# Patient Record
Sex: MALE | Race: WHITE | Employment: OTHER | URBAN - NONMETROPOLITAN AREA
[De-identification: names, ages, dates, MRNs, and addresses within clinical notes are randomized per-mention and may not be internally consistent; named-entity substitution may affect disease eponyms.]

---

## 2017-10-23 ENCOUNTER — APPOINTMENT (OUTPATIENT)
Dept: GENERAL RADIOLOGY | Age: 57
End: 2017-10-23
Payer: MEDICARE

## 2017-10-23 ENCOUNTER — APPOINTMENT (OUTPATIENT)
Dept: CT IMAGING | Age: 57
End: 2017-10-23
Payer: MEDICARE

## 2017-10-23 ENCOUNTER — HOSPITAL ENCOUNTER (EMERGENCY)
Age: 57
Discharge: HOME | End: 2017-10-23
Attending: FAMILY MEDICINE
Payer: MEDICARE

## 2017-10-23 VITALS
BODY MASS INDEX: 30.44 KG/M2 | RESPIRATION RATE: 18 BRPM | HEART RATE: 72 BPM | DIASTOLIC BLOOD PRESSURE: 102 MMHG | SYSTOLIC BLOOD PRESSURE: 154 MMHG | WEIGHT: 250 LBS | TEMPERATURE: 98.1 F | HEIGHT: 76 IN | OXYGEN SATURATION: 97 %

## 2017-10-23 DIAGNOSIS — R07.9 CHEST PAIN, UNSPECIFIED TYPE: ICD-10-CM

## 2017-10-23 DIAGNOSIS — I10 ESSENTIAL HYPERTENSION: Primary | ICD-10-CM

## 2017-10-23 LAB
ACETAMINOPHEN LEVEL: < 5 UG/ML (ref 0–20)
ALBUMIN SERPL-MCNC: 4.4 G/DL (ref 3.5–5.1)
ALLEN TEST: POSITIVE
ALP BLD-CCNC: 59 U/L (ref 38–126)
ALT SERPL-CCNC: 26 U/L (ref 11–66)
AMMONIA: 28 UMOL/L (ref 11–60)
AMPHETAMINE+METHAMPHETAMINE URINE SCREEN: NEGATIVE
ANION GAP SERPL CALCULATED.3IONS-SCNC: 15 MEQ/L (ref 8–16)
ANISOCYTOSIS: ABNORMAL
AST SERPL-CCNC: 41 U/L (ref 5–40)
BARBITURATE QUANTITATIVE URINE: NEGATIVE
BASE EXCESS (CALCULATED): -1.3 MMOL/L (ref -2.5–2.5)
BASOPHILS # BLD: 2 %
BASOPHILS ABSOLUTE: 0.2 THOU/MM3 (ref 0–0.1)
BENZODIAZEPINE QUANTITATIVE URINE: NEGATIVE
BILIRUB SERPL-MCNC: 0.4 MG/DL (ref 0.3–1.2)
BILIRUBIN DIRECT: < 0.2 MG/DL (ref 0–0.3)
BILIRUBIN URINE: NEGATIVE
BLOOD, URINE: NEGATIVE
BUN BLDV-MCNC: 14 MG/DL (ref 7–22)
CALCIUM SERPL-MCNC: 9 MG/DL (ref 8.5–10.5)
CANNABINOID QUANTITATIVE URINE: NEGATIVE
CHARACTER, URINE: CLEAR
CHLORIDE BLD-SCNC: 97 MEQ/L (ref 98–111)
CO2: 22 MEQ/L (ref 23–33)
COCAINE METABOLITE QUANTITATIVE URINE: NEGATIVE
COLLECTED BY:: ABNORMAL
COLOR: YELLOW
CREAT SERPL-MCNC: 1.4 MG/DL (ref 0.4–1.2)
D-DIMER QUANTITATIVE: < 215 NG/ML FEU (ref 0–500)
DEVICE: ABNORMAL
EOSINOPHIL # BLD: 0.6 %
EOSINOPHILS ABSOLUTE: 0 THOU/MM3 (ref 0–0.4)
ETHYL ALCOHOL, SERUM: < 0.01 %
FLU A ANTIGEN: NEGATIVE
FLU B ANTIGEN: NEGATIVE
GFR SERPL CREATININE-BSD FRML MDRD: 52 ML/MIN/1.73M2
GLUCOSE BLD-MCNC: 93 MG/DL
GLUCOSE BLD-MCNC: 93 MG/DL (ref 70–108)
GLUCOSE BLD-MCNC: 95 MG/DL (ref 70–108)
GLUCOSE URINE: NEGATIVE MG/DL
HCO3: 23 MMOL/L (ref 23–28)
HCT VFR BLD CALC: 37 % (ref 42–52)
HEMOGLOBIN: 12.4 GM/DL (ref 14–18)
KETONES, URINE: NEGATIVE
LEUKOCYTE ESTERASE, URINE: NEGATIVE
LYMPHOCYTES # BLD: 13 %
LYMPHOCYTES ABSOLUTE: 1.1 THOU/MM3 (ref 1–4.8)
MAGNESIUM: 2 MG/DL (ref 1.6–2.4)
MCH RBC QN AUTO: 33 PG (ref 27–31)
MCHC RBC AUTO-ENTMCNC: 33.4 GM/DL (ref 33–37)
MCV RBC AUTO: 98.9 FL (ref 80–94)
MONOCYTES # BLD: 7.7 %
MONOCYTES ABSOLUTE: 0.6 THOU/MM3 (ref 0.4–1.3)
NITRITE, URINE: NEGATIVE
NUCLEATED RED BLOOD CELLS: 0 /100 WBC
O2 SATURATION: 97 %
OPIATES, URINE: NEGATIVE
OSMOLALITY CALCULATION: 268.5 MOSMOL/KG (ref 275–300)
OXYCODONE: NEGATIVE
PCO2: 34 MMHG (ref 35–45)
PDW BLD-RTO: 14.9 % (ref 11.5–14.5)
PH BLOOD GAS: 7.43 (ref 7.35–7.45)
PH UA: 7.5
PHENCYCLIDINE QUANTITATIVE URINE: NEGATIVE
PLATELET # BLD: 135 THOU/MM3 (ref 130–400)
PLATELET ESTIMATE: ADEQUATE
PMV BLD AUTO: 8.2 MCM (ref 7.4–10.4)
PO2: 84 MMHG (ref 71–104)
POIKILOCYTES: SLIGHT
POTASSIUM SERPL-SCNC: 4.5 MEQ/L (ref 3.5–5.2)
PRO-BNP: 132.5 PG/ML (ref 0–900)
PROTEIN UA: NEGATIVE
RBC # BLD: 3.74 MILL/MM3 (ref 4.7–6.1)
RBC # BLD: ABNORMAL 10*6/UL
SALICYLATE, SERUM: 1.6 MG/DL (ref 2–10)
SCAN OF BLOOD SMEAR: NORMAL
SEG NEUTROPHILS: 76.7 %
SEGMENTED NEUTROPHILS ABSOLUTE COUNT: 6.3 THOU/MM3 (ref 1.8–7.7)
SODIUM BLD-SCNC: 134 MEQ/L (ref 135–145)
SOURCE, BLOOD GAS: ABNORMAL
SPECIFIC GRAVITY, URINE: 1.01 (ref 1–1.03)
TOTAL PROTEIN: 7 G/DL (ref 6.1–8)
TROPONIN T: < 0.01 NG/ML
TROPONIN T: < 0.01 NG/ML
TSH SERPL DL<=0.05 MIU/L-ACNC: 1.48 UIU/ML (ref 0.4–4.2)
UROBILINOGEN, URINE: 1 EU/DL
WBC # BLD: 8.2 THOU/MM3 (ref 4.8–10.8)

## 2017-10-23 PROCEDURE — 83735 ASSAY OF MAGNESIUM: CPT

## 2017-10-23 PROCEDURE — 82248 BILIRUBIN DIRECT: CPT

## 2017-10-23 PROCEDURE — 93005 ELECTROCARDIOGRAM TRACING: CPT

## 2017-10-23 PROCEDURE — 96375 TX/PRO/DX INJ NEW DRUG ADDON: CPT

## 2017-10-23 PROCEDURE — 6370000000 HC RX 637 (ALT 250 FOR IP): Performed by: FAMILY MEDICINE

## 2017-10-23 PROCEDURE — 80307 DRUG TEST PRSMV CHEM ANLYZR: CPT

## 2017-10-23 PROCEDURE — 80053 COMPREHEN METABOLIC PANEL: CPT

## 2017-10-23 PROCEDURE — 84443 ASSAY THYROID STIM HORMONE: CPT

## 2017-10-23 PROCEDURE — 6360000002 HC RX W HCPCS: Performed by: FAMILY MEDICINE

## 2017-10-23 PROCEDURE — 36415 COLL VENOUS BLD VENIPUNCTURE: CPT

## 2017-10-23 PROCEDURE — 85379 FIBRIN DEGRADATION QUANT: CPT

## 2017-10-23 PROCEDURE — 6360000004 HC RX CONTRAST MEDICATION: Performed by: FAMILY MEDICINE

## 2017-10-23 PROCEDURE — 82140 ASSAY OF AMMONIA: CPT

## 2017-10-23 PROCEDURE — 83880 ASSAY OF NATRIURETIC PEPTIDE: CPT

## 2017-10-23 PROCEDURE — 82803 BLOOD GASES ANY COMBINATION: CPT

## 2017-10-23 PROCEDURE — 96374 THER/PROPH/DIAG INJ IV PUSH: CPT

## 2017-10-23 PROCEDURE — 96361 HYDRATE IV INFUSION ADD-ON: CPT

## 2017-10-23 PROCEDURE — 99285 EMERGENCY DEPT VISIT HI MDM: CPT

## 2017-10-23 PROCEDURE — 84484 ASSAY OF TROPONIN QUANT: CPT

## 2017-10-23 PROCEDURE — 71275 CT ANGIOGRAPHY CHEST: CPT

## 2017-10-23 PROCEDURE — 81003 URINALYSIS AUTO W/O SCOPE: CPT

## 2017-10-23 PROCEDURE — 36600 WITHDRAWAL OF ARTERIAL BLOOD: CPT

## 2017-10-23 PROCEDURE — 87804 INFLUENZA ASSAY W/OPTIC: CPT

## 2017-10-23 PROCEDURE — 71010 XR CHEST PORTABLE: CPT

## 2017-10-23 PROCEDURE — G0480 DRUG TEST DEF 1-7 CLASSES: HCPCS

## 2017-10-23 PROCEDURE — 82948 REAGENT STRIP/BLOOD GLUCOSE: CPT

## 2017-10-23 PROCEDURE — 2500000003 HC RX 250 WO HCPCS: Performed by: FAMILY MEDICINE

## 2017-10-23 PROCEDURE — 2580000003 HC RX 258: Performed by: FAMILY MEDICINE

## 2017-10-23 PROCEDURE — 85025 COMPLETE CBC W/AUTO DIFF WBC: CPT

## 2017-10-23 RX ORDER — LABETALOL HYDROCHLORIDE 5 MG/ML
10 INJECTION, SOLUTION INTRAVENOUS ONCE
Status: COMPLETED | OUTPATIENT
Start: 2017-10-23 | End: 2017-10-23

## 2017-10-23 RX ORDER — 0.9 % SODIUM CHLORIDE 0.9 %
1000 INTRAVENOUS SOLUTION INTRAVENOUS ONCE
Status: COMPLETED | OUTPATIENT
Start: 2017-10-23 | End: 2017-10-23

## 2017-10-23 RX ORDER — FOLIC ACID 1 MG/1
1 TABLET ORAL ONCE
Status: COMPLETED | OUTPATIENT
Start: 2017-10-23 | End: 2017-10-23

## 2017-10-23 RX ORDER — ATENOLOL 25 MG/1
25 TABLET ORAL ONCE
Status: COMPLETED | OUTPATIENT
Start: 2017-10-23 | End: 2017-10-23

## 2017-10-23 RX ORDER — THIAMINE HYDROCHLORIDE 100 MG/ML
100 INJECTION, SOLUTION INTRAMUSCULAR; INTRAVENOUS ONCE
Status: COMPLETED | OUTPATIENT
Start: 2017-10-23 | End: 2017-10-23

## 2017-10-23 RX ORDER — QUETIAPINE 200 MG/1
400 TABLET, FILM COATED, EXTENDED RELEASE ORAL ONCE
Status: COMPLETED | OUTPATIENT
Start: 2017-10-23 | End: 2017-10-23

## 2017-10-23 RX ADMIN — IOPAMIDOL 80 ML: 755 INJECTION, SOLUTION INTRAVENOUS at 20:03

## 2017-10-23 RX ADMIN — LABETALOL HYDROCHLORIDE 10 MG: 5 INJECTION, SOLUTION INTRAVENOUS at 21:01

## 2017-10-23 RX ADMIN — FOLIC ACID 1 MG: 1 TABLET ORAL at 18:28

## 2017-10-23 RX ADMIN — QUETIAPINE FUMARATE 400 MG: 200 TABLET, EXTENDED RELEASE ORAL at 20:09

## 2017-10-23 RX ADMIN — ATENOLOL 25 MG: 25 TABLET ORAL at 20:09

## 2017-10-23 RX ADMIN — THIAMINE HYDROCHLORIDE 100 MG: 100 INJECTION, SOLUTION INTRAMUSCULAR; INTRAVENOUS at 18:28

## 2017-10-23 RX ADMIN — SODIUM CHLORIDE 1000 ML: 9 INJECTION, SOLUTION INTRAVENOUS at 18:55

## 2017-10-23 ASSESSMENT — PAIN DESCRIPTION - ORIENTATION: ORIENTATION: RIGHT;LEFT

## 2017-10-23 ASSESSMENT — ENCOUNTER SYMPTOMS
CHEST TIGHTNESS: 1
SORE THROAT: 0
BACK PAIN: 0
EYE REDNESS: 0
RHINORRHEA: 0
VOMITING: 0
ABDOMINAL PAIN: 0
SHORTNESS OF BREATH: 1
COUGH: 0
WHEEZING: 0
NAUSEA: 0
DIARRHEA: 0
EYE DISCHARGE: 0

## 2017-10-23 ASSESSMENT — PAIN DESCRIPTION - LOCATION: LOCATION: CHEST

## 2017-10-23 ASSESSMENT — PAIN SCALES - GENERAL: PAINLEVEL_OUTOF10: 6

## 2017-10-23 ASSESSMENT — PAIN DESCRIPTION - PAIN TYPE: TYPE: ACUTE PAIN

## 2017-10-23 ASSESSMENT — PAIN DESCRIPTION - DESCRIPTORS: DESCRIPTORS: TIGHTNESS

## 2017-10-23 NOTE — ED TRIAGE NOTES
Patient brought in by [address] [place] [address] Dr EMS for complaint of chest pain, sweating, shaking, flush, shortness of breath, and rapid heart rate. Patient reports he has a history of a rapid heart rate, denies atrial fib. Currently reports chest tightness, 6/10. Reports he only feels flushed otherwise.

## 2017-10-23 NOTE — ED PROVIDER NOTES
[facility]  eMERGENCY dEPARTMENT eNCOUnter          [address] [facility]       Chief Complaint   Patient presents with    Chest Pain    Shaking    Excessive Sweating    Shortness of Breath       Nurses Notes reviewed and I agree except as noted in the HPI. HISTORY OF PRESENT ILLNESS    Clarice Amos is a 61 y.o. male who presents to the Emergency Department via EMS for the evaluation of chest tightness. The patient states he was driving at 0:44DE today when his face started to get flushed, then he has the shakes, chest tightness, diaphoresis, and shortness of breath. He states he went home and his mom called the ambulance. The patient states he has heart palpitations today, and he has been treated for a a rapid heart beat for two years and he has a monitor. He takes atenolol for that. The patient states this is the first time he had had this before. He sees Dr. Neo Kong in [facility]. He takes Asprin every day. He has hypertension and has bipolar disorder. Patient denies a history of MI, DVT/PE, heart cath, heart stents, or weakened heart. He smokes cigarettes, but doesn't drink or do illicit drugs. Patient reported that his symptoms has much improved. The HPI was provided by the patient. REVIEW OF SYSTEMS     Review of Systems   Constitutional: Positive for diaphoresis. Negative for appetite change, chills, fatigue and fever. HENT: Negative for congestion, ear pain, rhinorrhea and sore throat. Eyes: Negative for discharge, redness and visual disturbance. Respiratory: Positive for chest tightness and shortness of breath. Negative for cough and wheezing. Cardiovascular: Positive for palpitations. Negative for chest pain and leg swelling. Gastrointestinal: Negative for abdominal pain, diarrhea, nausea and vomiting. Genitourinary: Negative for decreased urine volume, difficulty urinating and dysuria.    Musculoskeletal: Negative for arthralgias, back pain, joint swelling and neck pain. Skin: Negative for pallor and rash. Allergic/Immunologic: Negative for environmental allergies. Neurological: Negative for dizziness, syncope, weakness, light-headedness and headaches. Shakes   Hematological: Negative for adenopathy. Psychiatric/Behavioral: Negative for agitation, confusion, dysphoric mood and suicidal ideas. The patient is not nervous/anxious. PAST MEDICAL HISTORY    has a past medical history of Alcohol abuse; Cancer ([facility] [address] Depression; Drug abuse; GERD (gastroesophageal reflux disease); Hyperlipidemia; and Hypertension. SURGICAL HISTORY      has a past surgical history that includes other surgical history (12/28/2012); fracture surgery; and Tonsillectomy. CURRENT MEDICATIONS       Previous Medications    ACETAMINOPHEN 650 MG TABS    Take 650 mg by mouth every 4 hours as needed. AMLODIPINE (NORVASC) 10 MG TABLET    Take 1 tablet by mouth daily. CARBAMAZEPINE (TEGRETOL) 200 MG TABLET    Take 200 mg by mouth 2 times daily. CLONIDINE (CATAPRES) 0.1 MG TABLET    Take 1 tablet by mouth 2 times daily. FLUOXETINE (PROZAC) 40 MG CAPSULE    Take 1 capsule by mouth daily. FOLIC ACID (FOLVITE) 1 MG TABLET    Take 1 tablet by mouth daily. LISINOPRIL-HYDROCHLOROTHIAZIDE (PRINZIDE;ZESTORETIC) 20-12.5 MG PER TABLET    Take 1 tablet by mouth daily. METOPROLOL (LOPRESSOR) 100 MG TABLET    Take 1 tablet by mouth 2 times daily. MULTIPLE VITAMINS-MINERALS (THERAPEUTIC MULTIVITAMIN-MINERALS) TABLET    Take 1 tablet by mouth daily. NICOTINE (NICODERM CQ) 14 MG/24HR    Place 1 patch onto the skin daily. PANTOPRAZOLE (PROTONIX) 40 MG TABLET    Take 1 tablet by mouth 2 times daily (before meals). PRAVASTATIN (PRAVACHOL) 40 MG TABLET    Take 40 mg by mouth nightly. QUETIAPINE (SEROQUEL) 400 MG TABLET    Take 400 mg by mouth 2 times daily. TRAZODONE (DESYREL) 100 MG TABLET    Take 1 tablet by mouth nightly as needed for Sleep.     VITAMIN B-1 100 MG TABLET    Take 1 tablet by mouth daily. ALLERGIES     has No Known Allergies. FAMILY HISTORY     indicated that his mother is alive. He indicated that his father is . family history includes COPD in his father; Heart Disease in his father and mother. SOCIAL HISTORY      reports that he has been smoking Cigarettes. He has been smoking about 0.50 packs per day. He has never used smokeless tobacco. He reports that he drinks alcohol. He reports that he uses drugs, including Cocaine. PHYSICAL EXAM     INITIAL VITALS:  height is 6' 4\" (1.93 m) and weight is 250 lb (113.4 kg). His oral temperature is 98.1 °F (36.7 °C). His blood pressure is 154/102 (abnormal) and his pulse is 72. His respiration is 18 and oxygen saturation is 97%. Physical Exam   Constitutional: He is oriented to person, place, and time. He appears well-developed and well-nourished. HENT:   Head: Normocephalic and atraumatic. Right Ear: External ear normal.   Left Ear: External ear normal.   Wax in both TM's. Eyes: Conjunctivae are normal. Right eye exhibits no discharge. Left eye exhibits no discharge. No scleral icterus. Neck: Normal range of motion. Neck supple. No JVD present. Cardiovascular: Normal rate, regular rhythm and normal heart sounds. Exam reveals no gallop and no friction rub. No murmur heard. Pulmonary/Chest: Effort normal. No stridor. No respiratory distress. Abdominal: Soft. He exhibits no distension. Musculoskeletal: Normal range of motion. He exhibits no edema. Neurological: He is alert and oriented to person, place, and time. He exhibits normal muscle tone. GCS eye subscore is 4. GCS verbal subscore is 5. GCS motor subscore is 6. Skin: Skin is warm and dry. He is not diaphoretic. No erythema. Psychiatric: He has a normal mood and affect. His behavior is normal.   Nursing note and vitals reviewed. DIFFERENTIAL DIAGNOSIS:   ACS, arrhythmia, PE    DIAGNOSTIC RESULTS     EKG:  All EKG's are interpreted by the Emergency Department Physician who either signs or Co-signs this chart in the absence of a cardiologist.  EKG interpreted by Marcie Starr MD:    Vent. Rate: 80 bpm  WI interval: 178 ms  QRS duration: 100 ms  QTc: 426 ms  P-R-T axes: 38, -17, 6  Normal sinus rhythm   Incomplete right BBB  No STEMI    RADIOLOGY: non-plain film images(s) such as CT, Ultrasound and MRI are read by the radiologist.    CTA Chest W WO Contrast   Final Result   1. No CT evidence of pulmonary embolism. 2. No acute intrathoracic findings. Final report electronically signed by Dr. Wilhemenia Mortimer on 10/23/2017 8:22 PM      XR Chest Portable   Final Result   Stable radiographic appearance of the chest. No evidence of an acute process. **This report has been created using voice recognition software. It may contain minor errors which are inherent in voice recognition technology. **      Final report electronically signed by Dr. Isma Gonzales on 10/23/2017 4:53 PM          LABS:   Labs Reviewed   CBC WITH AUTO DIFFERENTIAL - Abnormal; Notable for the following:        Result Value    RBC 3.74 (*)     Hemoglobin 12.4 (*)     Hematocrit 37.0 (*)     MCV 98.9 (*)     MCH 33.0 (*)     RDW 14.9 (*)     Basophils # 0.2 (*)     All other components within normal limits   BASIC METABOLIC PANEL - Abnormal; Notable for the following:     Sodium 134 (*)     Chloride 97 (*)     CO2 22 (*)     CREATININE 1.4 (*)     All other components within normal limits   SALICYLATE LEVEL - Abnormal; Notable for the following:     Salicylate, Serum 1.6 (*)     All other components within normal limits   HEPATIC FUNCTION PANEL - Abnormal; Notable for the following:     AST 41 (*)     All other components within normal limits   GLOMERULAR FILTRATION RATE, ESTIMATED - Abnormal; Notable for the following:     Est, Glom Filt Rate 52 (*)     All other components within normal limits   OSMOLALITY - Abnormal; Notable for the following:     Osmolality Calc 268.5 (*)     All other components within normal limits   BLOOD GAS, ARTERIAL - Abnormal; Notable for the following:     PCO2 34 (*)     All other components within normal limits   POCT GLUCOSE - Normal   RAPID INFLUENZA A/B ANTIGENS   D-DIMER, QUANTITATIVE   TSH WITH REFLEX   MAGNESIUM   URINE RT REFLEX TO CULTURE   URINE DRUG SCREEN   ETHANOL   TROPONIN   BRAIN NATRIURETIC PEPTIDE   ACETAMINOPHEN LEVEL   AMMONIA   SCAN OF BLOOD SMEAR   ANION GAP   TROPONIN   POCT GLUCOSE       EMERGENCY DEPARTMENT COURSE:   Vitals:    Vitals:    10/23/17 1901 10/23/17 2009 10/23/17 2059 10/23/17 2106   BP: (!) 172/108 (!) 171/119 (!) 164/113 (!) 154/102   Pulse: 72 75 72 72   Resp: 12   18   Temp:       TempSrc:       SpO2: 99%  98% 97%   Weight:       Height:           4:06 PM: The patient was seen and evaluated. Seen and evaluated in the emergency chest pain all other Symptoms as mentioned above. Exam is mentioned above. Labwork was done which showed negative troponin by 2, TSH within normal.negative d-dimer, All other lab work as mentioned above. Chest x-ray was ordered which was negative. CT chest was done which was negative for any dissection or pulmonary embolism. Patient's symptoms has resolved during his ER stay. Patient was offered follow-up with cardiology. Patient stated that he has a cardiologist at an outside hospital and has an appointment with him and he preferred to follow up with him. He also has a loop recorder regarding his tachycardia which she follows up with his outside hospital cardiologist.    Return precautions were discussed with the patient and the need to come back immediately to the emergency. Patient verbalized understanding agreed to plan of care, he was discharged home in a stable condition to follow up with his cardiologist and primary care physician. FINAL IMPRESSION      1. Essential hypertension    2.  Chest pain, resolved DISPOSITION/PLAN   Discharge     PATIENT REFERRED TO:  Jaziel Alcala MD  [address]  Ria Mcginnis [address] [place] [address]  [phone]    Schedule an appointment as soon as possible for a visit in 2 days        DISCHARGE MEDICATIONS:  New Prescriptions    No medications on file       (Please note that portions of this note were completed with a voice recognition program.  Efforts were made to edit the dictations but occasionally words are mis-transcribed.)    Scribe:  Josey Messina 10/23/17 4:06 PM Scribing for and in the presence of Cecelia Mar MD.    Signed by: José Miguel Carvajal, 10/23/17 10:07 PM    Provider:  I personally performed the services described in the documentation, reviewed and edited the documentation which was dictated to the scribe in my presence, and it accurately records my words and actions.     Cecelia Mar MD 10/23/17 10:07 PM        Cecelia Mar MD  10/23/17 [id number]

## 2017-10-24 LAB
EKG ATRIAL RATE: 80 BPM
EKG P AXIS: 38 DEGREES
EKG P-R INTERVAL: 178 MS
EKG Q-T INTERVAL: 370 MS
EKG QRS DURATION: 100 MS
EKG QTC CALCULATION (BAZETT): 426 MS
EKG R AXIS: -17 DEGREES
EKG T AXIS: 6 DEGREES
EKG VENTRICULAR RATE: 80 BPM

## 2019-08-13 ENCOUNTER — HOSPITAL ENCOUNTER (EMERGENCY)
Age: 59
Discharge: HOME | End: 2019-08-13
Payer: MEDICARE

## 2019-08-13 ENCOUNTER — HOSPITAL ENCOUNTER (EMERGENCY)
Dept: GENERAL RADIOLOGY | Age: 59
Discharge: HOME | End: 2019-08-13
Payer: MEDICARE

## 2019-08-13 VITALS
DIASTOLIC BLOOD PRESSURE: 94 MMHG | SYSTOLIC BLOOD PRESSURE: 142 MMHG | TEMPERATURE: 97.3 F | RESPIRATION RATE: 18 BRPM | OXYGEN SATURATION: 97 % | HEART RATE: 88 BPM

## 2019-08-13 DIAGNOSIS — M79.18 INTERCOSTAL MUSCLE PAIN: ICD-10-CM

## 2019-08-13 DIAGNOSIS — S20.212A CONTUSION OF RIBS, LEFT, INITIAL ENCOUNTER: Primary | ICD-10-CM

## 2019-08-13 PROCEDURE — 71101 X-RAY EXAM UNILAT RIBS/CHEST: CPT

## 2019-08-13 PROCEDURE — 99213 OFFICE O/P EST LOW 20 MIN: CPT

## 2019-08-13 PROCEDURE — 99203 OFFICE O/P NEW LOW 30 MIN: CPT | Performed by: NURSE PRACTITIONER

## 2019-08-13 RX ORDER — CYCLOBENZAPRINE HCL 10 MG
10 TABLET ORAL 3 TIMES DAILY PRN
Qty: 15 TABLET | Refills: 0 | Status: SHIPPED | OUTPATIENT
Start: 2019-08-13 | End: 2019-08-23

## 2019-08-13 ASSESSMENT — ENCOUNTER SYMPTOMS
BACK PAIN: 1
COUGH: 0
VOMITING: 0
NAUSEA: 0
SHORTNESS OF BREATH: 0

## 2019-08-13 ASSESSMENT — PAIN - FUNCTIONAL ASSESSMENT: PAIN_FUNCTIONAL_ASSESSMENT: PREVENTS OR INTERFERES WITH MANY ACTIVE NOT PASSIVE ACTIVITIES

## 2019-08-13 ASSESSMENT — PAIN DESCRIPTION - PAIN TYPE: TYPE: ACUTE PAIN

## 2019-08-13 ASSESSMENT — PAIN DESCRIPTION - LOCATION: LOCATION: BACK

## 2019-08-13 ASSESSMENT — PAIN DESCRIPTION - FREQUENCY: FREQUENCY: INTERMITTENT

## 2019-08-13 ASSESSMENT — PAIN DESCRIPTION - ORIENTATION: ORIENTATION: UPPER

## 2019-08-13 ASSESSMENT — PAIN DESCRIPTION - PROGRESSION: CLINICAL_PROGRESSION: NOT CHANGED

## 2019-08-13 ASSESSMENT — PAIN DESCRIPTION - ONSET: ONSET: SUDDEN

## 2019-08-13 ASSESSMENT — PAIN SCALES - GENERAL: PAINLEVEL_OUTOF10: 8

## 2019-08-13 ASSESSMENT — PAIN DESCRIPTION - DESCRIPTORS: DESCRIPTORS: SPASM;SHOOTING

## 2019-08-13 NOTE — ED NOTES
No change in patients condition. Discharge instructions and prescriptions discussed with pt. Pt. Verbalized understanding of info given and ambulated to exit in stable condition.       Cindy Andujar RN  08/13/19 [id number]

## 2019-08-13 NOTE — ED NOTES
Pt here with an injury to the upper to mid back. Pt states that last night when heading into bed he tripped causing him to miss judged his bed fell backwards hitting his back on the edge of his nightstand. Pt is slow when moving from sitting to standing position and vice versa. Pt has a reddened faizan across the thoracic portion of back.       Bharat Angela RN  08/13/19 1821

## 2019-08-13 NOTE — ED PROVIDER NOTES
history. Drug: Cocaine. PHYSICAL EXAM     ED TRIAGE VITALS  BP: (!) 142/94, Temp: 97.3 °F (36.3 °C), Pulse: 88, Resp: 18, SpO2: 97 %,Estimated body mass index is 30.43 kg/m² as calculated from the following:    Height as of 10/23/17: 6' 4\" (1.93 m). Weight as of 10/23/17: 250 lb (113.4 kg). ,No LMP for male patient. Physical Exam   Constitutional: He is oriented to person, place, and time. He appears well-developed and well-nourished. He is cooperative. No distress. HENT:   Head: Normocephalic and atraumatic. Pulmonary/Chest: Effort normal. No respiratory distress. He exhibits tenderness (Posterior, left ribs lower thoracic). He exhibits no crepitus and no deformity. Neurological: He is alert and oriented to person, place, and time. Skin: Skin is warm and dry. Psychiatric: He has a normal mood and affect. His speech is normal and behavior is normal.   Nursing note and vitals reviewed. DIAGNOSTIC RESULTS     Labs:No results found for this visit on 08/13/19. IMAGING:    XR RIBS LEFT INCLUDE CHEST (MIN 3 VIEWS)   Final Result   1. Mild cortical irregularity of the left third rib which could be on the basis of a nondisplaced fracture. 2. No acute intrathoracic process. **This report has been created using voice recognition software. It may contain minor errors which are inherent in voice recognition technology. **      Final report electronically signed by Dr Meg Yu on 8/13/2019 10:39 AM            URGENT CARE COURSE:     Vitals:    08/13/19 1020   BP: (!) 142/94   Pulse: 88   Resp: 18   Temp: 97.3 °F (36.3 °C)   TempSrc: Temporal   SpO2: 97%       Medications - No data to display         PROCEDURES:  None    FINAL IMPRESSION      1. Contusion of ribs, left, initial encounter    2. Intercostal muscle pain          DISPOSITION/ PLAN     Patient presents with left posterior rib pain after a fall last night.   X-ray was negative for acute fracture dislocation in the area of

## 2019-08-16 ENCOUNTER — HOSPITAL ENCOUNTER (EMERGENCY)
Age: 59
Discharge: HOME | End: 2019-08-17
Payer: MEDICARE

## 2019-08-16 ENCOUNTER — APPOINTMENT (OUTPATIENT)
Dept: CT IMAGING | Age: 59
End: 2019-08-16
Payer: MEDICARE

## 2019-08-16 VITALS
OXYGEN SATURATION: 98 % | SYSTOLIC BLOOD PRESSURE: 136 MMHG | DIASTOLIC BLOOD PRESSURE: 82 MMHG | HEART RATE: 66 BPM | BODY MASS INDEX: 30.43 KG/M2 | WEIGHT: 250 LBS | RESPIRATION RATE: 16 BRPM | TEMPERATURE: 98.3 F
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
## 2019-08-16 DIAGNOSIS — S01.01XA LACERATION OF SCALP, INITIAL ENCOUNTER: ICD-10-CM

## 2019-08-16 DIAGNOSIS — F10.920 ACUTE ALCOHOLIC INTOXICATION WITHOUT COMPLICATION (HCC): Primary | ICD-10-CM

## 2019-08-16 DIAGNOSIS — S09.90XA INJURY OF HEAD, INITIAL ENCOUNTER: ICD-10-CM

## 2019-08-16 LAB
ACETAMINOPHEN LEVEL: < 5 UG/ML (ref 0–20)
ALBUMIN SERPL-MCNC: 4.3 G/DL (ref 3.5–5.1)
ALP BLD-CCNC: 60 U/L (ref 38–126)
ALT SERPL-CCNC: 21 U/L (ref 11–66)
AMPHETAMINE+METHAMPHETAMINE URINE SCREEN: NEGATIVE
ANION GAP SERPL CALCULATED.3IONS-SCNC: 18 MEQ/L (ref 8–16)
AST SERPL-CCNC: 35 U/L (ref 5–40)
BACTERIA: ABNORMAL /HPF
BARBITURATE QUANTITATIVE URINE: NEGATIVE
BASOPHILS # BLD: 0.8 %
BASOPHILS ABSOLUTE: 0 THOU/MM3 (ref 0–0.1)
BENZODIAZEPINE QUANTITATIVE URINE: NEGATIVE
BILIRUB SERPL-MCNC: 0.4 MG/DL (ref 0.3–1.2)
BILIRUBIN DIRECT: < 0.2 MG/DL (ref 0–0.3)
BILIRUBIN URINE: NEGATIVE
BLOOD, URINE: NEGATIVE
BUN BLDV-MCNC: 12 MG/DL (ref 7–22)
CALCIUM SERPL-MCNC: 8.9 MG/DL (ref 8.5–10.5)
CANNABINOID QUANTITATIVE URINE: NEGATIVE
CASTS 2: ABNORMAL /LPF
CASTS UA: ABNORMAL /LPF
CHARACTER, URINE: ABNORMAL
CHLORIDE BLD-SCNC: 99 MEQ/L (ref 98–111)
CO2: 20 MEQ/L (ref 23–33)
COCAINE METABOLITE QUANTITATIVE URINE: NEGATIVE
COLOR: YELLOW
CREAT SERPL-MCNC: 1.4 MG/DL (ref 0.4–1.2)
CRYSTALS, UA: ABNORMAL
EKG ATRIAL RATE: 76 BPM
EKG P AXIS: 65 DEGREES
EKG P-R INTERVAL: 188 MS
EKG Q-T INTERVAL: 382 MS
EKG QRS DURATION: 100 MS
EKG QTC CALCULATION (BAZETT): 429 MS
EKG R AXIS: -10 DEGREES
EKG T AXIS: 10 DEGREES
EKG VENTRICULAR RATE: 76 BPM
EOSINOPHIL # BLD: 2.7 %
EOSINOPHILS ABSOLUTE: 0.2 THOU/MM3 (ref 0–0.4)
EPITHELIAL CELLS, UA: ABNORMAL /HPF
ERYTHROCYTE [DISTWIDTH] IN BLOOD BY AUTOMATED COUNT: 13 % (ref 11.5–14.5)
ERYTHROCYTE [DISTWIDTH] IN BLOOD BY AUTOMATED COUNT: 48.9 FL (ref 35–45)
ETHYL ALCOHOL, SERUM: 0.08 %
ETHYL ALCOHOL, SERUM: 0.12 %
GFR SERPL CREATININE-BSD FRML MDRD: 52 ML/MIN/1.73M2
GLUCOSE BLD-MCNC: 100 MG/DL (ref 70–108)
GLUCOSE URINE: NEGATIVE MG/DL
HCT VFR BLD CALC: 36.5 % (ref 42–52)
HEMOGLOBIN: 12.1 GM/DL (ref 14–18)
IMMATURE GRANS (ABS): 0.01 THOU/MM3 (ref 0–0.07)
IMMATURE GRANULOCYTES: 0 %
INR BLD: 0.96 (ref 0.85–1.13)
KETONES, URINE: NEGATIVE
LEUKOCYTE ESTERASE, URINE: NEGATIVE
LIPASE: 25.4 U/L (ref 5.6–51.3)
LYMPHOCYTES # BLD: 23.5 %
LYMPHOCYTES ABSOLUTE: 1.4 THOU/MM3 (ref 1–4.8)
MAGNESIUM: 1.9 MG/DL (ref 1.6–2.4)
MCH RBC QN AUTO: 34.1 PG (ref 26–33)
MCHC RBC AUTO-ENTMCNC: 33.2 GM/DL (ref 32.2–35.5)
MCV RBC AUTO: 102.8 FL (ref 80–94)
MISCELLANEOUS 2: ABNORMAL
MONOCYTES # BLD: 7.7 %
MONOCYTES ABSOLUTE: 0.5 THOU/MM3 (ref 0.4–1.3)
NITRITE, URINE: NEGATIVE
NUCLEATED RED BLOOD CELLS: 0 /100 WBC
OPIATES, URINE: NEGATIVE
OSMOLALITY CALCULATION: 273.7 MOSMOL/KG (ref 275–300)
OXYCODONE: NEGATIVE
PH UA: 6.5 (ref 5–9)
PHENCYCLIDINE QUANTITATIVE URINE: NEGATIVE
PLATELET # BLD: 181 THOU/MM3 (ref 130–400)
PMV BLD AUTO: 9.4 FL (ref 9.4–12.4)
POTASSIUM SERPL-SCNC: 3.7 MEQ/L (ref 3.5–5.2)
PRO-BNP: 71 PG/ML (ref 0–900)
PROTEIN UA: NEGATIVE
RBC # BLD: 3.55 MILL/MM3 (ref 4.7–6.1)
RBC URINE: ABNORMAL /HPF
RENAL EPITHELIAL, UA: ABNORMAL
SALICYLATE, SERUM: < 0.3 MG/DL (ref 2–10)
SEG NEUTROPHILS: 65.1 %
SEGMENTED NEUTROPHILS ABSOLUTE COUNT: 3.9 THOU/MM3 (ref 1.8–7.7)
SODIUM BLD-SCNC: 137 MEQ/L (ref 135–145)
SPECIFIC GRAVITY, URINE: 1.01 (ref 1–1.03)
TOTAL PROTEIN: 7.1 G/DL (ref 6.1–8)
TROPONIN T: < 0.01 NG/ML
TSH SERPL DL<=0.05 MIU/L-ACNC: 1.14 UIU/ML (ref 0.4–4.2)
UROBILINOGEN, URINE: 0.2 EU/DL (ref 0–1)
WBC # BLD: 6 THOU/MM3 (ref 4.8–10.8)
WBC UA: ABNORMAL /HPF
YEAST: ABNORMAL

## 2019-08-16 PROCEDURE — 83880 ASSAY OF NATRIURETIC PEPTIDE: CPT

## 2019-08-16 PROCEDURE — 80053 COMPREHEN METABOLIC PANEL: CPT

## 2019-08-16 PROCEDURE — 6360000004 HC RX CONTRAST MEDICATION: Performed by: PHYSICIAN ASSISTANT

## 2019-08-16 PROCEDURE — 85610 PROTHROMBIN TIME: CPT

## 2019-08-16 PROCEDURE — 90471 IMMUNIZATION ADMIN: CPT | Performed by: PHYSICIAN ASSISTANT

## 2019-08-16 PROCEDURE — 6370000000 HC RX 637 (ALT 250 FOR IP): Performed by: PHYSICIAN ASSISTANT

## 2019-08-16 PROCEDURE — 96374 THER/PROPH/DIAG INJ IV PUSH: CPT

## 2019-08-16 PROCEDURE — 71260 CT THORAX DX C+: CPT

## 2019-08-16 PROCEDURE — 82248 BILIRUBIN DIRECT: CPT

## 2019-08-16 PROCEDURE — 6360000002 HC RX W HCPCS: Performed by: PHYSICIAN ASSISTANT

## 2019-08-16 PROCEDURE — G0480 DRUG TEST DEF 1-7 CLASSES: HCPCS

## 2019-08-16 PROCEDURE — 85025 COMPLETE CBC W/AUTO DIFF WBC: CPT

## 2019-08-16 PROCEDURE — 70450 CT HEAD/BRAIN W/O DYE: CPT

## 2019-08-16 PROCEDURE — 74177 CT ABD & PELVIS W/CONTRAST: CPT

## 2019-08-16 PROCEDURE — 2580000003 HC RX 258: Performed by: PHYSICIAN ASSISTANT

## 2019-08-16 PROCEDURE — 83690 ASSAY OF LIPASE: CPT

## 2019-08-16 PROCEDURE — 81001 URINALYSIS AUTO W/SCOPE: CPT

## 2019-08-16 PROCEDURE — 2500000003 HC RX 250 WO HCPCS: Performed by: PHYSICIAN ASSISTANT

## 2019-08-16 PROCEDURE — 76376 3D RENDER W/INTRP POSTPROCES: CPT

## 2019-08-16 PROCEDURE — 12002 RPR S/N/AX/GEN/TRNK2.6-7.5CM: CPT

## 2019-08-16 PROCEDURE — 80305 DRUG TEST PRSMV DIR OPT OBS: CPT

## 2019-08-16 PROCEDURE — 83735 ASSAY OF MAGNESIUM: CPT

## 2019-08-16 PROCEDURE — 93005 ELECTROCARDIOGRAM TRACING: CPT | Performed by: PHYSICIAN ASSISTANT

## 2019-08-16 PROCEDURE — 99285 EMERGENCY DEPT VISIT HI MDM: CPT

## 2019-08-16 PROCEDURE — 93010 ELECTROCARDIOGRAM REPORT: CPT | Performed by: INTERNAL MEDICINE

## 2019-08-16 PROCEDURE — 90715 TDAP VACCINE 7 YRS/> IM: CPT | Performed by: PHYSICIAN ASSISTANT

## 2019-08-16 PROCEDURE — 72125 CT NECK SPINE W/O DYE: CPT

## 2019-08-16 PROCEDURE — 84443 ASSAY THYROID STIM HORMONE: CPT

## 2019-08-16 PROCEDURE — 36415 COLL VENOUS BLD VENIPUNCTURE: CPT

## 2019-08-16 PROCEDURE — 84484 ASSAY OF TROPONIN QUANT: CPT

## 2019-08-16 RX ORDER — MORPHINE SULFATE 2 MG/ML
2 INJECTION, SOLUTION INTRAMUSCULAR; INTRAVENOUS ONCE
Status: COMPLETED | OUTPATIENT
Start: 2019-08-16 | End: 2019-08-16

## 2019-08-16 RX ORDER — 0.9 % SODIUM CHLORIDE 0.9 %
1000 INTRAVENOUS SOLUTION INTRAVENOUS ONCE
Status: COMPLETED | OUTPATIENT
Start: 2019-08-16 | End: 2019-08-16

## 2019-08-16 RX ORDER — LIDOCAINE HYDROCHLORIDE 10 MG/ML
20 INJECTION, SOLUTION INFILTRATION; PERINEURAL ONCE
Status: COMPLETED | OUTPATIENT
Start: 2019-08-16 | End: 2019-08-16

## 2019-08-16 RX ORDER — IBUPROFEN 200 MG
TABLET ORAL ONCE
Status: COMPLETED | OUTPATIENT
Start: 2019-08-16 | End: 2019-08-16

## 2019-08-16 RX ADMIN — TETANUS TOXOID, REDUCED DIPHTHERIA TOXOID AND ACELLULAR PERTUSSIS VACCINE, ADSORBED 0.5 ML: 5; 2.5; 8; 8; 2.5 SUSPENSION INTRAMUSCULAR at 19:35

## 2019-08-16 RX ADMIN — LIDOCAINE HYDROCHLORIDE 20 ML: 10 INJECTION, SOLUTION INFILTRATION; PERINEURAL at 21:33

## 2019-08-16 RX ADMIN — BACITRACIN, NEOMYCIN, POLYMYXIN B 1 G: 400; 3.5; 5 OINTMENT TOPICAL at 21:33

## 2019-08-16 RX ADMIN — IOPAMIDOL 80 ML: 755 INJECTION, SOLUTION INTRAVENOUS at 20:00

## 2019-08-16 RX ADMIN — MORPHINE SULFATE 2 MG: 2 INJECTION, SOLUTION INTRAMUSCULAR; INTRAVENOUS at 19:36

## 2019-08-16 RX ADMIN — SODIUM CHLORIDE 1000 ML: 9 INJECTION, SOLUTION INTRAVENOUS at 19:35

## 2019-08-16 ASSESSMENT — ENCOUNTER SYMPTOMS
EYE REDNESS: 0
COUGH: 0
ABDOMINAL PAIN: 0
NAUSEA: 0
EYE DISCHARGE: 0
COLOR CHANGE: 0
CONSTIPATION: 0
DIARRHEA: 0
BACK PAIN: 0
PHOTOPHOBIA: 0
SHORTNESS OF BREATH: 0
VOMITING: 0

## 2019-08-16 ASSESSMENT — PAIN DESCRIPTION - PAIN TYPE: TYPE: ACUTE PAIN

## 2019-08-16 ASSESSMENT — PAIN DESCRIPTION - LOCATION: LOCATION: HEAD

## 2019-08-16 ASSESSMENT — PAIN SCALES - GENERAL
PAINLEVEL_OUTOF10: 4
PAINLEVEL_OUTOF10: 7

## 2019-08-16 NOTE — ED PROVIDER NOTES
[facility] EMERGENCY DEPT      CHIEF COMPLAINT       Chief Complaint   Patient presents with    Alcohol Intoxication    Fall    Head Injury       Nurses Notes reviewed and I agree except asnoted in the HPI. HISTORY OFPRESENT ILLNESS    Del Gutierrez is a 60 y.o. male who presents to the emergency department for evaluation of alcohol intoxication and a head injury after a fall. The patient states that he was in a [employer] [place] when he fell. The patient states that he does not remember why he was there or how he fell. The patient reportedly hit the back of his head, although he states that he is not sure whether or not he passed out. The patient reports pain at the back of his head as well as bleeding. The patient denies a headache, dizziness, lightheadedness, weakness, numbness, or tingling. The patient reports pain of the left ribs but denies any chest pain or shortness of breath. The patient denies any abdominal pain, nausea, or vomiting. The patient denies any neck or back pain. The patient has a history of alcohol abuse as well as withdrawal.  The patient states that he drank 2 pints of alcohol today. The patient also has a history of hypertension. The patient denies any additional injuries or areas of pain. The patient is wearing a hard c-collar and is on a backboard on upon arrival to this department. There are no additional complaints at this time. REVIEW OF SYSTEMS      Review of Systems   Constitutional: Negative for chills, fatigue and fever. HENT: Negative for tinnitus. Eyes: Negative for photophobia, discharge, redness and visual disturbance. Respiratory: Negative for cough and shortness of breath. Cardiovascular: Negative for chest pain. Gastrointestinal: Negative for abdominal pain, constipation, diarrhea, nausea and vomiting. Denies bowel incontinence   Genitourinary: Negative for difficulty urinating, dysuria, frequency and urgency.         Denies There is intact sensation of soft touch in the extremities. The extremities appear to be neurovascularly intact. Lymphadenopathy:     He has no cervical adenopathy. Neurological: He is alert and oriented to person, place, and time. No cranial nerve deficit. He exhibits normal muscle tone. Coordination normal. GCS eye subscore is 4. GCS verbal subscore is 5. GCS motor subscore is 6. There were no noted cranial nerve or focal neurologic deficits. Cranial nerves II through XII are grossly intact. Skin: Skin is warm and dry. No rash noted. He is not diaphoretic. No erythema. No pallor. Psychiatric: He has a normal mood and affect. His behavior is normal. Thought content normal.   Nursing note and vitals reviewed.                  DIFFERENTIAL DIAGNOSIS:   Includes but not limited to: Alcohol intoxication, contusion, abrasion, laceration, hematoma, TBI, skull fracture, ICH, rib contusion versus fracture    DIAGNOSTIC RESULTS     EKG: All EKG's are interpreted by the Emergency Department Physician who either signs or Co-signsthis chart in the absence of a cardiologist.  EKG Emergency (Final result)    Component (Lab Inquiry)   Collection Time Result Time Ventricular Rate Atrial Rate P-R Interval QRS Duration Q-T Interval   08/16/19 19:42:08 08/16/19 19:42:08 76 76 188 100 382       Collection Time Result Time QTc Calculation (Bazett) P Axis R Axis T Axis   08/16/19 19:42:08 08/16/19 19:42:08 429 65 -10 10         Final result                Narrative:    Normal sinus rhythm  Incomplete right bundle branch block  Nonspecific T wave abnormality  Abnormal ECG  When compared with ECG of 23-OCT-2017 15:41,  No significant change was found  Confirmed by Sangeetha Ray ([id number]) on 8/16/2019 11:15:29 PM                RADIOLOGY: non-plain film images(s) such as CT, Ultrasound and MRI are read by the radiologist.  [place] images are visualized and preliminarily interpreted by the emergency physician unless otherwise stated below. CT Head WO Contrast   Final Result   No acute hemorrhage or midline shift. Moderate sized posterior parietal scalp hematoma with laceration. **This report has been created using voice recognition software. It may contain minor errors which are inherent in voice recognition technology. **      Final report electronically signed by Dr. Vinod Coburn on 8/16/2019 8:21 PM      CT Cervical Spine WO Contrast   Final Result   No acute fracture or subluxation. **This report has been created using voice recognition software. It may contain minor errors which are inherent in voice recognition technology. **      Final report electronically signed by Dr. Vinod Coburn on 8/16/2019 8:24 PM      CT Chest W Contrast   Final Result    No acute intrathoracic findings. **This report has been created using voice recognition software. It may contain minor errors which are inherent in voice recognition technology. **      Final report electronically signed by Dr. Vinod Coburn on 8/16/2019 8:26 PM      CT ABDOMEN PELVIS W IV CONTRAST Additional Contrast? None   Final Result   1. Nonspecific mild perinephric stranding, correlate with urinalysis to exclude changes of nephritis. 2. Chronic degenerative change of the skeleton present. 3. Mild fatty liver changes noted. **This report has been created using voice recognition software. It may contain minor errors which are inherent in voice recognition technology. **      Final report electronically signed by Dr. Karli Norton on 8/16/2019 9:29 PM      CT LUMBAR RECONSTRUCTION WO POST PROCESS   Final Result    IMPRESSION:   No acute fracture or subluxation. **This report has been created using voice recognition software. It may contain minor errors which are inherent in voice recognition technology. **         Final report electronically signed by Dr. Vinod Coburn on 8/16/2019 8:34 PM      CT THORACIC RECONSTRUCTION WO POST PROCESS   Final Result   Mild degenerative changes. Mild disc space narrowing. No acute fracture or subluxation. **This report has been created using voice recognition software. It may contain minor errors which are inherent in voice recognition technology. **      Final report electronically signed by Dr. Genie Garza on 8/16/2019 8:30 PM          LABS:   Labs Reviewed   CBC WITH AUTO DIFFERENTIAL - Abnormal; Notable for the following components:       Result Value    RBC 3.55 (*)     Hemoglobin 12.1 (*)     Hematocrit 36.5 (*)     .8 (*)     MCH 34.1 (*)     RDW-SD 48.9 (*)     All other components within normal limits   BASIC METABOLIC PANEL - Abnormal; Notable for the following components:    CO2 20 (*)     CREATININE 1.4 (*)     All other components within normal limits   SALICYLATE LEVEL - Abnormal; Notable for the following components:    Salicylate, Serum < 0.3 (*)     All other components within normal limits   ANION GAP - Abnormal; Notable for the following components:    Anion Gap 18.0 (*)     All other components within normal limits   GLOMERULAR FILTRATION RATE, ESTIMATED - Abnormal; Notable for the following components:    Est, Glom Filt Rate 52 (*)     All other components within normal limits   OSMOLALITY - Abnormal; Notable for the following components:    Osmolality Calc 273.7 (*)     All other components within normal limits   URINE WITH REFLEXED MICRO - Abnormal; Notable for the following components:    Character, Urine CLOUDY (*)     All other components within normal limits   BRAIN NATRIURETIC PEPTIDE   PROTIME-INR   HEPATIC FUNCTION PANEL   LIPASE   TROPONIN   MAGNESIUM   TSH WITHOUT REFLEX   ACETAMINOPHEN LEVEL   ETHANOL   RAPID DRUG SCREEN, URINE   ETHANOL       EMERGENCY DEPARTMENT COURSE:   Vitals:    Vitals:    08/16/19 2041 08/16/19 2132 08/16/19 2232 08/16/19 2357   BP:  (!) 145/95  136/82   Pulse: 72 73 70 66   Resp:  18 18 16   Temp: pathology. Laboratory work revealed an initial EtOH of 0.12 with a repeat value of 0.08. Within the department, the patient was treated with IV saline and morphine. A Boostrix immunization was given. His lacerations were cleaned, repaired, and dressed as below. I observed the patient's condition to improve during the duration of the stay. The patient was instructed to keep the lacerations clean and dry and agreed to keep them covered and dressed when leaving home. The patient was instructed not to soak the lacerations until the staples are removed, although routine washing of the area is allowed. The patient will return if the staples break or if there are noted fevers, redness, swelling, purulent drainage from the laceration site, or other signs of infection. The patient will either follow up with his PCP or return to this department to have the staples removed in 5 days. The patient vocalized understanding of these instructions. I explained my proposed course of treatment to the patient, who was amenable to my treatment and discharge decisions. The patient was discharged home in stable condition with prescriptions for Keflex and Tylenol, and the patient will return to the ED if the symptoms become more severe in nature or otherwise change. CRITICAL CARE:   None    CONSULTS:  Discussed the case with my attending physician in the Emergency Department, Dr. Marylin Bagley, who agreed with my workup, treatment, and disposition decisions.     PROCEDURES:  Lac Repair  Date/Time: 8/17/2019 1:33 PM  Performed by: Herminia Bagley PA-C  Authorized by: Herminia Bagley PA-C     Consent:     Consent obtained:  Verbal    Consent given by:  Patient    Risks discussed:  Infection, need for additional repair, poor cosmetic result, pain and poor wound healing    Alternatives discussed:  No treatment  Universal protocol:     Procedure explained and questions answered to patient or proxy's satisfaction: yes    Anesthesia (see Location:  Scalp    Scalp location:  R parietal    Length (cm):  4    Depth (mm):  3  Repair type:     Repair type:  Simple  Pre-procedure details:     Preparation:  Patient was prepped and draped in usual sterile fashion and imaging obtained to evaluate for foreign bodies  Exploration:     Hemostasis achieved with:  Direct pressure    Wound exploration: wound explored through full range of motion and entire depth of wound probed and visualized      Wound extent: no areolar tissue violation noted, no fascia violation noted, no foreign bodies/material noted, no muscle damage noted, no nerve damage noted, no tendon damage noted, no underlying fracture noted and no vascular damage noted      Contaminated: no    Treatment:     Area cleansed with:  Hibiclens    Amount of cleaning:  Standard    Irrigation solution:  Sterile saline    Irrigation method:  Syringe    Visualized foreign bodies/material removed: no    Skin repair:     Repair method:  Staples    Number of staples:  11  Approximation:     Approximation:  Close  Post-procedure details:     Dressing:  Antibiotic ointment, non-adherent dressing and tube gauze    Patient tolerance of procedure: Tolerated well, no immediate complications        FINAL IMPRESSION      1. Acute alcoholic intoxication without complication ([address]    2. Laceration of scalp, initial encounter    3. Injury of head, initial encounter    4. Fall from slip, trip, or stumble, initial encounter          DISPOSITION/PLAN     1. Acute alcoholic intoxication without complication ([address]    2. Laceration of scalp, initial encounter    3. Injury of head, initial encounter    4. Fall from slip, trip, or stumble, initial encounter       I have given the patient strict written and verbal instructions about care at home, follow-up, and signs and symptoms of worsening of condition, and the patient did verbalize understanding of these instructions. Patient was discharged in stable condition.  Will return if symptoms change or worsen, or for any sign or symptom deemed emergent by the patient or family members. Follow up as an outpatient or sooner if symptoms warrant.      PATIENT REFERRED TO:  [address]  [address]  [phone]  Call in 5 days  For wound re-check, For staple removal    Alexandr Robins MD  [address] [facility]  [phone]    Call in 5 days  For wound re-check, For staple removal      DISCHARGE MEDICATIONS:  Discharge Medication List as of 8/17/2019 12:14 AM      START taking these medications    Details   cephALEXin (KEFLEX) 500 MG capsule Take 1 capsule by mouth 3 times daily for 10 days, Disp-30 capsule, R-0Print             (Please note that portions of this note werecompleted with a voice recognition program.  Efforts were made to edit the dictations but occasionally words are mis-transcribed.)    QUIANA Nunes PA-C  08/17/19 [id number]

## 2019-08-17 RX ORDER — CEPHALEXIN 500 MG/1
500 CAPSULE ORAL 3 TIMES DAILY
Qty: 30 CAPSULE | Refills: 0 | Status: SHIPPED | OUTPATIENT
Start: 2019-08-17 | End: 2019-08-21 | Stop reason: ALTCHOICE

## 2019-08-17 RX ORDER — ACETAMINOPHEN 500 MG
500 TABLET ORAL EVERY 6 HOURS PRN
Qty: 120 TABLET | Refills: 0 | Status: SHIPPED | OUTPATIENT
Start: 2019-08-17

## 2019-08-17 NOTE — ED NOTES
Pt resting on cot at this time with lights dimmed. Denies other needs.      Nila Hunt RN  08/16/19 3016

## 2019-08-21 ENCOUNTER — HOSPITAL ENCOUNTER (EMERGENCY)
Age: 59
Discharge: HOME | End: 2019-08-21
Payer: MEDICARE

## 2019-08-21 VITALS
HEART RATE: 72 BPM | TEMPERATURE: 96.9 F | DIASTOLIC BLOOD PRESSURE: 88 MMHG | RESPIRATION RATE: 16 BRPM | OXYGEN SATURATION: 99 % | SYSTOLIC BLOOD PRESSURE: 135 MMHG | HEIGHT: 76 IN | WEIGHT: 240 LBS | BODY MASS INDEX: 29.22 KG/M2

## 2019-08-21 DIAGNOSIS — Z48.02 ENCOUNTER FOR STAPLE REMOVAL: Primary | ICD-10-CM

## 2019-08-21 PROCEDURE — 2709999900 HC NON-CHARGEABLE SUPPLY

## 2019-08-21 PROCEDURE — 99213 OFFICE O/P EST LOW 20 MIN: CPT | Performed by: NURSE PRACTITIONER

## 2019-08-21 PROCEDURE — 99211 OFF/OP EST MAY X REQ PHY/QHP: CPT

## 2019-08-21 ASSESSMENT — PAIN SCALES - GENERAL: PAINLEVEL_OUTOF10: 3

## 2019-08-21 ASSESSMENT — ENCOUNTER SYMPTOMS
VOMITING: 0
SHORTNESS OF BREATH: 0
NAUSEA: 0

## 2019-08-21 ASSESSMENT — PAIN DESCRIPTION - PAIN TYPE: TYPE: ACUTE PAIN

## 2019-08-21 ASSESSMENT — PAIN DESCRIPTION - ORIENTATION: ORIENTATION: LEFT

## 2020-01-09 ENCOUNTER — HOSPITAL ENCOUNTER (EMERGENCY)
Age: 60
Discharge: HOME | End: 2020-01-09
Payer: MEDICARE

## 2020-01-09 VITALS
TEMPERATURE: 97.4 F | HEIGHT: 76 IN | HEART RATE: 70 BPM | BODY MASS INDEX: 28.98 KG/M2 | WEIGHT: 238 LBS | RESPIRATION RATE: 16 BRPM | SYSTOLIC BLOOD PRESSURE: 134 MMHG | DIASTOLIC BLOOD PRESSURE: 84 MMHG | OXYGEN SATURATION: 97 %

## 2020-01-09 PROCEDURE — 99212 OFFICE O/P EST SF 10 MIN: CPT

## 2020-01-09 PROCEDURE — 12001 RPR S/N/AX/GEN/TRNK 2.5CM/<: CPT

## 2020-01-09 PROCEDURE — 99213 OFFICE O/P EST LOW 20 MIN: CPT | Performed by: NURSE PRACTITIONER

## 2020-01-09 PROCEDURE — 12002 RPR S/N/AX/GEN/TRNK2.6-7.5CM: CPT | Performed by: NURSE PRACTITIONER

## 2020-01-09 RX ORDER — ACETAMINOPHEN 650 MG
TABLET, EXTENDED RELEASE ORAL PRN
Status: DISCONTINUED | OUTPATIENT
Start: 2020-01-09 | End: 2020-01-09 | Stop reason: HOSPADM

## 2020-01-09 RX ORDER — CEPHALEXIN 500 MG/1
500 CAPSULE ORAL 4 TIMES DAILY
Qty: 40 CAPSULE | Refills: 0 | Status: SHIPPED | OUTPATIENT
Start: 2020-01-09 | End: 2020-01-19

## 2020-01-09 RX ORDER — LIDOCAINE HYDROCHLORIDE 10 MG/ML
5 INJECTION, SOLUTION INFILTRATION; PERINEURAL ONCE
Status: DISCONTINUED | OUTPATIENT
Start: 2020-01-09 | End: 2020-01-09 | Stop reason: HOSPADM

## 2020-01-09 ASSESSMENT — PAIN SCALES - GENERAL: PAINLEVEL_OUTOF10: 3

## 2020-01-09 ASSESSMENT — PAIN DESCRIPTION - PROGRESSION: CLINICAL_PROGRESSION: NOT CHANGED

## 2020-01-09 ASSESSMENT — PAIN DESCRIPTION - ORIENTATION: ORIENTATION: RIGHT

## 2020-01-09 ASSESSMENT — PAIN DESCRIPTION - LOCATION: LOCATION: HAND

## 2020-01-09 ASSESSMENT — PAIN DESCRIPTION - FREQUENCY: FREQUENCY: CONTINUOUS

## 2020-01-09 ASSESSMENT — ENCOUNTER SYMPTOMS: ROS SKIN COMMENTS: RIGHT HAND

## 2020-01-09 ASSESSMENT — PAIN - FUNCTIONAL ASSESSMENT: PAIN_FUNCTIONAL_ASSESSMENT: ACTIVITIES ARE NOT PREVENTED

## 2020-01-09 ASSESSMENT — PAIN DESCRIPTION - DESCRIPTORS: DESCRIPTORS: ACHING

## 2020-01-09 ASSESSMENT — PAIN DESCRIPTION - PAIN TYPE: TYPE: ACUTE PAIN

## 2020-01-09 ASSESSMENT — PAIN DESCRIPTION - ONSET: ONSET: SUDDEN

## 2020-01-09 NOTE — ED NOTES
Right hand wrapped with 2\" ace over gauze dressing. Pt given discharge instructions and verbalizes understanding.       Mau Navarro RN  01/09/20 1002

## 2020-01-09 NOTE — ED PROVIDER NOTES
is warm and dry. Capillary Refill: Capillary refill takes less than 2 seconds. Findings: Laceration present. Comments: Right hand   Neurological:      Mental Status: He is alert and oriented to person, place, and time. Sensory: No sensory deficit. Psychiatric:         Mood and Affect: Mood normal.         Behavior: Behavior normal. Behavior is cooperative. DIAGNOSTIC RESULTS     Labs:No results found for this visit on 01/09/20. IMAGING:    No orders to display         EKG:      URGENT CARE COURSE:     Vitals:    01/09/20 0856   BP: 134/84   Pulse: 70   Resp: 16   Temp: 97.4 °F (36.3 °C)   TempSrc: Temporal   SpO2: 97%   Weight: 238 lb (108 kg)   Height: 6' 4\" (1.93 m)       Medications - No data to display         PROCEDURES:  Lac Repair  Date/Time: 1/9/2020 9:30 AM  Performed by: DEBRA Sherman CNP  Authorized by: DEBRA Sherman CNP     Consent:     Consent obtained:  Verbal and written    Consent given by:  Patient    Risks discussed:  Infection, need for additional repair, tendon damage, vascular damage and nerve damage  Anesthesia (see MAR for exact dosages):      Anesthesia method:  Local infiltration    Local anesthetic:  Lidocaine 1% w/o epi (5 ml's)  Laceration details:     Location:  Hand    Hand location:  R palm    Length (cm):  3    Depth (mm):  3  Repair type:     Repair type:  Simple  Pre-procedure details:     Preparation:  Patient was prepped and draped in usual sterile fashion  Exploration:     Wound extent: no areolar tissue violation noted, no fascia violation noted, no foreign bodies/material noted, no muscle damage noted, no nerve damage noted, no underlying fracture noted and no vascular damage noted      Contaminated: no    Treatment:     Area cleansed with:  Betadine    Amount of cleaning:  Standard    Visualized foreign bodies/material removed: no    Skin repair:     Repair method:  Sutures    Suture size:  4-0    Suture material: Nylon    Suture technique:  Simple interrupted    Number of sutures:  6  Approximation:     Approximation:  Close  Post-procedure details:     Dressing:  Adhesive bandage    Patient tolerance of procedure: Tolerated well, no immediate complications                6 sutures patient had full flexion-extension of all digits after procedure area washed and dry sterile dressing applied      FINAL IMPRESSION      1.  Laceration of right hand without foreign body, initial encounter          DISPOSITION/ PLAN     Monitor for redness, drainage, pain   Keep clean and dry  Sutures out in 10-12 days  Take antibiotic as directed  Follow up with your PCP or return for any concerns   or go to the Emergency Department      PATIENT REFERRED TO:  Jai Turner MD  [address] / [place] [address]:  Discharge Medication List as of 1/9/2020  9:58 AM      START taking these medications    Details   cephALEXin (KEFLEX) 500 MG capsule Take 1 capsule by mouth 4 times daily for 10 days, Disp-40 capsule, R-0Print             Discharge Medication List as of 1/9/2020  9:58 AM      STOP taking these medications       pantoprazole (PROTONIX) 40 MG tablet Comments:   Reason for Stopping:               Discharge Medication List as of 1/9/2020  9:58 AM          DEBRA Jones CNP    (Please note that portions of this note were completed with a voice recognition program. Efforts were made to edit the dictations but occasionally words are mis-transcribed.)           DEBRA Jones CNP  01/09/20 1010

## 2020-08-24 ENCOUNTER — HOSPITAL ENCOUNTER (INPATIENT)
Age: 60
LOS: 3 days | Discharge: NURSING, REHAB OR LONG-TERM CARE | DRG: 552 | End: 2020-08-27
Attending: SURGERY | Admitting: SURGERY
Payer: MEDICARE

## 2020-08-24 ENCOUNTER — APPOINTMENT (OUTPATIENT)
Dept: GENERAL RADIOLOGY | Age: 60
DRG: 552 | End: 2020-08-24
Payer: MEDICARE

## 2020-08-24 ENCOUNTER — APPOINTMENT (OUTPATIENT)
Dept: CT IMAGING | Age: 60
DRG: 552 | End: 2020-08-24
Payer: MEDICARE

## 2020-08-24 PROBLEM — S22.009A CLOSED FRACTURE OF THORACIC VERTEBRAL BODY (HCC): Status: ACTIVE | Noted: 2020-08-24

## 2020-08-24 LAB
ALBUMIN SERPL-MCNC: 3.8 G/DL (ref 3.5–5.1)
ALP BLD-CCNC: 116 U/L (ref 38–126)
ALT SERPL-CCNC: 7 U/L (ref 11–66)
AMPHETAMINE+METHAMPHETAMINE URINE SCREEN: NEGATIVE
ANION GAP SERPL CALCULATED.3IONS-SCNC: 12 MEQ/L (ref 8–16)
APTT: 27.2 SECONDS (ref 22–38)
AST SERPL-CCNC: 14 U/L (ref 5–40)
BARBITURATE QUANTITATIVE URINE: NEGATIVE
BASOPHILS # BLD: 0.3 %
BASOPHILS ABSOLUTE: 0 THOU/MM3 (ref 0–0.1)
BENZODIAZEPINE QUANTITATIVE URINE: NEGATIVE
BILIRUB SERPL-MCNC: 0.4 MG/DL (ref 0.3–1.2)
BILIRUBIN URINE: NEGATIVE
BLOOD, URINE: NEGATIVE
BUN BLDV-MCNC: 12 MG/DL (ref 7–22)
CALCIUM SERPL-MCNC: 8.8 MG/DL (ref 8.5–10.5)
CANNABINOID QUANTITATIVE URINE: NEGATIVE
CHARACTER, URINE: CLEAR
CHLORIDE BLD-SCNC: 98 MEQ/L (ref 98–111)
CO2: 22 MEQ/L (ref 23–33)
COCAINE METABOLITE QUANTITATIVE URINE: NEGATIVE
COLOR: YELLOW
CREAT SERPL-MCNC: 1.3 MG/DL (ref 0.4–1.2)
EKG ATRIAL RATE: 76 BPM
EKG P AXIS: 70 DEGREES
EKG P-R INTERVAL: 186 MS
EKG Q-T INTERVAL: 394 MS
EKG QRS DURATION: 100 MS
EKG QTC CALCULATION (BAZETT): 443 MS
EKG R AXIS: -17 DEGREES
EKG T AXIS: 0 DEGREES
EKG VENTRICULAR RATE: 76 BPM
EOSINOPHIL # BLD: 0.7 %
EOSINOPHILS ABSOLUTE: 0 THOU/MM3 (ref 0–0.4)
ERYTHROCYTE [DISTWIDTH] IN BLOOD BY AUTOMATED COUNT: 13.4 % (ref 11.5–14.5)
ERYTHROCYTE [DISTWIDTH] IN BLOOD BY AUTOMATED COUNT: 52.9 FL (ref 35–45)
ETHYL ALCOHOL, SERUM: 0.08 %
GFR SERPL CREATININE-BSD FRML MDRD: 56 ML/MIN/1.73M2
GLUCOSE BLD-MCNC: 86 MG/DL (ref 70–108)
GLUCOSE URINE: NEGATIVE MG/DL
HCT VFR BLD CALC: 30.5 % (ref 42–52)
HEMOGLOBIN: 10 GM/DL (ref 14–18)
IMMATURE GRANS (ABS): 0.02 THOU/MM3 (ref 0–0.07)
IMMATURE GRANULOCYTES: 0.3 %
INR BLD: 1.04 (ref 0.85–1.13)
KETONES, URINE: NEGATIVE
LEUKOCYTE ESTERASE, URINE: NEGATIVE
LIPASE: 12.2 U/L (ref 5.6–51.3)
LYMPHOCYTES # BLD: 24.4 %
LYMPHOCYTES ABSOLUTE: 1.4 THOU/MM3 (ref 1–4.8)
MAGNESIUM: 1.5 MG/DL (ref 1.6–2.4)
MCH RBC QN AUTO: 35.1 PG (ref 26–33)
MCHC RBC AUTO-ENTMCNC: 32.8 GM/DL (ref 32.2–35.5)
MCV RBC AUTO: 107 FL (ref 80–94)
MONOCYTES # BLD: 7.3 %
MONOCYTES ABSOLUTE: 0.4 THOU/MM3 (ref 0.4–1.3)
NITRITE, URINE: NEGATIVE
NUCLEATED RED BLOOD CELLS: 0 /100 WBC
OPIATES, URINE: NEGATIVE
OSMOLALITY CALCULATION: 263.6 MOSMOL/KG (ref 275–300)
OXYCODONE: NEGATIVE
PH UA: 7.5 (ref 5–9)
PHENCYCLIDINE QUANTITATIVE URINE: NEGATIVE
PLATELET # BLD: 224 THOU/MM3 (ref 130–400)
PMV BLD AUTO: 9.4 FL (ref 9.4–12.4)
POTASSIUM SERPL-SCNC: 3.8 MEQ/L (ref 3.5–5.2)
PROTEIN UA: NEGATIVE
RBC # BLD: 2.85 MILL/MM3 (ref 4.7–6.1)
SEG NEUTROPHILS: 67 %
SEGMENTED NEUTROPHILS ABSOLUTE COUNT: 4 THOU/MM3 (ref 1.8–7.7)
SODIUM BLD-SCNC: 132 MEQ/L (ref 135–145)
SPECIFIC GRAVITY, URINE: 1.01 (ref 1–1.03)
TOTAL PROTEIN: 6 G/DL (ref 6.1–8)
TROPONIN T: < 0.01 NG/ML
UROBILINOGEN, URINE: 1 EU/DL (ref 0–1)
WBC # BLD: 5.9 THOU/MM3 (ref 4.8–10.8)

## 2020-08-24 PROCEDURE — 85610 PROTHROMBIN TIME: CPT

## 2020-08-24 PROCEDURE — 72125 CT NECK SPINE W/O DYE: CPT

## 2020-08-24 PROCEDURE — 72128 CT CHEST SPINE W/O DYE: CPT

## 2020-08-24 PROCEDURE — 93010 ELECTROCARDIOGRAM REPORT: CPT | Performed by: NUCLEAR MEDICINE

## 2020-08-24 PROCEDURE — 6820000001 HC L2 TRAUMA SURGERY EVALUATION: Performed by: SURGERY

## 2020-08-24 PROCEDURE — 83690 ASSAY OF LIPASE: CPT

## 2020-08-24 PROCEDURE — 81003 URINALYSIS AUTO W/O SCOPE: CPT

## 2020-08-24 PROCEDURE — 99285 EMERGENCY DEPT VISIT HI MDM: CPT

## 2020-08-24 PROCEDURE — G0480 DRUG TEST DEF 1-7 CLASSES: HCPCS

## 2020-08-24 PROCEDURE — 84484 ASSAY OF TROPONIN QUANT: CPT

## 2020-08-24 PROCEDURE — 36415 COLL VENOUS BLD VENIPUNCTURE: CPT

## 2020-08-24 PROCEDURE — 93005 ELECTROCARDIOGRAM TRACING: CPT | Performed by: NURSE PRACTITIONER

## 2020-08-24 PROCEDURE — 83735 ASSAY OF MAGNESIUM: CPT

## 2020-08-24 PROCEDURE — 70450 CT HEAD/BRAIN W/O DYE: CPT

## 2020-08-24 PROCEDURE — 6370000000 HC RX 637 (ALT 250 FOR IP): Performed by: PHYSICIAN ASSISTANT

## 2020-08-24 PROCEDURE — 85730 THROMBOPLASTIN TIME PARTIAL: CPT

## 2020-08-24 PROCEDURE — 80053 COMPREHEN METABOLIC PANEL: CPT

## 2020-08-24 PROCEDURE — 71045 X-RAY EXAM CHEST 1 VIEW: CPT

## 2020-08-24 PROCEDURE — 1200000000 HC SEMI PRIVATE

## 2020-08-24 PROCEDURE — 80307 DRUG TEST PRSMV CHEM ANLYZR: CPT

## 2020-08-24 PROCEDURE — 72131 CT LUMBAR SPINE W/O DYE: CPT

## 2020-08-24 PROCEDURE — APPSS60 APP SPLIT SHARED TIME 46-60 MINUTES: Performed by: PHYSICIAN ASSISTANT

## 2020-08-24 PROCEDURE — 99222 1ST HOSP IP/OBS MODERATE 55: CPT | Performed by: SURGERY

## 2020-08-24 PROCEDURE — 6370000000 HC RX 637 (ALT 250 FOR IP): Performed by: NURSE PRACTITIONER

## 2020-08-24 PROCEDURE — 85025 COMPLETE CBC W/AUTO DIFF WBC: CPT

## 2020-08-24 RX ORDER — FLUOXETINE HYDROCHLORIDE 20 MG/1
60 CAPSULE ORAL DAILY
Status: DISCONTINUED | OUTPATIENT
Start: 2020-08-25 | End: 2020-08-27 | Stop reason: HOSPADM

## 2020-08-24 RX ORDER — THIAMINE MONONITRATE (VIT B1) 100 MG
100 TABLET ORAL DAILY
Status: DISCONTINUED | OUTPATIENT
Start: 2020-08-24 | End: 2020-08-27 | Stop reason: HOSPADM

## 2020-08-24 RX ORDER — LORAZEPAM 1 MG/1
4 TABLET ORAL
Status: DISCONTINUED | OUTPATIENT
Start: 2020-08-24 | End: 2020-08-27 | Stop reason: HOSPADM

## 2020-08-24 RX ORDER — LORAZEPAM 1 MG/1
3 TABLET ORAL
Status: DISCONTINUED | OUTPATIENT
Start: 2020-08-24 | End: 2020-08-27 | Stop reason: HOSPADM

## 2020-08-24 RX ORDER — CARBAMAZEPINE 200 MG/1
200 TABLET ORAL 2 TIMES DAILY
Status: DISCONTINUED | OUTPATIENT
Start: 2020-08-24 | End: 2020-08-27 | Stop reason: HOSPADM

## 2020-08-24 RX ORDER — AMLODIPINE BESYLATE 10 MG/1
10 TABLET ORAL DAILY
Status: DISCONTINUED | OUTPATIENT
Start: 2020-08-24 | End: 2020-08-27 | Stop reason: HOSPADM

## 2020-08-24 RX ORDER — LORAZEPAM 1 MG/1
1 TABLET ORAL
Status: DISCONTINUED | OUTPATIENT
Start: 2020-08-24 | End: 2020-08-27 | Stop reason: HOSPADM

## 2020-08-24 RX ORDER — LORAZEPAM 2 MG/ML
1 INJECTION INTRAMUSCULAR
Status: DISCONTINUED | OUTPATIENT
Start: 2020-08-24 | End: 2020-08-27 | Stop reason: HOSPADM

## 2020-08-24 RX ORDER — FOLIC ACID 1 MG/1
1 TABLET ORAL DAILY
Status: DISCONTINUED | OUTPATIENT
Start: 2020-08-24 | End: 2020-08-27 | Stop reason: HOSPADM

## 2020-08-24 RX ORDER — QUETIAPINE FUMARATE 400 MG/1
400 TABLET, FILM COATED ORAL 2 TIMES DAILY
Status: DISCONTINUED | OUTPATIENT
Start: 2020-08-24 | End: 2020-08-27 | Stop reason: HOSPADM

## 2020-08-24 RX ORDER — ACETAMINOPHEN 325 MG/1
650 TABLET ORAL ONCE
Status: COMPLETED | OUTPATIENT
Start: 2020-08-24 | End: 2020-08-24

## 2020-08-24 RX ORDER — LORAZEPAM 2 MG/ML
3 INJECTION INTRAMUSCULAR
Status: DISCONTINUED | OUTPATIENT
Start: 2020-08-24 | End: 2020-08-27 | Stop reason: HOSPADM

## 2020-08-24 RX ORDER — LORAZEPAM 1 MG/1
2 TABLET ORAL
Status: DISCONTINUED | OUTPATIENT
Start: 2020-08-24 | End: 2020-08-27 | Stop reason: HOSPADM

## 2020-08-24 RX ORDER — TRAZODONE HYDROCHLORIDE 100 MG/1
100 TABLET ORAL NIGHTLY PRN
Status: DISCONTINUED | OUTPATIENT
Start: 2020-08-24 | End: 2020-08-27 | Stop reason: HOSPADM

## 2020-08-24 RX ORDER — TAMSULOSIN HYDROCHLORIDE 0.4 MG/1
0.4 CAPSULE ORAL 2 TIMES DAILY
COMMUNITY

## 2020-08-24 RX ORDER — LORAZEPAM 2 MG/ML
2 INJECTION INTRAMUSCULAR
Status: DISCONTINUED | OUTPATIENT
Start: 2020-08-24 | End: 2020-08-27 | Stop reason: HOSPADM

## 2020-08-24 RX ORDER — FENOFIBRATE 54 MG/1
54 TABLET ORAL NIGHTLY
COMMUNITY

## 2020-08-24 RX ORDER — LORAZEPAM 2 MG/ML
4 INJECTION INTRAMUSCULAR
Status: DISCONTINUED | OUTPATIENT
Start: 2020-08-24 | End: 2020-08-27 | Stop reason: HOSPADM

## 2020-08-24 RX ADMIN — AMLODIPINE BESYLATE 10 MG: 10 TABLET ORAL at 22:43

## 2020-08-24 RX ADMIN — Medication 100 MG: at 22:10

## 2020-08-24 RX ADMIN — QUETIAPINE FUMARATE 400 MG: 400 TABLET ORAL at 22:10

## 2020-08-24 RX ADMIN — FOLIC ACID 1 MG: 1 TABLET ORAL at 22:09

## 2020-08-24 RX ADMIN — CARBAMAZEPINE 200 MG: 200 TABLET ORAL at 22:10

## 2020-08-24 RX ADMIN — TRAZODONE HYDROCHLORIDE 100 MG: 100 TABLET ORAL at 22:10

## 2020-08-24 RX ADMIN — ACETAMINOPHEN 650 MG: 325 TABLET ORAL at 18:56

## 2020-08-24 ASSESSMENT — PAIN DESCRIPTION - DESCRIPTORS
DESCRIPTORS: ACHING
DESCRIPTORS: ACHING

## 2020-08-24 ASSESSMENT — PAIN SCALES - GENERAL
PAINLEVEL_OUTOF10: 8
PAINLEVEL_OUTOF10: 6

## 2020-08-24 ASSESSMENT — PAIN DESCRIPTION - ORIENTATION
ORIENTATION: UPPER;LOWER
ORIENTATION: UPPER;LOWER

## 2020-08-24 ASSESSMENT — PAIN DESCRIPTION - PAIN TYPE
TYPE: ACUTE PAIN
TYPE: ACUTE PAIN

## 2020-08-24 ASSESSMENT — PAIN DESCRIPTION - LOCATION
LOCATION: NECK;BACK
LOCATION: NECK;BACK

## 2020-08-24 NOTE — ED NOTES
Bed: 020A  Expected date:   Expected time:   Means of arrival: [facility]  Comments:     Loyd Nugent RN  08/24/20 7164

## 2020-08-24 NOTE — ED NOTES
Large hematoma noted to right side of forehead. Pt remains alert and oriented. Respirations unlabored.      Danika Park RN  08/24/20 8415

## 2020-08-24 NOTE — ED NOTES
Presents to ER with complaints of neck and back pain after a fall. Pt states he has been falling frequently at home. States he started using a walker to get around, but still fell out in the garage today with the walker. Pt states he used to do drugs, but has been drug free for 8 years. States he still is a daily drinker. Pt states he drank a pint of vodka today and feels that is what is causing him to fall. Upon EMS arrival pt in C Collar and backboard is in place. Pt states he is unaware if he lost consciousness.      Sandoval Guy RN  08/24/20 2516

## 2020-08-24 NOTE — ED NOTES
ED to inpatient nurses report    Chief Complaint   Patient presents with    Fall      Present to ED from home  LOC: alert and orientated to name, place, date  Vital signs   Vitals:    08/24/20 1729 08/24/20 1756 08/24/20 1858   BP: (!) 106/94 127/88 (!) 143/98   Pulse: 78 75 76   Resp: 15 16 15   Temp: 98.3 °F (36.8 °C)     TempSrc: Oral     SpO2: 98% 99% 98%   Weight: 235 lb (106.6 kg)     Height: 6' 4\" (1.93 m)        Oxygen Baseline room air    Current needs required none Bipap/Cpap No  LDAs:   Peripheral IV 08/24/20 Right Forearm (Active)   Site Assessment Clean;Dry; Intact 08/24/20 1754   Line Status Normal saline locked 08/24/20 1754   Dressing Status Clean;Dry; Intact 08/24/20 1754     Mobility: Bedrest  Pending ED orders: none  Present condition: stable, Lower spine fracture.     Electronically signed by Cely Cuba RN on 8/24/2020 at 7:17 PM     Cely Cuba RN  08/24/20 0332

## 2020-08-25 ENCOUNTER — APPOINTMENT (OUTPATIENT)
Dept: MRI IMAGING | Age: 60
DRG: 552 | End: 2020-08-25
Payer: MEDICARE

## 2020-08-25 LAB
ALBUMIN SERPL-MCNC: 3.6 G/DL (ref 3.5–5.1)
ALP BLD-CCNC: 117 U/L (ref 38–126)
ALT SERPL-CCNC: 7 U/L (ref 11–66)
ANION GAP SERPL CALCULATED.3IONS-SCNC: 8 MEQ/L (ref 8–16)
AST SERPL-CCNC: 13 U/L (ref 5–40)
BASOPHILS # BLD: 0.7 %
BASOPHILS ABSOLUTE: 0 THOU/MM3 (ref 0–0.1)
BILIRUB SERPL-MCNC: 0.4 MG/DL (ref 0.3–1.2)
BUN BLDV-MCNC: 12 MG/DL (ref 7–22)
CALCIUM SERPL-MCNC: 8.4 MG/DL (ref 8.5–10.5)
CHLORIDE BLD-SCNC: 102 MEQ/L (ref 98–111)
CO2: 24 MEQ/L (ref 23–33)
CREAT SERPL-MCNC: 1.3 MG/DL (ref 0.4–1.2)
EOSINOPHIL # BLD: 1.1 %
EOSINOPHILS ABSOLUTE: 0.1 THOU/MM3 (ref 0–0.4)
ERYTHROCYTE [DISTWIDTH] IN BLOOD BY AUTOMATED COUNT: 13.6 % (ref 11.5–14.5)
ERYTHROCYTE [DISTWIDTH] IN BLOOD BY AUTOMATED COUNT: 54.1 FL (ref 35–45)
FOLATE: 8.9 NG/ML (ref 4.8–24.2)
GFR SERPL CREATININE-BSD FRML MDRD: 56 ML/MIN/1.73M2
GLUCOSE BLD-MCNC: 89 MG/DL (ref 70–108)
HCT VFR BLD CALC: 29.7 % (ref 42–52)
HEMOGLOBIN: 10 GM/DL (ref 14–18)
IMMATURE GRANS (ABS): 0.01 THOU/MM3 (ref 0–0.07)
IMMATURE GRANULOCYTES: 0.2 %
LYMPHOCYTES # BLD: 44.5 %
LYMPHOCYTES ABSOLUTE: 2 THOU/MM3 (ref 1–4.8)
MAGNESIUM: 1.5 MG/DL (ref 1.6–2.4)
MCH RBC QN AUTO: 36.5 PG (ref 26–33)
MCHC RBC AUTO-ENTMCNC: 33.7 GM/DL (ref 32.2–35.5)
MCV RBC AUTO: 108.4 FL (ref 80–94)
MONOCYTES # BLD: 7.2 %
MONOCYTES ABSOLUTE: 0.3 THOU/MM3 (ref 0.4–1.3)
NUCLEATED RED BLOOD CELLS: 0 /100 WBC
PLATELET # BLD: 174 THOU/MM3 (ref 130–400)
PMV BLD AUTO: 9.4 FL (ref 9.4–12.4)
POTASSIUM REFLEX MAGNESIUM: 4.2 MEQ/L (ref 3.5–5.2)
RBC # BLD: 2.74 MILL/MM3 (ref 4.7–6.1)
SEG NEUTROPHILS: 46.3 %
SEGMENTED NEUTROPHILS ABSOLUTE COUNT: 2.1 THOU/MM3 (ref 1.8–7.7)
SODIUM BLD-SCNC: 134 MEQ/L (ref 135–145)
TOTAL PROTEIN: 5.7 G/DL (ref 6.1–8)
VITAMIN B-12: < 150 PG/ML (ref 211–911)
WBC # BLD: 4.6 THOU/MM3 (ref 4.8–10.8)

## 2020-08-25 PROCEDURE — 6360000002 HC RX W HCPCS: Performed by: NURSE PRACTITIONER

## 2020-08-25 PROCEDURE — 99232 SBSQ HOSP IP/OBS MODERATE 35: CPT | Performed by: INTERNAL MEDICINE

## 2020-08-25 PROCEDURE — 72148 MRI LUMBAR SPINE W/O DYE: CPT

## 2020-08-25 PROCEDURE — 6370000000 HC RX 637 (ALT 250 FOR IP): Performed by: PHYSICIAN ASSISTANT

## 2020-08-25 PROCEDURE — 6360000002 HC RX W HCPCS: Performed by: PHYSICIAN ASSISTANT

## 2020-08-25 PROCEDURE — 92523 SPEECH SOUND LANG COMPREHEN: CPT

## 2020-08-25 PROCEDURE — APPSS60 APP SPLIT SHARED TIME 46-60 MINUTES: Performed by: PHYSICIAN ASSISTANT

## 2020-08-25 PROCEDURE — 2500000003 HC RX 250 WO HCPCS: Performed by: PHYSICIAN ASSISTANT

## 2020-08-25 PROCEDURE — 72141 MRI NECK SPINE W/O DYE: CPT

## 2020-08-25 PROCEDURE — 94760 N-INVAS EAR/PLS OXIMETRY 1: CPT

## 2020-08-25 PROCEDURE — 99222 1ST HOSP IP/OBS MODERATE 55: CPT | Performed by: NURSE PRACTITIONER

## 2020-08-25 PROCEDURE — 85025 COMPLETE CBC W/AUTO DIFF WBC: CPT

## 2020-08-25 PROCEDURE — 99232 SBSQ HOSP IP/OBS MODERATE 35: CPT | Performed by: SURGERY

## 2020-08-25 PROCEDURE — L0464 TLSO 4MOD SACRO-SCAP PRE: HCPCS

## 2020-08-25 PROCEDURE — 72146 MRI CHEST SPINE W/O DYE: CPT

## 2020-08-25 PROCEDURE — 2580000003 HC RX 258: Performed by: PHYSICIAN ASSISTANT

## 2020-08-25 PROCEDURE — 83735 ASSAY OF MAGNESIUM: CPT

## 2020-08-25 PROCEDURE — 82746 ASSAY OF FOLIC ACID SERUM: CPT

## 2020-08-25 PROCEDURE — 97116 GAIT TRAINING THERAPY: CPT

## 2020-08-25 PROCEDURE — 82607 VITAMIN B-12: CPT

## 2020-08-25 PROCEDURE — 97163 PT EVAL HIGH COMPLEX 45 MIN: CPT

## 2020-08-25 PROCEDURE — 1200000000 HC SEMI PRIVATE

## 2020-08-25 PROCEDURE — 80053 COMPREHEN METABOLIC PANEL: CPT

## 2020-08-25 PROCEDURE — 36415 COLL VENOUS BLD VENIPUNCTURE: CPT

## 2020-08-25 PROCEDURE — 6370000000 HC RX 637 (ALT 250 FOR IP): Performed by: NURSE PRACTITIONER

## 2020-08-25 RX ORDER — MAGNESIUM SULFATE IN WATER 40 MG/ML
2 INJECTION, SOLUTION INTRAVENOUS PRN
Status: DISCONTINUED | OUTPATIENT
Start: 2020-08-25 | End: 2020-08-27 | Stop reason: HOSPADM

## 2020-08-25 RX ORDER — ONDANSETRON 2 MG/ML
4 INJECTION INTRAMUSCULAR; INTRAVENOUS EVERY 6 HOURS PRN
Status: DISCONTINUED | OUTPATIENT
Start: 2020-08-25 | End: 2020-08-27 | Stop reason: HOSPADM

## 2020-08-25 RX ORDER — POLYETHYLENE GLYCOL 3350 17 G/17G
17 POWDER, FOR SOLUTION ORAL DAILY PRN
Status: DISCONTINUED | OUTPATIENT
Start: 2020-08-25 | End: 2020-08-27 | Stop reason: HOSPADM

## 2020-08-25 RX ORDER — HALOPERIDOL 5 MG/ML
5 INJECTION INTRAMUSCULAR
Status: COMPLETED | OUTPATIENT
Start: 2020-08-25 | End: 2020-08-25

## 2020-08-25 RX ORDER — CYANOCOBALAMIN 1000 UG/ML
1000 INJECTION INTRAMUSCULAR; SUBCUTANEOUS ONCE
Status: COMPLETED | OUTPATIENT
Start: 2020-08-25 | End: 2020-08-25

## 2020-08-25 RX ORDER — SODIUM CHLORIDE 0.9 % (FLUSH) 0.9 %
10 SYRINGE (ML) INJECTION EVERY 12 HOURS SCHEDULED
Status: DISCONTINUED | OUTPATIENT
Start: 2020-08-25 | End: 2020-08-27 | Stop reason: HOSPADM

## 2020-08-25 RX ORDER — LORAZEPAM 2 MG/ML
1 INJECTION INTRAMUSCULAR ONCE
Status: COMPLETED | OUTPATIENT
Start: 2020-08-25 | End: 2020-08-25

## 2020-08-25 RX ORDER — MORPHINE SULFATE 2 MG/ML
2 INJECTION, SOLUTION INTRAMUSCULAR; INTRAVENOUS
Status: DISCONTINUED | OUTPATIENT
Start: 2020-08-25 | End: 2020-08-27 | Stop reason: HOSPADM

## 2020-08-25 RX ORDER — ACETAMINOPHEN 325 MG/1
650 TABLET ORAL EVERY 4 HOURS PRN
Status: DISCONTINUED | OUTPATIENT
Start: 2020-08-25 | End: 2020-08-27 | Stop reason: HOSPADM

## 2020-08-25 RX ORDER — SENNA PLUS 8.6 MG/1
1 TABLET ORAL NIGHTLY
Status: DISCONTINUED | OUTPATIENT
Start: 2020-08-25 | End: 2020-08-27 | Stop reason: HOSPADM

## 2020-08-25 RX ORDER — SODIUM CHLORIDE 9 MG/ML
INJECTION, SOLUTION INTRAVENOUS CONTINUOUS
Status: DISCONTINUED | OUTPATIENT
Start: 2020-08-25 | End: 2020-08-26

## 2020-08-25 RX ORDER — PROMETHAZINE HYDROCHLORIDE 25 MG/1
12.5 TABLET ORAL EVERY 6 HOURS PRN
Status: DISCONTINUED | OUTPATIENT
Start: 2020-08-25 | End: 2020-08-27 | Stop reason: HOSPADM

## 2020-08-25 RX ORDER — MORPHINE SULFATE 4 MG/ML
4 INJECTION, SOLUTION INTRAMUSCULAR; INTRAVENOUS
Status: DISCONTINUED | OUTPATIENT
Start: 2020-08-25 | End: 2020-08-27 | Stop reason: HOSPADM

## 2020-08-25 RX ORDER — MULTIVITAMIN WITH IRON
1 TABLET ORAL DAILY
Status: DISCONTINUED | OUTPATIENT
Start: 2020-08-25 | End: 2020-08-27 | Stop reason: HOSPADM

## 2020-08-25 RX ORDER — SODIUM CHLORIDE 0.9 % (FLUSH) 0.9 %
10 SYRINGE (ML) INJECTION PRN
Status: DISCONTINUED | OUTPATIENT
Start: 2020-08-25 | End: 2020-08-27 | Stop reason: HOSPADM

## 2020-08-25 RX ORDER — DOCUSATE SODIUM 100 MG/1
100 CAPSULE, LIQUID FILLED ORAL DAILY
Status: DISCONTINUED | OUTPATIENT
Start: 2020-08-25 | End: 2020-08-27 | Stop reason: HOSPADM

## 2020-08-25 RX ADMIN — Medication 100 MG: at 09:31

## 2020-08-25 RX ADMIN — FAMOTIDINE 20 MG: 10 INJECTION INTRAVENOUS at 19:15

## 2020-08-25 RX ADMIN — FOLIC ACID 1 MG: 1 TABLET ORAL at 09:31

## 2020-08-25 RX ADMIN — LORAZEPAM 1 MG: 2 INJECTION INTRAMUSCULAR; INTRAVENOUS at 17:19

## 2020-08-25 RX ADMIN — SODIUM CHLORIDE: 9 INJECTION, SOLUTION INTRAVENOUS at 02:35

## 2020-08-25 RX ADMIN — HALOPERIDOL LACTATE 5 MG: 5 INJECTION, SOLUTION INTRAMUSCULAR at 18:59

## 2020-08-25 RX ADMIN — MAGNESIUM SULFATE HEPTAHYDRATE 2 G: 40 INJECTION, SOLUTION INTRAVENOUS at 14:13

## 2020-08-25 RX ADMIN — FAMOTIDINE 20 MG: 10 INJECTION INTRAVENOUS at 09:29

## 2020-08-25 RX ADMIN — TRAZODONE HYDROCHLORIDE 100 MG: 100 TABLET ORAL at 19:13

## 2020-08-25 RX ADMIN — MORPHINE SULFATE 4 MG: 4 INJECTION, SOLUTION INTRAMUSCULAR; INTRAVENOUS at 09:29

## 2020-08-25 RX ADMIN — MORPHINE SULFATE 2 MG: 2 INJECTION, SOLUTION INTRAMUSCULAR; INTRAVENOUS at 19:10

## 2020-08-25 RX ADMIN — THERA TABS 1 TABLET: TAB at 09:31

## 2020-08-25 RX ADMIN — AMLODIPINE BESYLATE 10 MG: 10 TABLET ORAL at 09:30

## 2020-08-25 RX ADMIN — CARBAMAZEPINE 200 MG: 200 TABLET ORAL at 19:01

## 2020-08-25 RX ADMIN — FAMOTIDINE 20 MG: 10 INJECTION INTRAVENOUS at 02:35

## 2020-08-25 RX ADMIN — DOCUSATE SODIUM 100 MG: 100 CAPSULE, LIQUID FILLED ORAL at 14:13

## 2020-08-25 RX ADMIN — CYANOCOBALAMIN 1000 MCG: 1000 INJECTION, SOLUTION INTRAMUSCULAR at 14:01

## 2020-08-25 RX ADMIN — SODIUM CHLORIDE: 9 INJECTION, SOLUTION INTRAVENOUS at 22:36

## 2020-08-25 RX ADMIN — FLUOXETINE HYDROCHLORIDE 60 MG: 20 CAPSULE ORAL at 09:30

## 2020-08-25 RX ADMIN — CARBAMAZEPINE 200 MG: 200 TABLET ORAL at 09:31

## 2020-08-25 RX ADMIN — QUETIAPINE FUMARATE 400 MG: 400 TABLET ORAL at 09:30

## 2020-08-25 RX ADMIN — QUETIAPINE FUMARATE 400 MG: 400 TABLET ORAL at 19:01

## 2020-08-25 RX ADMIN — MORPHINE SULFATE 2 MG: 2 INJECTION, SOLUTION INTRAMUSCULAR; INTRAVENOUS at 14:13

## 2020-08-25 RX ADMIN — ACETAMINOPHEN 650 MG: 325 TABLET ORAL at 05:57

## 2020-08-25 SDOH — HEALTH STABILITY: MENTAL HEALTH: HOW OFTEN DO YOU HAVE A DRINK CONTAINING ALCOHOL?: 4 OR MORE TIMES A WEEK

## 2020-08-25 ASSESSMENT — PAIN SCALES - GENERAL
PAINLEVEL_OUTOF10: 0
PAINLEVEL_OUTOF10: 6
PAINLEVEL_OUTOF10: 7
PAINLEVEL_OUTOF10: 6
PAINLEVEL_OUTOF10: 6
PAINLEVEL_OUTOF10: 7
PAINLEVEL_OUTOF10: 6
PAINLEVEL_OUTOF10: 6

## 2020-08-25 ASSESSMENT — PAIN DESCRIPTION - DESCRIPTORS
DESCRIPTORS: ACHING
DESCRIPTORS: TENDER

## 2020-08-25 ASSESSMENT — PAIN DESCRIPTION - PAIN TYPE
TYPE: ACUTE PAIN
TYPE: ACUTE PAIN

## 2020-08-25 ASSESSMENT — PAIN DESCRIPTION - PROGRESSION: CLINICAL_PROGRESSION: GRADUALLY WORSENING

## 2020-08-25 ASSESSMENT — PAIN DESCRIPTION - FREQUENCY: FREQUENCY: CONTINUOUS

## 2020-08-25 ASSESSMENT — PAIN DESCRIPTION - LOCATION
LOCATION: NECK;BACK
LOCATION: NECK

## 2020-08-25 ASSESSMENT — PAIN DESCRIPTION - ORIENTATION: ORIENTATION: UPPER;LOWER

## 2020-08-25 ASSESSMENT — PAIN DESCRIPTION - ONSET: ONSET: ON-GOING

## 2020-08-25 NOTE — PROGRESS NOTES
Comprehensive Nutrition Assessment    Type and Reason for Visit:  Initial, Positive Nutrition Screen(Weight Loss/Decreased Appetite/Intake)    Nutrition Recommendations/Plan:   *Started Ensure Enlive TID. *Continue Multivitamin, Folic Acid and Thiamine daily d/t hx of ETOH abuse. *Continue current diet. Nutrition Assessment: Pt. nutritionally compromised AEB pt report of consuming only one meals daily over the past few years which is his baseline intake and pt reports unplanned weight loss of 10 lbs over the past year; however no weight history per EMR to confirm reported weight loss. At risk for further nutrition compromise r/t admit d/t fall w/closed fx of vertebral body, underlying medical condition (hx of ETOH abuse, drug abuse GERD, HTN and HLD) and need for nutrition support. Nutrition recommendations/interventions as per above. Malnutrition Assessment:  Malnutrition Status: At risk for malnutrition (Comment)    Context:  Chronic Illness     Findings of the 6 clinical characteristics of malnutrition:  Energy Intake:  7 - 75% or less estimated energy requirements for 1 month or longer  Weight Loss:  Unable to assess(no weight hx per EMR)     Body Fat Loss:  No significant body fat loss     Muscle Mass Loss:  No significant muscle mass loss    Fluid Accumulation:  No significant fluid accumulation Extremities   Strength:  Not Performed    Estimated Daily Nutrient Needs:  Energy (kcal):  5383-9115 kcal/day (20-25); Weight Used for Energy Requirements:  (111 kg on 8/25)     Protein (g):  119-138 g/day (1.3-1.5 g/kg);  Weight Used for Protein Requirements:  (IBW 91.8 kg)        Nutrition Related Findings:  admit d/t fall with closed fracture of thoracic verterbra; pt NPO at present; pt reports consuming only one meals daily around supper time over the past few years and pt reports pt reports unplanned weight loss of 10 lbs over the past year; hx ETOH abuse; pt denies N/V or difficulty chewing/swallowing food; no BM yet; Labs: Mg 1.5, Hg 10, Na 134. Rx includes: Colace, Folic acid, Multivitamin, Thiamine, Senna      Wounds:  Multiple(left and right knee scab; left head abrasion/traumatic)       Current Nutrition Therapies:    DIET GENERAL; Anthropometric Measures:  · Height: 6' 4\" (193 cm)  · Current Body Weight: 245 lb 8 oz (111.4 kg)(8/25; no edema noted)   · Admission Body Weight: 245 lb 8 oz (111.4 kg)(8/25; no edema noted)    · Usual Body Weight: 245 lb (111.1 kg)(per pt report. No actual weights per EMR to assess weight trends)     · Ideal Body Weight: 202 lbs  · BMI: 29.9  · BMI Categories: Overweight (BMI 25.0-29. 9)       Nutrition Diagnosis:   · Inadequate oral intake related to inadequate protein-energy intake as evidenced by poor intake prior to admission    Nutrition Interventions:   Food and/or Nutrient Delivery:  Continue Current Diet, Start Oral Nutrition Supplement, Vitamin Supplement  Nutrition Education/Counseling:  Education initiated   Coordination of Nutrition Care:  Continued Inpatient Monitoring    Goals:  Pt will consume 75% or more of meals during LOS       Nutrition Monitoring and Evaluation:   Food/Nutrient Intake Outcomes:  Food and Nutrient Intake, Supplement Intake, Vitamin/Mineral Intake  Physical Signs/Symptoms Outcomes:  Weight, Skin, Nutrition Focused Physical Findings, Biochemical Data     Discharge Planning:     Too soon to determine     Electronically signed by Shannan Ramos RD, LD on 8/25/20 at 1:42 PM EDT    Contact: [phone]

## 2020-08-25 NOTE — PROGRESS NOTES
[address] 59  OCCUPATIONAL THERAPY MISSED TREATMENT NOTE  [facility] 5K  [id number]/[id number]      Date: 2020  Patient Name: Brayan Gonzales        CSN: [id number]   : 1960  (60 y.o.)  Gender: male                REASON FOR MISSED TREATMENT: Pt with MRI pending, bedrest orders, and awaiting TLSO, see .

## 2020-08-25 NOTE — PLAN OF CARE
Pt seen after midnight. Labs reviewed. Cyanocobalamin ordered for Vit B 12 deficiency. CC:  Falls    HPI: \"The patient is a 60 y.o. male whom I have been requested to see by Dr. Josee Cheema for evaluation of falls; patient has a past medical history of chronic alcohol abuse, hypertension, hyperlipidemia and GERD; reported as patient has been having increasing falls over the last couple months; he fell 2 days ago and then again yesterday that brought him into the emergency department; he did tell me that he drank a pint of vodka yesterday afternoon and then sustained a fall; he tells me that he normally does not drink a pint of vodka a day; he states he falls after standing up; I have ordered orthostatic vital signs; today he relates to hitting his head and complains of neck pain as well; he states \"I blacked out in the garage\"; he currently denies any lightheadedness or dizziness, cough, chest pain, shortness of breath, nausea; he currently has a c-collar on; he is fully alert and oriented; moves all of his extremities; in the emergency department he was found to have a T12 vertebral body fracture and orthopedics has already been consulted; he also sustained a scalp hematoma; he was found to be hyponatremic at 132, creatinine mildly elevated at 1.3, and his magnesium was found to be low at 1.5; his troponin level was negative; urine toxicology screen was normal, serum ethyl alcohol level 0.08; he does have a macrocytic anemia which goes along with his alcohol abuse. \"    Electronically signed by Lorena Fajardo PA-C on 8/25/2020 at 7:50 AM

## 2020-08-25 NOTE — CONSULTS
[address] and Trauma SBIRT attempted, pt declined, state he receives daily outpatient AOD/MH services at [facility] in [facility].

## 2020-08-25 NOTE — PLAN OF CARE
Problem: Falls - Risk of:  Goal: Will remain free from falls  Description: Will remain free from falls  Outcome: Ongoing  Note: No falls this shift. Bed alarm, nonskid socks, and call light utilized. At risk due to generalized weakness and strict bedrest.        Problem: Pain:  Goal: Pain level will decrease  Description: Pain level will decrease  Outcome: Ongoing  Note: Pt states pain is 6/10. Pt pain goal 3/10. Rest and reposition provided to decrease pain. Will continue ongoing assessment of pain. Problem: Skin Integrity:  Goal: Will show no infection signs and symptoms  Description: Will show no infection signs and symptoms  Outcome: Ongoing     Problem: Skin Integrity:  Goal: Absence of new skin breakdown  Description: Absence of new skin breakdown  Outcome: Ongoing  Note: No new skin breakdown. Pt turned and repositioned. Will continue on going skin assessment. Problem: Discharge Planning:  Goal: Discharged to appropriate level of care  Description: Discharged to appropriate level of care  Outcome: Ongoing  Note: Discharge plans to home. Discussed with pt. Care plan reviewed with patient. Patient verbalize understanding of the plan of care and contribute to goal setting.

## 2020-08-25 NOTE — CONSULTS
bladder  incontinence. He currently denies any headache, dizziness, or  lightheadedness. ASSESSMENT:  T12 vertebral body fracture. PLAN:  We will place the patient in a TLSO brace for immobilization of  this fracture. He will most likely require this for the next two to  three months and we will require that he wears this when he is up and  walking. He may have this off when laying in bed. Otherwise, we are  obviously working him up further with MRI scans of the cervical,  thoracic, and lumbar spine. Within the cervical spine, there is  evidence of spinal canal stenosis based on the CT scan and we will  further evaluate this with an MRI scan to determine if there is a  component of myelopathic change affecting his balance in addition to his  alcohol abuse. We will also get thoracic and lumbar spine MRIs to  further evaluate the T12 vertebral body fracture and ligamentous  instability at this level as well. DRUG ALLERGIES:  No known drug allergies. CURRENT HOME MEDICATIONS:  Includes fenofibrate, tamsulosin, buspirone,  atorvastatin, atenolol, Tylenol, vitamin B, clonidine, multiple vitamin,  and Zestoretic. PAST MEDICAL HISTORY:  Includes hypertension, hyperlipidemia,  gastroesophageal reflux disease, drug abuse, depression, skin cancer,  and alcohol abuse. PAST SURGICAL HISTORY:  Includes previous ORIF left tibia,  tonsillectomy. SOCIAL HISTORY:  He is a current everyday smoker with a 40 pack-year  history. He does drink alcohol on a day-to-day basis. He does also  report a previous history of substance abuse including cocaine. REVIEW OF SYSTEMS:  Negative unless otherwise stated in the HPI. PHYSICAL EXAMINATION:  VITAL SIGNS:  Most recent vital signs show temperature 97.7, pulse 73,  respirations 16, blood pressure 120/80, and O2 at 98%. GENERAL:  The patient is pleasant, cooperative, awake, alert and  oriented x3. He is lying in his hospital bed in no acute distress.   BACK:

## 2020-08-25 NOTE — PLAN OF CARE
Problem: Nutrition  Goal: Optimal nutrition therapy  Outcome: Ongoing   Nutrition Problem #1: Inadequate oral intake  Intervention: Food and/or Nutrient Delivery: Continue Current Diet, Start Oral Nutrition Supplement, Vitamin Supplement  Nutritional Goals: Pt will consume 75% or more of meals during LOS

## 2020-08-25 NOTE — ED NOTES
Patient transported to [id number]  by cart in stable condition. Patient monitored on cardiac telemetry. IV  line is patent.    Pt with mask in place       Brodie Wells EMT-P  08/24/20 2100

## 2020-08-25 NOTE — PROGRESS NOTES
[address] [address]  INPATIENT PHYSICAL THERAPY  EVALUATION  [facility] ONC MED 5K - 5K-15/015-A    Time In: 4265  Time Out: 1444  Timed Code Treatment Minutes: 8 Minutes  Minutes: 19          Date: 2020  Patient Name: Meliton Oliveira,  Gender:  male        MRN: [id number]  : 1960  (60 y.o.)      Referring Practitioner: FLORIN Hernandez  Diagnosis: closed fracture of thoracic vertebral body  Additional Pertinent Hx: admit with above diagnosis, T12 compression fracture, chronic alcohol abuse, drug abuse, recurrent falls, neck pain, scalp hematoma     Restrictions/Precautions:  Restrictions/Precautions: General Precautions, Fall Risk  Required Braces or Orthoses  Spinal: Thoracic Lumbar Sacral Orthotics  Position Activity Restriction  Spinal Precautions: No Bending, No Lifting, No Twisting  Other position/activity restrictions: TLSO brace ordered for T12 fracture. Will need to wear this when up. May have it off when in bed.  pt impulsive    Subjective:  Chart Reviewed: Yes  Patient assessed for rehabilitation services?: Yes  Subjective: pleasant, impulsive, safety concerns, pt wanting to go to bathroom but difficulty getting words out to request at times    General:            Hearing: Within functional limits         Pain: pain all over that is chronic but inc per pt-not rated    Social/Functional History:    Lives With: (with mother)  Type of Home: House  Home Equipment: Rolling walker      Ambulation Assistance: Independent  Transfer Assistance: Independent          Additional Comments: pt impulsive and not wanting to answer PLOF questions,  no family present to answer questions    OBJECTIVE:  Range of Motion:  Bilateral Lower Extremity: WFL    Strength:  Bilateral Lower Extremity: WFL, generalized weakness    Balance:  Static Sitting Balance:  Supervision  Dynamic Sitting Balance: Stand By Assistance, Escobar to toribio and doff TLSO  Static Standing Balance: Contact Guard Assistance, with RW, fwd flexed posture  Dynamic Standing Balance: Minimal Assistance, to CGA to urinate at toilet-pt urinated on floor-RN aware, washed hands at sink, impulsive and safety concerns    Bed Mobility:  Rolling to Left: Supervision   Rolling to Right: Supervision   Supine to Sit: Stand By Assistance  Sit to Supine: Stand By Assistance   Scooting: Stand By Assistance  Pt impulsive cues for log roll technique for safety with back precautions    Transfers:  Sit to Stand: Contact Guard Assistance  Stand to Sit:Contact [employer] Assistance    Ambulation:  Contact Guard Assistance  Distance: 12'x2  Surface: Level Tile  Device:Rolling Walker  Gait Deviations: Forward Flexed Posture, Slow Dedra, Moderate Path Deviations and Unsteady Gait, cues for safe use of walker and for direction, pt impulsive      Functional Outcome Measures: Completed  AM-PAC Inpatient Mobility Raw Score : 18  AM-PAC Inpatient T-Scale Score : 43.63    ASSESSMENT:  Activity Tolerance:  Patient tolerance of  treatment: good. Treatment Initiated: Treatment and education initiated within context of evaluation. Evaluation time included review of current medical information, gathering information related to past medical, social and functional history, completion of standardized testing, formal and informal observation of tasks, assessment of data and development of plan of care and goals. Treatment time included skilled education and facilitation of tasks to increase safety and independence with functional mobility for improved independence and quality of life. Assessment:   Body structures, Functions, Activity limitations: Decreased functional mobility , Decreased safe awareness, Decreased balance, Decreased cognition, Decreased endurance, Decreased strength  Assessment: pt impulsive and dec safety awareness, dec balance, dec strength, TLSO to be worn when OOB, back precautions, chronic pain all over per pt but is inc-pt unable to rate, inc assist for safe mobility recommend cont PT to inc pt functional mobility  Prognosis: Good    REQUIRES PT FOLLOW UP: Yes    Discharge Recommendations:  Discharge Recommendations: Continue to assess pending progress, 24 hour supervision or assist, Patient would benefit from continued therapy after discharge(unsure pt's cognitive baseline, pt impulsive and safety concerns, recommend 24hr A/S at discharge)    Patient Education:  PT Education: Goals, Precautions, PT Role, Plan of Care, Functional Mobility Training    Equipment Recommendations: Other: cont to assess needs, used RW at [place] pt has RW    Plan:  Times per week: 5-6X T  Times per day: Daily  Specific instructions for Next Treatment: therex and mobility with TLSO    Goals:  Patient goals : return home  Short term goals  Time Frame for Short term goals: by discharge  Short term goal 1: bed mobility with S to get in/out of bed, use log roll technique  Short term goal 2: transfer with S to get in/out of chairs  Short term goal 3: amb 75'x1 with RW and S to walk safely in room  Long term goals  Time Frame for Long term goals : no LTGs set secondary to short ELOS    Following session, patient left in safe position with all fall risk precautions in place.

## 2020-08-25 NOTE — CARE COORDINATION
8/25/20, 11:31 AM EDT  DISCHARGE PLANNING EVALUATION:    Pansy Baumgarten       Admitted from: ER, patient presented after falling at home. 8/24/2020/ [place] day: 1   Location: [facility]15/015-A Reason for admit: Closed fracture of thoracic vertebral body ([facility] [address] [S22.009A] Status: Inpt. Admit order signed?: yes  PMH:  has a past medical history of Alcohol abuse, Cancer ([facility] [address] Depression, Drug abuse ([facility] [address] GERD (gastroesophageal reflux disease), Hyperlipidemia, and Hypertension. Procedure: N/A  Pertinent abnormal Imaging:CT of head: Large frontal scalp hematoma on the left. No acute intracranial findings. CT of thoracic spine: T12 vertebral body fracture. Fracture involves the majority of the vertebral body. If neurologic changes are suspected consider thoracic MRI for further evaluation. CT of cervical and lumbar spine-both without acute findings. MRI of cervical spine: Multilevel degenerative changes of the cervical spine with multiple areas of mild spinal canal stenosis and up to severe neuroforaminal stenosis. Please refer to the body of the report for segmental analysis. Medications:  Scheduled Meds:   sodium chloride flush  10 mL Intravenous 2 times per day    famotidine (PEPCID) injection  20 mg Intravenous BID    multivitamin  1 tablet Oral Daily    cyanocobalamin  1,000 mcg Intramuscular Once    magnesium replacement protocol   Other RX Placeholder    docusate sodium  100 mg Oral Daily    senna  1 tablet Oral Nightly    thiamine  100 mg Oral Daily    amLODIPine  10 mg Oral Daily    FLUoxetine  60 mg Oral Daily    QUEtiapine  400 mg Oral BID    folic acid  1 mg Oral Daily    carBAMazepine  200 mg Oral BID     Continuous Infusions:   sodium chloride 75 mL/hr at 08/25/20 0235      Pertinent Info/Orders/Treatment Plan: ETOH 0.08.  Ortho consult, MRI of thoracic and lumbar spine, TLSO brace, PT/OT/ST, B12 injection, IV fluids, Magnesium replacement protocol, prn Tylenol, Dulcolax, Morphine, Glycolax, Phenergan, Zofran, and Desyrel, CIWA scale with Ativan, daily  Weights, SCD's, orthostatic vitals, bedrest, fall and seizure precautions. Diet: Diet NPO Effective Now   Smoking status:  reports that he has been smoking cigarettes. He has a 40.00 pack-year smoking history. He has never used smokeless tobacco.   PCP: DEBRA Ro CNP  Readmission 30 days or less: No  Readmission Risk Score: 19%    Discharge Planning Evaluation  Current Residence:  Private Residence  Living Arrangements:  Alone   Support Systems:  Parent, Children  Current Services PTA:     Potential Assistance Needed:  N/A  Potential Assistance Purchasing Medications:  No  Does patient want to participate in local refill/ meds to beds program?  No  Type of Home Care Services:  None  Patient expects to be discharged to:  home  Expected Discharge date:  08/31/20  Follow Up Appointment: Best Day/ Time: Monday PM    Patient Goals/Plan/Treatment Preferences: Met with Jaelyn Spencer. He is from home residing with his mother. Miltondana Joahnna states he has a daughter that resides in [facility]. She will transport him home at discharge. Jaelyn Spencer verifies his insurance and PCP. He goes to [facility] therapy daily. He states they also fill his medication box. Lindaakiradana Spencer states he is independent with his ADL's and still able to drive. Moshe plans to return home at discharge. He denies a need for DME and declines HH. The TLSO brace has been delivered. Monitoring for therapy evaluations. Transportation/Food Security/Housekeeping Addressed:  No issues identified.     Evaluation: yes

## 2020-08-25 NOTE — PROGRESS NOTES
(1.93 m)   Wt 245 lb 8 oz (111.4 kg)   SpO2 98%   BMI 29.88 kg/m²   GENERAL: Awake, alert and oriented, in no acute distress, lying in hospital bed. NEURO: Alert and oriented, converses appropriately, follows commands, strength equal and strong bilaterally. No signs of focal neurological deficits. LUNGS: clear to auscultation bilaterally- no wheezes, rales or rhonchi, normal air movement, no respiratory distress  HEART: normal rate, regular rhythm, normal S1 and S2, no murmurs, no gallops and intact distal pulses  ABDOMEN: soft, non-tender, non-distended, normal bowel sounds, no masses or organomegaly  WOUNDS: Scalp abrasions healing well, no significant drainage, no significant erythema  EXTREMITY: no cyanosis and no clubbing. PMS intact in all 4 extremities. Strength equal and strong bilaterally. LABS  CBC :   Recent Labs     08/24/20 1745 08/25/20 0347   WBC 5.9 4.6*   HGB 10.0* 10.0*   HCT 30.5* 29.7*   .0* 108.4*    174     BMP:   Recent Labs     08/24/20 1745 08/25/20 0347   * 134*   K 3.8 4.2   CL 98 102   CO2 22* 24   BUN 12 12   CREATININE 1.3* 1.3*     COAGS:   Recent Labs     08/24/20 1745 08/25/20 0347   APTT 27.2  --    PROT 6.0* 5.7*   INR 1.04  --      Pancreas/HFP:    Recent Labs     08/24/20 1745   LIPASE 12.2     Recent Labs     08/24/20 1745 08/25/20 0347   AST 14 13   ALT 7* 7*   BILITOT 0.4 0.4   ALKPHOS 116 117     RADIOLOGY  Narrative    PROCEDURE: MRI LUMBAR SPINE WO CONTRAST         CLINICAL INFORMATION: T12 burst fx.  Problems with balance recent multiple falls.         COMPARISON: CT lumbar spine dated 8/24/2020 and 8/16/2019.         TECHNIQUE: Sagittal and axial T1 and T2-weighted images were obtained through the lumbar spine.         FINDINGS:    Redemonstration of compression deformity of the T12 vertebral body with approximately 60% central height loss and multiple linear areas of low T1 and hyperintense T2 signal corresponding to fracture lucencies on CT. There is diffuse edema throughout the    vertebral body with minimal retropulsion of the posterior aspect of the T12 vertebral bodies spinal canal without significant spinal canal stenosis. There is small amount of edema extending into the pedicles. There is otherwise anatomic alignment. There    is mild thickening of the anterior longitudinal ligament at T11-12 and T12-L1. The posterior longitudinal ligament appears intact. No ligamentum flavum injury or interspinous ligamentous injury is identified. No other focal areas of abnormal signal are    identified within the lumbar vertebral column. The conus terminates in a normal fashion at the L1-2 level. The cauda equina is normal in appearance without nerve root thickening or clumping identified. Paraspinal soft tissues are unremarkable. At T12-L1 there is no significant spinal canal or neuroforaminal stenosis. At L1-2 there is a minimal disc bulge without significant spinal canal or neuroforaminal stenosis. At L2-3 there is no significant spinal canal or neuroforaminal stenosis. At L3-4 there is a shallow disc bulge which mildly indents thecal sac and contributes to moderate spinal canal stenosis in association with mild facet hypertrophy and ligamentum flavum thickening superimposed on congenital spinal canal narrowing. There    is mild to moderate bilateral neural foraminal stenosis. At L4-5 there is a minimal disc bulge without significant spinal canal stenosis. There is mild bilateral neural foraminal stenosis in association with mild ligamentum flavum thickening. At L5-S1 there is a shallow disc bulge without significant spinal canal stenosis and moderate to severe bilateral neural foraminal stenosis in association with facet hypertrophy and ligamentum flavum thickening.              Impression         1. Redemonstration of T12 burst injury with mild anterior longitudinal ligament injury without evidence of instability.     2.  Redemonstration of acute burst injury of the T12 vertebral body without evidence of instability. There is no significant spinal canal or neuroforaminal stenosis throughout the thoracic spine.                        **This report has been created using voice recognition software. It may contain minor errors which are inherent in voice recognition technology. **         Final report electronically signed by Dr. Polina España MD on 8/25/2020 11:37 AM      Narrative    PROCEDURE: MRI CERVICAL SPINE WO CONTRAST         CLINICAL INFORMATION: balance changes, falls, cervical pain . Balance problems with multiple recent falls.         COMPARISON: CT cervical spine dated 8/24/2020.         TECHNIQUE: Sagittal T1, T2 and STIR sequences were obtained through the cervical spine. Axial fast and echo and gradient echo T2-weighted images were obtained.         FINDINGS:    There is minimal, grade 1, retrolisthesis of C6 relative to C7 on the basis of degenerative change, stable compared to prior CT. There is otherwise anatomic vertebral body height and alignment. There is a focal area of hyperintense T1 and T2 signal in    the C5 vertebral body is evidence for a hemangioma. There is mild hyperintense T1 and T2 signal at the opposing endplates of H3-1 through C6-7 with anterior osteophytes as evidence for Modic 2 degenerative change. Marrow signal is otherwise within normal     limits. The craniocervical junction appears intact. The cervical spinal cord is normal in caliber without focal area of abnormal T2 signal identified. Paraspinal soft tissues are unremarkable. At C2-3 there is no significant spinal canal stenosis. There is moderate bilateral neural foraminal stenosis in association with uncovertebral joint degenerative change and facet hypertrophy.     At C3-4 there is a shallow disc bulge which causes mild spinal canal stenosis, moderate to severe right and severe left neural foraminal stenosis in association with uncovertebral joint degenerative change and facet hypertrophy. At C4-5 there is a shallow disc bulge which causes mild spinal canal stenosis and moderate to severe left and severe right neuroforaminal stenosis in association with uncovertebral joint degenerative change and facet hypertrophy. At C5-6 there is a shallow disc bulge with rim osteophytes which causes mild spinal canal stenosis, moderate right and severe left neuroforaminal stenosis in association with uncovertebral joint degenerative change. At C6-7 there is partial uncovering the disc with superimposed shallow disc bulge which contributes to mild spinal canal stenosis and moderate right and mild left neural foraminal stenosis in association with uncovertebral joint degenerative change. At C7-T1 there is no significant spinal canal or neuroforaminal stenosis.           Impression     Multilevel degenerative changes of the cervical spine with multiple areas of mild spinal canal stenosis and up to severe neuroforaminal stenosis. Please refer to the body of the report for segmental analysis.                     **This report has been created using voice recognition software. It may contain minor errors which are inherent in voice recognition technology. **         Final report electronically signed by Dr. Justice Mckeon MD on 8/25/2020 11:25 AM             Electronically signed by Cora Fields PA-C on 8/25/2020 at 8:34 AM    Patient seen and examined independently by me earlier this AM. Above discussed and I agree with FLORIN Sousa. See my additional comments below for updated orders and plan. Labs, cultures, and radiographs where available were reviewed. I discussed patient concerns with the patient's nurse and instructions were given. Please see our orders for the updated patient care plan. -Bedrest until final recommendations given by orthopedic service. MRI today. No acute injury from cervical spine.   Okay to remove hard collar when okay with spine team.  Pain control. Advancing diet when okay with orthopedic service. At this time nonsurgical intervention. Back brace. A.m. labs. Appreciate hospitalist assistance. Therapy as able when restrictions given by orthopedic team.  Clinically, no acute neurovascular changes early this morning on exam.  SCDs for DVT prophylaxis.     Electronically signed by Alejandro Gutiérrez MD on 8/25/20 at 2:50 PM EDT

## 2020-08-25 NOTE — PROGRESS NOTES
Pt admitted to  5K15 by cart/stretcher from ED. IV into the forearm right, condition INT, no rednessate. IV site free of s/s of infection or infiltration. Vital signs obtained. Assessment complete. Oriented to room. All questions answered with no further questions at this time. Two nurse skin assessment performed by Zhanna Hinojosa and Kevin Chris RN. Oriented to room. Policies and procedures for 5 explained. A Fall prevention and safety brochure discussed with patient. Bed alarm on. Call light in reach.   Electronically signed by Ady Carpenter RN on 8/24/2020 at 10:47 PM

## 2020-08-25 NOTE — H&P
Trauma H&P     Patient:  Hiwot Lyon date: 8/24/2020   YOB: 1960 Date of Evaluation: 8/24/2020  MRN: [id number]  Acct: [de-identified]    Injury Date:8/22/2020  Injury time:Unknown  PCP: DEBRA Marrero CNP   Referring physician: Pallavi Jefferson CNP    Time of Trauma Surgeon Notification:  1909 August 24, 2020  Time of YONAS Arrival:1930  Time of Trauma Surgeon Arrival: 2027 August 24, 2020    Assessment:    Chronic alcohol abuse  Recurrent falls  T12 vertebral body fracture   Neck pain  Scalp hematoma  Plan:    Patient admitted under Trauma Services     Chronic alcohol abuse   - ETOH 0.08 on admission   - consult to addiction services   - CIWA protocal    Recurrent falls   - Consult to internal medicine for workup   - Restarted home meds.  Nursing staff to recheck home meds    T12 vertebral body fracture    - Consult to orthopedic spine   - Bed rest   - ortho to see in AM, considering MRI   - Pain control   - Neurochecks    Neck pain   - Consult to orthopedic spine   - Bed rest   - ortho to see in AM, considering MRI   - Pain control   - Neurochecks   - [place] collar to be placed    Scalp hematoma   - Ice to area   - Pain control    Consults: Internal medicine, orthopedic spine    Pain Management   Morphine    Prophylaxis: SCD's, Incentive Spirometry, Colace, Pepcid, Zofran    NPO now    IVF Management  Regular Neurovascular Checks  Repeat Labs Tomorrow AM  PT/OT/SLP Eval and Treat  Strict bedrest until evaluated by orthopedic spine    Planned Discharge to       Activation: []Level I (Trauma Alert) []Level II (Injury Call) [x]Level III (Trauma Consult) [] Downgraded (Time: )   Mode of Arrival: EMS transportation  Referring Facility:   Loss of Consciousness []No [x]Yes[]Unknown  Duration(min)  Mechanism of Injury:  []Motor Vehicle crash   []Single Vehicle [] []Passenger []Scene Fatality []Front Seat  []Restrained   []Air Bag Deployed   []Ejected []Rollover []Pedestrian []Trapped   Type of vehicle:   Protective Devices:   []Motorcycle  Wearing Helmet []Yes []No  []Bicycle  Wearing Helmet []Yes []No  [x]Fall   Distance - standing   []Assault    Abuse Reported []Yes []No  []Gunshot  []Stabbing  []Work Related  []Burn: []Flame []Scald []Electrical []Chemical []Contact []Inhalation []House Fire  []Other:   Patient Active Problem List   Diagnosis    Closed fracture of lower end of left tibia    HTN (hypertension)    Syncope    Alcohol intoxication in active alcoholic ([facility]    Chronic alcohol abuse    STEPHANY (acute kidney injury) ([facility]    Metabolic acidosis    Benign essential hypertension    Tobacco abuse    Syncope and collapse    Alcohol withdrawal ([facility]    Closed fracture of thoracic vertebral body ([facility]     Subjective   Chief Complaint: Back pain    History of Present Illness:    Mr. Emmanuel Siddiqi is a 61 Y/O male presenting at [facility] by trauma consult, brought by EMS following a fall today  with LOC; PMH includes drug and alcohol abuse, cancer, HTN, HLD, and GERD. Per patient report he has been falling with increasing frequency over past 2 months, believes a fall while exiting a parked car two days ago is what hurt his back, but he also fell today. Patient states he typically does not remember falls, just finds himself on the ground. Daughter present in exam room states, falls often occur after standing up, he will begin to shake and then fall to floor, appear to lose consciousness briefly. Patient reports history of alcohol abuse, drank a pint of liquor today between 2415-3790, states this is not unusual for him. He complains of moderate mid back and neck pain. Daughter states patient is behaving at baseline. Patient denies chest pain, shortness of breath, cough, headache, dizziness, lightheadedness, numbness, paraesthesias, weakness, abdominal pain, nausea, vomiting. Admit to trauma services. Further workup by orthopedic spine and internal medicine consults.  Care in coordination with trauma surgeon Dr. Macey Willson. Review of Systems:   Review of Systems  All systems were reviewed and negative other than HPI  Patient has no known allergies.   Past Surgical History:   Procedure Laterality Date    FRACTURE SURGERY      OTHER SURGICAL HISTORY  12/28/2012    ORIF LEFT TIBIA    TONSILLECTOMY       Past Medical History:   Diagnosis Date    Alcohol abuse     Cancer ([facility]     skin    Depression     Drug abuse ([facility]     2008    GERD (gastroesophageal reflux disease)     Hyperlipidemia     Hypertension      Past Surgical History:   Procedure Laterality Date    FRACTURE SURGERY      OTHER SURGICAL HISTORY  12/28/2012    ORIF LEFT TIBIA    TONSILLECTOMY       Social History     Socioeconomic History    Marital status:      Spouse name: None    Number of children: None    Years of education: None    Highest education level: None   Occupational History    None   Social Needs    Financial resource strain: None    Food insecurity     Worry: None     Inability: None    Transportation needs     Medical: None     Non-medical: None   Tobacco Use    Smoking status: Current Every Day Smoker     Packs/day: 1.00     Years: 40.00     Pack years: 40.00     Types: Cigarettes    Smokeless tobacco: Never Used   Substance and Sexual Activity    Alcohol use: Not Currently    Drug use: Not Currently     Types: Cocaine     Comment: \"clean since 2008\"    Sexual activity: None   Lifestyle    Physical activity     Days per week: None     Minutes per session: None    Stress: None   Relationships    Social connections     Talks on phone: None     Gets together: None     Attends Baptism service: None     Active member of club or organization: None     Attends meetings of clubs or organizations: None     Relationship status: None    Intimate partner violence     Fear of current or ex partner: None     Emotionally abused: None     Physically abused: None     Forced sexual activity: None   Other Topics Concern    None   Social History Narrative    None     Family History   Problem Relation Age of Onset    Heart Disease Mother     COPD Father     Heart Disease Father        Home medications:    Previous Medications    ACETAMINOPHEN (APAP EXTRA STRENGTH) 500 MG TABLET    Take 1 tablet by mouth every 6 hours as needed for Pain    AMLODIPINE (NORVASC) 10 MG TABLET    Take 1 tablet by mouth daily. ATENOLOL PO    Take 100 mg by mouth     ATORVASTATIN CALCIUM PO    Take 80 mg by mouth     BUSPIRONE HCL (BUSPAR PO)    Take by mouth daily    CARBAMAZEPINE (TEGRETOL) 200 MG TABLET    Take 200 mg by mouth 2 times daily. FENOFIBRATE (TRICOR) 54 MG TABLET    Take 54 mg by mouth daily [facility] pharmacy: Please dispense generic fenofibrate unless prescriber denote    FLUOXETINE (PROZAC) 40 MG CAPSULE    Take 1 capsule by mouth daily. FOLIC ACID (FOLVITE) 1 MG TABLET    Take 1 tablet by mouth daily. MULTIPLE VITAMINS-MINERALS (THERAPEUTIC MULTIVITAMIN-MINERALS) TABLET    Take 1 tablet by mouth daily. QUETIAPINE (SEROQUEL) 400 MG TABLET    Take 400 mg by mouth 2 times daily. TAMSULOSIN (FLOMAX) 0.4 MG CAPSULE    Take 0.4 mg by mouth daily    TRAZODONE (DESYREL) 100 MG TABLET    Take 1 tablet by mouth nightly as needed for Sleep. VITAMIN B-1 100 MG TABLET    Take 1 tablet by mouth daily.        Hospital medications:  Scheduled Meds:  Continuous Infusions:  PRN Meds:  Objective   ED TRIAGE VITALS  BP: (!) 148/97, Temp: 98.3 °F (36.8 °C), Pulse: 74, Resp: 16, SpO2: 97 %  [place] Coma Scale  Eye Opening: Spontaneous  Best Verbal Response: Oriented  Best Motor Response: Obeys commands  [place] Coma Scale Score: 15  Results for orders placed or performed during the hospital encounter of 08/24/20   CBC Auto Differential   Result Value Ref Range    WBC 5.9 4.8 - 10.8 thou/mm3    RBC 2.85 (L) 4.70 - 6.10 mill/mm3    Hemoglobin 10.0 (L) 14.0 - 18.0 gm/dl    Hematocrit 30.5 (L) 42.0 - 100 ms    Q-T Interval 394 ms    QTc Calculation (Bazett) 443 ms    P Axis 70 degrees    R Axis -17 degrees    T Axis 0 degrees       Physical Exam:  Patient Vitals for the past 24 hrs:   BP Temp Temp src Pulse Resp SpO2 Height Weight   08/24/20 1938 (!) 148/97 -- -- 74 16 97 % -- --   08/24/20 1858 (!) 143/98 -- -- 76 15 98 % -- --   08/24/20 1756 127/88 -- -- 75 16 99 % -- --   08/24/20 1729 (!) 106/94 98.3 °F (36.8 °C) Oral 78 15 98 % 6' 4\" (1.93 m) 235 lb (106.6 kg)     Primary Assessment:  Airway: Patent, trachea midline  Breathing: Breath sounds present and equal bilaterally, spontaneous, and unlabored  Circulation: Hemodynamically stable, 2+ peripheral pulses. Disability: ALBERT x 4, following commands. GCS =15    Secondary Assessment:  General: Alert, NAD. Head: Large 8x8x4 cm hematoma over left anterior parietal scalp with associated abrasion, mid face stable, Tympanic membranes intact, Nares patent bilaterally, no epistaxis. Mouth clear of foreign bodies, no lacerations or abrasions. Eyes: PERRLA, EOMI, Nontraumatic  Neurologic: A & O x3. Following commands. CN 2-12 intact  Neck: Immobilized in cervical collar, trachea midline. C-spine not evaluated secondary to collar in place  Back:TL spines tender in midline over inferior thoracic region, without step-offs, crepitus or deformity. No abrasions, contusions, or ecchymosis noted. Lungs: Clear to auscultation bilaterally. Chest Wall: Chest rise symmetrical.  Chest wall without tenderness to palpation. No crepitus, deformities, lacerations, or abrasions. Heart: RRR. Normal S1/S2. No obvious M/G/R. Abdomen:  Soft, NTTP. No guarding. Non-peritoneal.  Pelvis:  NTTP, stable to compression. Extremities: No gross deformities. PMS intact. Radial /DP/PT pulses 2+ bilaterally. Skin: Skin warm and dry. Normal for ethnicity. Radiology:     XR CHEST 1 VIEW   Final Result      No acute findings.             **This report has been created using voice recognition software. It may contain minor errors which are inherent in voice recognition technology. **      Final report electronically signed by Dr. Catherine Baker on 8/24/2020 6:44 PM      CT HEAD WO CONTRAST   Final Result   Large frontal scalp hematoma on the left. No acute intracranial findings. **This report has been created using voice recognition software. It may contain minor errors which are inherent in voice recognition technology. **      Final report electronically signed by Dr. Catherine Baker on 8/24/2020 6:39 PM      CT CERVICAL SPINE WO CONTRAST   Final Result   No acute fracture or subluxation. **This report has been created using voice recognition software. It may contain minor errors which are inherent in voice recognition technology. **      Final report electronically signed by Dr. Catherine Baker on 8/24/2020 6:47 PM      CT THORACIC SPINE WO CONTRAST   Final Result    IMPRESSION:   T12 vertebral body fracture. Fracture involves the majority of the vertebral body. If neurologic changes are suspected consider thoracic MRI for further evaluation. **This report has been created using voice recognition software. It may contain minor errors which are inherent in voice recognition technology. **      Final report electronically signed by Dr. Catherine Baker on 8/24/2020 6:53 PM      CT LUMBAR SPINE WO CONTRAST   Final Result      No acute fracture of the lumbar spine. T12 vertebral body fracture as reported on thoracic spine CT. **This report has been created using voice recognition software. It may contain minor errors which are inherent in voice recognition technology. **      Final report electronically signed by Dr. Catherine Baker on 8/24/2020 6:56 PM        Fast Exam: No - workup performed by ED providers. Electronically signed by FLORIN Hernandez on 8/24/2020 at 8:19 PM    Patient seen and examined independently by me on 8/24/20.  Above discussed and I agree with Alejandra Durbin PA. See my additional comments below for updated orders and plan. Labs, cultures, and radiographs where available were reviewed. I discussed patient concerns with the patient's nurse and instructions were given. Please see our orders for the updated patient care plan. -Admit for pain control. Bed rest.  Orthopedic service to evaluate. Neurovascular checks. Aspen collar temporarily secondary to pain. Cervical spine CT okay. Possible MRI in a.m. pending spine team evaluation. A.m. labs. SCDs for DVT prophylaxis. Alcohol withdrawal monitoring. Addiction services consultation. N.p.o. until evaluated by spine team.  Therapy when appropriate.     Electronically signed by Jina Peña MD on 8/25/20 at 5:23 AM EDT

## 2020-08-25 NOTE — PROGRESS NOTES
[facility]  SPEECH THERAPY  STRZ ONC MED 5K  Speech - Language - Cognitive Evaluation    SLP Individual Minutes  Time In: 3918  Time Out: 9169  Minutes: 20  Timed Code Treatment Minutes: 0 Minutes       Date: 2020  Patient Name: Hollie Troy      CSN: [id number]   : 1960  (60 y.o.)  Gender: male   Referring Physician: FLORIN Woo  Diagnosis: Closed fracture of thoracic vertebral body   Secondary Diagnosis: Cognitive deficits   Precautions: Fall risk  History of Present Illness/Injury: Pt admitted with above dx. Per H&P, pt \"presenting at [facility] by trauma consult, brought by EMS following a fall today  with LOC; PMH includes drug and alcohol abuse, cancer, HTN, HLD, and GERD. Per patient report he has been falling with increasing frequency over past 2 months, believes a fall while exiting a parked car two days ago is what hurt his back, but he also fell today. Patient states he typically does not remember falls, just finds himself on the ground. Daughter present in exam room states, falls often occur after standing up, he will begin to shake and then fall to floor, appear to lose consciousness briefly. Patient reports history of alcohol abuse, drank a pint of liquor today between 7085-6804, states this is not unusual for him. He complains of moderate mid back and neck pain. Daughter states patient is behaving at baseline. Patient denies chest pain, shortness of breath, cough, headache, dizziness, lightheadedness, numbness, paraesthesias, weakness, abdominal pain, nausea, vomiting. Admit to trauma services. Further workup by orthopedic spine and internal medicine consults. \" ST consulted to assess cognitive functioning and determine goals/POC given concerns for recurrent falls.      Past Medical History:   Diagnosis Date    Alcohol abuse     Cancer ([address]     skin    Depression     Drug abuse ([facility]         GERD (gastroesophageal reflux disease)     Hyperlipidemia  Hypertension        Pain: No pain reported. Subjective: DASH Nunez with approval to complete cognitive evaluation. Pt resting in bed upon ST arrival. Alert and pleasant throughout, mildly tangential with highly decreased attention skills. SOCIAL HISTORY:   Living Arrangements: Home with mother  Work History: On disability  Education Level: 12th grade  Driving Status: Active   Finance Management: Independent  Medication Management: Independent  ADL's: Independent. Hobbies: Browsing the Internet   Vision Status: Impaired; wears glasses daily (not present for evaluation)  Hearing: WFL     Type of Home: House  Home Equipment: Rolling walker    SPEECH / VOICE:  Speech and Voice appear to be grossly intact for basic and complex daily communication    LANGUAGE:  Receptive:  Receptive language skills appear to be grossly intact for basic and complex daily communication. Expressive:  Expressive language skills appear to be grossly intact for basic and complex daily communication. COGNITION:  [place] Cognitive Assessment [facility]) version 7.3 completed. Pt scored 15/25 (score adjusted given visual impairments; glasses not present at time of evaluation). Normal score is greater than or equal to 26/30. Inclusion of +1 point given highest level of education achieved less than/equal to 12th grade or GED with limited-0 post-secondary schooling. Orientation: 4/6  Immediate Recall: 2/5  Short-Term Recall: 0/5  Divergent Namin in 1 minute (normal is 11 or greater)  Problem Solvin/3; Impaired  Reasonin/3; Impaired  Sequencin/4 ADL verbal sequencing;  Impaired  Thought Organization: Impaired  Insight: Impaired  Attention: 6/11 sustained attention  Math Computation: 2/3 serial math configuration    SWALLOWING:  Current Diet: NPO (awaiting ortho)  Not Tested     RECOMMENDATIONS/ASSESSMENT:  DIAGNOSTIC IMPRESSIONS:  Pt presents with a moderate cognitive impairment characterized by score of 15/25 on will complete sustained, selective, and divided attention tasks with no more than 3 errors within a given task in order to improve participation in treatment and permit eventual return to driving. Goal 3: Pt will complete functional reasoning and thought organizational tasks with 70% accuracy given mod cues to improve overall executive functioning skills. Goal 4: Pt will complete functional problem solving and sequencing tasks with 70% accuracy given mod cues to improve ability to make successful return to IADL's (medication management, finances, etc.). LONG TERM GOALS:  No LTG's established given short ELOS.        Kathy Solano M.S. [facility] [id number]

## 2020-08-25 NOTE — CONSULTS
[facility]      Patient:  Brayan Gonzales  YOB: 1960    MRN: [id number]     Acct: [de-identified]    Primary Care Physician: DEBRA Arizmendi CNP    Reason for consult: falls    Assessment/Plan:  1. Recurrent Falls--check orthostatic vital signs; I have to question if this is affected by alcohol as he told me he drank a pint of alcohol before he fell however his alcohol level is only at 0.08  2. Hypomagnesia--replace per protocol  3. Hyponatremia--monitor, gentle IV fluids  4. Essential hypertension, uncontrolled--patient takes Norvasc at home, monitor  5. Chronic Alcohol Abuse--ETOH level at 0.08; monitor for any signs of withdrawal; on CIWA with Ativan  6. Chronic macrocytic, hyperchromic anemia--we will check vitamin B12 and folate levels      HISTORY OF PRESENT ILLNESS:   History obtained from chart review and the patient.   The patient is a 60 y.o. male whom I have been requested to see by Dr. Black Garvey for evaluation of falls; patient has a past medical history of chronic alcohol abuse, hypertension, hyperlipidemia and GERD; reported as patient has been having increasing falls over the last couple months; he fell 2 days ago and then again yesterday that brought him into the emergency department; he did tell me that he drank a pint of vodka yesterday afternoon and then sustained a fall; he tells me that he normally does not drink a pint of vodka a day; he states he falls after standing up; I have ordered orthostatic vital signs; today he relates to hitting his head and complains of neck pain as well; he states \"I blacked out in the garage\"; he currently denies any lightheadedness or dizziness, cough, chest pain, shortness of breath, nausea; he currently has a c-collar on; he is fully alert and oriented; moves all of his extremities; in the emergency department he was found to have a T12 vertebral body fracture and orthopedics has already been consulted; he also sustained a scalp hematoma; he was found to be hyponatremic at 132, creatinine mildly elevated at 1.3, and his magnesium was found to be low at 1.5; his troponin level was negative; urine toxicology screen was normal, serum ethyl alcohol level 0.08; he does have a macrocytic anemia which goes along with his alcohol abuse. Past Medical History:        Diagnosis Date    Alcohol abuse     Cancer ([facility] [address]     skin    Depression     Drug abuse ([facility]     2008    GERD (gastroesophageal reflux disease)     Hyperlipidemia     Hypertension        Past Surgical History:        Procedure Laterality Date    FRACTURE SURGERY      OTHER SURGICAL HISTORY  12/28/2012    ORIF LEFT TIBIA    TONSILLECTOMY         Medications: Scheduled Meds:   sodium chloride flush  10 mL Intravenous 2 times per day    famotidine (PEPCID) injection  20 mg Intravenous BID    thiamine  100 mg Oral Daily    amLODIPine  10 mg Oral Daily    FLUoxetine  60 mg Oral Daily    QUEtiapine  400 mg Oral BID    folic acid  1 mg Oral Daily    carBAMazepine  200 mg Oral BID     Continuous Infusions:   sodium chloride 75 mL/hr at 08/25/20 0235     PRN Meds:.sodium chloride flush, acetaminophen, polyethylene glycol, promethazine **OR** ondansetron, morphine **OR** morphine, LORazepam **OR** LORazepam **OR** LORazepam **OR** LORazepam **OR** LORazepam **OR** LORazepam **OR** LORazepam **OR** LORazepam, traZODone    Allergies:  Patient has no known allergies. Social History:    reports that he has been smoking cigarettes. He has a 40.00 pack-year smoking history. He has never used smokeless tobacco. He reports previous alcohol use. He reports previous drug use. Drug: Cocaine.       Family History:       Problem Relation Age of Onset    Heart Disease Mother     COPD Father     Heart Disease Father          Review of Systems:  Constitutional: ROS: negative  Head: no headache, no head injury, no migraine   Eyes ROS: denies blurred/double vision  Ears ROS: no hearing difficulty, no tinnitus  Mouth and Throat ROS: no ulceration, dysphagia, dental caries  Psychological ROS: no depression, no anxiety, no panic attacks, denies suicide/homicide ideation  Endocrine ROS: denies polyuria, polydypsia, no heat or cold intolerance  Respiratory ROS: no cough, shortness of breath, or wheezing  Cardiovascular ROS: no chest pain or dyspnea on exertion  Gastrointestinal ROS: no abdominal pain, change in bowel habits, or black or bloody stools  Genito-Urinary ROS: denies dysuria, frequency, urgency; denies hematuria  Musculoskeletal ROS: positive for -pain in his head and neck  Neurological ROS: (+) syncope, no seizures, no numbness or tingling of hands, no numbness or tingling of feet, no paresis  Dermatology: no skin rash, no eczema  Endocrine: no polyuria, polydypsia, no heat/cold intolerance  Hematology: denies bruising easily, denies bleeding problems, denies clotting disorders            Physical Exam:    Vitals:  Patient Vitals for the past 24 hrs:   BP Temp Temp src Pulse Resp SpO2 Height Weight   08/25/20 0045 -- -- -- -- -- -- -- 245 lb 8 oz (111.4 kg)   08/25/20 0000 120/79 97.9 °F (36.6 °C) Oral 72 16 97 % -- --   08/24/20 2236 (!) 168/100 -- -- 70 -- -- -- --   08/24/20 2206 (!) 181/104 -- -- 68 -- -- -- --   08/24/20 2130 (!) 169/107 97.3 °F (36.3 °C) Oral 68 16 -- -- --   08/24/20 1938 (!) 148/97 -- -- 74 16 97 % -- --   08/24/20 1858 (!) 143/98 -- -- 76 15 98 % -- --   08/24/20 1756 127/88 -- -- 75 16 99 % -- --   08/24/20 1729 (!) 106/94 98.3 °F (36.8 °C) Oral 78 15 98 % 6' 4\" (1.93 m) 235 lb (106.6 kg)     Weight: Weight: 245 lb 8 oz (111.4 kg)     24 hour intake/output:    Intake/Output Summary (Last 24 hours) at 8/25/2020 0252  Last data filed at 8/24/2020 2236  Gross per 24 hour   Intake --   Output 200 ml   Net -200 ml       General appearance - alert, well appearing, and in no distress, oriented to person, place, and time and normal appearing weight; appears stated age  HEENT-atraumatic, normocephalic; PERRL; conjunctiva pink; sclera anicteric; oral mucosa pink and moist; trachea midline; neck supple; no carotid bruit auscultated; no JVD  Respiratory - clear to auscultation, no wheezes, rales or rhonchi, symmetric air entry  Cardiovascular - normal rate and regular rhythm  Gastrointestinal - soft, nontender, nondistended, active bowel sounds x 4  Obese: No  Neurological - alert and oriented to person, place, time; cranial nerves 2-12 grossly intact; speech is clear   Musculoskeletal -c-collar is in place, movement of extremities x 4 intact; no lower extremity edema; pedal and posterior tibialis pulses 2+  Integumentary - pink, warm, dry      Review of Labs and Diagnostic Testing:    CBC:   Recent Labs     08/24/20  1745   WBC 5.9   HGB 10.0*        BMP:    Recent Labs     08/24/20  1745   *   K 3.8   CL 98   CO2 22*   BUN 12   CREATININE 1.3*   GLUCOSE 86     Calcium:  Recent Labs     08/24/20  1745   CALCIUM 8.8     Ionized Calcium:No results for input(s): IONCA in the last 72 hours. Magnesium:  Recent Labs     08/24/20  1745   MG 1.5*       INR:   Recent Labs     08/24/20  1745   INR 1.04     Hepatic:   Recent Labs     08/24/20  1745   ALKPHOS 116   ALT 7*   AST 14   PROT 6.0*   BILITOT 0.4   LABALBU 3.8     Troponin:   Recent Labs     08/24/20  1745   TROPONINT < 0.010     Ct Head Wo Contrast    Result Date: 8/24/2020  PROCEDURE: CT HEAD WO CONTRAST CLINICAL INFORMATION: fall. COMPARISON: 8/16/2019 TECHNIQUE: Noncontrast 5 mm axial images were obtained through the brain. All CT scans at this facility use dose modulation, iterative reconstruction, and/or weight-based dosing when appropriate to reduce radiation dose to as low as reasonably achievable. FINDINGS: Generalized volume loss and small vessel ischemic changes. There is no acute hemorrhage or midline shift. Prominent ventricles in keeping with central atrophy.  Paranasal sinuses are clear. Mastoid air cells are patent. Skull: Unremarkable. Soft tissues: Large frontal scalp hematoma on the left. Large frontal scalp hematoma on the left. No acute intracranial findings. **This report has been created using voice recognition software. It may contain minor errors which are inherent in voice recognition technology. ** Final report electronically signed by Dr. Pavel Arriaga on 8/24/2020 6:39 PM    Ct Cervical Spine Wo Contrast    Result Date: 8/24/2020  PROCEDURE: CT CERVICAL SPINE WO CONTRAST CLINICAL INFORMATION: fall. COMPARISON: August 16, 2019 TECHNIQUE: 3 mm noncontrast axial images were obtained through the cervical spine with sagittal and coronal reconstructions. All CT scans at this facility use dose modulation, iterative reconstruction, and/or weight-based dosing when appropriate to reduce radiation dose to as low as reasonably achievable. FINDINGS: Mastoid air cells are clear where visualized. Facet hypertrophy. Vascular calcifications. Multilevel disc space narrowing and anterior osteophytes. No severe central canal stenosis. Prevertebral soft tissues are unremarkable. No acute fracture or subluxation. **This report has been created using voice recognition software. It may contain minor errors which are inherent in voice recognition technology. ** Final report electronically signed by Dr. Pavel Arriaga on 8/24/2020 6:47 PM    Ct Thoracic Spine Wo Contrast    Result Date: 8/24/2020  PROCEDURE: CT THORACIC SPINE WO CONTRAST CLINICAL INFORMATION: fall. COMPARISON: August 16, 2019 TECHNIQUE: 3 mm axial noncontrast CT images were obtained to the thoracic spine. Sagittal and coronal reconstructions were obtained. All CT scans at this facility use dose modulation, iterative reconstruction, and/or weight-based dosing when appropriate to reduce radiation dose to as low as reasonably achievable. FINDINGS: Normal alignment of the thoracic spine. Facet hypertrophy.  Acute fracture greater than 50% T12 vertebral body. Mild narrowing of the central canal related to small bony fragment extending posteriorly from the posterior margin of the vertebral body. . If there is neurologic change thoracic MRI can be performed as clinically warranted. . There is mild paraspinal edema/infiltration. Facet joints are normally aligned. IMPRESSION: T12 vertebral body fracture. Fracture involves the majority of the vertebral body. If neurologic changes are suspected consider thoracic MRI for further evaluation. **This report has been created using voice recognition software. It may contain minor errors which are inherent in voice recognition technology. ** Final report electronically signed by Dr. Bijan Albert on 8/24/2020 6:53 PM    Ct Lumbar Spine Wo Contrast    Result Date: 8/24/2020  PROCEDURE: CT LUMBAR SPINE WO CONTRAST CLINICAL INFORMATION: fall, fall. COMPARISON: August 16, 2019 TECHNIQUE: 3 mm noncontrast axial images were obtained of the lumbar spine. Sagittal and coronal reconstructions were obtained. Angled images were reconstructed through the L3-4, L4-5 and L5-S1 disc levels. All CT scans at this facility use dose modulation, iterative reconstruction, and/or weight-based dosing when appropriate to reduce radiation dose to as low as reasonably achievable. FINDINGS: Normal alignment. No acute fracture lumbar spine. Multilevel disc space narrowing. . No severe central canal stenosis. Facet joints are normally aligned. SI joints are symmetric. Old fracture of the left posterior rib with callus formation. No acute fracture of the lumbar spine. T12 vertebral body fracture as reported on thoracic spine CT. **This report has been created using voice recognition software. It may contain minor errors which are inherent in voice recognition technology. ** Final report electronically signed by Dr. Bijan Albert on 8/24/2020 6:56 PM    Xr Chest 1 View    Result Date: 8/24/2020  PROCEDURE: XR CHEST 1 VIEW CLINICAL INFORMATION:

## 2020-08-25 NOTE — FLOWSHEET NOTE
08/25/20 1757   Provider Notification   Reason for Communication Review case   Provider Name [facility]   Provider Notification [facility] Clinician (CNS, NP, CNM, CRNA, PA)   Method of Communication Secure Message   Response See orders   Notification Time [address] [place], [place] messaged to inform of patient behaviors pulled IV out, wanting to leave, confused and getting combative with staff with security called and daughter updated.  Bowen Schilling returned call and new order for PRN haldol 5 mg IM x 1 dose

## 2020-08-25 NOTE — FLOWSHEET NOTE
Tere Hansen 59  PHYSICAL THERAPY MISSED TREATMENT NOTE  [facility] ONC MED 5K    Date: 2020  Patient Name: Santi Atkins        MRN: [id number]   : 1960  (60 y.o.)  Gender: male                REASON FOR MISSED TREATMENT:  Missed Treat. Pt with MRI pending, bedrest orders, and awaiting TLSO, see .

## 2020-08-25 NOTE — CARE COORDINATION
DISCHARGE/PLANNING EVALUATION  8/25/20, 3:44 PM EDT    Reason for Referral: Discharge needs  Mental Status: Patient is confused  Decision Making: Makes own decisions  Family/Social/Home Environment: Patient stated that he resides at home with his mother. He stated that his home is one floor with 2 steps inside. He stated that he has a tub/shower combo with a shower seat. Patient stated that he drives. Current Services including food security, transportation and housekeeping:  Patient stated that he drives. He stated that is mother completes household tasks. Current Equipment: [address]: [employer]  Concerns or Barriers to Discharge: NA  Post acute provider list with quality measures, geographic area and applicable managed care information provided. Questions regarding selection process answered: Declined    Teach Back Method used with Shahidabest Smalls regarding care plan and options for discharge. Patient  verbalize understanding of the plan of care and contribute to goal setting. Patient goals, treatment preferences and discharge plan: Patient stated that his daughter will be coming to visit and that they will discuss. Patient unsure of his plan but thinks that he wants to return home.      Electronically signed by KIRSTIE Recinos on 8/25/2020 at 3:44 PM

## 2020-08-26 LAB
ANION GAP SERPL CALCULATED.3IONS-SCNC: 9 MEQ/L (ref 8–16)
BUN BLDV-MCNC: 13 MG/DL (ref 7–22)
CALCIUM SERPL-MCNC: 8.7 MG/DL (ref 8.5–10.5)
CHLORIDE BLD-SCNC: 105 MEQ/L (ref 98–111)
CO2: 24 MEQ/L (ref 23–33)
CREAT SERPL-MCNC: 1.1 MG/DL (ref 0.4–1.2)
ERYTHROCYTE [DISTWIDTH] IN BLOOD BY AUTOMATED COUNT: 13.6 % (ref 11.5–14.5)
ERYTHROCYTE [DISTWIDTH] IN BLOOD BY AUTOMATED COUNT: 55.8 FL (ref 35–45)
GFR SERPL CREATININE-BSD FRML MDRD: 68 ML/MIN/1.73M2
GLUCOSE BLD-MCNC: 99 MG/DL (ref 70–108)
HCT VFR BLD CALC: 31.4 % (ref 42–52)
HEMOGLOBIN: 10.1 GM/DL (ref 14–18)
MAGNESIUM: 2.2 MG/DL (ref 1.6–2.4)
MCH RBC QN AUTO: 35.8 PG (ref 26–33)
MCHC RBC AUTO-ENTMCNC: 32.2 GM/DL (ref 32.2–35.5)
MCV RBC AUTO: 111.3 FL (ref 80–94)
PLATELET # BLD: 181 THOU/MM3 (ref 130–400)
PMV BLD AUTO: 9.3 FL (ref 9.4–12.4)
POTASSIUM SERPL-SCNC: 4.5 MEQ/L (ref 3.5–5.2)
RBC # BLD: 2.82 MILL/MM3 (ref 4.7–6.1)
SODIUM BLD-SCNC: 138 MEQ/L (ref 135–145)
WBC # BLD: 4.1 THOU/MM3 (ref 4.8–10.8)

## 2020-08-26 PROCEDURE — 6370000000 HC RX 637 (ALT 250 FOR IP): Performed by: PHYSICIAN ASSISTANT

## 2020-08-26 PROCEDURE — 97166 OT EVAL MOD COMPLEX 45 MIN: CPT

## 2020-08-26 PROCEDURE — 97530 THERAPEUTIC ACTIVITIES: CPT

## 2020-08-26 PROCEDURE — 97116 GAIT TRAINING THERAPY: CPT

## 2020-08-26 PROCEDURE — 80048 BASIC METABOLIC PNL TOTAL CA: CPT

## 2020-08-26 PROCEDURE — 2500000003 HC RX 250 WO HCPCS: Performed by: PHYSICIAN ASSISTANT

## 2020-08-26 PROCEDURE — 97535 SELF CARE MNGMENT TRAINING: CPT

## 2020-08-26 PROCEDURE — 6370000000 HC RX 637 (ALT 250 FOR IP): Performed by: NURSE PRACTITIONER

## 2020-08-26 PROCEDURE — 99233 SBSQ HOSP IP/OBS HIGH 50: CPT | Performed by: NURSE PRACTITIONER

## 2020-08-26 PROCEDURE — 1200000000 HC SEMI PRIVATE

## 2020-08-26 PROCEDURE — 97130 THER IVNTJ EA ADDL 15 MIN: CPT

## 2020-08-26 PROCEDURE — 83735 ASSAY OF MAGNESIUM: CPT

## 2020-08-26 PROCEDURE — 85027 COMPLETE CBC AUTOMATED: CPT

## 2020-08-26 PROCEDURE — 36415 COLL VENOUS BLD VENIPUNCTURE: CPT

## 2020-08-26 PROCEDURE — APPSS60 APP SPLIT SHARED TIME 46-60 MINUTES: Performed by: PHYSICIAN ASSISTANT

## 2020-08-26 PROCEDURE — 97110 THERAPEUTIC EXERCISES: CPT

## 2020-08-26 PROCEDURE — 99232 SBSQ HOSP IP/OBS MODERATE 35: CPT | Performed by: SURGERY

## 2020-08-26 PROCEDURE — 97129 THER IVNTJ 1ST 15 MIN: CPT

## 2020-08-26 PROCEDURE — 2580000003 HC RX 258: Performed by: PHYSICIAN ASSISTANT

## 2020-08-26 PROCEDURE — 6360000002 HC RX W HCPCS: Performed by: PHYSICIAN ASSISTANT

## 2020-08-26 RX ORDER — LANOLIN ALCOHOL/MO/W.PET/CERES
1000 CREAM (GRAM) TOPICAL DAILY
Status: DISCONTINUED | OUTPATIENT
Start: 2020-08-26 | End: 2020-08-27 | Stop reason: HOSPADM

## 2020-08-26 RX ORDER — ASPIRIN 325 MG
81 TABLET ORAL DAILY
COMMUNITY
End: 2022-04-26 | Stop reason: DRUGHIGH

## 2020-08-26 RX ADMIN — TRAZODONE HYDROCHLORIDE 100 MG: 100 TABLET ORAL at 20:11

## 2020-08-26 RX ADMIN — ACETAMINOPHEN 650 MG: 325 TABLET ORAL at 14:59

## 2020-08-26 RX ADMIN — MORPHINE SULFATE 2 MG: 2 INJECTION, SOLUTION INTRAMUSCULAR; INTRAVENOUS at 20:09

## 2020-08-26 RX ADMIN — Medication 100 MG: at 08:17

## 2020-08-26 RX ADMIN — FOLIC ACID 1 MG: 1 TABLET ORAL at 08:17

## 2020-08-26 RX ADMIN — QUETIAPINE FUMARATE 400 MG: 400 TABLET ORAL at 08:17

## 2020-08-26 RX ADMIN — DOCUSATE SODIUM 100 MG: 100 CAPSULE, LIQUID FILLED ORAL at 08:17

## 2020-08-26 RX ADMIN — Medication 1000 MCG: at 14:51

## 2020-08-26 RX ADMIN — Medication 10 ML: at 20:09

## 2020-08-26 RX ADMIN — QUETIAPINE FUMARATE 400 MG: 400 TABLET ORAL at 20:08

## 2020-08-26 RX ADMIN — CARBAMAZEPINE 200 MG: 200 TABLET ORAL at 08:18

## 2020-08-26 RX ADMIN — AMLODIPINE BESYLATE 10 MG: 10 TABLET ORAL at 08:17

## 2020-08-26 RX ADMIN — FAMOTIDINE 20 MG: 10 INJECTION INTRAVENOUS at 08:17

## 2020-08-26 RX ADMIN — FAMOTIDINE 20 MG: 10 INJECTION INTRAVENOUS at 20:08

## 2020-08-26 RX ADMIN — FLUOXETINE HYDROCHLORIDE 60 MG: 20 CAPSULE ORAL at 20:08

## 2020-08-26 RX ADMIN — THERA TABS 1 TABLET: TAB at 08:17

## 2020-08-26 RX ADMIN — SENNOSIDES 8.6 MG: 8.6 TABLET, FILM COATED ORAL at 20:08

## 2020-08-26 RX ADMIN — Medication 10 ML: at 08:19

## 2020-08-26 RX ADMIN — CARBAMAZEPINE 200 MG: 200 TABLET ORAL at 20:08

## 2020-08-26 ASSESSMENT — PAIN DESCRIPTION - PAIN TYPE
TYPE: ACUTE PAIN

## 2020-08-26 ASSESSMENT — PAIN DESCRIPTION - FREQUENCY
FREQUENCY: CONTINUOUS

## 2020-08-26 ASSESSMENT — PAIN SCALES - GENERAL
PAINLEVEL_OUTOF10: 0
PAINLEVEL_OUTOF10: 5
PAINLEVEL_OUTOF10: 6
PAINLEVEL_OUTOF10: 6
PAINLEVEL_OUTOF10: 5

## 2020-08-26 ASSESSMENT — PAIN DESCRIPTION - PROGRESSION
CLINICAL_PROGRESSION: NOT CHANGED

## 2020-08-26 ASSESSMENT — PAIN DESCRIPTION - LOCATION
LOCATION: HEAD;BACK
LOCATION: NECK;BACK
LOCATION: BACK

## 2020-08-26 ASSESSMENT — PAIN DESCRIPTION - ORIENTATION
ORIENTATION: LOWER;UPPER
ORIENTATION: LOWER

## 2020-08-26 ASSESSMENT — PAIN DESCRIPTION - ONSET
ONSET: ON-GOING
ONSET: ON-GOING

## 2020-08-26 ASSESSMENT — PAIN DESCRIPTION - DESCRIPTORS
DESCRIPTORS: ACHING
DESCRIPTORS: ACHING;TENDER
DESCRIPTORS: ACHING

## 2020-08-26 ASSESSMENT — PAIN - FUNCTIONAL ASSESSMENT
PAIN_FUNCTIONAL_ASSESSMENT: ACTIVITIES ARE NOT PREVENTED
PAIN_FUNCTIONAL_ASSESSMENT: ACTIVITIES ARE NOT PREVENTED

## 2020-08-26 NOTE — PROGRESS NOTES
Tere Hansen 59  INPATIENT OCCUPATIONAL THERAPY  [facility] ONC MED 5K  EVALUATION    Time:    Time In: 6303  Time Out: 1205  Timed Code Treatment Minutes: 10 Minutes  Minutes: 23          Date: 2020  Patient Name: Samina Billy,   Gender: male      MRN: [id number]  : 1960  (60 y.o.)  Referring Practitioner: ULI CATHERINE  Diagnosis: closed fracture of thoracic vertebral  body  Additional Pertinent Hx: Per ED notes: Mr. Emmanuel Siddiqi is a 59 Y/O male presenting at [facility] by trauma consult, brought by EMS following a fall today  with LOC; PMH includes drug and alcohol abuse, cancer, HTN, HLD, and GERD. Per patient report he has been falling with increasing frequency over past 2 months, believes a fall while exiting a parked car two days ago is what hurt his back, but he also fell today. Patient states he typically does not remember falls, just finds himself on the ground. Daughter present in exam room states, falls often occur after standing up, he will begin to shake and then fall to floor, appear to lose consciousness briefly. Patient reports history of alcohol abuse, drank a pint of liquor today between [id number], states this is not unusual for him. Restrictions/Precautions:  Restrictions/Precautions: General Precautions, Fall Risk  Required Braces or Orthoses  Spinal: Thoracic Lumbar Sacral Orthotics  Position Activity Restriction  Spinal Precautions: No Bending, No Lifting, No Twisting  Other position/activity restrictions: TLSO brace ordered for T12 fracture. Will need to wear this when up. May have it off when in bed. pt impulsive    Subjective  Chart Reviewed: Yes, Orders, Progress Notes, History and Physical  Patient assessed for rehabilitation services?: Yes    Subjective: Pt sitting up in bedside chair with increased confusion notedad sitter present. Pt stating he stayed in a motel last night requiring reorientation to place.     Pain:  Pain Assessment  Patient Currently in Pain: Distance: To and from bathroom and into hallway  Pt unsteady on feet with small LOB equiring ocaisonal assist to correct. Pt requiring education on walker safety throughotu as well d/t attempting to  walker and putting it to fare in front of him during          Activity Tolerance:  Patient tolerance of  treatment: fair. Decreased safety awareness throughout       Assessment:  Assessment: Pt demo increased confusion this date with decreased safety awareness during ADL tasks. Pt erquiring wearing of TLSO at htis time d/t T12 burst fracture. Pt with decreased balance increasing his fall risk druing ADLs. Pt would benefit from skilled oTS Ervices to educate on safety awareness, improve his balance for decreased fall risk and imrpove his ability to be more indep with ADL tasks while maintaining back precautions. Performance deficits / Impairments: Decreased functional mobility , Decreased endurance, Decreased ADL status, Decreased balance, Decreased safe awareness, Decreased cognition  Prognosis: Good  REQUIRES OT FOLLOW UP: Yes  Decision Making: Medium Complexity    Treatment Initiated: Treatment and education initiated within context of evaluation. Evaluation time included review of current medical information, gathering information related to past medical, social and functional history, completion of standardized testing, formal and informal observation of tasks, assessment of data and development of plan of care and goals. Treatment time included skilled education and facilitation of tasks to increase safety and independence with ADL's for improved functional independence and quality of life.     Discharge Recommendations:  IP Rehab, Patient would benefit from continued therapy after discharge(Pt is not safe to return home with his mother at this time d/t decreased safety awareness, decreased balance increasing his fall risk and need for assitance during ADL tasks)    Patient Education:  OT Education: Plan of Care, OT Role  Patient Education: waering of TLSO, safety with transfers and mobility    Equipment Recommendations: Other: continue to assess- will need a RW    Plan:  Times per week: 6x  Current Treatment Recommendations: Patient/Caregiver Education & Training, Balance Training, Functional Mobility Training, Endurance Training, Safety Education & Training, Self-Care / ADL. See long-term goal time frame for expected duration of plan of care. If no long-term goals established, a short length of stay is anticipated. Goals:  Patient goals : go home  Short term goals  Time Frame for Short term goals: by discharge  Short term goal 1: Pt to complete LB ADLs with min A while using LHAE and requiring min cues for maintaining back precautions throughout  Short term goal 2: Pt to demo toileting tasks and transfers with CGA and no cues for safety  Short term goal 3: Pt to demo good dyamamic standing balance > 3 min with no UE support while reaching outside base of support with CGA in prepf for ADL tasks  Short term goal 4: Pt to [facility] distances using AD with CGA, no LOB or cues for safety to increase his abiliyt to complete his ADL tasks in home upon return         Following session, patient left in safe position with all fall risk precautions in place.

## 2020-08-26 NOTE — PROGRESS NOTES
[address] [address]  INPATIENT PHYSICAL THERAPY  DAILY NOTE  [facility] 5K - 5K-15/015-A      Time In: 7702  Time Out: 0806  Timed Code Treatment Minutes: 43 Minutes  Minutes: 42          Date: 2020  Patient Name: Linh Hawk,  Gender:  male        MRN: [id number]  : 1960  (60 y.o.)     Referring Practitioner: FLORIN Hernandez  Diagnosis: closed fracture of thoracic vertebral body  Additional Pertinent Hx: admit with above diagnosis, T12 compression fracture, chronic alcohol abuse, drug abuse, recurrent falls, neck pain, scalp hematoma     Prior Level of Function:  Lives With: (with mother)  Type of Home: House  Home Equipment: Rolling walker        Ambulation Assistance: Independent  Transfer Assistance: Independent  Additional Comments: pt impulsive and not wanting to answer PLOF questions,  no family present to answer questions    Restrictions/Precautions:  Restrictions/Precautions: General Precautions, Fall Risk  Required Braces or Orthoses  Spinal: Thoracic Lumbar Sacral Orthotics  Position Activity Restriction  Spinal Precautions: No Bending, No Lifting, No Twisting  Other position/activity restrictions: TLSO brace ordered for T12 fracture. Will need to wear this when up. May have it off when in bed.  pt impulsive    SUBJECTIVE: pt in bed on arrival he was cooperative and agreeable for therapy, he demonstrated decreased safety awareness however he was recalling some of what we discussed, pt reported that he feels his vision is off he c/o of blurriness but denied any double vision he stated that his current glasses don't seem to help him right now, will call [facility] to adjust fitting of his TLSO      PAIN: no number given he c/o of back soreness     OBJECTIVE:  Bed Mobility:  Rolling to Right: Minimal Assistance, he was guarded and did use rail    Supine to Sit: Minimal Assistance, at trunk with HOB flat and no use of rail   Scooting: Contact Guard Assistance  ** pt required total assist to toribio his TLSO and shoes while sitting edge of bed   Transfers:  Sit to Stand: Minimal Assistance, from bed he needed cues each time for hand placement he was unsteady wtih intial standing and one time he reported that he was having tremors   Stand to Sit:Minimal Assistance, cues to reach back and to control descend noted fair carryover when he sat in the chair     Ambulation:  Minimal Assistance of one and SBA of another for IV pole  Distance: 40x1, 10x1  Surface: Level Tile  Device:Rolling Walker  Gait Deviations:  Pt needing assist at walker to keep it closer to him, noted uneven step length and usually a rather large step to where he isn't even able to step to with his opp LE, noted fair heel contact at junior LEs he needed max cues for walker safety marin when turning around and backing up to chair     Balance:  Pt stood w/ no UE at support with CGA and min trunk sway, he completed reaching task and needed min assist when he had both UEs off the walker and trunk sway he did c/o of being dizzy when looking up, and he remembered his back precautions and didn't twist     Exercise:  Patient was guided in 1 set(s) 10 reps of exercise to both lower extremities. Ankle pumps, Glut sets, Quad sets, Heelslides, Short arc quads, Hip abduction/adduction and discussed completing ex with abdominal bracing. Exercises were completed for increased independence with functional mobility. Functional Outcome Measures: Completed  Balance Score: 2  Gait Score: 6  Tinetti Total Score: 8  Risk Indicators:  Less than/equal to 18 = high risk  19-23 Moderate risk  Greater than/equal to 24 = low risk    AM-PAC Inpatient Mobility Raw Score : 16  AM-PAC Inpatient T-Scale Score : 40.78    ASSESSMENT:  Assessment: Patient progressing toward established goals.  and pt cont to demonstrate decreased safety awareness however he did recall and carryover some of what we discussed today in therapy, he was unsteady with standing balance and gait he scored 8/28 on his tinetti balance test putting him at a high fall risk, pt would greatly benefit from cont skilled therapy to work on strength, balance, endurance and increased independence with functional mobility prior to return home with family   Activity Tolerance:  Patient tolerance of  treatment: fair. Equipment Recommendations: Other: will cont to assess- pt inconsistant with answers if he has a walker at home anticipate he will need to use a rolling walker at discharge   Discharge Recommendations:    IP Rehab    Plan: Times per week: 5-6X T  Times per day: Daily  Specific instructions for Next Treatment: therex and mobility with TLSO    Patient Education  Patient Education: Plan of Care, back precautions, hand placement with transfers, gait and discharge recommendations     Goals:  Patient goals : return home  Short term goals  Time Frame for Short term goals: by discharge  Short term goal 1: bed mobility with S to get in/out of bed, use log roll technique  Short term goal 2: transfer with S to get in/out of chairs  Short term goal 3: amb 75'x1 with RW and S to walk safely in room  Long term goals  Time Frame for Long term goals : no LTGs set secondary to short ELOS    Following session, patient left in safe position with all fall risk precautions in place.

## 2020-08-26 NOTE — CARE COORDINATION
8/26/20, 2:40 PM EDT    DISCHARGE ON GOING [address] day: 2  Location: [facility]15/015-A Reason for admit: Closed fracture of thoracic vertebral body ([facility] [address] [S22.009A]   Procedure: N/A  Treatment Plan of Care: PT/OT/ST, Ortho consult complete, patient to follow-up at discharge, PM & Rconsult, Acute Rehab consult, SS, Dietician, TLSO brace on when out of bed, IV Pepcid, Magnesium replacement protocol, prn Tylenol, Morphine, Glycolax, Phenergan,  Zofran, and Desyrel, general diet, activity as tolerated, fall and seizure precautions, daily weights. Barriers to Discharge: Medical stability, placement. PCP: DEBRA Rob CNP  Readmission Risk Score: 15%  Patient Goals/Plan/Treatment Preferences: Tisha Noonan is from home residing with his mother. Acute rehab consulted for placement.

## 2020-08-26 NOTE — PROGRESS NOTES
[facility]  Daily Progress Note    Pt Name: Santi Atkins  Medical Record Number: [id number]  Date of Birth 1960   Today's Date: 8/26/2020    HD: # 2    CC: \"I'm doing alright\"    ASSESSMENT  1. Active Hospital Problems    Diagnosis Date Noted    Fall [W19. XXXA]     Closed head injury [S09.90XA]     Closed fracture of thoracic vertebral body ([address] [S22.009A] 08/24/2020         PLAN  Patient admitted under Trauma Services      Chronic alcohol abuse              - ETOH 0.08 on admission              - consult to addiction services              - CIWA protocal   - Thiamine, folic acid, multivitamin     Recurrent falls              - Consult to internal medicine for workup              - Restarted home meds.  Nursing staff to recheck home meds     T12 vertebral body fracture               - Consult to orthopedic spine              - Bed rest              - TLSO brace ordered   - MRI cervical, thoracic, lumbar spine obtained resulted noted below    -Acute burst injury of the T12 vertebral body without evidence of instability   - OOB with therapy, no lifting over 20 lb per ortho   - Ortho office to schedule 2-3 week f/u, patient will need serial XR              - Pain control              - Neurochecks   - PT/OT      Neck pain              - Consult to orthopedic spine              - Bed rest              - MRI cervical, thoracic, lumbar spine obtained resulted noted below    -Multilevel degenerative changes of the cervical spine              - Pain control              - Neurochecks              - [place] collar removed by orthospine   - PT/OT when cleared by orthospine     Scalp hematoma              - Ice to area              - Pain control   - SLP cog eval for close head injury     Consults: Internal medicine, orthopedic spine     Pain Management              Morphine     Prophylaxis: SCD's, Incentive Spirometry, Colace, Pepcid, Zofran     NPO now until orthospine clears     IVF Management  Regular Neurovascular Checks  Repeat Labs Tomorrow AM  PT/OT/SLP Eval and Treat  Strict bedrest until evaluated by orthopedic spine     Planned Discharge pending clinical course    - Consult to rehab/TCU and PM&R for evaluation/placement     SUBJECTIVE  Mr. Nicola Vazquez is a 59 Y/O male continuing to present at [address] [place] [address] on 5K following multiple falls with a past medical history of ETOH abuse, HTN, HLD< CA, depression, GERD, and drug abuse. Patient complains of mild mid back pain, some nausea when taking pepcid, and head pain from abrasions/hematoma. Patient denies chest pain, shortness of breath, cough,  dizziness, lightheadedness, numbness, paraesthesias, weakness,  abdominal pain, vomiting. Workup for falls by hospitalist team, possible IPR at discharge. Hemoglobin stable at 10.1, WBC downtrending at 4.1, no electrolyte abnormality, MRI images reviewed, vital signs stable on exam.         Wt Readings from Last 3 Encounters:   08/25/20 245 lb 8 oz (111.4 kg)   01/09/20 238 lb (108 kg)   08/21/19 240 lb (108.9 kg)     Temp Readings from Last 3 Encounters:   08/26/20 98 °F (36.7 °C) (Oral)   01/09/20 97.4 °F (36.3 °C) (Temporal)   08/21/19 96.9 °F (36.1 °C)     BP Readings from Last 3 Encounters:   08/26/20 130/80   01/09/20 134/84   08/21/19 135/88     Pulse Readings from Last 3 Encounters:   08/26/20 72   01/09/20 70   08/21/19 72       24 HR INTAKE/OUTPUT :     Intake/Output Summary (Last 24 hours) at 8/26/2020 1054  Last data filed at 8/26/2020 1025  Gross per 24 hour   Intake 1577.32 ml   Output 1920 ml   Net -342.68 ml     DIET GENERAL;    OBJECTIVE  CURRENT VITALS /80   Pulse 72   Temp 98 °F (36.7 °C) (Oral)   Resp 16   Ht 6' 4\" (1.93 m)   Wt 245 lb 8 oz (111.4 kg)   SpO2 100%   BMI 29.88 kg/m²   GENERAL: Presents sitting upright in chairunassisted, Awake and alert x 3 person, place, and time; In no acute distress and well nourished.   SKIN: Appropriate for ethnicity, warm and dry. EYES: No apparent trauma, discharge, or hematoma bilaterally. PERRL at 3mm  CARDIO: Unable to assess completely due to TLSO brace. 2+ radial and dorsalis pedal pulses. Capillary refill <2 sec. No extremity edema noted. PULMONARY:  Trachea midline, no audible wheezing, increased respiratory effort, accessory muscle use, or asymmetrical chest wall movement. Lung sounds are clear to ascultation . No wheezing, crackles, or rhonchi. NEURO: Follows commands. PMS intact, moves limbs freely. MSK: Extremities intact and present. No new bruising, swelling, deformity, discoloration or bleeding. No new tenderness to muscular or bony structure palpation.  strength 5/5 and equal bilaterally. LABS  CBC :   Recent Labs     08/24/20 1745 08/25/20 0347 08/26/20  0400   WBC 5.9 4.6* 4.1*   HGB 10.0* 10.0* 10.1*   HCT 30.5* 29.7* 31.4*   .0* 108.4* 111.3*    174 181     BMP:   Recent Labs     08/24/20 1745 08/25/20 0347 08/26/20  0400   * 134* 138   K 3.8 4.2 4.5   CL 98 102 105   CO2 22* 24 24   BUN 12 12 13   CREATININE 1.3* 1.3* 1.1     COAGS:   Recent Labs     08/24/20 1745 08/25/20 0347   APTT 27.2  --    PROT 6.0* 5.7*   INR 1.04  --      Pancreas/HFP:    Recent Labs     08/24/20 1745   LIPASE 12.2     Recent Labs     08/24/20 1745 08/25/20  0347   AST 14 13   ALT 7* 7*   BILITOT 0.4 0.4   ALKPHOS 116 117     RADIOLOGY  Narrative    PROCEDURE: MRI LUMBAR SPINE WO CONTRAST         CLINICAL INFORMATION: T12 burst fx. Problems with balance recent multiple falls.         COMPARISON: CT lumbar spine dated 8/24/2020 and 8/16/2019.         TECHNIQUE: Sagittal and axial T1 and T2-weighted images were obtained through the lumbar spine.         FINDINGS:    Redemonstration of compression deformity of the T12 vertebral body with approximately 60% central height loss and multiple linear areas of low T1 and hyperintense T2 signal corresponding to fracture lucencies on CT. There is diffuse edema throughout the    vertebral body with minimal retropulsion of the posterior aspect of the T12 vertebral bodies spinal canal without significant spinal canal stenosis. There is small amount of edema extending into the pedicles. There is otherwise anatomic alignment. There    is mild thickening of the anterior longitudinal ligament at T11-12 and T12-L1. The posterior longitudinal ligament appears intact. No ligamentum flavum injury or interspinous ligamentous injury is identified. No other focal areas of abnormal signal are    identified within the lumbar vertebral column. The conus terminates in a normal fashion at the L1-2 level. The cauda equina is normal in appearance without nerve root thickening or clumping identified. Paraspinal soft tissues are unremarkable. At T12-L1 there is no significant spinal canal or neuroforaminal stenosis. At L1-2 there is a minimal disc bulge without significant spinal canal or neuroforaminal stenosis. At L2-3 there is no significant spinal canal or neuroforaminal stenosis. At L3-4 there is a shallow disc bulge which mildly indents thecal sac and contributes to moderate spinal canal stenosis in association with mild facet hypertrophy and ligamentum flavum thickening superimposed on congenital spinal canal narrowing. There    is mild to moderate bilateral neural foraminal stenosis. At L4-5 there is a minimal disc bulge without significant spinal canal stenosis. There is mild bilateral neural foraminal stenosis in association with mild ligamentum flavum thickening. At L5-S1 there is a shallow disc bulge without significant spinal canal stenosis and moderate to severe bilateral neural foraminal stenosis in association with facet hypertrophy and ligamentum flavum thickening.              Impression         1. Redemonstration of T12 burst injury with mild anterior longitudinal ligament injury without evidence of instability.     2. Congenital spinal canal narrowing with superimposed mild degenerative changes most pronounced at L3-4 where there is moderate spinal canal stenosis and mild to moderate bilateral neural foraminal stenosis.                     **This report has been created using voice recognition software. It may contain minor errors which are inherent in voice recognition technology. **         Final report electronically signed by Dr. Giovanni Bell MD on 8/25/2020 11:54 AM      Narrative    PROCEDURE: MRI THORACIC SPINE WO CONTRAST         CLINICAL INFORMATION: T12 burst fx. Balance problems with recent falls.         COMPARISON: CT thoracic spine dated 8/24/2020 and 8/16/2019.         TECHNIQUE: Sagittal T1, T2 and STIR sequences were obtained through the thoracic spine. Axial T2-weighted images were obtained through the discs.         FINDINGS:     Redemonstration of compression deformity of the T12 vertebral body with approximately 60% central height loss and minimal retropulsion of the posterior aspect of the vertebral body of the spinal canal, stable compared to most recent CT and new compared    to 8/16/2019. There is no significant spinal canal stenosis at this level. There are linear hypointense signal areas in the vertebral column consistent with fracture lucencies. There is diffuse edema throughout the T12 vertebral body with mild extension    into the pedicles. There is otherwise anatomic vertebral body height and alignment. There is a prominent focal area of hyperintense T1 and T2 signal in the T10 vertebral body as evidence for a hemangioma. Marrow signal is otherwise within normal limits. The thoracic spinal cord is normal in caliber without focal area of abnormal T2 signal identified. Paraspinal soft tissues are unremarkable. The intervertebral discs are relatively preserved.  There is no significant spinal canal or neuroforaminal    stenosis at any thoracic level.                   Impression     Redemonstration of acute burst injury of the T12 vertebral body without evidence of instability. There is no significant spinal canal or neuroforaminal stenosis throughout the thoracic spine.                        **This report has been created using voice recognition software. It may contain minor errors which are inherent in voice recognition technology. **         Final report electronically signed by Dr. Reese Ronquillo MD on 8/25/2020 11:37 AM      Narrative    PROCEDURE: MRI CERVICAL SPINE WO CONTRAST         CLINICAL INFORMATION: balance changes, falls, cervical pain . Balance problems with multiple recent falls.         COMPARISON: CT cervical spine dated 8/24/2020.         TECHNIQUE: Sagittal T1, T2 and STIR sequences were obtained through the cervical spine. Axial fast and echo and gradient echo T2-weighted images were obtained.         FINDINGS:    There is minimal, grade 1, retrolisthesis of C6 relative to C7 on the basis of degenerative change, stable compared to prior CT. There is otherwise anatomic vertebral body height and alignment. There is a focal area of hyperintense T1 and T2 signal in    the C5 vertebral body is evidence for a hemangioma. There is mild hyperintense T1 and T2 signal at the opposing endplates of D2-7 through C6-7 with anterior osteophytes as evidence for Modic 2 degenerative change. Marrow signal is otherwise within normal     limits. The craniocervical junction appears intact. The cervical spinal cord is normal in caliber without focal area of abnormal T2 signal identified. Paraspinal soft tissues are unremarkable. At C2-3 there is no significant spinal canal stenosis. There is moderate bilateral neural foraminal stenosis in association with uncovertebral joint degenerative change and facet hypertrophy.     At C3-4 there is a shallow disc bulge which causes mild spinal canal stenosis, moderate to severe right and severe left neural foraminal stenosis in association with uncovertebral joint degenerative change and facet hypertrophy. At C4-5 there is a shallow disc bulge which causes mild spinal canal stenosis and moderate to severe left and severe right neuroforaminal stenosis in association with uncovertebral joint degenerative change and facet hypertrophy. At C5-6 there is a shallow disc bulge with rim osteophytes which causes mild spinal canal stenosis, moderate right and severe left neuroforaminal stenosis in association with uncovertebral joint degenerative change. At C6-7 there is partial uncovering the disc with superimposed shallow disc bulge which contributes to mild spinal canal stenosis and moderate right and mild left neural foraminal stenosis in association with uncovertebral joint degenerative change. At C7-T1 there is no significant spinal canal or neuroforaminal stenosis.           Impression     Multilevel degenerative changes of the cervical spine with multiple areas of mild spinal canal stenosis and up to severe neuroforaminal stenosis. Please refer to the body of the report for segmental analysis.                     **This report has been created using voice recognition software. It may contain minor errors which are inherent in voice recognition technology. **         Final report electronically signed by Dr. Errol Bravo MD on 8/25/2020 11:25 AM             Electronically signed by FLORIN Rico on 8/26/2020 at 10:54 AM    Patient seen and examined independently by me. Above discussed and I agree with FLORIN Rico. See my additional comments below for updated orders and plan. Labs, cultures, and radiographs where available were reviewed. I discussed patient concerns with the patient's nurse and instructions were given. Please see our orders for the updated patient care plan. -Pain control. TLSO brace. Nonsurgical management per orthopedic spine service. PT/OT. Neurovascular checks. Cervical collar removed.   Following up with orthopedic service. Discharge planning in process.     Electronically signed by Pérez Blackwood MD on 8/26/20 at 3:57 PM EDT

## 2020-08-26 NOTE — PROGRESS NOTES
Department of Orthopedic Surgery  Spine Service  Progress Note        Subjective:   8/26/20  Williams Varma is resting in bed on 5k. Our team was consulted for T12 vetebral body fracture. MRI of CS,TS, and LS completed yesterday and reviewed by both Dr. Esdras Walker and myself. No surgical intervention at this time. There is cervical spinal stenosis but we are not convinced that his stenosis is a cause of his falls and believe that his falls are due to the patients alcohol abuse. He denies myelopathy or radicular symptoms. Main concern is LBP. Patient acknowledges his alcohol abuse and states he plans on being discharged to his mothers house at discharge. MRI TS/LS demonstrates a T12 burst fx without instability. Discussed with patient he is to wear his TLSO brace when OOB and no lifting over 10 lbs. Vitals  VITALS:  /70   Pulse 66   Temp 97.6 °F (36.4 °C)   Resp 16   Ht 6' 4\" (1.93 m)   Wt 245 lb 8 oz (111.4 kg)   SpO2 96%   BMI 29.88 kg/m²   24HR INTAKE/OUTPUT:    Intake/Output Summary (Last 24 hours) at 8/26/2020 0701  Last data filed at 8/26/2020 0640  Gross per 24 hour   Intake 1457.32 ml   Output 1620 ml   Net -162.68 ml     URINARY CATHETER OUTPUT (Hernandez):     DRAIN/TUBE OUTPUT:     VENT SETTINGS:  Vent Information  SpO2: 96 %  Additional Respiratory  Assessments  Pulse: 66  Resp: 16  SpO2: 96 %        PHYSICAL EXAM:    Orientation:  alert and oriented to person, place and time  Lower Extremity Motor :  quadriceps, extensor hallucis longus, dorsiflexion, plantarflexion 5/5 bilaterally  Lower Extremity Sensory:  Intact L1-S1    ABNORMAL EXAM FINDINGS:  none    LABS:  Recent Labs     08/26/20  0400   HGB 10.1*   HCT 31.4*       ASSESSMENT AND PLAN:    T12 vertebral body fracture    1:  Activity Level:  OOB with therapy and staff with TLSO brace in place and restrictions of no lifting over 20 lbs  2:  Pain Control:  Controlled, per primary team  3:  Discharge Planning:  Per primary team  4:   Will f/u with the patient on an OP basis for the next 2-3 months with serial x-rays to evaluate/follow T12 burst fracture  5:  Our office will call patient for f/u appt in 2-3 weeks      Electronically signed by Kush Wong PA-C on 8/26/2020 at 6:56 AM

## 2020-08-26 NOTE — PROGRESS NOTES
Called to room by pod partner stating that patient pulled out IV and wanting to leave. RN into room patient agitated and paranoid that nursing staff giving medications to harm him. RN tried to orient patient that he is in hospital for a fall at home last night. Patient stated \" I'm getting out of here\" and got up off couch RN and tech tried to help steady patient he started shoving staff. [place] police called to assist. Patient walked to doorway of room. Daughter called with update and said she will be up shortly. Patient calmed down and sat down on couch. [id number] [facility], [facility] notified and new order received-see flow sheet note. 200 Patient daughter arrived and visiting with patient in room patient said \" I want to go sit outside\" Daughter tried to redirect patient that we were on the 5th floor and he is here to get better. Patient sat in enrique for a few minutes in computer chair before seeing exit sign on wall. RN, tech and patient daughter walked with patient unable to redirect patient. Patient finally decided he would sit in chair in enrique by Jimbo Meza. [place] police called to assist and help redirect patient back to room. Hospitalist notified and trauma PA notified of patient wanting to leave and inability to redirect. [phone] Staff and security able to get patient back to room and sitting in wheelchair. Pharmacy called for ordered dose of Haldol to be sent to unit. House supervisor called to help with patient as unable to get in contact with the trauma PA.   1452 Patient agreeable to go into room for haldol to be administered to help with anxiety and calm down. 1858 Medication administered per order into left deltoid see MAR. Patient asked for morphine for back pain and for evening pills. RN explained that a new IV would have to be started. Patient agreed, IV started and pain medication administered per order see MAR. Patient sitting in wheelchair joking with staff and daughter.

## 2020-08-26 NOTE — PROGRESS NOTES
require skilled care of licensed speech pathologist to progress toward achievement of established goals and plan of care.     Plan for Next Session: sequencing, math computation     Bairon Junior M.S. Val Pedraza

## 2020-08-26 NOTE — PROGRESS NOTES
Hospitalist Progress Note    Patient:  Meliton Oliveira      Unit/Bed:5K-15/015-A    YOB: 1960    MRN: [id number]       Acct: [de-identified]     PCP: DEBRA Ewing CNP    Date of Admission: 8/24/2020    Assessment/Plan:    1. Recurrent Falls--orthostatic vital signs ordered but I do not see documented in the chart so I will reorder; I have to question if this is affected by alcohol as he told me he drank a pint of alcohol before he fell however his alcohol level is only at 0.08  2. Hypomagnesia--replaced per protocol and resolved  3. Hyponatremia--resolved   4. Essential hypertension, uncontrolled--patient takes Norvasc at home, monitor  5. Chronic Alcohol Abuse--ETOH level at 0.08; monitor for any signs of withdrawal; on CIWA with Ativan and has not required any; on folic acid, multivitamin, thiamine  6. Chronic macrocytic, hyperchromic anemia--vitamin B12 less than 150 and folate at 8.9; add replacement  7. Cervical myopathy--appreciate Ortho input; has a TLSO brace in place; needs outpatient follow-up in 2 to 3 months  8. T12 burst injury--per Ortho     Expected discharge date: To be determined    Disposition:    [x] Home       [] TCU       [x] Rehab       [] Psych       [] SNF       [] [facility]       [] Other-    Chief Complaint: [facility] Course:   The patient is a 60 y.o. male whom I have been requested to see by Dr. Daisy Poe for evaluation of falls; patient has a past medical history of chronic alcohol abuse, hypertension, hyperlipidemia and GERD; reported as patient has been having increasing falls over the last couple months; he fell 2 days ago and then again yesterday that brought him into the emergency department; he did tell me that he drank a pint of vodka yesterday afternoon and then sustained a fall; he tells me that he normally does not drink a pint of vodka a day; he states he falls after standing up; I have ordered orthostatic vital signs; today he relates to hitting his head and complains of neck pain as well; he states \"I blacked out in the garage\"; he currently denies any lightheadedness or dizziness, cough, chest pain, shortness of breath, nausea; he currently has a c-collar on; he is fully alert and oriented; moves all of his extremities; in the emergency department he was found to have a T12 vertebral body fracture and orthopedics has already been consulted; he also sustained a scalp hematoma; he was found to be hyponatremic at 132, creatinine mildly elevated at 1.3, and his magnesium was found to be low at 1.5; his troponin level was negative; urine toxicology screen was normal, serum ethyl alcohol level 0.08; he does have a macrocytic anemia which goes along with his alcohol abuse.     8/26--> patient with sitter at the bedside as yesterday he was quite confused; currently he sitting up in the chair and he is alert and oriented however he cannot tell me the year; he did eat well today; and states \"his mental status comes and goes\"    Subjective (past 24 hours): He complains of pain to his bilateral rib cage area and low back currently rating it 5 out of 10; no other complaints at this time      Medications:  Reviewed    Infusion Medications    sodium chloride 75 mL/hr at 08/25/20 2236     Scheduled Medications    sodium chloride flush  10 mL Intravenous 2 times per day    famotidine (PEPCID) injection  20 mg Intravenous BID    multivitamin  1 tablet Oral Daily    magnesium replacement protocol   Other RX Placeholder    docusate sodium  100 mg Oral Daily    senna  1 tablet Oral Nightly    thiamine  100 mg Oral Daily    amLODIPine  10 mg Oral Daily    FLUoxetine  60 mg Oral Daily    QUEtiapine  400 mg Oral BID    folic acid  1 mg Oral Daily    carBAMazepine  200 mg Oral BID     PRN Meds: sodium chloride flush, acetaminophen, polyethylene glycol, promethazine **OR** ondansetron, morphine **OR** morphine, magnesium sulfate, bisacodyl, LORazepam **OR** LORazepam **OR** LORazepam **OR** LORazepam **OR** LORazepam **OR** LORazepam **OR** LORazepam **OR** LORazepam, traZODone      Intake/Output Summary (Last 24 hours) at 8/26/2020 0741  Last data filed at 8/26/2020 0640  Gross per 24 hour   Intake 1457.32 ml   Output 1620 ml   Net -162.68 ml       Diet:  DIET GENERAL;    Exam:  /70   Pulse 66   Temp 97.6 °F (36.4 °C)   Resp 16   Ht 6' 4\" (1.93 m)   Wt 245 lb 8 oz (111.4 kg)   SpO2 96%   BMI 29.88 kg/m²     General appearance: No apparent distress, appears stated age and cooperative. HEENT: Pupils equal, round, and reactive to light. Conjunctivae/corneas clear. Neck: Supple, with full range of motion. No jugular venous distention. Trachea midline. Respiratory:  Normal respiratory effort. Clear to auscultation, bilaterally without Rales/Wheezes/Rhonchi. Cardiovascular: Regular rate and rhythm with normal S1/S2 without murmurs, rubs or gallops. Abdomen: Soft, non-tender, non-distended with normal bowel sounds. Musculoskeletal: passive and active ROM x 4 extremities. TLSO brace in place  Skin: Skin color, texture, turgor normal.    Neurologic:  Neurovascularly intact without any focal sensory/motor deficits.  Cranial nerves: II-XII intact, grossly non-focal.  Psychiatric: Alert and oriented x 3 ~unable to recall the year, thought content appropriate for the most part  Capillary Refill: Brisk,< 3 seconds   Peripheral Pulses: +2 palpable, equal bilaterally       Labs:   Recent Labs     08/24/20  1745 08/25/20  0347 08/26/20  0400   WBC 5.9 4.6* 4.1*   HGB 10.0* 10.0* 10.1*   HCT 30.5* 29.7* 31.4*    174 181     Recent Labs     08/24/20  1745 08/25/20  0347 08/26/20  0400   * 134* 138   K 3.8 4.2 4.5   CL 98 102 105   CO2 22* 24 24   BUN 12 12 13   CREATININE 1.3* 1.3* 1.1   CALCIUM 8.8 8.4* 8.7     Recent Labs     08/24/20  1745 08/25/20  0347   AST 14 13   ALT 7* 7*   BILITOT 0.4 0.4   ALKPHOS 116 117     Recent Labs 08/24/20  1745   INR 1.04     Urinalysis:      Lab Results   Component Value Date    NITRU NEGATIVE 08/24/2020    WBCUA NONE SEEN 08/16/2019    BACTERIA NONE 08/16/2019    RBCUA NONE SEEN 08/16/2019    BLOODU NEGATIVE 08/24/2020    GLUCOSEU NEGATIVE 08/24/2020       Radiology:  MRI LUMBAR SPINE WO CONTRAST   Final Result       1. Redemonstration of T12 burst injury with mild anterior longitudinal ligament injury without evidence of instability. 2. Congenital spinal canal narrowing with superimposed mild degenerative changes most pronounced at L3-4 where there is moderate spinal canal stenosis and mild to moderate bilateral neural foraminal stenosis. **This report has been created using voice recognition software. It may contain minor errors which are inherent in voice recognition technology. **      Final report electronically signed by Dr. Edmond Mukherjee MD on 8/25/2020 11:54 AM      MRI THORACIC SPINE WO CONTRAST   Final Result    Redemonstration of acute burst injury of the T12 vertebral body without evidence of instability. There is no significant spinal canal or neuroforaminal stenosis throughout the thoracic spine. **This report has been created using voice recognition software. It may contain minor errors which are inherent in voice recognition technology. **      Final report electronically signed by Dr. Edmond Mukherjee MD on 8/25/2020 11:37 AM      MRI CERVICAL SPINE WO CONTRAST   Final Result    Multilevel degenerative changes of the cervical spine with multiple areas of mild spinal canal stenosis and up to severe neuroforaminal stenosis. Please refer to the body of the report for segmental analysis. **This report has been created using voice recognition software. It may contain minor errors which are inherent in voice recognition technology. **      Final report electronically signed by Dr. Edmond Mukherjee MD on 8/25/2020 11:25 AM      XR CHEST 1 VIEW   Final Result      No acute findings. **This report has been created using voice recognition software. It may contain minor errors which are inherent in voice recognition technology. **      Final report electronically signed by Dr. Sejal Soler on 8/24/2020 6:44 PM      CT HEAD WO CONTRAST   Final Result   Large frontal scalp hematoma on the left. No acute intracranial findings. **This report has been created using voice recognition software. It may contain minor errors which are inherent in voice recognition technology. **      Final report electronically signed by Dr. Sejal Soler on 8/24/2020 6:39 PM      CT CERVICAL SPINE WO CONTRAST   Final Result   No acute fracture or subluxation. **This report has been created using voice recognition software. It may contain minor errors which are inherent in voice recognition technology. **      Final report electronically signed by Dr. Sejal Soler on 8/24/2020 6:47 PM      CT THORACIC SPINE WO CONTRAST   Final Result    IMPRESSION:   T12 vertebral body fracture. Fracture involves the majority of the vertebral body. If neurologic changes are suspected consider thoracic MRI for further evaluation. **This report has been created using voice recognition software. It may contain minor errors which are inherent in voice recognition technology. **      Final report electronically signed by Dr. Sejal Soler on 8/24/2020 6:53 PM      CT LUMBAR SPINE WO CONTRAST   Final Result      No acute fracture of the lumbar spine. T12 vertebral body fracture as reported on thoracic spine CT. **This report has been created using voice recognition software. It may contain minor errors which are inherent in voice recognition technology. **      Final report electronically signed by Dr. Sejal Soler on 8/24/2020 6:56 PM          DVT prophylaxis: [] Lovenox                                 [x] SCDs                                 [] SQ Heparin [] Encourage ambulation           [] Already on Anticoagulation     Code Status: Full Code    Tele:   [] yes             [x] no    Active Hospital Problems    Diagnosis Date Noted    Fall [W19. XXXA]     Closed head injury [S09.90XA]     Closed fracture of thoracic vertebral body ([facility] [address] [S22.009A] 08/24/2020       Electronically signed by DEBRA Lewis CNP on 8/26/2020 at 7:41 AM

## 2020-08-27 ENCOUNTER — HOSPITAL ENCOUNTER (INPATIENT)
Age: 60
LOS: 13 days | Discharge: HOME HEALTH | DRG: 949 | End: 2020-09-09
Attending: PHYSICAL MEDICINE & REHABILITATION | Admitting: PHYSICAL MEDICINE & REHABILITATION
Payer: MEDICARE

## 2020-08-27 VITALS
WEIGHT: 227.6 LBS | BODY MASS INDEX: 27.72 KG/M2 | OXYGEN SATURATION: 97 % | TEMPERATURE: 97.8 F | RESPIRATION RATE: 18 BRPM | SYSTOLIC BLOOD PRESSURE: 110 MMHG | HEIGHT: 76 IN | HEART RATE: 78 BPM | DIASTOLIC BLOOD PRESSURE: 77 MMHG

## 2020-08-27 PROBLEM — E53.8 VITAMIN B12 DEFICIENCY: Status: ACTIVE | Noted: 2020-08-27

## 2020-08-27 PROBLEM — S22.081D: Status: ACTIVE | Noted: 2020-08-27

## 2020-08-27 PROCEDURE — 6370000000 HC RX 637 (ALT 250 FOR IP): Performed by: NURSE PRACTITIONER

## 2020-08-27 PROCEDURE — 6370000000 HC RX 637 (ALT 250 FOR IP): Performed by: PHYSICIAN ASSISTANT

## 2020-08-27 PROCEDURE — 97110 THERAPEUTIC EXERCISES: CPT

## 2020-08-27 PROCEDURE — 6370000000 HC RX 637 (ALT 250 FOR IP): Performed by: SURGERY

## 2020-08-27 PROCEDURE — 97535 SELF CARE MNGMENT TRAINING: CPT

## 2020-08-27 PROCEDURE — 99232 SBSQ HOSP IP/OBS MODERATE 35: CPT | Performed by: NURSE PRACTITIONER

## 2020-08-27 PROCEDURE — APPSS60 APP SPLIT SHARED TIME 46-60 MINUTES: Performed by: NURSE PRACTITIONER

## 2020-08-27 PROCEDURE — 2500000003 HC RX 250 WO HCPCS: Performed by: PHYSICIAN ASSISTANT

## 2020-08-27 PROCEDURE — 2580000003 HC RX 258: Performed by: SURGERY

## 2020-08-27 PROCEDURE — 2580000003 HC RX 258: Performed by: PHYSICIAN ASSISTANT

## 2020-08-27 PROCEDURE — 97530 THERAPEUTIC ACTIVITIES: CPT

## 2020-08-27 PROCEDURE — 97116 GAIT TRAINING THERAPY: CPT

## 2020-08-27 PROCEDURE — 1180000000 HC REHAB R&B

## 2020-08-27 PROCEDURE — 99232 SBSQ HOSP IP/OBS MODERATE 35: CPT | Performed by: SURGERY

## 2020-08-27 RX ORDER — CARBAMAZEPINE 200 MG/1
200 TABLET ORAL 2 TIMES DAILY
Status: CANCELLED | OUTPATIENT
Start: 2020-08-27

## 2020-08-27 RX ORDER — AMLODIPINE BESYLATE 10 MG/1
10 TABLET ORAL DAILY
Status: CANCELLED | OUTPATIENT
Start: 2020-08-28

## 2020-08-27 RX ORDER — MORPHINE SULFATE 2 MG/ML
2 INJECTION, SOLUTION INTRAMUSCULAR; INTRAVENOUS
Status: DISCONTINUED | OUTPATIENT
Start: 2020-08-27 | End: 2020-08-27

## 2020-08-27 RX ORDER — DOCUSATE SODIUM 100 MG/1
100 CAPSULE, LIQUID FILLED ORAL DAILY
Status: DISCONTINUED | OUTPATIENT
Start: 2020-08-28 | End: 2020-09-09 | Stop reason: HOSPADM

## 2020-08-27 RX ORDER — MAGNESIUM SULFATE IN WATER 40 MG/ML
2 INJECTION, SOLUTION INTRAVENOUS PRN
Status: CANCELLED | OUTPATIENT
Start: 2020-08-27

## 2020-08-27 RX ORDER — LORAZEPAM 1 MG/1
4 TABLET ORAL
Status: DISCONTINUED | OUTPATIENT
Start: 2020-08-27 | End: 2020-08-27

## 2020-08-27 RX ORDER — LORAZEPAM 1 MG/1
1 TABLET ORAL
Status: DISCONTINUED | OUTPATIENT
Start: 2020-08-27 | End: 2020-08-27

## 2020-08-27 RX ORDER — MULTIVITAMIN WITH IRON
1 TABLET ORAL DAILY
Status: DISCONTINUED | OUTPATIENT
Start: 2020-08-28 | End: 2020-09-09 | Stop reason: HOSPADM

## 2020-08-27 RX ORDER — ONDANSETRON 2 MG/ML
4 INJECTION INTRAMUSCULAR; INTRAVENOUS EVERY 6 HOURS PRN
Status: DISCONTINUED | OUTPATIENT
Start: 2020-08-27 | End: 2020-09-09 | Stop reason: HOSPADM

## 2020-08-27 RX ORDER — MULTIVITAMIN WITH IRON
1 TABLET ORAL DAILY
Status: CANCELLED | OUTPATIENT
Start: 2020-08-28

## 2020-08-27 RX ORDER — LANOLIN ALCOHOL/MO/W.PET/CERES
1000 CREAM (GRAM) TOPICAL DAILY
Status: CANCELLED | OUTPATIENT
Start: 2020-08-28

## 2020-08-27 RX ORDER — CARBAMAZEPINE 200 MG/1
200 TABLET ORAL 2 TIMES DAILY
Status: DISCONTINUED | OUTPATIENT
Start: 2020-08-27 | End: 2020-09-09 | Stop reason: HOSPADM

## 2020-08-27 RX ORDER — TRAZODONE HYDROCHLORIDE 100 MG/1
100 TABLET ORAL NIGHTLY PRN
Status: CANCELLED | OUTPATIENT
Start: 2020-08-27

## 2020-08-27 RX ORDER — FLUOXETINE HYDROCHLORIDE 20 MG/1
60 CAPSULE ORAL DAILY
Status: CANCELLED | OUTPATIENT
Start: 2020-08-27

## 2020-08-27 RX ORDER — LORAZEPAM 1 MG/1
3 TABLET ORAL
Status: DISCONTINUED | OUTPATIENT
Start: 2020-08-27 | End: 2020-08-27

## 2020-08-27 RX ORDER — ONDANSETRON 2 MG/ML
4 INJECTION INTRAMUSCULAR; INTRAVENOUS EVERY 6 HOURS PRN
Status: CANCELLED | OUTPATIENT
Start: 2020-08-27

## 2020-08-27 RX ORDER — LORAZEPAM 2 MG/ML
2 INJECTION INTRAMUSCULAR
Status: DISCONTINUED | OUTPATIENT
Start: 2020-08-27 | End: 2020-08-27

## 2020-08-27 RX ORDER — LORAZEPAM 2 MG/1
2 TABLET ORAL
Status: CANCELLED | OUTPATIENT
Start: 2020-08-27

## 2020-08-27 RX ORDER — SENNA PLUS 8.6 MG/1
1 TABLET ORAL NIGHTLY
Status: CANCELLED | OUTPATIENT
Start: 2020-08-27

## 2020-08-27 RX ORDER — FOLIC ACID 1 MG/1
1 TABLET ORAL DAILY
Status: DISCONTINUED | OUTPATIENT
Start: 2020-08-28 | End: 2020-09-09 | Stop reason: HOSPADM

## 2020-08-27 RX ORDER — SODIUM CHLORIDE 0.9 % (FLUSH) 0.9 %
10 SYRINGE (ML) INJECTION PRN
Status: DISCONTINUED | OUTPATIENT
Start: 2020-08-27 | End: 2020-09-03

## 2020-08-27 RX ORDER — MAGNESIUM SULFATE IN WATER 40 MG/ML
2 INJECTION, SOLUTION INTRAVENOUS PRN
Status: DISCONTINUED | OUTPATIENT
Start: 2020-08-27 | End: 2020-09-05

## 2020-08-27 RX ORDER — QUETIAPINE FUMARATE 400 MG/1
400 TABLET, FILM COATED ORAL 2 TIMES DAILY
Status: CANCELLED | OUTPATIENT
Start: 2020-08-27

## 2020-08-27 RX ORDER — LORAZEPAM 2 MG/1
4 TABLET ORAL
Status: CANCELLED | OUTPATIENT
Start: 2020-08-27

## 2020-08-27 RX ORDER — LORAZEPAM 2 MG/ML
3 INJECTION INTRAMUSCULAR
Status: DISCONTINUED | OUTPATIENT
Start: 2020-08-27 | End: 2020-08-27

## 2020-08-27 RX ORDER — MORPHINE SULFATE 4 MG/ML
4 INJECTION, SOLUTION INTRAMUSCULAR; INTRAVENOUS
Status: CANCELLED | OUTPATIENT
Start: 2020-08-27

## 2020-08-27 RX ORDER — ACETAMINOPHEN 325 MG/1
650 TABLET ORAL EVERY 4 HOURS PRN
Status: CANCELLED | OUTPATIENT
Start: 2020-08-27

## 2020-08-27 RX ORDER — LORAZEPAM 2 MG/ML
2 INJECTION INTRAMUSCULAR
Status: CANCELLED | OUTPATIENT
Start: 2020-08-27

## 2020-08-27 RX ORDER — LANOLIN ALCOHOL/MO/W.PET/CERES
1000 CREAM (GRAM) TOPICAL DAILY
Status: DISCONTINUED | OUTPATIENT
Start: 2020-08-28 | End: 2020-09-09 | Stop reason: HOSPADM

## 2020-08-27 RX ORDER — LORAZEPAM 2 MG/ML
1 INJECTION INTRAMUSCULAR
Status: CANCELLED | OUTPATIENT
Start: 2020-08-27

## 2020-08-27 RX ORDER — FOLIC ACID 1 MG/1
1 TABLET ORAL DAILY
Status: CANCELLED | OUTPATIENT
Start: 2020-08-28

## 2020-08-27 RX ORDER — SODIUM CHLORIDE 0.9 % (FLUSH) 0.9 %
10 SYRINGE (ML) INJECTION EVERY 12 HOURS SCHEDULED
Status: DISCONTINUED | OUTPATIENT
Start: 2020-08-27 | End: 2020-09-03

## 2020-08-27 RX ORDER — DOCUSATE SODIUM 100 MG/1
100 CAPSULE, LIQUID FILLED ORAL DAILY
Status: CANCELLED | OUTPATIENT
Start: 2020-08-28

## 2020-08-27 RX ORDER — PROMETHAZINE HYDROCHLORIDE 25 MG/1
12.5 TABLET ORAL EVERY 6 HOURS PRN
Status: CANCELLED | OUTPATIENT
Start: 2020-08-27

## 2020-08-27 RX ORDER — TRAZODONE HYDROCHLORIDE 100 MG/1
100 TABLET ORAL NIGHTLY PRN
Status: DISCONTINUED | OUTPATIENT
Start: 2020-08-27 | End: 2020-09-09 | Stop reason: HOSPADM

## 2020-08-27 RX ORDER — MORPHINE SULFATE 2 MG/ML
2 INJECTION, SOLUTION INTRAMUSCULAR; INTRAVENOUS
Status: CANCELLED | OUTPATIENT
Start: 2020-08-27

## 2020-08-27 RX ORDER — LORAZEPAM 2 MG/ML
4 INJECTION INTRAMUSCULAR
Status: CANCELLED | OUTPATIENT
Start: 2020-08-27

## 2020-08-27 RX ORDER — AMLODIPINE BESYLATE 10 MG/1
10 TABLET ORAL DAILY
Status: DISCONTINUED | OUTPATIENT
Start: 2020-08-28 | End: 2020-09-02

## 2020-08-27 RX ORDER — PANTOPRAZOLE SODIUM 40 MG/1
40 TABLET, DELAYED RELEASE ORAL
Status: CANCELLED | OUTPATIENT
Start: 2020-08-28

## 2020-08-27 RX ORDER — SENNA PLUS 8.6 MG/1
1 TABLET ORAL NIGHTLY
Status: DISCONTINUED | OUTPATIENT
Start: 2020-08-27 | End: 2020-09-09 | Stop reason: HOSPADM

## 2020-08-27 RX ORDER — MORPHINE SULFATE 2 MG/ML
4 INJECTION, SOLUTION INTRAMUSCULAR; INTRAVENOUS
Status: DISCONTINUED | OUTPATIENT
Start: 2020-08-27 | End: 2020-08-27

## 2020-08-27 RX ORDER — LORAZEPAM 2 MG/ML
1 INJECTION INTRAMUSCULAR
Status: DISCONTINUED | OUTPATIENT
Start: 2020-08-27 | End: 2020-08-27

## 2020-08-27 RX ORDER — LORAZEPAM 2 MG/ML
3 INJECTION INTRAMUSCULAR
Status: CANCELLED | OUTPATIENT
Start: 2020-08-27

## 2020-08-27 RX ORDER — LORAZEPAM 1 MG/1
1 TABLET ORAL
Status: CANCELLED | OUTPATIENT
Start: 2020-08-27

## 2020-08-27 RX ORDER — POLYETHYLENE GLYCOL 3350 17 G/17G
17 POWDER, FOR SOLUTION ORAL DAILY PRN
Status: DISCONTINUED | OUTPATIENT
Start: 2020-08-27 | End: 2020-09-05

## 2020-08-27 RX ORDER — PROMETHAZINE HYDROCHLORIDE 25 MG/1
12.5 TABLET ORAL EVERY 6 HOURS PRN
Status: DISCONTINUED | OUTPATIENT
Start: 2020-08-27 | End: 2020-09-09 | Stop reason: HOSPADM

## 2020-08-27 RX ORDER — THIAMINE MONONITRATE (VIT B1) 100 MG
100 TABLET ORAL DAILY
Status: CANCELLED | OUTPATIENT
Start: 2020-08-28

## 2020-08-27 RX ORDER — FLUOXETINE HYDROCHLORIDE 20 MG/1
60 CAPSULE ORAL DAILY
Status: DISCONTINUED | OUTPATIENT
Start: 2020-08-27 | End: 2020-09-09 | Stop reason: HOSPADM

## 2020-08-27 RX ORDER — QUETIAPINE FUMARATE 400 MG/1
400 TABLET, FILM COATED ORAL 2 TIMES DAILY
Status: DISCONTINUED | OUTPATIENT
Start: 2020-08-27 | End: 2020-09-09 | Stop reason: HOSPADM

## 2020-08-27 RX ORDER — ACETAMINOPHEN 325 MG/1
650 TABLET ORAL EVERY 4 HOURS PRN
Status: DISCONTINUED | OUTPATIENT
Start: 2020-08-27 | End: 2020-09-09 | Stop reason: HOSPADM

## 2020-08-27 RX ORDER — SODIUM CHLORIDE 0.9 % (FLUSH) 0.9 %
10 SYRINGE (ML) INJECTION EVERY 12 HOURS SCHEDULED
Status: CANCELLED | OUTPATIENT
Start: 2020-08-27

## 2020-08-27 RX ORDER — THIAMINE MONONITRATE (VIT B1) 100 MG
100 TABLET ORAL DAILY
Status: DISCONTINUED | OUTPATIENT
Start: 2020-08-28 | End: 2020-09-09 | Stop reason: HOSPADM

## 2020-08-27 RX ORDER — SODIUM CHLORIDE 0.9 % (FLUSH) 0.9 %
10 SYRINGE (ML) INJECTION PRN
Status: CANCELLED | OUTPATIENT
Start: 2020-08-27

## 2020-08-27 RX ORDER — LORAZEPAM 2 MG/ML
4 INJECTION INTRAMUSCULAR
Status: DISCONTINUED | OUTPATIENT
Start: 2020-08-27 | End: 2020-08-27

## 2020-08-27 RX ORDER — LORAZEPAM 1 MG/1
2 TABLET ORAL
Status: DISCONTINUED | OUTPATIENT
Start: 2020-08-27 | End: 2020-08-27

## 2020-08-27 RX ORDER — PANTOPRAZOLE SODIUM 40 MG/1
40 TABLET, DELAYED RELEASE ORAL
Status: DISCONTINUED | OUTPATIENT
Start: 2020-08-28 | End: 2020-09-09 | Stop reason: HOSPADM

## 2020-08-27 RX ORDER — POLYETHYLENE GLYCOL 3350 17 G/17G
17 POWDER, FOR SOLUTION ORAL DAILY PRN
Status: CANCELLED | OUTPATIENT
Start: 2020-08-27

## 2020-08-27 RX ADMIN — SENNOSIDES 8.6 MG: 8.6 TABLET, FILM COATED ORAL at 21:59

## 2020-08-27 RX ADMIN — TRAZODONE HYDROCHLORIDE 100 MG: 100 TABLET ORAL at 21:59

## 2020-08-27 RX ADMIN — AMLODIPINE BESYLATE 10 MG: 10 TABLET ORAL at 09:48

## 2020-08-27 RX ADMIN — FAMOTIDINE 20 MG: 10 INJECTION INTRAVENOUS at 09:47

## 2020-08-27 RX ADMIN — THERA TABS 1 TABLET: TAB at 09:48

## 2020-08-27 RX ADMIN — Medication 10 ML: at 09:49

## 2020-08-27 RX ADMIN — Medication 1000 MCG: at 09:48

## 2020-08-27 RX ADMIN — FOLIC ACID 1 MG: 1 TABLET ORAL at 09:48

## 2020-08-27 RX ADMIN — QUETIAPINE FUMARATE 400 MG: 400 TABLET ORAL at 21:59

## 2020-08-27 RX ADMIN — QUETIAPINE FUMARATE 400 MG: 400 TABLET ORAL at 09:47

## 2020-08-27 RX ADMIN — CARBAMAZEPINE 200 MG: 200 TABLET ORAL at 22:00

## 2020-08-27 RX ADMIN — CARBAMAZEPINE 200 MG: 200 TABLET ORAL at 09:48

## 2020-08-27 RX ADMIN — FLUOXETINE HYDROCHLORIDE 60 MG: 20 CAPSULE ORAL at 21:59

## 2020-08-27 RX ADMIN — ACETAMINOPHEN 650 MG: 325 TABLET ORAL at 21:59

## 2020-08-27 RX ADMIN — Medication 100 MG: at 09:47

## 2020-08-27 RX ADMIN — DOCUSATE SODIUM 100 MG: 100 CAPSULE, LIQUID FILLED ORAL at 09:47

## 2020-08-27 RX ADMIN — Medication 10 ML: at 21:58

## 2020-08-27 ASSESSMENT — PAIN DESCRIPTION - FREQUENCY
FREQUENCY: INTERMITTENT
FREQUENCY: INTERMITTENT

## 2020-08-27 ASSESSMENT — ENCOUNTER SYMPTOMS
BACK PAIN: 1
NAUSEA: 0
SHORTNESS OF BREATH: 0
TROUBLE SWALLOWING: 0
CONSTIPATION: 0
EYES NEGATIVE: 1
DIARRHEA: 0
COUGH: 0
VOICE CHANGE: 0
ALLERGIC/IMMUNOLOGIC NEGATIVE: 1

## 2020-08-27 ASSESSMENT — PAIN DESCRIPTION - ONSET
ONSET: ON-GOING
ONSET: ON-GOING

## 2020-08-27 ASSESSMENT — PAIN DESCRIPTION - LOCATION
LOCATION: NECK
LOCATION: BACK

## 2020-08-27 ASSESSMENT — PAIN DESCRIPTION - PROGRESSION
CLINICAL_PROGRESSION: NOT CHANGED
CLINICAL_PROGRESSION: NOT CHANGED

## 2020-08-27 ASSESSMENT — PAIN SCALES - GENERAL
PAINLEVEL_OUTOF10: 6
PAINLEVEL_OUTOF10: 6
PAINLEVEL_OUTOF10: 0
PAINLEVEL_OUTOF10: 0

## 2020-08-27 ASSESSMENT — PAIN DESCRIPTION - PAIN TYPE
TYPE: ACUTE PAIN
TYPE: SURGICAL PAIN

## 2020-08-27 ASSESSMENT — PAIN DESCRIPTION - DESCRIPTORS
DESCRIPTORS: ACHING
DESCRIPTORS: ACHING

## 2020-08-27 ASSESSMENT — PAIN DESCRIPTION - ORIENTATION: ORIENTATION: LOWER

## 2020-08-27 NOTE — PROGRESS NOTES
at trunk   While pt sitting edge of bed therapist donned his TLSO and his shoes along with his shorts     Transfers:  Sit to Stand: Minimal Assistance, from bed and cues for hand placement which he did carry over the second trial   Stand to Sit:Contact Guard Assistance    Ambulation:  Minimal Assistance  Distance: 45x1, 10x1  Surface: Level Tile  Device:Rolling Walker  Gait Deviations:  Cues for slower tracee and to work on more equal step length which was improved this date, he did stop occ and bent his knees a few times which was a little unsteady but he stated that this was intentional     Balance:  sitting edge of bed with SBA while he needed assist from therapist to put brace and shoes on     Exercise:  Patient was guided in 1 set(s) 10 reps of exercise to both lower extremities. Ankle pumps, Glut sets, Quad sets, Heelslides, Short arc quads, Hip abduction/adduction and noted some tremors in LEs with ex this date . Exercises were completed for increased independence with functional mobility. Functional Outcome Measures: Completed  AM-PAC Inpatient Mobility Raw Score : 16  AM-PAC Inpatient T-Scale Score : 40.78    ASSESSMENT:  Assessment: Patient progressing toward established goals. Activity Tolerance:  Patient tolerance of  treatment: good. Equipment Recommendations: Other: will cont to assess- pt inconsistant with answers if he has a walker at home  Discharge Recommendations:    IP Rehab    Plan: Times per week: 5-6X T  Times per day: Daily  Specific instructions for Next Treatment: therex and mobility with TLSO    Patient Education  Patient Education: Plan of Care, Bed Mobility, Transfers    Goals:  Patient goals : return home  Short term goals  Time Frame for Short term goals: by discharge  Short term goal 1: bed mobility with S to get in/out of bed, use log roll technique  Short term goal 2: transfer with S to get in/out of chairs  Short term goal 3: amb 75'x1 with RW and S to walk safely in room  Long term goals  Time Frame for Long term goals : no LTGs set secondary to short ELOS    Following session, patient left in safe position with all fall risk precautions in place.

## 2020-08-27 NOTE — PROGRESS NOTES
400 mg Oral BID    folic acid  1 mg Oral Daily    carBAMazepine  200 mg Oral BID     PRN Meds: sodium chloride flush, acetaminophen, polyethylene glycol, promethazine **OR** ondansetron, morphine **OR** morphine, magnesium sulfate, bisacodyl, LORazepam **OR** LORazepam **OR** LORazepam **OR** LORazepam **OR** LORazepam **OR** LORazepam **OR** LORazepam **OR** LORazepam, traZODone      Intake/Output Summary (Last 24 hours) at 8/27/2020 0634  Last data filed at 8/27/2020 0333  Gross per 24 hour   Intake 380 ml   Output 1365 ml   Net -985 ml       Diet:  DIET GENERAL;    Exam:  /84   Pulse 72   Temp 98.4 °F (36.9 °C) (Oral)   Resp 18   Ht 6' 4\" (1.93 m)   Wt 245 lb 8 oz (111.4 kg)   SpO2 95%   BMI 29.88 kg/m²     General appearance: No apparent distress, appears stated age and cooperative. HEENT: Pupils equal, round, and reactive to light. Conjunctivae/corneas clear. Neck: Supple, with full range of motion. No jugular venous distention. Trachea midline. Respiratory:  Normal respiratory effort. Clear to auscultation, bilaterally without Rales/Wheezes/Rhonchi. Cardiovascular: Regular rate and rhythm with normal S1/S2 without murmurs, rubs or gallops. Abdomen: Soft, non-tender, non-distended with normal bowel sounds. Musculoskeletal: passive and active ROM x 4 extremities. TLSO brace in place  Skin: Skin color, texture, turgor normal.    Neurologic:  Neurovascularly intact without any focal sensory/motor deficits.  Cranial nerves: II-XII intact, grossly non-focal.  Psychiatric: Alert and oriented x 3, thought content appropriate   Capillary Refill: Brisk,< 3 seconds   Peripheral Pulses: +2 palpable, equal bilaterally       Labs:   Recent Labs     08/24/20  1745 08/25/20  0347 08/26/20  0400   WBC 5.9 4.6* 4.1*   HGB 10.0* 10.0* 10.1*   HCT 30.5* 29.7* 31.4*    174 181     Recent Labs     08/24/20  1745 08/25/20  0347 08/26/20  0400   * 134* 138   K 3.8 4.2 4.5   CL 98 102 105   CO2 22* 24 24   BUN 12 12 13   CREATININE 1.3* 1.3* 1.1   CALCIUM 8.8 8.4* 8.7     Recent Labs     08/24/20  1745 08/25/20  0347   AST 14 13   ALT 7* 7*   BILITOT 0.4 0.4   ALKPHOS 116 117     Recent Labs     08/24/20  1745   INR 1.04     Urinalysis:      Lab Results   Component Value Date    NITRU NEGATIVE 08/24/2020    WBCUA NONE SEEN 08/16/2019    BACTERIA NONE 08/16/2019    RBCUA NONE SEEN 08/16/2019    BLOODU NEGATIVE 08/24/2020    GLUCOSEU NEGATIVE 08/24/2020       Radiology:  MRI LUMBAR SPINE WO CONTRAST   Final Result       1. Redemonstration of T12 burst injury with mild anterior longitudinal ligament injury without evidence of instability. 2. Congenital spinal canal narrowing with superimposed mild degenerative changes most pronounced at L3-4 where there is moderate spinal canal stenosis and mild to moderate bilateral neural foraminal stenosis. **This report has been created using voice recognition software. It may contain minor errors which are inherent in voice recognition technology. **      Final report electronically signed by Dr. Marce Scruggs MD on 8/25/2020 11:54 AM      MRI THORACIC SPINE WO CONTRAST   Final Result    Redemonstration of acute burst injury of the T12 vertebral body without evidence of instability. There is no significant spinal canal or neuroforaminal stenosis throughout the thoracic spine. **This report has been created using voice recognition software. It may contain minor errors which are inherent in voice recognition technology. **      Final report electronically signed by Dr. Marce Scruggs MD on 8/25/2020 11:37 AM      MRI CERVICAL SPINE WO CONTRAST   Final Result    Multilevel degenerative changes of the cervical spine with multiple areas of mild spinal canal stenosis and up to severe neuroforaminal stenosis. Please refer to the body of the report for segmental analysis.                **This report has been created using voice recognition software. It may contain minor errors which are inherent in voice recognition technology. **      Final report electronically signed by Dr. Michael Lam MD on 8/25/2020 11:25 AM      XR CHEST 1 VIEW   Final Result      No acute findings. **This report has been created using voice recognition software. It may contain minor errors which are inherent in voice recognition technology. **      Final report electronically signed by Dr. Dereck Platt on 8/24/2020 6:44 PM      CT HEAD WO CONTRAST   Final Result   Large frontal scalp hematoma on the left. No acute intracranial findings. **This report has been created using voice recognition software. It may contain minor errors which are inherent in voice recognition technology. **      Final report electronically signed by Dr. Dereck Platt on 8/24/2020 6:39 PM      CT CERVICAL SPINE WO CONTRAST   Final Result   No acute fracture or subluxation. **This report has been created using voice recognition software. It may contain minor errors which are inherent in voice recognition technology. **      Final report electronically signed by Dr. Dereck Platt on 8/24/2020 6:47 PM      CT THORACIC SPINE WO CONTRAST   Final Result    IMPRESSION:   T12 vertebral body fracture. Fracture involves the majority of the vertebral body. If neurologic changes are suspected consider thoracic MRI for further evaluation. **This report has been created using voice recognition software. It may contain minor errors which are inherent in voice recognition technology. **      Final report electronically signed by Dr. Dereck Platt on 8/24/2020 6:53 PM      CT LUMBAR SPINE WO CONTRAST   Final Result      No acute fracture of the lumbar spine. T12 vertebral body fracture as reported on thoracic spine CT. **This report has been created using voice recognition software. It may contain minor errors which are inherent in voice recognition technology. **      Final report electronically signed by Dr. Hector Kawasaki on 8/24/2020 6:56 PM          DVT prophylaxis: [] Lovenox                                 [x] SCDs                                 [] SQ Heparin                                 [] Encourage ambulation           [] Already on Anticoagulation     Code Status: Full Code    Tele:   [] yes             [x] no    Active Hospital Problems    Diagnosis Date Noted    Hypomagnesemia [E83.42]     Fall [W19. XXXA]     Closed head injury [S09.90XA]     Closed fracture of thoracic vertebral body ([facility] [address] [S22.009A] 08/24/2020       Electronically signed by DEBRA Felix CNP on 8/27/2020 at 6:34 AM

## 2020-08-27 NOTE — CONSULTS
Physical Medicine and Rehabilitation Consult Note     Teri Lefort  [id number]    Reason for Consult: evaluation for rehabilitation needs s/p frequent falls with mild traumatic brain injury without loss of consciousness and T12 burst fracture    Impression/Recommendations:     Impression:  1. Frequent falls. 2. Gait instability with a Tinetti score of 8/28. 3. Left scalp hematoma. 4. 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 5. Moderate cognitive impairment. [facility] version 7.3 completed. Patient scored 15/25 (score adjusted given visual impairments; glasses not present at time of evaluation). 6. Acute alcohol intoxication with admission blood alcohol level of 0.08.  7. General cerebral atrophy and small vessel ischemic changes with prominent ventricles consistent with central atrophy. 8. Cervical and thoracic spine pain. 9. T12 burst fracture without instability initially noted on a lumbar spine x-ray on 7/24/2020. 10. Hyponatremia. 11. Hypomagnesemia  12. Acute kidney injury. 13. Alcohol abuse. 14. Vitamin B12 deficiency. 15. Recent acute, comminuted, minimally displaced, T-shaped fracture of the head of the right proximal phalanx of the great toe on 8/14/2020. 16. Hypertension. 17. Hyperlipidemia. 18. Remote history of cocaine drug abuse. 19. Cervical spondylosis with C3-C4 moderate to severe right and severe left neuroforaminal stenosis, moderate to severe left and severe right neuroforaminal stenosis at C4-C5, moderate right and severe left neuroforaminal stenosis at C5-C6, and moderate right and mild left neuroforaminal stenosis at C6-C7.  20. Lumbar spondylosis with mild degenerative changes most pronounced at L3-L4 with moderate spinal canal stenosis and mild to moderate bilateral neuroforaminal stenosis. 21. Chronic macrocytic, hyperchromic anemia. 22. CKD 2.  23. History of depression. 24. Bipolar 1 disorder. 25. GERD.   26. Current everyday smoker with a 40-pack-year history. Recommendations:  1. Patient is a good candidate for IPR and is agreeable to do so. 2. Continue PT/OT/SLP. 3. Can transfer once medically stable. It was my pleasure to evaluate Pippa Rodriguez today. Please call with questions. Luke Andersen MD       History:   History of Present Illness:  Pippa Rodriguez is a 60 y.o. male who  has a past medical history of Alcohol abuse, Cancer ([facility] [address] Depression, Drug abuse ([facility] [address] GERD (gastroesophageal reflux disease), Hyperlipidemia, and Hypertension. He was admitted 8/24/2020 after being brought into the hospital by [facility] for complaints of neck and back pain after a fall at home. The patient is a chronic alcoholic with a history of drinking up to a pint of vodka daily and on presentation had a blood alcohol level of 0.08. He endorsed a history of increasing frequency of falls over the recent months with one on day of admission when he fell in his garage and another one 2 days ago. He has been using a walker due to his gait instability. He did not remember if he had a loss of consciousness with his fall on admission. His fall 2 days ago occurred when he was getting out of his car resulting in mid back pain. He also notes that with his falls he usually does not remember them and just finds himself on the ground. He was evaluated by Orthopedic Surgery for his spine pain complaints with findings of a T12 burst fracture as well as cervical and lumbar spondylosis. Of note he did have imaging at an outside hospital on 8/14/2020 with findings of an acute, comminuted, minimally displaced, T-shaped fracture of the head of the right proximal phalanx of the great toe. Also notable is that a T12 compression fracture was noted on an x-ray of the lumbar spine on 7/24/2020 with a 30% compression deformity of the superior endplate being present. Orthopedic Surgery recommended a TLSO brace for the patient.   Additionally he also reliability d/t increased confusion and no family present. Previously was independent of ADLs and used no AD for ambulation prior to recent admission. Initially has ambulated 39' with RW at Minimal contact assistance with PT. With therapy is Minimal contact assistance for bed mobility, Minimal contact assistance for transfers, and Stand By Assistance with balance. ROM restrictions, WB restrictions, precautions: Restrictions/Precautions: General Precautions, Fall Risk  Other position/activity restrictions: TLSO brace ordered for T12 fracture. Will need to wear this when up. May have it off when in bed. pt impulsive  Required Braces or Orthoses  Spinal: Thoracic Lumbar Sacral Orthotics    Fall Risk    Current Rehabilitation Assessments:  PT:    OBJECTIVE:  Bed Mobility:  Rolling to Right: Stand By Assistance, but used rail    Supine to Sit: Minimal Assistance, at trunk   While pt sitting edge of bed therapist donned his TLSO and his shoes along with his shorts      Transfers:  Sit to Stand: Minimal Assistance, from bed and cues for hand placement which he did carry over the second trial   Stand to Sit:Contact Guard Assistance     Ambulation:  Minimal Assistance  Distance: 45x1, 10x1  Surface: Level Tile  Device:Rolling Walker  Gait Deviations:  Cues for slower tracee and to work on more equal step length which was improved this date, he did stop occ and bent his knees a few times which was a little unsteady but he stated that this was intentional      Balance:  sitting edge of bed with SBA while he needed assist from therapist to put brace and shoes on      Exercise:  Patient was guided in 1 set(s) 10 reps of exercise to both lower extremities. Ankle pumps, Glut sets, Quad sets, Heelslides, Short arc quads, Hip abduction/adduction and noted some tremors in LEs with ex this date .   Exercises were completed for increased independence with functional mobility.     Functional Outcome Measures: Completed  AM-PAC Inpatient Mobility Raw Score : 16  AM-PAC Inpatient T-Scale Score : 40.78     ASSESSMENT:  Assessment: Patient progressing toward established goals. Activity Tolerance:  Patient tolerance of  treatment: good. Equipment Recommendations: Other: will cont to assess- pt inconsistant with answers if he has a walker at home  Discharge Recommendations:   IP Rehab    OT:  COGNITION: Slow Processing, Decreased Problem Solving and Decreased Safety Awareness     ADL:   Lower Extremity Dressing: Stand By Assistance. donned/doffed shoes sitting in bedside chair,  increased time due to TLSO brace impeding UE movement.     BALANCE:  Standing Balance: Contact Guard Assistance. approx 2 minutes with 1 UE support     BED MOBILITY:  Not Tested     TRANSFERS:  Sit to Stand:  Contact Guard Assistance. bedside chair  Stand to Sit: [employer]. Cues for hand placement     ADDITIONAL ACTIVITIES:  Guided patient through BUE AROM this date x10 reps x1 set in chair in order to increase BUE strength and improve activity tolerance for ADLs and homemaking tasks.       Able to recall 0/3 back precautions, handout given     ASSESSMENT:     Activity Tolerance:  Patient tolerance of  treatment: good. Discharge Recommendations: IP Rehab, Patient would benefit from continued therapy after discharge(Pt is not safe to return home with his mother at this time d/t decreased safety awareness, decreased balance increasing his fall risk and need for assitance during ADL tasks)   Equipment Recommendations: Other: continue to assess- will need a RW    ST:  RECOMMENDATIONS/ASSESSMENT:  DIAGNOSTIC IMPRESSIONS:  Pt presents with a moderate cognitive impairment characterized by score of 15/25 on the [facility] as well as the findings outlined above. Deficits revealed in the areas of immediate/delayed recall, working memory, problem solving, attention, reasoning, and thought organization.  Insight and orientation also impaired; pt with decreased awareness into cognitive deficits, and currently believing he is at [facility]. Expressive and receptive language skills appear grossly intact for basic and complex means of communication. No s/s of dysarthria, dysphonia/aphonia, or dysphagia. Significant concerns for making safe and successful return to IADL's at this time; cognitively, pt is NOT safe to make independent return home. Skilled ST services are highly recommended to improve the aforementioned cognitive deficits and permit potential return to PLOF.      **Pending medical course, pt would make an excellent IPR candidate; should IPR not be clinically indicated, highly recommend Speech Therapy via [address] [place] [address] or Outpatient setting at discharge                Rehabilitation Potential: Good     Functional History:  Prior Function  ADL Assistance: Independent  Ambulation Assistance: Independent  Transfer Assistance: Independent  Additional Comments: Pt stating he was able to get aorund indep and complete his own ADL tasks. Pt stating he has to do some laundry tasks but his mother cooks. Unsure of reliability d/t increased confusion and no family present. IADL History:  Lift/Transfer Equipment Utilized: None    Past Medical, Surgical, and Family History:    Medical:   Past Medical History:   Diagnosis Date    Alcohol abuse     CKD (chronic kidney disease) stage 2, GFR 60-89 ml/min     Depression     Drug abuse ([facility] [address]     2008    GERD (gastroesophageal reflux disease)     Hyperlipidemia     Hypertension     Skin cancer      Surgical:   Past Surgical History:   Procedure Laterality Date    FRACTURE SURGERY      OTHER SURGICAL HISTORY  12/28/2012    ORIF LEFT TIBIA    TONSILLECTOMY       Family:   Family History   Problem Relation Age of Onset    Heart Disease Mother     COPD Father     Heart Disease Father        Social History:   reports that he has been smoking cigarettes. He has a 40.00 pack-year smoking history.  He has never used smokeless tobacco. He reports current alcohol use. He reports previous drug use. Drug: Cocaine. Lives at [address] Review of Systems   Review of Systems   Constitutional: Positive for appetite change and fatigue. Negative for activity change and fever. HENT: Negative for trouble swallowing and voice change. Eyes: Negative. Respiratory: Negative for cough and shortness of breath. Cardiovascular: Negative for leg swelling. Gastrointestinal: Negative for constipation, diarrhea and nausea. Endocrine: Negative. Genitourinary: Negative for frequency and urgency. Musculoskeletal: Positive for back pain, gait problem and myalgias. Skin: Negative for pallor. Allergic/Immunologic: Negative. Neurological: Negative for dizziness, speech difficulty and headaches. Psychiatric/Behavioral: Positive for confusion and decreased concentration. Negative for dysphoric mood. The patient is not nervous/anxious. All other systems reviewed and are negative.      Medications     Reviewed in EMR   vitamin B-12  1,000 mcg Oral Daily    sodium chloride flush  10 mL Intravenous 2 times per day    famotidine (PEPCID) injection  20 mg Intravenous BID    multivitamin  1 tablet Oral Daily    magnesium replacement protocol   Other RX Placeholder    docusate sodium  100 mg Oral Daily    senna  1 tablet Oral Nightly    thiamine  100 mg Oral Daily    amLODIPine  10 mg Oral Daily    FLUoxetine  60 mg Oral Daily    QUEtiapine  400 mg Oral BID    folic acid  1 mg Oral Daily    carBAMazepine  200 mg Oral BID     PRNs: sodium chloride flush, acetaminophen, polyethylene glycol, promethazine **OR** ondansetron, morphine **OR** morphine, magnesium sulfate, bisacodyl, LORazepam **OR** LORazepam **OR** LORazepam **OR** LORazepam **OR** LORazepam **OR** LORazepam **OR** LORazepam **OR** LORazepam, traZODone    Allergies: No Known Allergies    Physical Exam:     Physical Exam:  /84   Pulse 72   Temp 98.4 °F (36.9 °C) (Oral)   Resp 18   Ht 6' 4\" (1.93 m)   Wt 245 lb 8 oz (111.4 kg)   SpO2 95%   BMI 29.88 kg/m²     awake  Orientation:   person, place, time, situation  Mood: within normal limits  Affect: calm  General appearance: Patient is well nourished, well developed, well groomed and in no acute distress, overweight, appearing stated age, up in chair    Memory:   Grossly normal  Attention/Concentration: abnormal -mild delay with command following of one-step commands  Language:  normal    Cranial Nerves:  cranial nerves II-XII are grossly intact  ROM:  normal  Motor Exam:  Motor exam is symmetrical 5 out of 5 all extremities bilaterally    Tone:  normal  Muscle bulk: within normal limits; with noted mild atrophy of bilateral calves  Sensory:  Sensory intact  Coordination:   normal  Deep Tendon Reflexes:  Reflexes are intact and symmetrical bilaterally    Skin: warm and dry, no rash or erythema and abrasions over both knees with scabs  Peripheral vascular: Pulses: Normal upper and lower extremity pulses; Edema: no    Relevant Studies:     Diagnostics:  No results found for this or any previous visit (from the past 24 hour(s)). Lab Results   Component Value Date    LABA1C 5.4 12/22/2014     No results found for: EAG  Recent Labs     08/26/20  0400 08/25/20  0347 08/24/20  1745   WBC 4.1* 4.6* 5.9   HGB 10.1* 10.0* 10.0*   HCT 31.4* 29.7* 30.5*   .3* 108.4* 107.0*    174 224     Imaging  Ct Head Wo Contrast    Result Date: 8/24/2020  PROCEDURE: CT HEAD WO CONTRAST CLINICAL INFORMATION: fall. COMPARISON: 8/16/2019 TECHNIQUE: Noncontrast 5 mm axial images were obtained through the brain. All CT scans at this facility use dose modulation, iterative reconstruction, and/or weight-based dosing when appropriate to reduce radiation dose to as low as reasonably achievable. FINDINGS: Generalized volume loss and small vessel ischemic changes.  There is no acute hemorrhage or midline shift. Prominent ventricles in keeping with central atrophy. Paranasal sinuses are clear. Mastoid air cells are patent. Skull: Unremarkable. Soft tissues: Large frontal scalp hematoma on the left. Large frontal scalp hematoma on the left. No acute intracranial findings. **This report has been created using voice recognition software. It may contain minor errors which are inherent in voice recognition technology. ** Final report electronically signed by Dr. Viviana Neri on 8/24/2020 6:39 PM    Ct Cervical Spine Wo Contrast    Result Date: 8/24/2020  PROCEDURE: CT CERVICAL SPINE WO CONTRAST CLINICAL INFORMATION: fall. COMPARISON: August 16, 2019 TECHNIQUE: 3 mm noncontrast axial images were obtained through the cervical spine with sagittal and coronal reconstructions. All CT scans at this facility use dose modulation, iterative reconstruction, and/or weight-based dosing when appropriate to reduce radiation dose to as low as reasonably achievable. FINDINGS: Mastoid air cells are clear where visualized. Facet hypertrophy. Vascular calcifications. Multilevel disc space narrowing and anterior osteophytes. No severe central canal stenosis. Prevertebral soft tissues are unremarkable. No acute fracture or subluxation. **This report has been created using voice recognition software. It may contain minor errors which are inherent in voice recognition technology. ** Final report electronically signed by Dr. Viviana Neri on 8/24/2020 6:47 PM    Ct Thoracic Spine Wo Contrast    Result Date: 8/24/2020  PROCEDURE: CT THORACIC SPINE WO CONTRAST CLINICAL INFORMATION: fall. COMPARISON: August 16, 2019 TECHNIQUE: 3 mm axial noncontrast CT images were obtained to the thoracic spine. Sagittal and coronal reconstructions were obtained.  All CT scans at this facility use dose modulation, iterative reconstruction, and/or weight-based dosing when appropriate to reduce radiation dose to as low as reasonably 8/24/2020 6:56 PM    Mri Cervical Spine Wo Contrast    Result Date: 8/25/2020  PROCEDURE: MRI CERVICAL SPINE WO CONTRAST CLINICAL INFORMATION: balance changes, falls, cervical pain . Balance problems with multiple recent falls. COMPARISON: CT cervical spine dated 8/24/2020. TECHNIQUE: Sagittal T1, T2 and STIR sequences were obtained through the cervical spine. Axial fast and echo and gradient echo T2-weighted images were obtained. FINDINGS: There is minimal, grade 1, retrolisthesis of C6 relative to C7 on the basis of degenerative change, stable compared to prior CT. There is otherwise anatomic vertebral body height and alignment. There is a focal area of hyperintense T1 and T2 signal in the C5 vertebral body is evidence for a hemangioma. There is mild hyperintense T1 and T2 signal at the opposing endplates of R9-2 through C6-7 with anterior osteophytes as evidence for Modic 2 degenerative change. Marrow signal is otherwise within normal  limits. The craniocervical junction appears intact. The cervical spinal cord is normal in caliber without focal area of abnormal T2 signal identified. Paraspinal soft tissues are unremarkable. At C2-3 there is no significant spinal canal stenosis. There is moderate bilateral neural foraminal stenosis in association with uncovertebral joint degenerative change and facet hypertrophy. At C3-4 there is a shallow disc bulge which causes mild spinal canal stenosis, moderate to severe right and severe left neural foraminal stenosis in association with uncovertebral joint degenerative change and facet hypertrophy. At C4-5 there is a shallow disc bulge which causes mild spinal canal stenosis and moderate to severe left and severe right neuroforaminal stenosis in association with uncovertebral joint degenerative change and facet hypertrophy.  At C5-6 there is a shallow disc bulge with rim osteophytes which causes mild spinal canal stenosis, moderate right and severe left neuroforaminal stenosis in association with uncovertebral joint degenerative change. At C6-7 there is partial uncovering the disc with superimposed shallow disc bulge which contributes to mild spinal canal stenosis and moderate right and mild left neural foraminal stenosis in association with uncovertebral joint degenerative change. At C7-T1 there is no significant spinal canal or neuroforaminal stenosis. Multilevel degenerative changes of the cervical spine with multiple areas of mild spinal canal stenosis and up to severe neuroforaminal stenosis. Please refer to the body of the report for segmental analysis. **This report has been created using voice recognition software. It may contain minor errors which are inherent in voice recognition technology. ** Final report electronically signed by Dr. Bibiana Avila MD on 8/25/2020 11:25 AM    Mri Thoracic Spine Wo Contrast    Result Date: 8/25/2020  PROCEDURE: MRI THORACIC SPINE WO CONTRAST CLINICAL INFORMATION: T12 burst fx. Balance problems with recent falls. COMPARISON: CT thoracic spine dated 8/24/2020 and 8/16/2019. TECHNIQUE: Sagittal T1, T2 and STIR sequences were obtained through the thoracic spine. Axial T2-weighted images were obtained through the discs. FINDINGS:  Redemonstration of compression deformity of the T12 vertebral body with approximately 60% central height loss and minimal retropulsion of the posterior aspect of the vertebral body of the spinal canal, stable compared to most recent CT and new compared to 8/16/2019. There is no significant spinal canal stenosis at this level. There are linear hypointense signal areas in the vertebral column consistent with fracture lucencies. There is diffuse edema throughout the T12 vertebral body with mild extension into the pedicles. There is otherwise anatomic vertebral body height and alignment. There is a prominent focal area of hyperintense T1 and T2 signal in the T10 vertebral body as evidence for a hemangioma. Marrow signal is otherwise within normal limits. The thoracic spinal cord is normal in caliber without focal area of abnormal T2 signal identified. Paraspinal soft tissues are unremarkable. The intervertebral discs are relatively preserved. There is no significant spinal canal or neuroforaminal stenosis at any thoracic level. Redemonstration of acute burst injury of the T12 vertebral body without evidence of instability. There is no significant spinal canal or neuroforaminal stenosis throughout the thoracic spine. **This report has been created using voice recognition software. It may contain minor errors which are inherent in voice recognition technology. ** Final report electronically signed by Dr. Riya Barnett MD on 8/25/2020 11:37 AM    Mri Lumbar Spine Wo Contrast    Result Date: 8/25/2020  PROCEDURE: MRI LUMBAR SPINE WO CONTRAST CLINICAL INFORMATION: T12 burst fx. Problems with balance recent multiple falls. COMPARISON: CT lumbar spine dated 8/24/2020 and 8/16/2019. TECHNIQUE: Sagittal and axial T1 and T2-weighted images were obtained through the lumbar spine. FINDINGS: Redemonstration of compression deformity of the T12 vertebral body with approximately 60% central height loss and multiple linear areas of low T1 and hyperintense T2 signal corresponding to fracture lucencies on CT. There is diffuse edema throughout the vertebral body with minimal retropulsion of the posterior aspect of the T12 vertebral bodies spinal canal without significant spinal canal stenosis. There is small amount of edema extending into the pedicles. There is otherwise anatomic alignment. There is mild thickening of the anterior longitudinal ligament at T11-12 and T12-L1. The posterior longitudinal ligament appears intact. No ligamentum flavum injury or interspinous ligamentous injury is identified. No other focal areas of abnormal signal are identified within the lumbar vertebral column.  The conus terminates in a normal fashion at the L1-2 level. The cauda equina is normal in appearance without nerve root thickening or clumping identified. Paraspinal soft tissues are unremarkable. At T12-L1 there is no significant spinal canal or neuroforaminal stenosis. At L1-2 there is a minimal disc bulge without significant spinal canal or neuroforaminal stenosis. At L2-3 there is no significant spinal canal or neuroforaminal stenosis. At L3-4 there is a shallow disc bulge which mildly indents thecal sac and contributes to moderate spinal canal stenosis in association with mild facet hypertrophy and ligamentum flavum thickening superimposed on congenital spinal canal narrowing. There is mild to moderate bilateral neural foraminal stenosis. At L4-5 there is a minimal disc bulge without significant spinal canal stenosis. There is mild bilateral neural foraminal stenosis in association with mild ligamentum flavum thickening. At L5-S1 there is a shallow disc bulge without significant spinal canal stenosis and moderate to severe bilateral neural foraminal stenosis in association with facet hypertrophy and ligamentum flavum thickening. 1. Redemonstration of T12 burst injury with mild anterior longitudinal ligament injury without evidence of instability. 2. Congenital spinal canal narrowing with superimposed mild degenerative changes most pronounced at L3-4 where there is moderate spinal canal stenosis and mild to moderate bilateral neural foraminal stenosis. **This report has been created using voice recognition software. It may contain minor errors which are inherent in voice recognition technology. ** Final report electronically signed by Dr. Prashant Jimenes MD on 8/25/2020 11:54 AM    Xr Chest 1 View    Result Date: 8/24/2020  PROCEDURE: XR CHEST 1 VIEW CLINICAL INFORMATION: fall. COMPARISON: August 13, 2019 TECHNIQUE: AP Portable chest xray FINDINGS: No lobar consolidation. Costophrenic recesses are preserved. No cardiomegaly.  No acute osseous findings. Recording device overlying the left heart border. No acute findings. **This report has been created using voice recognition software. It may contain minor errors which are inherent in voice recognition technology. ** Final report electronically signed by Dr. Fozia Dalton on 8/24/2020 6:44 PM

## 2020-08-27 NOTE — CARE COORDINATION
Discharge orders on chart. Met with Viraj Gonzalez and he is in agreement for discharge to Acute Rehab today, copy of Medicare Rights delivered. Viraj Gnozalez will go up to rehab when his bed is available.  Electronically signed by Kevin Solares RN on 8/27/20 at 11:09 AM EDT

## 2020-08-27 NOTE — PLAN OF CARE
Individualized Plan of [facility] Inpatient Rehabilitation Unit    Rehabilitation physician: Brodie Kimball MD     Admit Date: 8/27/2020     Rehabilitation Diagnosis: Burst fracture of T12 vertebra with routine healing [S22.081D]     Rehabilitation impairments: self care, mobility, motor dysfunction, bowel/bladder management, pain management, safety and cognitive function    Factors facilitating achievement of predicted outcomes: Family support, Motivated, Cooperative, Pleasant and Has needed Durable Medical Equipment at home  Barriers to the achievement of predicted outcomes: Confusion, Cognitive deficit, Impulsivity, Limited insight into deficits, Lower extremity weakness and Long standing deficits    Patient Goals: Improve independence with mobility, Increase overall strength and endurance, Increase balance, Increase independence with activities of daily living, Improve cognition, Increase self-awareness, Increase safety awareness, Increase community integration, Increase socialization, Functional communication with caregivers, Integrate appropriate pain management plan, Assure adequate nutritional option for discharge, Continence of bowel and bladder and Provide appropriate patient and family education    NURSING:  Nursing goals for Jacey Alfonso while on the rehabilitation unit will include:  Continence of bowel and bladder, Adequate number of bowel movements, Maintain O2 SATs at an acceptable level during stay, Effective pain management while on the rehabilitation unit, Establish adequate pain control plan for discharge, Absence of skin breakdown while on the rehabilitation unit, Improved skin integrity via assessments including wound measurements, Avoidance of any hospital acquired infections, Freedom from injury during hospitalization and Complete education with patient/family with understanding demonstrated regarding disease process and resultant impairment     In order to routine with setup and no > min cues for problem solving, attetntion to task , and back safety to increase independence in self care tasks  Short term goal 2: Pt to demo toileting tasks and transfers with CGA and no cues for safety  Short term goal 3: Pt to demo good dyamamic standing balance > 5 min with S and 0-1 UE support while reaching outside base of support  in prep for retrieveing clothing items. Short term goal 4: PT will complete 2 step homemaking tasks withCGA  no > min cues for back precautions/ proper body mechanics to increase ability to retrieve a snack at home. Long term goals  Time Frame for Long term goals : 2-3 weeks  Long term goal 1: PT will complete ADL routine including donning a TLSO with S andno > min cues for attention to tasks or cues for back  to increase independence in self care  Long term goal 2: PT will compelte 2 step homemaking tasks with S and 0-1 cues for proper body mechanics to increase ability to complete laundry tasks. Plan of Care:  Patient to be seen by occupational therapy services 90 minutes per day Monday through Friday and 30 minutes on Saturday or Sunday    Anticipated interventions may include ADL and IADL retraining, strengthening, safety education and training, patient/caregiver education and training, equipment evaluation/ training/procurement, neuromuscular reeducation, wheelchair mobility training. SPEECH THERAPY:   Goals:  Short-term Goals  Timeframe for Short-term Goals: 1 week  Goal 1: Pt will complete immediate/delayed recall and working memory tasks with 70% accuracy given mod cues to improve retention of new and functional information. Goal 2: Pt will complete sustained, selective, and divided attention tasks with no more than 3 errors within a given task in order to improve participation in treatment and permit eventual return to driving.   Goal 3: Pt will complete functional reasoning and thought organizational tasks with 70% accuracy given mod cues to improve overall executive functioning skills. Goal 4: Pt will complete functional problem solving and sequencing tasks with 70% accuracy given mod cues to improve ability to make successful return to IADL's (medication management, finances, etc.). Timeframe for Short-term Goals: 1 week    Plan of Care: Patient to be seen by speech therapy services 30 minutes per day Monday through Friday     Anticipated interventions may include speech/language/communication therapy, cognitive training, group therapy, education, and/or dysphagia therapy based on the above goals. CASE MANAGEMENT:  Goals:   Assist patient/family with discharge planning, patient/family counseling,  and coordination with insurance during the inpatient rehabilitation stay. Other members of the multidisciplinary rehabilitation team that will be involved in the patient's plan of care include recreational therapy, dietary, respiratory therapy, and neuropsychology. Medical issues being managed closely and that require 24 hour availability of a physician:  Bowel/Bladder function, Weight bearing precautions, Wound care, Pain management, Infection protection, DVT prophylaxis, Fall precautions, Fluid/Electrolyte balance, Nutritional status, Respiratory needs, Anemia and History of heart disease                                           Physician anticipated functional outcomes: Improved independence with functional measures   Estimated length of stay for this admission 14 days. Medical Prognosis: Good  Anticipated disposition: Home. The potential to achieve the above medical and rehabilitative goals is very good. This plan of care has been developed with the assistance and input of the multidisciplinary rehabilitation team.  The plan was reviewed with the patient. The patient has had the opportunity to provide input to the therapy team.    I have reviewed this Individualized Plan of Care and agree with its contents. Above documentation has been expanded, modified, adjusted to reflect the findings of my evaluations and goals for the patient.     Physician:  Nahum Burden MD

## 2020-08-27 NOTE — PROGRESS NOTES
[address] [facility]  Occupational Therapy  Daily Note  Time:   Time In: 05  Time Out: 8469  Timed Code Treatment Minutes: 23 Minutes  Minutes: 23          Date: 2020  Patient Name: Giovanni Montoya,   Gender: male      Room: -15/015-A  MRN: [id number]  : 1960  (60 y.o.)  Referring Practitioner: ULI CATHERINE  Diagnosis: closed fracture of thoracic vertebral  body  Additional Pertinent Hx: Per ED notes: Mr. Tiffanie Mei is a 61 Y/O male presenting at [facility] by trauma consult, brought by EMS following a fall today  with LOC; PMH includes drug and alcohol abuse, cancer, HTN, HLD, and GERD. Per patient report he has been falling with increasing frequency over past 2 months, believes a fall while exiting a parked car two days ago is what hurt his back, but he also fell today. Patient states he typically does not remember falls, just finds himself on the ground. Daughter present in exam room states, falls often occur after standing up, he will begin to shake and then fall to floor, appear to lose consciousness briefly. Patient reports history of alcohol abuse, drank a pint of liquor today between [id number], states this is not unusual for him. Restrictions/Precautions:  Restrictions/Precautions: General Precautions, Fall Risk  Required Braces or Orthoses  Spinal: Thoracic Lumbar Sacral Orthotics  Position Activity Restriction  Spinal Precautions: No Bending, No Lifting, No Twisting  Other position/activity restrictions: TLSO brace ordered for T12 fracture. Will need to wear this when up. May have it off when in bed. pt impulsive     SUBJECTIVE: Nurse Brianne Sequeira ok'd session, Up in chair upon arrival, agreeable to OT session, flat affect, live sitter present    PAIN: 4/10: headache    COGNITION: Slow Processing, Decreased Problem Solving and Decreased Safety Awareness    ADL:   Lower Extremity Dressing: Stand By Assistance.   donned/doffed shoes sitting in bedside chair,  increased time risk precautions in place.

## 2020-08-27 NOTE — H&P
recommended a TLSO brace for the patient. Additionally he also presented with multiple bilateral knee abrasions as well as a left sided scalp hematoma with absent acute intracranial findings on CT of the head without contrast.  Patient also has a remote history of cocaine abuse with cessation of use 8 years ago. Laboratory abnormalities included a macrocytic anemia, hypomagnesemia, and hyponatremia consistent with chronic alcohol abuse. Speech therapy did find moderate cognitive deficits with administration of the MOCA of 15/25 with 5 points adjusted for his visual impairments. On initial consultation exam the patient is resting comfortably in hospital recliner and his daughter, who is a nurse manager at the [employer] is also present. The patient is pleasant with mild confusion noted; but no agitation. There is a tele-sitter in the room at this time. He is able to endorse his most recent medical history consistent with chart review. His current level of pain is stated to be 5/10 from his known T12 burst fracture. There is also a left-sided scalp hematoma with no complaints of headaches at this time. Strength is grossly 5/5 without any fine motor control deficits of the hands any noted ataxia at this time. It was discussed with the patient and daughter that he would be a good candidate to come to the acute inpatient rehabilitation unit; but that it would be his choice under his Medicare benefits whether he wanted to stay at the hospital for his acute inpatient rehabilitation stay or go to [facility] where his daughter works. The patient notes that his medical care here was good and he would like to stay here; his daughter states that she is okay with this decision. In the interim the patient states that his first day of therapies is going well.   He does state he has had some difficulty sleeping and does use Tylenol PM at home and would like for this to be added to his orders. Previously was independent of ADLs and used no AD for ambulation prior to recent admission. Initially has ambulated 79' with RW at Minimal contact assistance with PT. With therapy is Supervision for bed mobility, Minimal contact assistance for transfers, and contact-guard assistance with balance. ROM restrictions, WB restrictions, precautions: Restrictions/Precautions: General Precautions, Fall Risk  Other position/activity restrictions: TLSO brace ordered for T12 fracture. Will need to wear this when up. May have it off when in bed. Required Braces or Orthoses  Spinal: Thoracic Lumbar Sacral Orthotics(May don in sitting)    Fall Risk    Current Rehabilitation Assessments:     OBJECTIVE:  Range of Motion:  Right Lower Extremity: WFL  Left Lower Extremity: [facility]     Strength:  Right Lower Extremity: WFL at knee/ankle. Hip MMT deferred due to back injury  Left Lower Extremity:  WFL at knee/ankle. Hip MMT deferred due to back injury    Bed Mobility:  Rolling to Left: Supervision   Rolling to Right: Supervision   Supine to Sit: Supervision  Sit to Supine: Supervision      Transfers:  Sit to Stand: Contact Guard Assistance with RW support. Without RW, min assist to gain upright balance. Stand to Sit:Minimal Assistance with cues to fully align RW and for hand placement. Stand Pivot:Minimal Assistance to [address] with RW. Min trunk unsteadiness.  Min c/o lightheadedness     Ambulation:  Minimal Assistance  Distance: 20 ft, 70 ft  Surface: Level Tile  Device:Rolling Walker  Gait Deviations:  Slow Dedra, Decreased Step Length Bilaterally, Decreased Gait Speed and Unsteady Gait     Functional Outcome Measures: Completed  Tinetti:  Balance Score: 9  Gait Score: 5  Tinetti Total Score: 14  Risk Indicators:  Less than/equal to 18 = high risk  19-23 Moderate risk  Greater than/equal to 24 = low risk     Timed up and Go Test (TUG)  63 seconds   Normative Reference Values  60-69 years:  8.1 seconds  70-79 years: 9.2 seconds  80-99 years: 11.3 seconds     < 10 seconds is normal, a score of > 14 seconds indicates high fall risk     EXERCISES:  The following exercises were completed to improve functional mobility: standing with RW support, heel and toe raises x10 reps with CGA to min assist for balance. Demo for technique.       ASSESSMENT:  Activity Tolerance:  Patient tolerance of  treatment: good.        Treatment Initiated: Treatment and education initiated within context of evaluation. Evaluation time included review of current medical information, gathering information related to past medical, social and functional history, completion of standardized testing, formal and informal observation of tasks, assessment of data and development of plan of care and goals. Treatment time included skilled education and facilitation of tasks to increase safety and independence with functional mobility for improved independence and quality of life.     Assessment: Body structures, Functions, Activity limitations: Decreased functional mobility , Decreased safe awareness, Decreased balance, Decreased cognition, Decreased endurance, Decreased strength  Assessment: Pt demonstrates a decrease in baseline by way of bed mobility, transfers, gait and steps combined with decreased safety awareness. He is showing a decrease in balance noted by the 63 sec TUG and 14/28 Tinetti, decrease in endurance and functional strength. He will benefit from skilled PT to advance in these areas for improved functional mobility and safety.   Prognosis: Good    Discharge Recommendations: Continue to assess pending progress, Patient would benefit from continued therapy after discharge     Occupational Therapy:  COGNITION: Slow Processing, Decreased Recall, Decreased Insight, Impaired Memory, Decreased Problem Solving, Decreased Safety Awareness, Impaired Attention, Decreased Arousal and Difficulty Following Commands     RANGE OF MOTION:  Bilateral Upper Extremity:  WFL     STRENGTH:  Bilateral Upper Extremity:  Not Tested     ADL:     EATING:Supervision or touching assistance. ,  CARE Score: 4. ORAL HYGIENE:Supervision or touching assistance. [place] CARE Score: 4. TOILETING HYGIENE:Partial/moderate assistance. [place] CARE Score: 3. SHOWERING/BATHING:Partial/moderate assistance. assist for B feet and bottom. sponge bath completed. [place] Aldana CARE Score: 3.     UPPER BODY DRESSING:Partial/moderate assistance. mod A for TLSO , seated. CARE Score: 3. LOWER BODY DRESSING:Partial/moderate assistance. without AE. assist for shorts over feet. Sin Aldana CARE Score: 3. FOOTWEAR:Substantial/maximal assistance   . CARE Score: 2. TOILET TRANSFER: Partial/moderate assistance  min A sit to stand from a STS with grab bar on the right. CARE Score: 3        BALANCE:  Sitting Balance:  Supervision. Standing Balance: Contact Guard Assistance.       BED MOBILITY:  Not Tested     TRANSFERS:  Sit to Stand:  Contact Guard Assistance. mod cues for safe hand placemetn. Stand to Sit: Contact Guard Assistance.       FUNCTIONAL MOBILITY:  Assistive Device: Rolling Walker  Assist Level:  Contact Guard Assistance. Distance: To and from bathroom and on the unit towards the therapy apt. PT instructed on safe tech for standing for laundry tasks. PT able to tolerate standign x 3 min with CGA to close SBA for setting up washing machine for placement of clothing. Exercise:  none     Activity Tolerance:  Patient tolerance of  treatment: good.         Assessment:  Assessment: Pt demo increased confusion this date with decreased safety awareness during ADL tasks. Pt is requiring wearing of TLSO at is time d/t T12 burst fracture. He currently requires mod A for LE dressing and bathing, CGA For safe transfers and CGA For toileing tasks due to unsteadiness and fair safety awareness.  He requires up to mod A for problem solving, initiation and safe tech for completing a spong e memory, problem solving, reasoning, thought organization, executive functioning and sustained/selective attention. Patient with limited insight into cognitive impairments and functional implications related to return to his home environment. Expressive and receptive language skills are relatively intact. No dysarthria, dysphonia, or dysphagia noted. Patient will likely continue to require support/assistance for management of fiances and medications upon discharge, with additional recommendations for refraining from driving. Skilled ST services are highly recommended to improve the aforementioned cognitive deficits and permit potential return to [facility]. Rehabilitation Potential: good    Past Medical History:      Diagnosis Date    Alcohol abuse     CKD (chronic kidney disease) stage 2, GFR 60-89 ml/min     Current every day smoker     Depression     Drug abuse ([facility] [address]     2008    GERD (gastroesophageal reflux disease)     Hyperlipidemia     Hypertension     Skin cancer      Primary care provider: DEBRA Martinez CNP     Past Surgical History:      Procedure Laterality Date    TIBIA FRACTURE SURGERY Left 12/28/2012    ORIF LEFT TIBIA    TONSILLECTOMY       Allergies:    Patient has no known allergies.     Current Medications:    Current Facility-Administered Medications: acetaminophen (TYLENOL) tablet 500 mg, 500 mg, Oral, Nightly **AND** diphenhydrAMINE (BENADRYL) tablet 25 mg, 25 mg, Oral, Nightly  acetaminophen (TYLENOL) tablet 650 mg, 650 mg, Oral, Q4H PRN  polyethylene glycol (GLYCOLAX) packet 17 g, 17 g, Oral, Daily PRN  promethazine (PHENERGAN) tablet 12.5 mg, 12.5 mg, Oral, Q6H PRN **OR** ondansetron (ZOFRAN) injection 4 mg, 4 mg, Intravenous, Q6H PRN  sodium chloride flush 0.9 % injection 10 mL, 10 mL, Intravenous, 2 times per day  sodium chloride flush 0.9 % injection 10 mL, 10 mL, Intravenous, PRN  vitamin B-1 (THIAMINE) tablet 100 mg, 100 mg, Oral, Daily  amLODIPine (NORVASC) tablet 10 mg, 10 mg, Oral, Daily  bisacodyl (DULCOLAX) EC tablet 5 mg, 5 mg, Oral, Daily PRN  carBAMazepine (TEGRETOL) tablet 200 mg, 200 mg, Oral, BID  docusate sodium (COLACE) capsule 100 mg, 100 mg, Oral, Daily  FLUoxetine (PROZAC) capsule 60 mg, 60 mg, Oral, Daily  folic acid (FOLVITE) tablet 1 mg, 1 mg, Oral, Daily  [START ON 8/25/2021] magnesium replacement protocol, , Other, RX Placeholder  magnesium sulfate 2 g in 50 mL IVPB premix, 2 g, Intravenous, PRN  multivitamin 1 tablet, 1 tablet, Oral, Daily  pantoprazole (PROTONIX) tablet 40 mg, 40 mg, Oral, QAM AC  QUEtiapine (SEROQUEL) tablet 400 mg, 400 mg, Oral, BID  senna (SENOKOT) tablet 8.6 mg, 1 tablet, Oral, Nightly  traZODone (DESYREL) tablet 100 mg, 100 mg, Oral, Nightly PRN  vitamin B-12 (CYANOCOBALAMIN) tablet 1,000 mcg, 1,000 mcg, Oral, Daily  magnesium hydroxide (MILK OF MAGNESIA) 400 MG/5ML suspension 30 mL, 30 mL, Oral, Daily PRN     Social History:   reports that he has been smoking cigarettes. He has a 40.00 pack-year smoking history. He has never used smokeless tobacco. He reports current alcohol use. He reports previous drug use. Drug: Cocaine. Lives at [address] [place] [address]. Social/Functional History  Lives With: Other (comment)(Mother)  Type of Home: House  Home Layout: One level  Home Access: Stairs to enter without rails  Entrance Stairs - Number of Steps: 1  Bathroom Shower/Tub: Tub/Shower unit, Shower chair with back  Bathroom Toilet: Standard  Bathroom Equipment: Grab bars in shower(not in great shape per pt. plastic grab bars.)  Home Equipment: Standard walker  ADL Assistance: Independent  Ambulation Assistance: Independent  Transfer Assistance: Independent  Active : Yes  Occupation: On disability  Type of occupation: x 11 years  Additional Comments: Pt stated he was \"experimenting\" with using the walker prior to admission.  Pt reports mother does most of the inside chores, Pt is responsible for outside chores and yardwork. Functional History:  Prior Function  ADL Assistance: Independent  Ambulation Assistance: Independent  Transfer Assistance: Independent  Additional Comments: Pt stated he was \"experimenting\" with using the walker prior to admission. Pt reports mother does most of the inside chores, Pt is responsible for outside chores and yardwork. IADL History:  Lift/Transfer Equipment Utilized: None    Family History:       Problem Relation Age of Onset    Heart Disease Mother     COPD Father     Heart Disease Father      Review of Systems:  Review of Systems   Constitutional: Positive for activity change and fatigue. Negative for appetite change and fever. HENT: Negative for trouble swallowing and voice change. Eyes: Negative. Respiratory: Negative for cough and shortness of breath. Cardiovascular: Negative for leg swelling. Gastrointestinal: Negative for constipation, diarrhea and nausea. Endocrine: Negative. Genitourinary: Negative for frequency and urgency. Musculoskeletal: Positive for gait problem. Negative for back pain. Skin: Negative for pallor. Allergic/Immunologic: Negative. Neurological: Positive for weakness. Negative for dizziness, speech difficulty, light-headedness and numbness. Hematological: Negative. Psychiatric/Behavioral: Positive for confusion and decreased concentration. Negative for dysphoric mood. The patient is not nervous/anxious. All other systems reviewed and are negative.      Physical Exam:  /83   Pulse 83   Temp 98.1 °F (36.7 °C) (Oral)   Resp 16   Ht 6' 4\" (1.93 m)   Wt 226 lb 12.8 oz (102.9 kg)   SpO2 97%   BMI 27.61 kg/m²     awake  Orientation:   person, place, time, situation  Mood: within normal limits  Affect: calm  General appearance: Patient is well nourished, well developed, well groomed and in no acute distress, overweight, appearing stated age, up in hallway with RW    Memory:   Grossly normal  Attention/Concentration: abnormal -mild delay with command following of one-step commands  Language:  normal    Cranial Nerves:  cranial nerves II-XII are grossly intact  ROM:  normal  Motor Exam:  Motor exam is symmetrical 5 out of 5 all extremities bilaterally    Tone:  normal  Muscle bulk: within normal limits; with noted mild atrophy of bilateral calves  Sensory:  Sensory intact  Coordination:   normal  Deep Tendon Reflexes:  Reflexes are intact and symmetrical bilaterally    Skin: warm and dry, no rash or erythema and abrasions over both knees with scabs and hematoma with abrasion over left parietal scalp  Peripheral vascular: Pulses: Normal upper and lower extremity pulses; Edema: no    Diagnostics:  No results found for this or any previous visit (from the past 24 hour(s)). Lab Results   Component Value Date    LABA1C 5.4 12/22/2014     No results found for: EAG  Recent Labs     08/28/20  0536 08/26/20  0400 08/25/20  0347   WBC 6.0 4.1* 4.6*   HGB 9.8* 10.1* 10.0*   HCT 30.9* 31.4* 29.7*   .0* 111.3* 108.4*    181 174     Impression:  1. Frequent falls. 2. Gait instability with a Tinetti score of 8/28. 3. Left scalp hematoma. 4. 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 5. Moderate cognitive impairment. (MOCA) version 8.2 completed. Patient scored 14/30. *Previous score on the [address] [place] [address] was 15/25. 6. Acute alcohol intoxication with admission blood alcohol level of 0.08.  7. General cerebral atrophy and small vessel ischemic changes with prominent ventricles consistent with central atrophy. 8. Cervical and thoracic spine pain. 9. T12 burst fracture without instability initially noted on a lumbar spine x-ray on 7/24/2020. 10. Hyponatremia. 11. Hypomagnesemia  12. Acute kidney injury. 13. Alcohol abuse. 14. Vitamin B12 deficiency with chronic macrocytic anemia. .  15. Recent acute, comminuted, minimally displaced, T-shaped fracture of the head of the right proximal phalanx of the great toe on 8/14/2020. 16. Hypertension. 17. Hyperlipidemia. 18. Remote history of cocaine drug abuse. 19. Cervical spondylosis with C3-C4 moderate to severe right and severe left neuroforaminal stenosis, moderate to severe left and severe right neuroforaminal stenosis at C4-C5, moderate right and severe left neuroforaminal stenosis at C5-C6, and moderate right and mild left neuroforaminal stenosis at C6-C7.  20. Lumbar spondylosis with mild degenerative changes most pronounced at L3-L4 with moderate spinal canal stenosis and mild to moderate bilateral neuroforaminal stenosis. 21. Chronic macrocytic, hyperchromic anemia. 22. CKD 2.  23. History of depression. 24. Bipolar 1 disorder. 25. GERD. 26. Current everyday smoker with a 40-pack-year history. Plan:   The patient demonstrates good potential to participate in an inpatient rehabilitation program involving at least 3 hours per day, 5 days per week of intensive rehabilitation. Rehabilitation services will include PT, OT and SLP/RT in order to improve functional status prior to discharge. Family education and training will be completed. Equipment evaluations and recommendations will be completed as appropriate. Medical management: Per primary team and Dr. Dell Plunkett. Consultants: Trauma Surgery, Critical Care, Orthopedic Surgery, Family Medicine, Physical Medicine    Narcotic usage:  N/A  Last BM:  Stool Amount: Large (08/29/20 1402)    Acute/Rehabilitation Problems:  1. Frequent falls/Gait instability with a Tinetti score of 8/28 . 1. PT/OT. 2. Left scalp hematoma. 3. 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 1. TBI protocol. 2. Tele-sitter. 4. Moderate cognitive impairment. (MOCA) version 8.2 completed. Patient scored 14/30. *Previous score on the [address] [place] [address] was 15/25. 1. SLP.   5. Acute alcohol intoxication with admission blood alcohol level of 0.08.  6. General cerebral atrophy and small vessel ischemic changes with prominent neuroforaminal stenosis at C5-C6, and moderate right and mild left neuroforaminal stenosis at C6-C7.  5. Lumbar spondylosis with mild degenerative changes most pronounced at L3-L4 with moderate spinal canal stenosis and mild to moderate bilateral neuroforaminal stenosis. 6. Chronic macrocytic, hyperchromic anemia. 7. CKD 2.  8. History of depression/Bipolar 1 disorder. 1. Prozac. 2. Seroquel. 3. Tegretol. 4. Has BuSpar on home medication list.  5. Trazodone for sleep. 9. GERD. 1. Protonix. 10. Current everyday smoker with a 40-pack-year history. 1. NicoDerm patch if patient is agreeable. Labs reviewed on:  1. 8/28. Infectious Disease:  1. N/A    The main medical problem(s) and comorbidities being actively managed by the physicians and requiring 24 hour rehabilitation nursing care during this stay include traumatic brain injury management, electrolyte management, acute kidney injury management, smoking cessation, anemia management, and weightbearing maintenance. The domains of functional impairment present in this patient which will require an intensive and interdisciplinary rehabilitation environment include self care, mobility, motor dysfunction, bowel/bladder management, pain management, safety and cognitive function. Estimated length of stay for this admission 14 days. Anticipated disposition: Assisted living. The potential to achieve that is good. The post admission physician evaluation (OTILIA) is consistent with the pre-admission assessment. See above findings to reflect the elements required in the OTILIA. Patient's admitting condition is consistent with the findings of the preadmission assessment by the rehabilitation admissions coordinator. Greater than 70% of 15 minutes was spent formulating a rehabilitation unit admission plan, reviewing labs, reviewing imaging, and answering any of the patient's questions.     Shashi Goddard MD

## 2020-08-27 NOTE — PROGRESS NOTES
Admitted to the Inpatient Rehabilitation Unit via wheelchair. Patient was then oriented to room and unit. Education provided on the rehabilitation routine: three hours of therapy five days per week and dining room for lunch. Explained patients right to have family, representative or physician notified of their admission. Patient has N/A for physician to be notified. Patient has N/A for family/representative to be notified. Admitting medication orders compared with acute stay medications; home medication list reviewed with patient/family. Medication issues identified No  Medication issue: NA  If yes, physician notified Dr Jack Bedoya. Bladder and Bowel Function Assessment:  1. Prior history of bladder problems: no problems with bladder  2. Number of pads used per day: 0  3. Frequency of night time voiding: twice  4. Fluid intake volume and pattern: adequate  5. Last BM: 08/25/20  6. Prior history of bowel problems: No      Incontinence      Frequent diarrhea      Constipation      Hemorrhoids      Diverticulitis      Bowel Surgery     Two nurse skin assessment performed by Mary Lou Tatum LPN  and Prieto Cervantes LPN . Care plan was created with patient's input and goals were agreed upon. Admission folder provided with education regarding patients diagnoses, fall prevention, skin care, and M in the box. \"Data Collection Information Summary for Patients in Inpatient Rehabilitation Facilities\" and \"Privacy Act Statement - Health Care Records\" provided. Please refer to the admission navigator for further information.

## 2020-08-27 NOTE — PROGRESS NOTES
[facility] [address] Armando Calderon  Daily Progress Note    Pt Name: Ross Everett  Medical Record Number: [id number]  Date of Birth 1960   Today's Date: 8/27/2020    HD: # 3    CC: No C/O    ASSESSMENT  1. Active Hospital Problems    Diagnosis Date Noted    Hypomagnesemia [E83.42]     Fall [W19. XXXA]     Closed head injury [S09.90XA]     Closed fracture of thoracic vertebral body ([address] [S22.009A] 08/24/2020         PLAN  Patient admitted under Trauma Services      Chronic alcohol abuse              - ETOH 0.08 on admission              - Addiction services has seen              - SIMON flor   - Thiamine, folic acid, multivitamin     Recurrent falls              - Hospitalist assisting with management              - Restarted home meds.  Nursing staff to recheck home meds     T12 vertebral body fracture               - Orthopedic spine managing non operatively               - TLSO brace when OOB   - MRI cervical, thoracic, lumbar spine notes acute burst injury of the T12 vertebral body without evidence of instability   - OOB with therapy, no lifting over 20 lb per ortho   - Ortho office to schedule 2-3 week f/u, patient will need serial XR              - Pain control              - Neurochecks   - PT/OT      Neck pain              - Orthopedic spine managing non operatively               - MRI cervical, thoracic, lumbar spine notes multilevel degenerative changes of the cervical spine              - Pain control              - Neurochecks              - [place] collar removed by orthospine   - PT/OT to continue     Scalp hematoma              - Ice to area              - Pain control    Closed head injury   - SLP indicates patient is not safe to return home independently   - Limited stimulation brain injury guidelines        Pain Management              Morphine     Prophylaxis: SCD's, Incentive Spirometry, Colace, Pepcid, Zofran     Diet as tolerated     Stop IVF Management  Regular Neurovascular Checks  PT/OT/SLP continue to treat  Up with assistance and TLSO brace     Planned Discharge pending clinical course    - PM&R consulted, patient is a good candidate for IPR, plan to discharge today      Arturo Childress continues on 5K. He continues to have neck and lower back pain. Patient denies chest pain, shortness of breath, cough,  dizziness, lightheadedness, numbness, paraesthesias, weakness,  abdominal pain, vomiting. He is tolerating a diet without any nausea or vomiting. He is passing flatus but has not had a bowel movement. Plan for IPR today. Wt Readings from Last 3 Encounters:   08/25/20 245 lb 8 oz (111.4 kg)   01/09/20 238 lb (108 kg)   08/21/19 240 lb (108.9 kg)     Temp Readings from Last 3 Encounters:   08/27/20 98.4 °F (36.9 °C) (Oral)   01/09/20 97.4 °F (36.3 °C) (Temporal)   08/21/19 96.9 °F (36.1 °C)     BP Readings from Last 3 Encounters:   08/27/20 123/84   01/09/20 134/84   08/21/19 135/88     Pulse Readings from Last 3 Encounters:   08/27/20 72   01/09/20 70   08/21/19 72       24 HR INTAKE/OUTPUT :     Intake/Output Summary (Last 24 hours) at 8/27/2020 0805  Last data filed at 8/27/2020 0333  Gross per 24 hour   Intake 260 ml   Output 745 ml   Net -485 ml     DIET GENERAL;    OBJECTIVE  CURRENT VITALS /84   Pulse 72   Temp 98.4 °F (36.9 °C) (Oral)   Resp 18   Ht 6' 4\" (1.93 m)   Wt 245 lb 8 oz (111.4 kg)   SpO2 95%   BMI 29.88 kg/m²      GENERAL: Awake, alert and oriented, in no acute distress, lying in hospital bed. NEURO: Alert and oriented, converses appropriately, follows commands, strength equal and strong bilaterally. No signs of focal neurological deficits.   LUNGS: clear to auscultation bilaterally- no wheezes, rales or rhonchi, normal air movement, no respiratory distress  HEART: normal rate, regular rhythm, normal S1 and S2, no murmurs, no gallops and intact distal pulses  ABDOMEN: soft, non-tender, non-distended, normal bowel sounds, no masses or organomegaly  WOUNDS: Scalp abrasions healing well, no significant drainage, no significant erythema  EXTREMITY: no cyanosis and no clubbing. PMS intact in all 4 extremities. Strength equal and strong bilaterally. LABS  CBC :   Recent Labs     08/24/20 1745 08/25/20 0347 08/26/20  0400   WBC 5.9 4.6* 4.1*   HGB 10.0* 10.0* 10.1*   HCT 30.5* 29.7* 31.4*   .0* 108.4* 111.3*    174 181     BMP:   Recent Labs     08/24/20 1745 08/25/20 0347 08/26/20  0400   * 134* 138   K 3.8 4.2 4.5   CL 98 102 105   CO2 22* 24 24   BUN 12 12 13   CREATININE 1.3* 1.3* 1.1     COAGS:   Recent Labs     08/24/20 1745 08/25/20 0347   APTT 27.2  --    PROT 6.0* 5.7*   INR 1.04  --      Pancreas/HFP:    Recent Labs     08/24/20 1745   LIPASE 12.2     Recent Labs     08/24/20 1745 08/25/20  0347   AST 14 13   ALT 7* 7*   BILITOT 0.4 0.4   ALKPHOS 116 117     RADIOLOGY:  No new results      Electronically signed by DEBRA Naqvi CNP on 8/27/2020 at 8:05 AM    Patient seen and examined independently by me early this AM. Above discussed and I agree with Kumar Pressley CNP. See my additional comments below for updated orders and plan. Labs, cultures, and radiographs where available were reviewed. I discussed patient concerns with the patient's nurse and instructions were given. Please see our orders for the updated patient care plan. -Stable from a trauma standpoint. Pain control. Therapy. Nonoperative management by orthopedic service. TLSO brace. Planning inpatient acute rehab. Most likely transfer today.     Electronically signed by Moraima Guillen MD on 8/27/20 at 12:37 PM EDT

## 2020-08-27 NOTE — DISCHARGE SUMMARY
Discharge Summary     Patient Identification:  Teri Lefort  : 1960  MRN: [id number]   Account: [de-identified]     Admit date: 2020  Discharge date: 20   Attending provider: Javier Cline MD        Primary care provider: DEBRA Buchanan CNP     Discharge Diagnoses: Active Problems:    Closed fracture of thoracic vertebral body ([address]    Fall    Closed head injury    Hypomagnesemia  Resolved Problems:    * No resolved hospital problems. *       Hospital Course:   Teri Lefort is a 60 y.o. male admitted to [address] [place] [address] on 2020 for treatment of injuries sustained in a fall. He has a history of chronic alcohol abuse and falls frequently, mostly not remembering how he fell, would just wake up on the ground. He believed he fell while exiting a parked car two days prior to arrival and that  is what hurt his back, but he also fell the day he presented to the ER. Nonetheless, he was diagnosed with a T12 burst fracture and Orthopedic Spine specialists were consulted. An MRI was obtained noting that the burst fracture was stable. A TLSO brace was placed on Ronel Rico when he was out of bed and he was advised to not lift anything over 10 pounds. No surgical intervention was necessary. PT, OT and Speech therapy were consulted and determinted that Ronel Rico was not safe to return home at the time of discharge and recommended that he go to [facility] for continued therapy. Once insurance approved the transfer, he was discharged and re-admitted to [facility] in stable condition.                Discharge Medications:   Win Almendarez   Home Medication Instructions RZY:[id number]    Printed on:20 1103   Medication Information                      acetaminophen (APAP EXTRA STRENGTH) 500 MG tablet  Take 1 tablet by mouth every 6 hours as needed for Pain             aspirin 325 MG tablet  Take 325 mg by mouth daily             ATENOLOL PO  Take 100 mg by mouth HEAD WO CONTRAST CLINICAL INFORMATION: fall. COMPARISON: 8/16/2019 TECHNIQUE: Noncontrast 5 mm axial images were obtained through the brain. All CT scans at this facility use dose modulation, iterative reconstruction, and/or weight-based dosing when appropriate to reduce radiation dose to as low as reasonably achievable. FINDINGS: Generalized volume loss and small vessel ischemic changes. There is no acute hemorrhage or midline shift. Prominent ventricles in keeping with central atrophy. Paranasal sinuses are clear. Mastoid air cells are patent. Skull: Unremarkable. Soft tissues: Large frontal scalp hematoma on the left. Large frontal scalp hematoma on the left. No acute intracranial findings. **This report has been created using voice recognition software. It may contain minor errors which are inherent in voice recognition technology. ** Final report electronically signed by Dr. Edgard Gardner on 8/24/2020 6:39 PM    Ct Cervical Spine Wo Contrast    Result Date: 8/24/2020  PROCEDURE: CT CERVICAL SPINE WO CONTRAST CLINICAL INFORMATION: fall. COMPARISON: August 16, 2019 TECHNIQUE: 3 mm noncontrast axial images were obtained through the cervical spine with sagittal and coronal reconstructions. All CT scans at this facility use dose modulation, iterative reconstruction, and/or weight-based dosing when appropriate to reduce radiation dose to as low as reasonably achievable. FINDINGS: Mastoid air cells are clear where visualized. Facet hypertrophy. Vascular calcifications. Multilevel disc space narrowing and anterior osteophytes. No severe central canal stenosis. Prevertebral soft tissues are unremarkable. No acute fracture or subluxation. **This report has been created using voice recognition software. It may contain minor errors which are inherent in voice recognition technology. ** Final report electronically signed by Dr. Edgard Gardner on 8/24/2020 6:47 PM    Ct Thoracic Spine Wo Contrast    Result Date: 8/24/2020  PROCEDURE: CT THORACIC SPINE WO CONTRAST CLINICAL INFORMATION: fall. COMPARISON: August 16, 2019 TECHNIQUE: 3 mm axial noncontrast CT images were obtained to the thoracic spine. Sagittal and coronal reconstructions were obtained. All CT scans at this facility use dose modulation, iterative reconstruction, and/or weight-based dosing when appropriate to reduce radiation dose to as low as reasonably achievable. FINDINGS: Normal alignment of the thoracic spine. Facet hypertrophy. Acute fracture greater than 50% T12 vertebral body. Mild narrowing of the central canal related to small bony fragment extending posteriorly from the posterior margin of the vertebral body. . If there is neurologic change thoracic MRI can be performed as clinically warranted. . There is mild paraspinal edema/infiltration. Facet joints are normally aligned. IMPRESSION: T12 vertebral body fracture. Fracture involves the majority of the vertebral body. If neurologic changes are suspected consider thoracic MRI for further evaluation. **This report has been created using voice recognition software. It may contain minor errors which are inherent in voice recognition technology. ** Final report electronically signed by Dr. Dominique Cuellar on 8/24/2020 6:53 PM    Ct Lumbar Spine Wo Contrast    Result Date: 8/24/2020  PROCEDURE: CT LUMBAR SPINE WO CONTRAST CLINICAL INFORMATION: fall, fall. COMPARISON: August 16, 2019 TECHNIQUE: 3 mm noncontrast axial images were obtained of the lumbar spine. Sagittal and coronal reconstructions were obtained. Angled images were reconstructed through the L3-4, L4-5 and L5-S1 disc levels. All CT scans at this facility use dose modulation, iterative reconstruction, and/or weight-based dosing when appropriate to reduce radiation dose to as low as reasonably achievable. FINDINGS: Normal alignment. No acute fracture lumbar spine. Multilevel disc space narrowing. . No severe central canal stenosis.  Facet joints are normally aligned. SI joints are symmetric. Old fracture of the left posterior rib with callus formation. No acute fracture of the lumbar spine. T12 vertebral body fracture as reported on thoracic spine CT. **This report has been created using voice recognition software. It may contain minor errors which are inherent in voice recognition technology. ** Final report electronically signed by Dr. Dereck Platt on 8/24/2020 6:56 PM    Mri Cervical Spine Wo Contrast    Result Date: 8/25/2020  PROCEDURE: MRI CERVICAL SPINE WO CONTRAST CLINICAL INFORMATION: balance changes, falls, cervical pain . Balance problems with multiple recent falls. COMPARISON: CT cervical spine dated 8/24/2020. TECHNIQUE: Sagittal T1, T2 and STIR sequences were obtained through the cervical spine. Axial fast and echo and gradient echo T2-weighted images were obtained. FINDINGS: There is minimal, grade 1, retrolisthesis of C6 relative to C7 on the basis of degenerative change, stable compared to prior CT. There is otherwise anatomic vertebral body height and alignment. There is a focal area of hyperintense T1 and T2 signal in the C5 vertebral body is evidence for a hemangioma. There is mild hyperintense T1 and T2 signal at the opposing endplates of A1-1 through C6-7 with anterior osteophytes as evidence for Modic 2 degenerative change. Marrow signal is otherwise within normal  limits. The craniocervical junction appears intact. The cervical spinal cord is normal in caliber without focal area of abnormal T2 signal identified. Paraspinal soft tissues are unremarkable. At C2-3 there is no significant spinal canal stenosis. There is moderate bilateral neural foraminal stenosis in association with uncovertebral joint degenerative change and facet hypertrophy.  At C3-4 there is a shallow disc bulge which causes mild spinal canal stenosis, moderate to severe right and severe left neural foraminal stenosis in association with uncovertebral joint degenerative change and facet hypertrophy. At C4-5 there is a shallow disc bulge which causes mild spinal canal stenosis and moderate to severe left and severe right neuroforaminal stenosis in association with uncovertebral joint degenerative change and facet hypertrophy. At C5-6 there is a shallow disc bulge with rim osteophytes which causes mild spinal canal stenosis, moderate right and severe left neuroforaminal stenosis in association with uncovertebral joint degenerative change. At C6-7 there is partial uncovering the disc with superimposed shallow disc bulge which contributes to mild spinal canal stenosis and moderate right and mild left neural foraminal stenosis in association with uncovertebral joint degenerative change. At C7-T1 there is no significant spinal canal or neuroforaminal stenosis. Multilevel degenerative changes of the cervical spine with multiple areas of mild spinal canal stenosis and up to severe neuroforaminal stenosis. Please refer to the body of the report for segmental analysis. **This report has been created using voice recognition software. It may contain minor errors which are inherent in voice recognition technology. ** Final report electronically signed by Dr. Donna Huerta MD on 8/25/2020 11:25 AM    Mri Thoracic Spine Wo Contrast    Result Date: 8/25/2020  PROCEDURE: MRI THORACIC SPINE WO CONTRAST CLINICAL INFORMATION: T12 burst fx. Balance problems with recent falls. COMPARISON: CT thoracic spine dated 8/24/2020 and 8/16/2019. TECHNIQUE: Sagittal T1, T2 and STIR sequences were obtained through the thoracic spine. Axial T2-weighted images were obtained through the discs. FINDINGS:  Redemonstration of compression deformity of the T12 vertebral body with approximately 60% central height loss and minimal retropulsion of the posterior aspect of the vertebral body of the spinal canal, stable compared to most recent CT and new compared to 8/16/2019.  There is no significant spinal canal stenosis at this level. There are linear hypointense signal areas in the vertebral column consistent with fracture lucencies. There is diffuse edema throughout the T12 vertebral body with mild extension into the pedicles. There is otherwise anatomic vertebral body height and alignment. There is a prominent focal area of hyperintense T1 and T2 signal in the T10 vertebral body as evidence for a hemangioma. Marrow signal is otherwise within normal limits. The thoracic spinal cord is normal in caliber without focal area of abnormal T2 signal identified. Paraspinal soft tissues are unremarkable. The intervertebral discs are relatively preserved. There is no significant spinal canal or neuroforaminal stenosis at any thoracic level. Redemonstration of acute burst injury of the T12 vertebral body without evidence of instability. There is no significant spinal canal or neuroforaminal stenosis throughout the thoracic spine. **This report has been created using voice recognition software. It may contain minor errors which are inherent in voice recognition technology. ** Final report electronically signed by Dr. Debbie Moon MD on 8/25/2020 11:37 AM    Mri Lumbar Spine Wo Contrast    Result Date: 8/25/2020  PROCEDURE: MRI LUMBAR SPINE WO CONTRAST CLINICAL INFORMATION: T12 burst fx. Problems with balance recent multiple falls. COMPARISON: CT lumbar spine dated 8/24/2020 and 8/16/2019. TECHNIQUE: Sagittal and axial T1 and T2-weighted images were obtained through the lumbar spine. FINDINGS: Redemonstration of compression deformity of the T12 vertebral body with approximately 60% central height loss and multiple linear areas of low T1 and hyperintense T2 signal corresponding to fracture lucencies on CT. There is diffuse edema throughout the vertebral body with minimal retropulsion of the posterior aspect of the T12 vertebral bodies spinal canal without significant spinal canal stenosis.  There is small amount of edema extending into the pedicles. There is otherwise anatomic alignment. There is mild thickening of the anterior longitudinal ligament at T11-12 and T12-L1. The posterior longitudinal ligament appears intact. No ligamentum flavum injury or interspinous ligamentous injury is identified. No other focal areas of abnormal signal are identified within the lumbar vertebral column. The conus terminates in a normal fashion at the L1-2 level. The cauda equina is normal in appearance without nerve root thickening or clumping identified. Paraspinal soft tissues are unremarkable. At T12-L1 there is no significant spinal canal or neuroforaminal stenosis. At L1-2 there is a minimal disc bulge without significant spinal canal or neuroforaminal stenosis. At L2-3 there is no significant spinal canal or neuroforaminal stenosis. At L3-4 there is a shallow disc bulge which mildly indents thecal sac and contributes to moderate spinal canal stenosis in association with mild facet hypertrophy and ligamentum flavum thickening superimposed on congenital spinal canal narrowing. There is mild to moderate bilateral neural foraminal stenosis. At L4-5 there is a minimal disc bulge without significant spinal canal stenosis. There is mild bilateral neural foraminal stenosis in association with mild ligamentum flavum thickening. At L5-S1 there is a shallow disc bulge without significant spinal canal stenosis and moderate to severe bilateral neural foraminal stenosis in association with facet hypertrophy and ligamentum flavum thickening. 1. Redemonstration of T12 burst injury with mild anterior longitudinal ligament injury without evidence of instability. 2. Congenital spinal canal narrowing with superimposed mild degenerative changes most pronounced at L3-4 where there is moderate spinal canal stenosis and mild to moderate bilateral neural foraminal stenosis. **This report has been created using voice recognition software.  It may contain minor errors which are inherent in voice recognition technology. ** Final report electronically signed by Dr. Thang Rao MD on 8/25/2020 11:54 AM    Xr Chest 1 View    Result Date: 8/24/2020  PROCEDURE: XR CHEST 1 VIEW CLINICAL INFORMATION: fall. COMPARISON: August 13, 2019 TECHNIQUE: AP Portable chest xray FINDINGS: No lobar consolidation. Costophrenic recesses are preserved. No cardiomegaly. No acute osseous findings. Recording device overlying the left heart border. No acute findings. **This report has been created using voice recognition software. It may contain minor errors which are inherent in voice recognition technology. ** Final report electronically signed by Dr. Pavel Arriaga on 8/24/2020 6:44 PM      Labs:   Recent Results (from the past 72 hour(s))   CBC Auto Differential    Collection Time: 08/24/20  5:45 PM   Result Value Ref Range    WBC 5.9 4.8 - 10.8 thou/mm3    RBC 2.85 (L) 4.70 - 6.10 mill/mm3    Hemoglobin 10.0 (L) 14.0 - 18.0 gm/dl    Hematocrit 30.5 (L) 42.0 - 52.0 %    .0 (H) 80.0 - 94.0 fL    MCH 35.1 (H) 26.0 - 33.0 pg    MCHC 32.8 32.2 - 35.5 gm/dl    RDW-CV 13.4 11.5 - 14.5 %    RDW-SD 52.9 (H) 35.0 - 45.0 fL    Platelets 539 196 - 644 thou/mm3    MPV 9.4 9.4 - 12.4 fL    Seg Neutrophils 67.0 %    Lymphocytes 24.4 %    Monocytes 7.3 %    Eosinophils 0.7 %    Basophils 0.3 %    Immature Granulocytes 0.3 %    Segs Absolute 4.0 1.8 - 7.7 thou/mm3    Lymphocytes Absolute 1.4 1.0 - 4.8 thou/mm3    Monocytes Absolute 0.4 0.4 - 1.3 thou/mm3    Eosinophils Absolute 0.0 0.0 - 0.4 thou/mm3    Basophils Absolute 0.0 0.0 - 0.1 thou/mm3    Immature Grans (Abs) 0.02 0.00 - 0.07 thou/mm3    nRBC 0 /100 wbc   Comprehensive Metabolic Panel    Collection Time: 08/24/20  5:45 PM   Result Value Ref Range    Glucose 86 70 - 108 mg/dL    CREATININE 1.3 (H) 0.4 - 1.2 mg/dL    BUN 12 7 - 22 mg/dL    Sodium 132 (L) 135 - 145 meq/L    Potassium 3.8 3.5 - 5.2 meq/L    Chloride 98 98 - 111 meq/L    CO2 22 (L) 23 - 33 meq/L    Calcium 8.8 8.5 - 10.5 mg/dL    AST 14 5 - 40 U/L    Alkaline Phosphatase 116 38 - 126 U/L    Total Protein 6.0 (L) 6.1 - 8.0 g/dL    Alb 3.8 3.5 - 5.1 g/dL    Total Bilirubin 0.4 0.3 - 1.2 mg/dL    ALT 7 (L) 11 - 66 U/L   Troponin    Collection Time: 08/24/20  5:45 PM   Result Value Ref Range    Troponin T < 0.010 ng/ml   Lipase    Collection Time: 08/24/20  5:45 PM   Result Value Ref Range    Lipase 12.2 5.6 - 51.3 U/L   Magnesium    Collection Time: 08/24/20  5:45 PM   Result Value Ref Range    Magnesium 1.5 (L) 1.6 - 2.4 mg/dL   Protime-INR    Collection Time: 08/24/20  5:45 PM   Result Value Ref Range    INR 1.04 0.85 - 1.13   APTT    Collection Time: 08/24/20  5:45 PM   Result Value Ref Range    aPTT 27.2 22.0 - 38.0 seconds   Ethanol    Collection Time: 08/24/20  5:45 PM   Result Value Ref Range    ETHYL ALCOHOL, SERUM 0.08 0.00 %   Anion Gap    Collection Time: 08/24/20  5:45 PM   Result Value Ref Range    Anion Gap 12.0 8.0 - 16.0 meq/L   Osmolality    Collection Time: 08/24/20  5:45 PM   Result Value Ref Range    Osmolality Calc 263.6 (L) 275.0 - 300 mOsmol/kg   Glomerular Filtration Rate, Estimated    Collection Time: 08/24/20  5:45 PM   Result Value Ref Range    Est, Glom Filt Rate 56 (A) ml/min/1.73m2   EKG 12 Lead    Collection Time: 08/24/20  5:45 PM   Result Value Ref Range    Ventricular Rate 76 BPM    Atrial Rate 76 BPM    P-R Interval 186 ms    QRS Duration 100 ms    Q-T Interval 394 ms    QTc Calculation (Bazett) 443 ms    P Axis 70 degrees    R Axis -17 degrees    T Axis 0 degrees   Urinalysis Reflex to Culture    Collection Time: 08/24/20 10:20 PM    Specimen: Urine, clean catch   Result Value Ref Range    Glucose, Ur NEGATIVE NEGATIVE mg/dl    Bilirubin Urine NEGATIVE NEGATIVE    Ketones, Urine NEGATIVE NEGATIVE    Specific Gravity, Urine 1.015 1.002 - 1.03    Blood, Urine NEGATIVE NEGATIVE    pH, UA 7.5 5.0 - 9.0    Protein, UA NEGATIVE NEGATIVE Urobilinogen, Urine 1.0 0.0 - 1.0 eu/dl    Nitrite, Urine NEGATIVE NEGATIVE    Leukocyte Esterase, Urine NEGATIVE NEGATIVE    Color, UA YELLOW STRAW-YELL    Character, Urine CLEAR CLEAR-SL C   Urine Drug Screen    Collection Time: 08/24/20 10:20 PM   Result Value Ref Range    AMPHETAMINE+METHAMPHETAMINE URINE SCREEN Negative NEGATIVE    Barbiturate Quant, Ur Negative NEGATIVE    Benzodiazepine Quant, Ur Negative NEGATIVE    Cannabinoid Quant, Ur Negative NEGATIVE    Cocaine Metab Quant, Ur Negative NEGATIVE    Opiates, Urine Negative NEGATIVE    Oxycodone Negative NEGATIVE    PCP Quant, Ur Negative NEGATIVE   Comprehensive Metabolic Panel w/ Reflex to MG    Collection Time: 08/25/20  3:47 AM   Result Value Ref Range    Glucose 89 70 - 108 mg/dL    CREATININE 1.3 (H) 0.4 - 1.2 mg/dL    BUN 12 7 - 22 mg/dL    Sodium 134 (L) 135 - 145 meq/L    Potassium reflex Magnesium 4.2 3.5 - 5.2 meq/L    Chloride 102 98 - 111 meq/L    CO2 24 23 - 33 meq/L    Calcium 8.4 (L) 8.5 - 10.5 mg/dL    AST 13 5 - 40 U/L    Alkaline Phosphatase 117 38 - 126 U/L    Total Protein 5.7 (L) 6.1 - 8.0 g/dL    Alb 3.6 3.5 - 5.1 g/dL    Total Bilirubin 0.4 0.3 - 1.2 mg/dL    ALT 7 (L) 11 - 66 U/L   CBC auto differential    Collection Time: 08/25/20  3:47 AM   Result Value Ref Range    WBC 4.6 (L) 4.8 - 10.8 thou/mm3    RBC 2.74 (L) 4.70 - 6.10 mill/mm3    Hemoglobin 10.0 (L) 14.0 - 18.0 gm/dl    Hematocrit 29.7 (L) 42.0 - 52.0 %    .4 (H) 80.0 - 94.0 fL    MCH 36.5 (H) 26.0 - 33.0 pg    MCHC 33.7 32.2 - 35.5 gm/dl    RDW-CV 13.6 11.5 - 14.5 %    RDW-SD 54.1 (H) 35.0 - 45.0 fL    Platelets 414 706 - 993 thou/mm3    MPV 9.4 9.4 - 12.4 fL    Seg Neutrophils 46.3 %    Lymphocytes 44.5 %    Monocytes 7.2 %    Eosinophils 1.1 %    Basophils 0.7 %    Immature Granulocytes 0.2 %    Segs Absolute 2.1 1.8 - 7.7 thou/mm3    Lymphocytes Absolute 2.0 1.0 - 4.8 thou/mm3    Monocytes Absolute 0.3 (L) 0.4 - 1.3 thou/mm3    Eosinophils Absolute 0.1 0.0 - 0.4 thou/mm3    Basophils Absolute 0.0 0.0 - 0.1 thou/mm3    Immature Grans (Abs) 0.01 0.00 - 0.07 thou/mm3    nRBC 0 /100 wbc   Vitamin B12 & folate    Collection Time: 08/25/20  3:47 AM   Result Value Ref Range    Vitamin B-12 < 150 (L) 211 - 911 pg/mL    Folate 8.9 4.8 - 24.2 ng/mL   Anion Gap    Collection Time: 08/25/20  3:47 AM   Result Value Ref Range    Anion Gap 8.0 8.0 - 16.0 meq/L   Glomerular Filtration Rate, Estimated    Collection Time: 08/25/20  3:47 AM   Result Value Ref Range    Est, Glom Filt Rate 56 (A) ml/min/1.73m2   Magnesium    Collection Time: 08/25/20  8:13 AM   Result Value Ref Range    Magnesium 1.5 (L) 1.6 - 2.4 mg/dL   CBC    Collection Time: 08/26/20  4:00 AM   Result Value Ref Range    WBC 4.1 (L) 4.8 - 10.8 thou/mm3    RBC 2.82 (L) 4.70 - 6.10 mill/mm3    Hemoglobin 10.1 (L) 14.0 - 18.0 gm/dl    Hematocrit 31.4 (L) 42.0 - 52.0 %    .3 (H) 80.0 - 94.0 fL    MCH 35.8 (H) 26.0 - 33.0 pg    MCHC 32.2 32.2 - 35.5 gm/dl    RDW-CV 13.6 11.5 - 14.5 %    RDW-SD 55.8 (H) 35.0 - 45.0 fL    Platelets 450 153 - 395 thou/mm3    MPV 9.3 (L) 9.4 - 12.4 fL   Basic Metabolic Panel    Collection Time: 08/26/20  4:00 AM   Result Value Ref Range    Sodium 138 135 - 145 meq/L    Potassium 4.5 3.5 - 5.2 meq/L    Chloride 105 98 - 111 meq/L    CO2 24 23 - 33 meq/L    Glucose 99 70 - 108 mg/dL    BUN 13 7 - 22 mg/dL    CREATININE 1.1 0.4 - 1.2 mg/dL    Calcium 8.7 8.5 - 10.5 mg/dL   Magnesium    Collection Time: 08/26/20  4:00 AM   Result Value Ref Range    Magnesium 2.2 1.6 - 2.4 mg/dL   Anion Gap    Collection Time: 08/26/20  4:00 AM   Result Value Ref Range    Anion Gap 9.0 8.0 - 16.0 meq/L   Glomerular Filtration Rate, Estimated    Collection Time: 08/26/20  4:00 AM   Result Value Ref Range    Est, Glom Filt Rate 68 (A) ml/min/1.73m2       Discharge condition: stable  Disposition: Discharge/Readmit  Time spent on discharge: >35 minutes    Electronically signed by DEBRA Crawford CNP on 8/27/2020 at 11:01 AM

## 2020-08-27 NOTE — DISCHARGE SUMMARY
Physical Medicine & Rehabilitation  Discharge Summary     Patient Identification:  Samina Billy  : 1960  Admit date: 2020  Discharge date:  2020   Attending provider: Ansley Wallis MD    Primary care provider: DEBRA Marroquin CNP     Discharge Diagnoses:   Primary impairment requiring rehabilitation: 2.22 Traumatic, Closed brain injury     Etiologic Diagnosis that led to the condition: Mild traumatic brain injury without loss of consciousness. Comorbid conditions affecting rehabilitation:  1. Frequent falls. 2. Gait instability with a Tinetti score of 8/28. 3. Left scalp hematoma. 4. 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 5. Moderate cognitive impairment. 6. Acute alcohol intoxication with admission blood alcohol level of 0.08.  7. General cerebral atrophy and small vessel ischemic changes with prominent ventricles consistent with central atrophy. 8. Cervical and thoracic spine pain. 9. T12 burst fracture without instability initially noted on a lumbar spine x-ray on 2020. 10. Hyponatremia. 11. Hypomagnesemia  12. *Acute kidney injury. 13. Alcohol abuse. 14. Vitamin B12 deficiency with chronic macrocytic anemia. .  15. Recent acute, comminuted, minimally displaced, T-shaped fracture of the head of the right proximal phalanx of the great toe on 2020. 16. Hypertension. 17. Hyperlipidemia. 18. Remote history of cocaine drug abuse. 19. Cervical spondylosis with C3-C4 moderate to severe right and severe left neuroforaminal stenosis, moderate to severe left and severe right neuroforaminal stenosis at C4-C5, moderate right and severe left neuroforaminal stenosis at C5-C6, and moderate right and mild left neuroforaminal stenosis at C6-C7.  20. Lumbar spondylosis with mild degenerative changes most pronounced at L3-L4 with moderate spinal canal stenosis and mild to moderate bilateral neuroforaminal stenosis.   21. Chronic macrocytic, hyperchromic anemia. 22. CKD 2.  23. History of depression. 24. Bipolar 1 disorder. 25. GERD. 26. Current everyday smoker with a 40-pack-year history. 27. Orthostatic hypotension. 28. Right medial hip adductor pain. 29. Chronic neuropathy of both feet. Discharge Functional Status:    Physical therapy:  PAIN: None indicated     OBJECTIVE:  Bed Mobility:  Rolling to Right: Modified Independent   Supine to Sit: Modified Independent  Sit to Supine: Modified Independent      Transfers:  Sit to Stand: Modified Independent  Stand to Sit:Modified Independent     Ambulation:  Modified Independent  Distance: 150' x 2, 20' x 1, 40' x 2  Surface: Level Tile  Device:Rolling Walker, No AD for one 40' bout  Gait Deviations:  Decreased Gait Speed. Pt. Easily distracted. Pt. With mild posterior lean with ambulation at beginning of session     Stairs:  Stairs:  6\" steps. X 4 using Bilateral Handrails and Stand By Assistance, with increased time for completion. Pt. Unsteady during descent displaying increased trunk sway with posterior lean at times.      Balance:  Dynamic Sitting Balance: Modified Independent  Dynamic Standing Balance: Modified Independent  While completing clothing management. Pt. Following back precautions well.      Exercise:  None     Functional Outcome Measures: Completed  Balance Score: 13  Gait Score: 11  Tinetti Total Score: 24     Risk Indicators:  Less than/equal to 18 = high risk  19-23 Moderate risk  Greater than/equal to 24 = low risk        Timed Up and Go: 20 seconds  Normative Reference Values  60-69 years:  8.1 seconds  70-79 years: 9.2 seconds  80-99 years: 11.3 seconds     < 10 seconds is normal, a score of > 14 seconds indicates high fall risk    ASSESSMENT:  Assessment: Patient progressing toward established goals. Activity Tolerance:  Patient tolerance of  treatment: good.       Equipment Recommendations:Equipment Needed: (RW ordered from [facility])  Discharge Recommendations:  Continue to assess pending progress, Home with Home health PT     Plan: Pt. To be discharged home with mother and home health PT.     Patient Education  Patient Education: Plan of Care, Precautions/Restrictions, Reviewed Prior Education, Health Promotion and Wellness Education, Home Safety Education     Goals:  Patient goals : get my dizziness better  Short term goals  Time Frame for Short term goals: 1 wk  Short term goal 1: bed mobility with Mod I, to get in/out of bed, use log roll technique  GOAL MET  Short term goal 2: transfer with SBA to get in/out of chairs  GOAL MET, SEE LTG  Short term goal 3: amb >= 50''x1 with RW and SBA to progress to home and community mobility MET, see LTG  Short term goal 4: Pt to ascend/descend 6\" step with RW, CGA, for home entry. MET, see LTG  Short term goal 5: Pt to score >= 20/28 on Tinetti; perform TUG <= 25 sec, indicating improved balance. GOAL MET, SEE LTG  Long term goals  Time Frame for Long term goals : 3 wks  Long term goal 1: transfer with Mod I, to get in/out of chairs  GOAL MET  Long term goal 2: amb >= 150''x1 with RW and Mod I, for home and community mobility  GOAL MET  Long term goal 3: Pt to ascend/descend 6\" step with RW, Mod I, for home entry. GOAL MET  Long term goal 4: Pt to score >= 24/28 on Tinetti; perform TUG ,<= 20 sec, indicating improved balance. GOAL MET  Long term goal 5: Pt to ascend/descend 4+ steps junior rails, Mod I, for community mobility  GOAL NOT MET  Mobility:  , PT Equipment Recommendations  Equipment Needed: (RW ordered from [facility]), Assessment: Pt demonstrates a decrease in baseline by way of bed mobility, transfers, gait and steps combined with decreased safety awareness. He is showing a decrease in balance, endurance and functional strength. He will benefit from skilled PT to advance in these areas for improved functional mobility and safety.     Occupational therapy  COGNITION: Slow Processing, Decreased Recall, Decreased Insight, Decreased Safety Awareness and Impaired Attention     ADDITIONAL ACTIVITIES:  Patient identified a personal goal to increase UB strength and improve overall endurance so they can complete their toilet & shower transfers; skilled edu on UE strengthening and patient completed BUE strengthening exercises x15  reps x1 set this date with a medium resistance band in all joints and all planes. Patient tolerated well , requiring no rest breaks. Pt required min cues to attend to and problem solve exs from handout with visual and verbal language to assist with comprehension of exs at home.      ASSESSMENT:  Activity Tolerance:  Patient tolerance of  treatment: good. Assessment: Tisha Noonan has made steady progress on IP Rehab during his 12 days. He has progressed to modified independent level with his ADL routine and is able to complete while adhering to spinal  precautions. Pt still requires supervision for mobility and home IADL tasks due to intermittent c/o dizziness. Pt does have cognitive concerns and is difficult to redirect at times. He will benefit from [facility] services at discharge to improve safety awareness within ADL and IADL at home to decrease risk of fall and future injury. Discharge Recommendations: Home with Home health OT, Home with nursing aide, 24 hour supervision or assist  Equipment Recommendations: Equipment Needed: No  Other: Pt has a shower chair. Does not need a BSC. Recommend a reacher. Discussed purchasing options. Plan: Discharge home with home health OT and support from his mother.    Current Treatment Recommendations: Patient/Caregiver Education & Training, Balance Training, Functional Mobility Training, Endurance Training, Safety Education & Training, Self-Care / ADL, Home Management Training, Equipment Evaluation, Education, & procurement     Patient Education  Patient Education: Role of OT, Plan of Care, ADL's and Home Exercise Program     Goals  Short term goals  Time Frame for Short term goals: 1 do\" lists, and written reminders regarding events. Reviewed current contributions to log, which were clear with appropriate organization (ie: use of headings, changes in pen color to indicate completion or importance) . Discussed ways to implement these strategies into home environment (ie: use of monthly calendar for documenting appointments/events, use of a 5 subject notebook to improve organization with a section for his memory log, section for \"to do\" items, and a section for his thoughts. Patient receptive to education.      SHORT TERM GOAL #2:  Goal 2:  Pt will complete sustained, selective, and divided attention tasks for 5-7 minutes with no more than 2 errors within a given task in order to improve participation in treatment and permit eventual return to driving. GOAL NOT MET  INTERVENTIONS: Provided skilled education on the different types of attention and specific breakdowns in attention that have been evident in speech sessions, as well as information he has shared about events occurring prior to hospitalization (ie: paying for gas, but losing attention to task and not actually getting the gas.) Discussed ways to improve mental focus with suggestion for patient to write down these 3 steps and recite them when he feels his focus on wavering, 1) stop what you are doing, 2) remind yourself of what you are supposed to be doing, 3) Resume what you are supposed to be doing. Sustained, alternating and divided attention remain a continued area of concern as the directly impact all other areas of cognition.      SHORT TERM GOAL #3:  Goal 3: Pt will complete functional reasoning and thought organizational tasks with 90% accuracy given min cues to improve overall executive functioning skills.  GOAL NOT MET  INTERVENTIONS: Reviewed reasoning, and judgement with patient writing on his list \"determining the consequences of actions\" and \"prioritizing completion of tasks\" to assist with organizing thoughts and guiding entering [address] [place] [address], [employer], and [employer], Pt's communication became tangential and required redirections to task. Overall, Pt appropriately utilized functional problem solving, reasoning, cognitive flexibility, attention to task, and sequencing to navigate environment with min cues. Pt expressed content and satisfaction when asked how he thought he performed on the hospital navigation task. Therapist provided feedback regarding skills he demonstrated well as well as areas for continued remediation. Long-Term Goals:  Timeframe for Long-term Goals: 3 weeks     LONG TERM GOAL #1:  Goal 1: Patient will improve cognitive functioning to a min assist level to increase level of independence with ADL and iADL tasks in the home environment and reduce caregiver burden. GOAL MET        Comprehension: 6 - Complex ideas 90% or device (hearing aid or glasses- if patient is primarily a visual learner)  Expression: 6 - Device used to express complex ideas/needs  Social Interaction: 5 - Patient is appropriate with supervision/cues  Problem Solvin - Patient able to solve simple/routine tasks  Memory: 5 - Patient requires prompting with stress/unfamiliar situations        EDUCATION:  Learner: Patient  Education:  Reviewed recommendations for follow-up, Education Related to Prevention of Recurrence of Impairment/Illness/Injury, Education Related to [employer] and Wellness, Home Safety Education and ST HEP  Evaluation of Education: [employer] understanding, Patient writing information discussed in his memory log.      ASSESSMENT/PLAN:  SUMMARY  Patient met 2/4 STG (goals adjusted during yesterday's progress note and have not been addressed thus far) and / LTG. Improvements evident in immediate and delayed memory recall with use of compensatory strategies, sustained and selective attention, functional reasoning, functional problem solving, sequencing tasks/activities, and overall thought organization skills.  Despite overall improvements, Pt continues to present with mild (borderline moderate) cognitive impairments with retention of information (without use of compensatory strategies) , attending to details, higher level divided attention, and higher level thought organization. Given concerns regarding attention skills, recommend Pt refrain from driving at this time. Per daughter report, patient's cognition appears close to baseline, however he has not had ST services in the past and would benefit from further [facility] ST to assist with carryover of compensatory strategies, organization, and reasoning skills to optimize his level of independence in the home environment. Activity Tolerance:  Patient tolerance of treatment: good. Appropriate participation with POC. Assessment/Plan: Patient discharged from Speech Therapy at this time due to completion of inpatient rehab stay. Discharge Disposition: home with family support. Continued Speech Therapy Services recommended: Yes     Comprehension: 6 - Complex ideas 90% or device (hearing aid or glasses- if patient is primarily a visual learner)  Expression: 6 - Device used to express complex ideas/needs  Social Interaction: 5 - Patient is appropriate with supervision/cues  Problem Solvin - Patient able to solve simple/routine tasks  Memory: 5 - Patient requires prompting with stress/unfamiliar situations    Wt Readings from Last 3 Encounters:   20 236 lb 2 oz (107.1 kg)   20 227 lb 9.6 oz (103.2 kg)   20 238 lb (108 kg)     Vitals:    20 2128 20 0600 20 0828 20 0943   BP: (!) 158/87  107/71    Pulse: 72  80    Resp: 16  18    Temp: 97.4 °F (36.3 °C)  98.1 °F (36.7 °C)    TempSrc: Oral  Oral    SpO2: 98%  98% 99%   Weight:  236 lb 2 oz (107.1 kg)     Height:          Inpatient Rehabilitation Course:   Pansy Baumgarten is a 60 y.o. male admitted to inpatient rehabilitation on 2020.   Original admission 2020 after being brought into the hospital by [facility] for complaints of neck and back pain after a fall at home. The patient is a chronic alcoholic with a history of drinking up to a pint of vodka daily and on presentation had a blood alcohol level of 0.08. He endorsed a history of increasing frequency of falls over the recent months with one on day of admission when he fell in his garage and another one 2 days ago. He has been using a walker due to his gait instability. He did not remember if he had a loss of consciousness with his fall on admission. His fall 2 days ago occurred when he was getting out of his car resulting in mid back pain. He also notes that with his falls he usually does not remember them and just finds himself on the ground. He was evaluated by Orthopedic Surgery for his spine pain complaints with findings of a T12 burst fracture as well as cervical and lumbar spondylosis. Of note he did have imaging at an outside hospital on 8/14/2020 with findings of an acute, comminuted, minimally displaced, T-shaped fracture of the head of the right proximal phalanx of the great toe. Also notable is that a T12 compression fracture was noted on an x-ray of the lumbar spine on 7/24/2020 with a 30% compression deformity of the superior endplate being present. Orthopedic Surgery recommended a TLSO brace for the patient. Additionally he also presented with multiple bilateral knee abrasions as well as a left sided scalp hematoma with absent acute intracranial findings on CT of the head without contrast.  Patient also has a remote history of cocaine abuse with cessation of use 8 years ago. Laboratory abnormalities included a macrocytic anemia, hypomagnesemia, and hyponatremia consistent with chronic alcohol abuse. Speech therapy did find moderate cognitive deficits with administration of the MOCA of 15/25 with 5 points adjusted for his visual impairments.     On initial consultation exam the patient is resting comfortably in hospital recliner and his daughter, who is a nurse manager at the [employer] is also present. The patient is pleasant with mild confusion noted; but no agitation. There is a tele-sitter in the room at this time. He is able to endorse his most recent medical history consistent with chart review. His current level of pain is stated to be 5/10 from his known T12 burst fracture. There is also a left-sided scalp hematoma with no complaints of headaches at this time. Strength is grossly 5/5 without any fine motor control deficits of the hands any noted ataxia at this time. It was discussed with the patient and daughter that he would be a good candidate to come to the acute inpatient rehabilitation unit; but that it would be his choice under his Medicare benefits whether he wanted to stay at the hospital for his acute inpatient rehabilitation stay or go to [facility] where his daughter works. The patient notes that his medical care here was good and he would like to stay here; his daughter states that she is okay with this decision. In the interim the patient states that his first day of therapies is going well. He does state he has had some difficulty sleeping and uses Tylenol PM at home and would like for this to be added to his orders. The patient was discharged with stable vital signs; pain was well controlled at time of discharge. The patient was tolerating a diet at discharge; last bowel movement was on 9/6. Medical course on the inpatient rehabilitation unit was notable for maintenance of a TBI protocol, hypotension, complaint of neuropathy, maintenance of weightbearing precautions for known T12 burst fracture, macrocytic anemia, gait instability, hyponatremia, hypomagnesemia, right adductor complex strain of the thigh, gait instability, and an acute kidney injury.   Due to the patient's known head injury he was maintained on a low stimulation program with transition from a life sitter to a tele-sitter; his restrictions were gradually reduced over the course of his stay on the acute inpatient rehabilitation unit. He did have issues with hypotension for which he was encouraged to improve his fluid intake and his antihypertensive medications were briefly reduced in dosing; but all his medications were at normal home dosing by time of discharge without any reoccurrence of this issue. He did multiple times endorse having complaint of neuropathy in his bilateral feet for which he was trialed on a 10% phenytoin ointment and a neutral cream base; he did not find benefit and in fact found that this made his symptoms worse. The patient was referred to my office for an EMG/NCS to further characterize his neuropathy and potentially have a small fiber biopsy done depending upon the findings of the EMG/NCS. He was maintained in his TLSO as ordered by the Orthopedic Surgery service and as recommended a 2-week follow-up x-ray was completed which showed stability of the T12 burst fracture. For his macrocytic anemia he was briefly put on oral iron and his vitamin B12 and folate levels were checked; all of these were found to be within normal limits. His hyponatremia improved without interventions as did his low presenting magnesium level. He did after his known fall present with a right adductor strain which resolved with ultrasound treatments by the Physical Therapy service. His gait instability did improve with the offered intensive daily therapy from being a high fall risk with a Tinetti score initially of 14/28 which improved to 24/28 placing him at low risk for falls. Lastly he also had a mild acute kidney injury which resolved at time of discharge with return to normal baseline renal function of CKD 2 after the patient was encouraged to improve his fluid intake. Details are provided below.   The patient progressed well with the offered therapies and was discharged to home with Home Health Physical Therapy, Occupational Therapy, Speech Therapy, Nursing and an aide. Issues addressed during rehabilitation stay and medication changes:   Acute/Rehabilitation Problems:  1. Frequent falls/Gait instability with a Tinetti score of 8/28. 1. PT/OT. 2. Tinetti 14/28. Score improved to 16/28 on 9/1. Improved to 19/28 on 9/5 and 24/28 on 9/9. Risk Indicators: Less than/equal to 18 = high risk; 19-23 Moderate risk; Greater than/equal to 24 = low risk. 3. TUG 63 seconds. Improved to 28 seconds on 9/5 and 20 seconds on 9/9.  60-69 years:  8.1 seconds; 70-79 years: 9.2 seconds; 80-99 years: 11.3 seconds. 2. Left scalp hematoma. 3. 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 1. TBI protocol. 2. Tele-sitter. 4. Moderate cognitive impairment. (MOCA) version 8.2 completed. Patient scored 14/30. *Previous score on the [address] [place] [address] was 15/25. 1. SLP. 5. Acute alcohol intoxication with admission blood alcohol level of 0.08.  6. General cerebral atrophy and small vessel ischemic changes with prominent ventricles consistent with central atrophy. 7. Cervical and thoracic spine pain/T12 burst fracture without instability initially noted on a lumbar spine x-ray on 7/24/2020.  1. TLSO brace when out of bed. 2. Will likely need brace for 2 to 3 months. 3. No lifting over 20 pounds. 4. 2 to 3-week follow-up after discharge with serial x-rays. X-ray was ordered on 9/7 with no concerning changes noted in regards to the T12 burst fracture. 8. Acute kidney injury. 1. Cr/BUN/GFR improved to 1.2/13/62 from1.3/12/1957 on 8/25. Stable at 1.2/15/62 on 9/4. Decreased to 1.4/17/52 on 9/7. Normal on 9/9 at 1.1/19/68. 2. Encourage fluids. 9. Alcohol abuse. 1. Family states they would like for him to go into a substance abuse program.  2. Multivitamin. 10. Vitamin B12 deficiency with chronic macrocytic anemia  1. .0 on 8/28. Improved to 109.1 on 8/31. MCV stable at 110.4.   2. Hemoglobin 9.7 on 9/4 which is grossly stable over prior 10.3 on 8/31. Stable at 72.2 on 9/7.  3. Folic acid. 4. Vitamin B12.  5. Vitamin B12/folate on 9/7 were 627/7.4. Normal.  6. Ferrous sulfate with vitamin C BID with meals started on 9/4.  7. Iron level was 71 on 9/7. Will discontinue ferrous sulfate at discharge. 11. Recent acute, comminuted, minimally displaced, T-shaped fracture of the head of the right proximal phalanx of the great toe on 8/14/2020. 12. Orthostatic hypotension. 1. Noted on 9/1. Patient encouraged to improve fluid intake with resolution later in the day. Will continue to monitor patient's fluid consumption. 2. Encourage fluids. 3. Atenolol has been decreased to 50 mg daily from 100 mg. Consider midodrine if no improvement noted. Atenolol increased back to home dose at time of discharge. 13. Right hip adductor pain. 1. Suspect possible adductor complex strain. Physical Therapy ultrasound treatments have been ordered. Patient notes that these treatments are helping as of 9/3. 14. Chronic neuropathy of both feet. 1. Patient agreeable to trial 10% phenytoin cream in a neutral base to be applied twice daily. Ordered on 9/3 from the outpatient pharmacy and added to his inpatient medications. 3 refills have been provided. 2. Patient noted that this medication actually made his feet burn even more and he requested recommendations for follow-up with a specialist to address his neuropathy. 15. Nutrition:  Consultation to dietician for nutritional counseling and recommendations. 1. TotP 6.0, alb 3.5, Vitamin 25OH level of 43 on 8/28/2020. 16. Electrolytes. 1. Normal on 9/7 with exception of:  2. Hyponatremia. Sodium normal at 139 on 8/28 which is improved from 132 on 8/24. Sodium remains normal at 139 on 9/4  3. Hypomagnesemia. Magnesium was 1.5 on 8/25 with improvement to 2.2 on 8/26. Resolved.   17. Bladder: No issues, has Flomax on home medication list.  Medication was started 9/1 at patient's Speech Therapy, Nursing and an aide 3x week  Is patient appropriate for an outpatient driving evaluation? no  Equipment at Discharge: RW, shower chair, Reacher    Hypertension management was undertaken with medication management on any patient with systolic blood pressure greater than 458 or diastolic blood pressure greater than 90. Hyperlipidemia was considered and the patient was started or continued on a statin medication for any LDL>100. Appropriate DVT prophylaxis options were considered throughout rehabilitation stay. DME:  None, patient has a rolling walker at home. Consults:   Trauma Surgery, Critical Care, Orthopedic Surgery, Family Medicine, Physical Medicine    Significant Diagnostics:     Lab Results   Component Value Date     09/09/2020    K 4.7 09/09/2020     09/09/2020    CO2 25 09/09/2020    BUN 19 09/09/2020    CREATININE 1.1 09/09/2020    GLUCOSE 98 09/09/2020    CALCIUM 9.8 09/09/2020    PROT 6.4 09/04/2020    LABALBU 3.7 09/04/2020    BILITOT <0.2 (L) 09/04/2020    ALKPHOS 93 09/04/2020    AST 30 09/04/2020    ALT 20 09/04/2020    LABGLOM 68 (A) 09/09/2020     Recent Labs     09/07/20  0603 09/04/20  1114 08/31/20  0842   WBC 5.7 4.6* 5.5   HGB 10.1* 9.7* 10.3*   HCT 31.7* 30.7* 31.2*   .6* 110.4* 109.1*    251 276     Labs Renal Latest Ref Rng & Units 9/9/2020 9/7/2020 9/4/2020 8/31/2020 8/28/2020   BUN 7 - 22 mg/dL 19 17 15 12 13   Cr 0.4 - 1.2 mg/dL 1.1 1.4(H) 1.2 1.2 1.2   K 3.5 - 5.2 meq/L 4.7 4.5 4.4 4.3 3.9   Na 135 - 145 meq/L 141 141 139 140 139      Lab Results   Component Value Date    VITD25 43 08/28/2020      Results for Ovidio Yusuf (MRN [id number]) as of 9/6/2020 12:18   Ref.  Range 8/25/2020 03:47   Folate Latest Ref Range: 4.8 - 24.2 ng/mL 8.9   Vitamin B-12 Latest Ref Range: 211 - 911 pg/mL < 150 (L)     Vl Dup Lower Extremity Venous Bilateral    Result Date: 9/4/2020  PROCEDURE: VL DUP LOWER EXTREMITY VENOUS BILATERAL CLINICAL INFORMATION: Pain COMPARISON: No prior study. TECHNIQUE: Multiple grayscale and color flow images of the major veins of both lower extremities were obtained from the level of the groin to the level of the ankle. Multiple compression and augmentation maneuvers were performed. FINDINGS: RIGHT LOWER EXTREMITY:All the deep veins of the right lower extremity are widely patent with normal flow and normal compressibility. LEFT LOWER EXTREMITY:All the  deep veins of the left lower extremity are widely patent with normal flow and normal compressibility. Normal venous ultrasound. No evidence for deep venous thrombosis. **This report has been created using voice recognition software. It may contain minor errors which are inherent in voice recognition technology. ** Final report electronically signed by Dr. Jose Mcfarlane on 9/4/2020 1:12 PM    Xr Lumbar Spine (2-3 Views)    Result Date: 9/7/2020  PROCEDURE: XR LUMBAR SPINE (2-3 VIEWS) CLINICAL INFORMATION: Interval image for prior T12 burst fracture., COMPARISON: Correlation is made with MRI dated 8/25/2020. TECHNIQUE: AP and lateral views of the lumbar spine. FINDINGS: A fracture of the T12 vertebral body is partially visualized. The lumbar vertebral body heights and alignment are maintained. Facet arthropathy seen lower lumbar spine. Sacroiliac joints are within normal limits. Fracture of the T12 vertebral body is partially visualized on the lateral film. **This report has been created using voice recognition software. It may contain minor errors which are inherent in voice recognition technology. ** Final report electronically signed by Dr Tiana Clifford on 9/7/2020 11:45 AM    Patient Instructions:    See AVS  Follow-up visits: See after visit summary from hospitalization    Discharge Medications:   Juana Gerber   Home Medication Instructions [id number]    Printed on:09/09/20 1009   Medication Information                      acetaminophen (APAP EXTRA STRENGTH) 500 MG tablet  Take 1 tablet by mouth every 6 hours as needed for Pain             aspirin 325 MG tablet  Take 325 mg by mouth daily             ATENOLOL PO  Take 100 mg by mouth              ATORVASTATIN CALCIUM PO  Take 80 mg by mouth              busPIRone HCl (BUSPAR PO)  Take by mouth daily             carBAMazepine (TEGRETOL) 200 MG tablet  Take 200 mg by mouth 2 times daily. fenofibrate (TRICOR) 54 MG tablet  Take 54 mg by mouth daily s [facility] pharmacy: Please dispense generic fenofibrate unless prescriber denote             FLUoxetine (PROZAC) 40 MG capsule  Take 1 capsule by mouth daily. folic acid (FOLVITE) 1 MG tablet  Take 1 tablet by mouth daily. Multiple Vitamins-Minerals (THERAPEUTIC MULTIVITAMIN-MINERALS) tablet  Take 1 tablet by mouth every evening              QUEtiapine (SEROQUEL) 400 MG tablet  Take 400 mg by mouth 2 times daily. tamsulosin (FLOMAX) 0.4 MG capsule  Take 0.4 mg by mouth daily             traZODone (DESYREL) 100 MG tablet  Take 1 tablet by mouth nightly as needed for Sleep.             vitamin B-1 100 MG tablet  Take 1 tablet by mouth daily. vitamin B-12 1000 MCG tablet  Take 1 tablet by mouth daily               Controlled substances monitoring: not applicable.      40 minutes spent preparing the patient for discharge    Tank Stephens MD

## 2020-08-27 NOTE — PROGRESS NOTES
[address] [address]  Acute Inpatient Rehab Preadmission Assessment    Patient Name: Aneudy Guido        MRN: [id number]    : 1960  (60 y.o.)  Gender: male     Admitted from:[address]  Initial Assessment    Date of admission to the hospital: 2020  5:27 PM  Date patient eligible for admission:2020    Primary Diagnosis: T12 compression Fracture      Did patient have surgery?  no    Physicians: Aspen Correa MD,  Dr. Ray Parker for clinical complications/co-morbidities:   Past Medical History:   Diagnosis Date    Alcohol abuse     [facility])     skin    Depression     Drug abuse ([facility] [address]         GERD (gastroesophageal reflux disease)     Hyperlipidemia     Hypertension        Financial Information  Primary insurance: Medicare    Secondary Insurance:    Has the patient had two or more falls in the past year or any fall with injury in the past year? yes    Did the patient have major surgery during the 100 days prior to admission? no    Precautions:   falls  Restrictions/Precautions: General Precautions, Fall Risk  Other position/activity restrictions: TLSO brace ordered for T12 fracture. Will need to wear this when up. May have it off when in bed. pt impulsive  Required Braces or Orthoses  Spinal: Thoracic Lumbar Sacral Orthotics    Isolation Precautions: None       Physiatrist: Dr. Franc Deal    Patients Occupation: Retired  Reviewed Lab and Diagnostic reports from Current Admission: Yes    Patients Prior Functional  Level: Prior Function  ADL Assistance: Independent  Ambulation Assistance: Independent  Transfer Assistance: Independent  Additional Comments: Pt stating he was able to get aorund indep and complete his own ADL tasks. Pt stating he has to do some laundry tasks but his mother cooks. Unsure of reliability d/t increased confusion and no family present.     Current functional status for upper extremity ADLs: minimal assistance    Current functional status for lower extremity ADLs: Maximum Assistance. donning shoes with no awareness to back precautions after educaiton completed    Current functional status for bed, chair, wheelchair transfers: minimal assistance    Current functional status for toilet transfers:  Minimal assistance    Current functional status for locomotion: Assistive Device: Rolling Walker  Assist Level:  Minimal Assistance. Distance: To and from bathroom and into hallway  Pt unsteady on feet with small LOB equiring ocaisonal assist to correct.  Pt requiring education on walker safety throughotu as well d/t attempting to  walker and putting it to fare in front of him during      Current functional status for bladder management: Modified independence    Current functional status for bowel management:Modified independence    Current functional status for comprehension: Modified independence    Current functional status for expression: Modified independence    Current functional status for social interaction: Modified independence    Current functional status for problem solving: Minimal contact assistance    Current functional status for memory: Minimal contact assistance    Expected level of Improvement in Self-Care:  Complete independence    Expected level of Improvement in Sphincter Control:  Complete independence    Expected level of Improvement in Transfers: Complete independence    Expected level of Improvement in Locomotion:  Complete independence    Expected level of Improvement in Communication and Social Cognition: Complete independence    Expected length of time to achieve that level of improvement: 2 weeks    Current rehab issues: ADL dysfunction,bladder management,bowel management,carry over of therapy techniques, discharge planning, disease and co-morbidity management, gait/mobility dysfunction, medication management, nutrition and hydration management,Ongoing assessment of safety, Pain management, Patient and family education, Prevention of secondary complications, Skin Integrit,cognitive impairment, communication impairment. Required therapy: Physical Therapy, Occupational Therapy and Speech Therapy 3 hours per day, 5-6 days per week. Recreational Therapy 1 hour per week. Expected Discharge Destination: Home    Expected Post Discharge Treatments: Out Patient    Other information relevant to the care needs:     Acute Inpatient Rehabilitation Disclosure Statement provided to patient. Patient verbalized understanding. I have reviewed and concur with the findings and results of the pre-admission screening assessment completed by the Inpatient Rehabilitation Admissions Coordinator.     Ethan Siemens, MD

## 2020-08-27 NOTE — CARE COORDINATION
Perfect serve message sent to DEBRA Ingram CNP. Patient is approved for acute rehab today.  Electronically signed by Luis Angel Simpson RN on 8/27/20 at 10:05 AM EDT

## 2020-08-27 NOTE — CONSULTS
Patient seen and examined for T12 burst fracture, history of alcohol abuse, gait instability with frequent falls. Note to follow. Patient is a good candidate for rehab admission and is agreeable to do so. The patient's daughter, who works at the [employer] was present during today's discussion and she was fine with the patient either going to her facility or staying at [facility]; the patient endorsed that he liked his care here and would be fine with coming to the inpatient rehabilitation unit here. Patient will not require a prior authorization under his Medicare benefits. Thank you for the consult.     Abigail Brown MD

## 2020-08-28 LAB
ALBUMIN SERPL-MCNC: 3.5 G/DL (ref 3.5–5.1)
ALP BLD-CCNC: 101 U/L (ref 38–126)
ALT SERPL-CCNC: 7 U/L (ref 11–66)
ANION GAP SERPL CALCULATED.3IONS-SCNC: 8 MEQ/L (ref 8–16)
AST SERPL-CCNC: 15 U/L (ref 5–40)
BILIRUB SERPL-MCNC: 0.2 MG/DL (ref 0.3–1.2)
BUN BLDV-MCNC: 13 MG/DL (ref 7–22)
CALCIUM SERPL-MCNC: 9 MG/DL (ref 8.5–10.5)
CHLORIDE BLD-SCNC: 108 MEQ/L (ref 98–111)
CO2: 23 MEQ/L (ref 23–33)
CREAT SERPL-MCNC: 1.2 MG/DL (ref 0.4–1.2)
ERYTHROCYTE [DISTWIDTH] IN BLOOD BY AUTOMATED COUNT: 14.2 % (ref 11.5–14.5)
ERYTHROCYTE [DISTWIDTH] IN BLOOD BY AUTOMATED COUNT: 57.9 FL (ref 35–45)
GFR SERPL CREATININE-BSD FRML MDRD: 62 ML/MIN/1.73M2
GLUCOSE BLD-MCNC: 102 MG/DL (ref 70–108)
HCT VFR BLD CALC: 30.9 % (ref 42–52)
HEMOGLOBIN: 9.8 GM/DL (ref 14–18)
MCH RBC QN AUTO: 35.5 PG (ref 26–33)
MCHC RBC AUTO-ENTMCNC: 31.7 GM/DL (ref 32.2–35.5)
MCV RBC AUTO: 112 FL (ref 80–94)
PLATELET # BLD: 188 THOU/MM3 (ref 130–400)
PMV BLD AUTO: 9.2 FL (ref 9.4–12.4)
POTASSIUM SERPL-SCNC: 3.9 MEQ/L (ref 3.5–5.2)
RBC # BLD: 2.76 MILL/MM3 (ref 4.7–6.1)
SODIUM BLD-SCNC: 139 MEQ/L (ref 135–145)
TOTAL PROTEIN: 6 G/DL (ref 6.1–8)
VITAMIN D 25-HYDROXY: 43 NG/ML (ref 30–100)
WBC # BLD: 6 THOU/MM3 (ref 4.8–10.8)

## 2020-08-28 PROCEDURE — 2580000003 HC RX 258: Performed by: SURGERY

## 2020-08-28 PROCEDURE — 6370000000 HC RX 637 (ALT 250 FOR IP): Performed by: PHYSICAL MEDICINE & REHABILITATION

## 2020-08-28 PROCEDURE — 92523 SPEECH SOUND LANG COMPREHEN: CPT | Performed by: SPEECH-LANGUAGE PATHOLOGIST

## 2020-08-28 PROCEDURE — 6370000000 HC RX 637 (ALT 250 FOR IP): Performed by: SURGERY

## 2020-08-28 PROCEDURE — 97535 SELF CARE MNGMENT TRAINING: CPT

## 2020-08-28 PROCEDURE — 36415 COLL VENOUS BLD VENIPUNCTURE: CPT

## 2020-08-28 PROCEDURE — 94760 N-INVAS EAR/PLS OXIMETRY 1: CPT

## 2020-08-28 PROCEDURE — 97162 PT EVAL MOD COMPLEX 30 MIN: CPT

## 2020-08-28 PROCEDURE — 80053 COMPREHEN METABOLIC PANEL: CPT

## 2020-08-28 PROCEDURE — 97116 GAIT TRAINING THERAPY: CPT

## 2020-08-28 PROCEDURE — 85027 COMPLETE CBC AUTOMATED: CPT

## 2020-08-28 PROCEDURE — 97110 THERAPEUTIC EXERCISES: CPT

## 2020-08-28 PROCEDURE — 97166 OT EVAL MOD COMPLEX 45 MIN: CPT

## 2020-08-28 PROCEDURE — 1180000000 HC REHAB R&B

## 2020-08-28 PROCEDURE — 97530 THERAPEUTIC ACTIVITIES: CPT

## 2020-08-28 PROCEDURE — 82306 VITAMIN D 25 HYDROXY: CPT

## 2020-08-28 RX ORDER — ACETAMINOPHEN 500 MG
500 TABLET ORAL NIGHTLY
Status: DISCONTINUED | OUTPATIENT
Start: 2020-08-28 | End: 2020-09-09 | Stop reason: HOSPADM

## 2020-08-28 RX ORDER — DIPHENHYDRAMINE HCL 25 MG
25 TABLET ORAL NIGHTLY
Status: DISCONTINUED | OUTPATIENT
Start: 2020-08-28 | End: 2020-09-09 | Stop reason: HOSPADM

## 2020-08-28 RX ADMIN — CARBAMAZEPINE 200 MG: 200 TABLET ORAL at 08:33

## 2020-08-28 RX ADMIN — FLUOXETINE HYDROCHLORIDE 60 MG: 20 CAPSULE ORAL at 21:15

## 2020-08-28 RX ADMIN — ACETAMINOPHEN 500 MG: 500 TABLET ORAL at 21:15

## 2020-08-28 RX ADMIN — Medication 10 ML: at 21:17

## 2020-08-28 RX ADMIN — DOCUSATE SODIUM 100 MG: 100 CAPSULE, LIQUID FILLED ORAL at 08:33

## 2020-08-28 RX ADMIN — DIPHENHYDRAMINE HCL 25 MG: 25 TABLET ORAL at 21:16

## 2020-08-28 RX ADMIN — PANTOPRAZOLE SODIUM 40 MG: 40 TABLET, DELAYED RELEASE ORAL at 06:26

## 2020-08-28 RX ADMIN — CARBAMAZEPINE 200 MG: 200 TABLET ORAL at 21:16

## 2020-08-28 RX ADMIN — AMLODIPINE BESYLATE 10 MG: 10 TABLET ORAL at 08:33

## 2020-08-28 RX ADMIN — THERA TABS 1 TABLET: TAB at 08:33

## 2020-08-28 RX ADMIN — TRAZODONE HYDROCHLORIDE 100 MG: 100 TABLET ORAL at 21:15

## 2020-08-28 RX ADMIN — Medication 100 MG: at 08:33

## 2020-08-28 RX ADMIN — ACETAMINOPHEN 650 MG: 325 TABLET ORAL at 08:01

## 2020-08-28 RX ADMIN — Medication 1000 MCG: at 08:33

## 2020-08-28 RX ADMIN — FOLIC ACID 1 MG: 1 TABLET ORAL at 08:33

## 2020-08-28 RX ADMIN — BISACODYL 5 MG: 5 TABLET, COATED ORAL at 21:15

## 2020-08-28 RX ADMIN — Medication 10 ML: at 08:34

## 2020-08-28 RX ADMIN — SENNOSIDES 8.6 MG: 8.6 TABLET, FILM COATED ORAL at 21:16

## 2020-08-28 RX ADMIN — QUETIAPINE FUMARATE 400 MG: 400 TABLET ORAL at 08:33

## 2020-08-28 RX ADMIN — QUETIAPINE FUMARATE 400 MG: 400 TABLET ORAL at 21:15

## 2020-08-28 ASSESSMENT — PAIN DESCRIPTION - PROGRESSION
CLINICAL_PROGRESSION: NOT CHANGED
CLINICAL_PROGRESSION: NOT CHANGED

## 2020-08-28 ASSESSMENT — PAIN DESCRIPTION - ONSET: ONSET: ON-GOING

## 2020-08-28 ASSESSMENT — PAIN DESCRIPTION - PAIN TYPE
TYPE: ACUTE PAIN
TYPE: ACUTE PAIN

## 2020-08-28 ASSESSMENT — PAIN DESCRIPTION - ORIENTATION: ORIENTATION: LOWER

## 2020-08-28 ASSESSMENT — PAIN SCALES - GENERAL
PAINLEVEL_OUTOF10: 6

## 2020-08-28 ASSESSMENT — PAIN - FUNCTIONAL ASSESSMENT: PAIN_FUNCTIONAL_ASSESSMENT: ACTIVITIES ARE NOT PREVENTED

## 2020-08-28 ASSESSMENT — PAIN DESCRIPTION - LOCATION
LOCATION: BACK
LOCATION: BACK

## 2020-08-28 ASSESSMENT — PAIN DESCRIPTION - DESCRIPTORS: DESCRIPTORS: ACHING

## 2020-08-28 ASSESSMENT — PAIN DESCRIPTION - FREQUENCY: FREQUENCY: INTERMITTENT

## 2020-08-28 NOTE — CARE COORDINATION
8/28/20, 7:04 AM EDT    DISCHARGE PLANNING EVALUATION    Late entry for 8/27/2020-patient admitted to in-patient rehab unit. 8/28/20, 7:05 AM EDT    Patient goals/plan/ treatment preferences discussed by  and . Patient goals/plan/ treatment preferences reviewed with patient/ family. Patient/ family verbalize understanding of discharge plan and are in agreement with goal/plan/treatment preferences. Understanding was demonstrated using the teach back method. AVS provided by RN at time of discharge, which includes all necessary medical information pertaining to the patients current course of illness, treatment, post-discharge goals of care, and treatment preferences. IMM Letter  IMM Letter given to Patient/Family/Significant other/Guardian/POA/by[de-identified] Copy delivered to patient by mgr. Issa  IMM Letter date given[de-identified] 08/27/20  IMM Letter time given[de-identified] 0208

## 2020-08-28 NOTE — PLAN OF CARE
Problem: DISCHARGE BARRIERS  Goal: Patient's continuum of care needs are met  Note:   [address] [address]  Physical Medicine Case Management Assessment    [x] Inpatient Rehabilitation Unit  [] Transitional Care Unit    Patient Name: Linh Hawk        MRN: [id number]    : 1960  (60 y.o.)  Gender: male   Date of Admission: 2020      Family/Social/Home Environment: Prior to hospitalization, patient indicates independence with ADL's and personal care. Patient lives with mother, Phyliss Cooks. Patient reports mother, Phyliss Cooks, is 80years old and \"gets around better than me\". Patient reports mother, Phyliss Cooks, does not use any medical equipment for ambulation. Mother, Phyliss Cooks, is able to drive, complete errands, manage housekeeping, laundry, and meal preparation. Patient's mother, Phyliss Cooks, is his payee, therefore, managing all of his finances. Patient is able to drive. Patient does not work and has been on disability for approxinately 11 years. Patient reports that  (mental health), Cornelio Farah, arranges his medications in a pill box on a weekly basis, but patient uses [address] for medications. Patient attends group therapy daily, Monday through Friday. Patient reports history of bipolar depression. Patient has been a client with [facility] for approximately 10 years. Patient reports being clean from crack cocaine for 10 years, but has been drinking for 45 years. Patient verbalizes no interest in quitting his drinking. Patient drinks four/five days a week, drinking one pint of Vodka each time. SW discussed options for medications to reduce urges for alcohol, but patient declines, \"I want to be able to have a drink when I want to\". SW discussed with patient current consequences from drinking that include current hospitalization. Patient indicates this consequence has been \"the worst yet\" from drinking.  Patient appears to have poor insight related to drinking and consequences from drinking. Patient even discussed son, Lucy Wyatt, passing away two years ago from an overdose and \"wishing he would have listened to me to stop\". Social/Functional History  Lives With: Other (comment)(Mother)  Type of Home: House  Home Layout: One level  Home Access: Stairs to enter without rails  Entrance Stairs - Number of Steps: 1  Bathroom Shower/Tub: Tub/Shower unit, Shower chair with back  Bathroom Toilet: Standard  Bathroom Equipment: Grab bars in shower(not in great shape per pt. plastic grab bars.)  Home Equipment: Standard walker  ADL Assistance: Independent  Ambulation Assistance: Independent  Transfer Assistance: Independent  Active : Yes  Occupation: On disability  Type of occupation: x 11 years  Additional Comments: Pt stated he was \"experimenting\" with using the walker prior to admission. Pt reports mother does most of the inside chores, Pt is responsible for outside chores and yardwork. Contact/Guardian Information: Mother, Jan Landau, [phone]. Daughter, Pete Burk, [phone]. Community Resources Utilized: [facility] case management and group therapy. Sexuality/Intimacy: No issues or concerns identified at time of SW assessment. Complementary Health Approaches: Patient with no current interest in complementary health approaches at time of SW assessment. Anticipated Needs/Discharge Plans: Anticipate patient would benefit from continued therapy upon discharge. SW met with patient to introduce self and explain role, complete SW assessment, and initiate discharge planning. Prior to hospitalization, patient indicates independence with ADL's and personal care. Patient lives with mother, Jesus James. Patient reports mother, Jesus James, is 80years old and \"gets around better than me\". Patient reports , Jesus James, does not use any medical equipment for ambulation.  Mother, Jesus James, is able to drive, complete errands, manage housekeeping, laundry, and meal preparation. Patient's mother, Vane Gr, is his payee, therefore, managing all of his finances. Patient is able to drive. Patient does not work and has been on disability for approxinately 11 years. Patient reports that  (mental health), Lavell Mendieta, arranges his medications in a pill box on a weekly basis, but patient uses [address] for medications. Patient attends group therapy daily, Monday through Friday. Patient reports history of bipolar depression. Patient has been a client with [facility] for approximately 10 years. Patient reports being clean from crack cocaine for 10 years, but has been drinking for 45 years. Patient verbalizes no interest in quitting his drinking. Patient drinks four/five days a week, drinking one pint of Vodka each time. SW discussed options for medications to reduce urges for alcohol, but patient declines, \"I want to be able to have a drink when I want to\". SW discussed with patient current consequences from drinking that include current hospitalization. Patient indicates this consequence has been \"the worst yet\" from drinking. Patient appears to have poor insight related to drinking and consequences from drinking. Patient even discussed son, Ivon Mohan, passing away two years ago from an overdose and \"wishing he would have listened to me to stop\". Anticipate patient would benefit from continued therapy upon discharge. SW reviewed with patient team conference on Tuesday, 09/01/2020, to further discuss progress and discharge recommendations. SW to follow and maintain involvement in discharge planning.            Discharge Planning  Living Arrangements: Parent  Support Systems: Children, Family Members, Parent  Potential Assistance Needed: N/A  Potential Assistance Purchasing Medications: No  Meds-to-Beds: Does the patient want to have any new prescriptions delivered to bedside prior to discharge?: No(Patient prefers to use [facility] for discharge

## 2020-08-28 NOTE — PROGRESS NOTES
[facility]  Individualized Disclosure Statement      Patient: Aneudy Guido      Scope of Service  [facility] provides 24 hour individualized service to patients with functional limitations due to, but not limited to: stroke, brain injury, spinal cord injury, major multiple trauma, fractures, amputation, and neurological disorders. The [address] [place] [address] provides rehabilitative nursing and medical services as well as physical, occupational, speech, and recreation therapies. [facility] is fully accredited by the Commission on Accreditation of Rehabilitation Facilities (CARF) as a comprehensive provider of rehabilitation services. Patients admitted to the [address] [place] [address] receive a minimum of three hours of therapy per day, at least six days per week, with a revised therapy schedule on weekends and holidays. Physical therapy, occupational therapy, and speech therapy are provided seven days per week including holidays. Other therapeutic services are available on weekends and evenings as needed or scheduled. Intensity of Treatment  Your treatment program will consist of Nursing Care and:  1.5 hours of Physical Therapy, per day  1.5 hours of Occupational Therapy, per day   30-60 minutes of Speech Therapy, per day  1 hour of Recreational Therapy, per week    [facility] maintains contracts with most insurance plans. Depending on the type of coverage, the insurance may impose limits on the coverage for rehabilitation care. Coverage is based on the premise that you are able to fully participate in the rehabilitation program and show continued progress. Please verify your own insurance information A copy of this was given to the patient/ family on this date.   Insurance Coverage  Your insurance company has made the following determination relative to the length of your stay:   Your estimated length of stay is  14 days   Your insurance Coverage has been verified as follows:    Primary Insurance: medicare   Deductible:[phone]  Coverage: active   Secondary Insurance: commercial ;WILBER Cuhn often cover co-pay amounts, but to ensure payment please contact your insurance company.     Alternative Resources: Please ask the  for more information [phone]

## 2020-08-28 NOTE — CONSULTS
Comprehensive Nutrition Assessment    Type and Reason for Visit:  Initial, Positive Nutrition Screen, Consult(Weight Loss/Decreased Appetite/Intake)    Nutrition Recommendations/Plan:   *Started Ensure Enlive TID. *Continue Multivitamin, Folic Acid and Thiamine daily d/t hx of ETOH abuse. *Continue current diet. *Continue bowel medications d/t constipation. Nutrition Assessment: Pt. nutritionally compromised AEB pt report of consuming only one meals daily over the past few years which is his baseline intake and pt reports unplanned weight loss of 10 lbs over the past year; however no weight history per EMR to confirm reported weight loss. At risk for further nutrition compromise r/t admit d/t fall w/closed fx of vertebral body, underlying medical condition (hx of ETOH abuse, drug abuse GERD, HTN and HLD) and need for nutrition support. Nutrition recommendations/interventions as per above. Malnutrition Assessment:  Malnutrition Status: At risk for malnutrition (Comment)    Context:  Chronic Illness     Findings of the 6 clinical characteristics of malnutrition:  Energy Intake:  7 - 75% or less estimated energy requirements for 1 month or longer  Weight Loss:  Unable to assess(no weight hx per EMR)     Body Fat Loss:  No significant body fat loss     Muscle Mass Loss:  No significant muscle mass loss    Fluid Accumulation:  No significant fluid accumulation Extremities   Strength:  Not Performed    Estimated Daily Nutrient Needs:  Energy (kcal):  0947-4049 kcals (20-25); Weight Used for Energy Requirements:  (103 kg on 8/27)     Protein (g):  119-138 g/day (1.3-1.5 g/kg);  Weight Used for Protein Requirements:  (IBW 91.8 kg)       Nutrition Related Findings:  admit d/t fall with closed fracture of thoracic verterbra; pt reports decreased appetite and intake of meals since admission; pt reports consuming only one meals daily around supper time over the past few years; pt amenable to Ensure Enlive TID; hx ETOH abuse; pt denies N/V or difficulty chewing/swallowing food; no BM yet; Rx includes: Folic acid, Thiamine, Multivitamin, Vitamin B-12, Colace and Senna      Wounds:  Multiple(left and right knee scab; left head abrasion/traumatic)       Current Nutrition Therapies:    DIET GENERAL; Anthropometric Measures:  · Height: 6' 4\" (193 cm)  · Current Body Weight: 226 lb 12.8 oz (102.9 kg)(8/28; no edema noted)   · Admission Body Weight: 227 lb 9.6 oz (103.2 kg)(8/27; no edema noted)    · Usual Body Weight: 245 lb (111.1 kg)(per pt report. No actual weights per EMR to assess weight trends)     · Ideal Body Weight: 202 lbs  · BMI: 27.6  · BMI Categories: Overweight (BMI 25.0-29. 9)       Nutrition Diagnosis:   · Inadequate oral intake related to inadequate protein-energy intake as evidenced by poor intake prior to admission(and decreased intake since admission)    Nutrition Interventions:   Food and/or Nutrient Delivery:  Continue Current Diet, Start Oral Nutrition Supplement, Vitamin Supplement  Nutrition Education/Counseling:  Education initiated(Encouraged po intake of meals and ONS use in-between meals at best effort)   Coordination of Nutrition Care:  Continued Inpatient Monitoring    Goals:  Pt will consume 75% or more of meals during LOS       Nutrition Monitoring and Evaluation:   Food/Nutrient Intake Outcomes:  Food and Nutrient Intake, Supplement Intake, Vitamin/Mineral Intake  Physical Signs/Symptoms Outcomes:  Biochemical Data, Nutrition Focused Physical Findings, Skin, Weight     Discharge Planning:     Too soon to determine     Electronically signed by Marcela Baldwin RD, LD on 8/28/20 at 10:17 AM EDT    Contact: [phone]

## 2020-08-28 NOTE — PROGRESS NOTES
[facility]  EVALUATION    Time:    Time In: 1110  Time Out: 1240  Timed Code Treatment Minutes: 75 Minutes  Minutes: 90          Date: 2020  Patient Name: Sasha Yin,   Gender: male      MRN: [id number]  : 1960  (60 y.o.)  Referring Practitioner: Dr. Sierra Cruz  Diagnosis: Burst fracture T 12 with routine healing  Additional Pertinent Hx: Per ED notes: Mr. Jesse De La Garza is a 59 Y/O male presenting at [address] [place] [address] by trauma consult, brought by EMS following a fall today  with LOC; PMH includes drug and alcohol abuse, cancer, HTN, HLD, and GERD. Per patient report he has been falling with increasing frequency over past 2 months, believes a fall while exiting a parked car two days ago is what hurt his back, but he also fell today. Patient states he typically does not remember falls, just finds himself on the ground. Daughter present in exam room states, falls often occur after standing up, he will begin to shake and then fall to floor, appear to lose consciousness briefly. Patient reports history of alcohol abuse, drank a pint of liquor today between [id number], states this is not unusual for him. Restrictions/Precautions:  Restrictions/Precautions: General Precautions, Fall Risk  Required Braces or Orthoses  Spinal: Thoracic Lumbar Sacral Orthotics(May don in sitting)  Position Activity Restriction  Spinal Precautions: No Bending, No Lifting, No Twisting  Other position/activity restrictions: TLSO brace ordered for T12 fracture. Will need to wear this when up. May have it off when in bed. Subjective  Chart Reviewed: Yes, Orders, Progress Notes, History and Physical  Patient assessed for rehabilitation services?: Yes  Family / Caregiver Present: No    Subjective: cooperative, moderate cues for problem solving and safety awarereness, hand placement, initiation, attention to task.  Reports lightheadedness after retreiveing cloting from a lower drawer and with ambulation. Upon sitting  /71, HR 82, 100% pulse ox,    Pain:  Pain Assessment  Patient Currently in Pain: Yes  Pain Level: 6  Pain Type: Acute pain  Pain Location: Back    Social/Functional History:  Lives With: Other (comment)(Mother)  Type of Home: House  Home Layout: One level  Home Access: Stairs to enter without rails  Entrance Stairs - Number of Steps: 1  Home Equipment: Standard walker   Bathroom Shower/Tub: Tub/Shower unit, Shower chair with back  Bathroom Toilet: Standard  Bathroom Equipment: Grab bars in shower(not in great shape per pt. plastic grab bars.)       ADL Assistance: Independent  Ambulation Assistance: Independent  Transfer Assistance: Independent    Active : Yes  Occupation: On disability  Type of occupation: x 11 years  Additional Comments: Pt stated he was \"experimenting\" with using the walker prior to admission. Pt reports mother does most of the inside chores, Pt is responsible for outside chores and yardwork. VISION:WFL    HEARING:  WFL    COGNITION: Slow Processing, Decreased Recall, Decreased Insight, Impaired Memory, Decreased Problem Solving, Decreased Safety Awareness, Impaired Attention, Decreased Arousal and Difficulty Following Commands    RANGE OF MOTION:  Bilateral Upper Extremity:  WFL    STRENGTH:  Bilateral Upper Extremity:  Not Tested    ADL:    EATING:Supervision or touching assistance. ,  CARE Score: 4. ORAL HYGIENE:Supervision or touching assistance. Brocton Hires CARE Score: 4. TOILETING HYGIENE:Partial/moderate assistance. Mehrdad Hires CARE Score: 3. SHOWERING/BATHING:Partial/moderate assistance. assist for B feet and bottom. sponge bath completed. Mehrdad Hires CARE Score: 3.     UPPER BODY DRESSING:Partial/moderate assistance. mod A for TLSO , seated. CARE Score: 3. LOWER BODY DRESSING:Partial/moderate assistance. without AE. assist for shorts over feet. Brocton Hires CARE Score: 3.      FOOTWEAR:Substantial/maximal assistance   . CARE Score: 2. TOILET TRANSFER: Partial/moderate assistance  min A sit to stand from a STS with grab bar on the right. CARE Score: 3                   BALANCE:  Sitting Balance:  Supervision. Standing Balance: Contact Guard Assistance. BED MOBILITY:  Not Tested    TRANSFERS:  Sit to Stand:  Contact Guard Assistance. mod cues for safe hand placemetn. Stand to Sit: Contact Guard Assistance. FUNCTIONAL MOBILITY:  Assistive Device: Rolling Walker  Assist Level:  Contact Guard Assistance. Distance: To and from bathroom and on the unit towards the therapy apt. PT instructed on safe tech for standing for laundry tasks. PT able to tolerate standign x 3 min with CGA to close SBA for setting up washing machine for placement of clothing. Exercise:  none    Activity Tolerance:  Patient tolerance of  treatment: good. Assessment:  Assessment: Pt demo increased confusion this date with decreased safety awareness during ADL tasks. Pt is requiring wearing of TLSO at is time d/t T12 burst fracture. He currently requires mod A for LE dressing and bathing, CGA For safe transfers and CGA For toileing tasks due to unsteadiness and fair safety awareness. He requires up to mod A for problem solving, initiation and safe tech for completing a spong e bath. Pt with decreased balance increasing his fall risk during ADLs and IADLs . Pt would benefit from skilled OT services  to educate on safety awareness, improve his balance for decreased fall risk and imrpove his ability to be more indep with ADL tasks while maintaining back precautions. Prognosis: Good  Decision Making: Medium Complexity  Safety Devices in place: Yes  Type of devices: All fall risk precautions in place, Patient at risk for falls, Call light within reach, Left in chair, Chair alarm in place, Gait belt(telesitter in place)    Treatment Initiated: Treatment and education initiated within context of evaluation. Evaluation time included review of current medical information, gathering information related to past medical, social and functional history, completion of standardized testing, formal and informal observation of tasks, assessment of data and development of plan of care and goals. Treatment time included skilled education and facilitation of tasks to increase safety and independence with ADL's for improved functional independence and quality of life. Discharge Recommendations:  Continue to assess pending progress    Patient Education:  OT Education: OT Role, Home Exercise Program    Equipment Recommendations: Other: continue to assess- will need a RW, may need a grab bar in the shower . Assess the need for a BSC, pt has a shower chair at home. Plan:  Times per week: 5xs week x 90 min, 1 x week x 30 min  Current Treatment Recommendations: Patient/Caregiver Education & Training, Balance Training, Functional Mobility Training, Endurance Training, Safety Education & Training, Self-Care / ADL, Home Management Training, Equipment Evaluation, Education, & procurement. See long-term goal time frame for expected duration of plan of care. If no long-term goals established, a short length of stay is anticipated. Goals:  Patient goals : \"dizziness prevention, fall prevention, full time sobriety\"  Short term goals  Time Frame for Short term goals: 1 week  Short term goal 1: Pt will complete BADL routine with setup and no > min cues for problem solving, attetntion to task , and back safety to increase independence in self care tasks  Short term goal 2: Pt to demo toileting tasks and transfers with CGA and no cues for safety  Short term goal 3: Pt to demo good dyamamic standing balance > 5 min with S and 0-1 UE support while reaching outside base of support  in prep for retrieveing clothing items.   Short term goal 4: PT will complete 2 step homemaking tasks withCGA  no > min cues for back precautions/ proper body mechanics to increase ability to retrieve a snack at home. Long term goals  Time Frame for Long term goals : 2-3 weeks  Long term goal 1: PT will complete ADL routine including donning a TLSO with S andno > min cues for attention to tasks or cues for back  to increase independence in self care  Long term goal 2: PT will compelte 2 step homemaking tasks with S and 0-1 cues for proper body mechanics to increase ability to complete laundry tasks. Following session, patient left in safe position with all fall risk precautions in place.

## 2020-08-28 NOTE — PLAN OF CARE
Care plan reviewed with patient and verbalize understanding of the plan of care and contribute to goal setting. Problem: Pain:  Goal: Pain level will decrease  Description: Pain level will decrease  Outcome: Ongoing     Problem: Anxiety:  Goal: Level of anxiety will decrease  Description: Level of anxiety will decrease  Outcome: Ongoing     Problem: Mood - Altered:  Goal: Mood stable  Description: Mood stable  Outcome: Ongoing  Note: Very nice, calm and cooperative with staff. Will continue to monitor. Problem: Skin Integrity:  Goal: Absence of new skin breakdown  Description: Absence of new skin breakdown  Outcome: Ongoing     Problem: Falls - Risk of:  Goal: Will remain free from falls  Description: Will remain free from falls  Outcome: Ongoing  Note: Remains free from falls. Will continue to monitor.       Problem: Falls - Risk of:  Goal: Absence of physical injury  Description: Absence of physical injury  Outcome: Ongoing     Problem: IP MOBILITY  Goal: LTG - patient will demonstrate safe mobility requirements  Outcome: Ongoing     Problem: Nutrition  Goal: Optimal nutrition therapy  8/28/2020 1811 by Jeremiah Tillman LPN  Outcome: Ongoing     Problem: DISCHARGE BARRIERS  Goal: Patient's continuum of care needs are met  8/28/2020 1811 by Jeremiah Tillman LPN  Outcome: Ongoing     Problem: IP COMMUNICATION/DYSARTHRIA  Goal: LTG - Patient will effectively communicate in all situations with the use of compensatory strategies  Outcome: Ongoing

## 2020-08-28 NOTE — PROGRESS NOTES
[address] [address]  Recreational Therapy  Evaluation  Inpatient Rehabilitation Unit      Time Spent with Patient: 30 minutes    Date:  2020       Patient Name: Steven Sánchez      MRN: [id number]       YOB: 1960 (56 y.o.)       Gender: male          RESTRICTIONS/PRECAUTIONS:             PAIN: 6    SUBJECTIVE:  Pt lives with mother-siblings close by and he has a daughter that is very supportive-had a son that  from drug overdose     VISION:  Glasses -in process of getting new prescription     HEARING: Hard of Hearing    LEISURE INTERESTS:   Pt states he use to be a drug user and is an alcoholic-he goes to [place] daily from 1:00-2:00 for socialization group-he does the dishes and the yard work at [facility] daughter is going to bring in his tablet and headphones so he can listen to music which he loves-he would be interested in going outside stating\" I would want to smoke but I didn't bring  any with me\"- is not on the patch but states he does not miss his cigarettes at this time-pleasant-slow to process-states everyone here is very nice and helpful-    BARRIERS TO LEISURE INTERESTS:    Decreased endurance and Pain           Patient Education  New Education Provided: Importance of Leisure, RT Plan of Care    Plan:  Continue to follow patient through this admission  See patient individually    Electronically signed by: NOAH Murdock Mai  Date: 2020

## 2020-08-28 NOTE — PROGRESS NOTES
[facility]  Speech - Language - Cognitive Evaluation    SLP Individual Minutes  Time In: 1000  Time Out: 1030  Minutes: 30  Timed Code Treatment Minutes: 0 Minutes       Date: 2020  Patient Name: Jacey Alfonso      CSN: [id number]   : 1960  (60 y.o.)  Gender: male   Referring Physician: Dr. Hernesto Cates   Diagnosis: Burst fracture of T12 vertebra with routine healing   Secondary Diagnosis: Cognitive impairments  Precautions: Fall risk, TLSO on when up   History of Present Illness/Injury: Patient admitted with the above diagnosis with history or multiple falls with LOC, and alcohol/drug abuse. Please refer to physician H&P for further details. ST consulted to assess cognition. Past Medical History:   Diagnosis Date    Alcohol abuse     CKD (chronic kidney disease) stage 2, GFR 60-89 ml/min     Current every day smoker     Depression     Drug abuse ([facility] [address]         GERD (gastroesophageal reflux disease)     Hyperlipidemia     Hypertension     Skin cancer        Pain: No pain reported. Subjective:  Patient seen sitting upright in recliner, pleasant and cooperative. Telesitter present in room due to concerns for impulsive behavior. SOCIAL HISTORY:   Living Arrangements: Lives with his mother  Work History: Currently on disability. Previously worked as a computer systems analyist   Education Level: Bachelor degree  Driving Status: Active   Finance Management: Dependent/Unable -Mother manages finances   Medication Management: Assistance Required - Reports that he uses a pill box. Which he takes to [facility] and they supervise as he fills it. ADL's: Independent.    Hobbies: Playing music and being on the Internet   Vision Status: Wears corrective lenses  Hearing: Reports mild loss and difficulty with auditory comprehension   Type of Home: House  Home Layout: One level  Home Access: Stairs to enter without rails  Entrance Stairs - Number of Steps: 1  Home Equipment: Standard walker      SPEECH / VOICE:  Speech and Voice appear to be grossly intact for basic and complex daily communication    LANGUAGE:  Receptive:  2 Step Commands: 3/3 with increased processing time  Complex Yes/No Questions: 4/4 indep    Expressive:  Confrontational Naming: 3/3  Divergent Naming (abstract): 5 in one minute (target =11)  Conversational Speech: Appropriate ability to express wants and needs, but with reduced thought organization at times and delayed processing speed  Paraphasias: None noted  Repetition: 1/2 at the sentence level. COGNITION:  Jerel Cognitive Assessment (MOCA) version 8.2 completed. Pt scored 14/30. Normal is greater than or equal to 26/30. *Previous score on the [address] [place] [address] was 15/. Orientation: 4/6  Immediate Recall: 3/5  Short-Term Recall: 0/5  Problem Solving: Appropriate for verbalizing and demonstrating call light use, but needed assist for determining appropriate times to use call light; Requiring use of telesitter in room  Reasonin/2  Sequencin/5  Thought Organization: Mild-moderate difficulty   Insight: Guarded  Attention: Moderate impairments with sustained and selective attention  Math Computation: 3/5 for serial substraction  Executive Functionin/5    SWALLOWING:  Current Diet: Regular with thin liquids  WNL    Comprehension: 4 - Patient understands basic needs 75-90%+ of the time  Expression: 5 - Expresses basic ideas/needs only (hungry/hot/pain)  Social Interaction: 4 - Patient appropriate 75-90%+ of the time  Problem Solvin - Patient solves simple/routine tasks 25%-49%  Memory: 2 - Patient remembers 25%-49% of the time    RECOMMENDATIONS/ASSESSMENT:  DIAGNOSTIC IMPRESSIONS:  Patient presents with a moderate cognitive impairment as outlined above.  Deficits evident with orientation, immediate/delayed recall, working memory, problem solving, reasoning, thought organization, executive functioning and sustained/selective attention. Patient with limited insight into cognitive impairments and functional implications related to return to his home environment. Expressive and receptive language skills are relatively intact. No dysarthria, dysphonia, or dysphagia noted. Patient will likely continue to require support/assistance for management of fiances and medications upon discharge, with additional recommendations for refraining from driving. Skilled ST services are highly recommended to improve the aforementioned cognitive deficits and permit potential return to PLOF. Rehabilitation Potential: good    EDUCATION:  Learner: Patient  Education:  Reviewed results and recommendations of this evaluation and Reviewed ST goals and Plan of Care  Evaluation of Education: Verbalizes understanding, Needs further instruction and Family not present    PLAN:  Skilled SLP intervention on IP Rehab or TCU 60 minutes per day ,BID, 5 days per week. Specific interventions for next session may include: Cognitive treatment    PATIENT GOAL:    Return to prior level of function. SHORT TERM GOALS:  Short-term Goals  Timeframe for Short-term Goals: 1 week  Goal 1: Pt will complete immediate/delayed recall and working memory tasks with 70% accuracy given mod cues to improve retention of new and functional information. Goal 2: Pt will complete sustained, selective, and divided attention tasks with no more than 3 errors within a given task in order to improve participation in treatment and permit eventual return to driving. Goal 3: Pt will complete functional reasoning and thought organizational tasks with 70% accuracy given mod cues to improve overall executive functioning skills. Goal 4: Pt will complete functional problem solving and sequencing tasks with 70% accuracy given mod cues to improve ability to make successful return to IADL's (medication management, finances, etc.).     LONG TERM GOALS:  Long-term Goals  Timeframe for Long-term Goals: 3 weeks  Goal 1: Patient will improve cognitive functioning to a min assist level to increase level of independence with ADL and iADL tasks in the home environment and reduce caregiver burden. Gurdeep Mao.  [address], [address]

## 2020-08-28 NOTE — CONSULTS
Department of Family Practice  Consult Note        Reason for Consult:  Medical Management while in Inpatient Rehab  Requesting Physician:  Dr Keshav Cyr:  Fall with burst fracture of T12    History Obtained From:  Patient, EMR    HISTORY OF PRESENT ILLNESS:              The patient is a 60 y.o. male with significant past medical history of       Diagnosis Date    Alcohol abuse     CKD (chronic kidney disease) stage 2, GFR 60-89 ml/min     Depression     Drug abuse ([facility] [address]     2008    GERD (gastroesophageal reflux disease)     Hyperlipidemia     Hypertension     Skin cancer     who presents with back pain associated with one of many chronic falling episodes. Evaluation in the ED found a burst fracture of T12. Orthopedic consultation did not feel that surgical stabilization was indicated. He comes to the Inpatient Rehab Unit in hopes of gaining strength and independence through intensive therapies prior to his return home.     Past Medical History:        Diagnosis Date    Alcohol abuse     CKD (chronic kidney disease) stage 2, GFR 60-89 ml/min     Depression     Drug abuse ([facility] [address]     2008    GERD (gastroesophageal reflux disease)     Hyperlipidemia     Hypertension     Skin cancer      Past Surgical History:        Procedure Laterality Date    FRACTURE SURGERY      OTHER SURGICAL HISTORY  12/28/2012    ORIF LEFT TIBIA    TONSILLECTOMY       Current Medications:   Current Facility-Administered Medications: acetaminophen (TYLENOL) tablet 650 mg, 650 mg, Oral, Q4H PRN  polyethylene glycol (GLYCOLAX) packet 17 g, 17 g, Oral, Daily PRN  promethazine (PHENERGAN) tablet 12.5 mg, 12.5 mg, Oral, Q6H PRN **OR** ondansetron (ZOFRAN) injection 4 mg, 4 mg, Intravenous, Q6H PRN  sodium chloride flush 0.9 % injection 10 mL, 10 mL, Intravenous, 2 times per day  sodium chloride flush 0.9 % injection 10 mL, 10 mL, Intravenous, PRN  [START ON 8/28/2020] vitamin B-1 (THIAMINE) tablet 100 mg, 100 mg, Oral, syncope  BEHAVIOR/PSYCH:  negative for increased agitation  PHYSICAL EXAM:      Vitals:    /83   Pulse 83   Temp 98.1 °F (36.7 °C) (Oral)   Resp 17   Ht 6' 4\" (1.93 m)   Wt 227 lb 9.6 oz (103.2 kg)   SpO2 94%   BMI 27.70 kg/m²     CONSTITUTIONAL:  awake, alert, cooperative, no apparent distress, and appears stated age  EYES:  sclera clear and conjunctiva normal  ENT:  Some ecchymosis to the scalp, normocepalic, without obvious abnormality  NECK:  supple, symmetrical, trachea midline  HEMATOLOGIC/LYMPHATICS:  no cervical lymphadenopathy and no supraclavicular lymphadenopathy  LUNGS:  no increased work of breathing and clear to auscultation  CARDIOVASCULAR:  normal S1 and S2, no murmur noted and no edema  ABDOMEN:  normal bowel sounds, soft and non-tender  CHEST/BREASTS:  symetrical expansion  MUSCULOSKELETAL:  tone is normal  NEUROLOGIC:  Weak, no present signs of withdraw  SKIN:  Scattered healing lacerations      IMPRESSION/RECOMMENDATIONS:      Active Hospital Problems    Diagnosis Date Noted    Burst fracture of T12 vertebra with routine healing [S22.081D] 08/27/2020    Vitamin B12 deficiency [E53.8] 08/27/2020    Hypomagnesemia [E83.42]     Fall [W19. XXXA]     Closed head injury [S09.90XA]     Chronic alcohol abuse [F10.10] 12/22/2014    Benign essential hypertension [I10] 12/22/2014

## 2020-08-28 NOTE — PROGRESS NOTES
[facility]  Recreational Therapy  Daily Note  [address]    Time Spent with Patient: 30 minutes    Date:  8/28/2020       Patient Name: Sasha Yin      MRN: [id number]      YOB: 1960 (60 y.o.)       Gender: male  Diagnosis: Burst fracture T 12 with routine healing  Referring Practitioner: Dr. Esquivel School    RESTRICTIONS/PRECAUTIONS:  Restrictions/Precautions: General Precautions, Fall Risk  Vision: Impaired  Hearing: Within functional limits    PAIN: 0-no c/o pain     SUBJECTIVE:  I play bingo in [address]:  P.T. brought pt to the [facility]'s dining room to play bingo after therapy-pt played 2 cards and processed game well-affect bright and social with peers-picked out a prize for his daughter -appreciative -wanted to stay in his w/c by the nursing station instead of going back to his room-looking for daughter to come in some time this afternoon       Patient Education  New Education Provided: Importance of Leisure,     Electronically signed by: NOAH Kiser  Date: 8/28/2020

## 2020-08-28 NOTE — PROGRESS NOTES
[address]  [facility] - 7E-70/070-A    Time In: 730  Time Out: 830  Timed Code Treatment Minutes: 40 Minutes  Minutes: 60          Date: 2020  Patient Name: Jacey Alfonso,  Gender:  male        MRN: [id number]  : 1960  (60 y.o.)      Referring Practitioner: Dr. Hernesto Cates     Additional Pertinent Hx: T12 compression fracture s/p fall, chronic alcohol abuse, drug abuse, recurrent falls, neck pain, scalp hematoma     Restrictions/Precautions:  Restrictions/Precautions: General Precautions, Fall Risk  Required Braces or Orthoses  Spinal: Thoracic Lumbar Sacral Orthotics(May don in sitting)  Position Activity Restriction  Spinal Precautions: No Bending, No Lifting, No Twisting    Subjective:  Chart Reviewed: Yes  Patient assessed for rehabilitation services?: Yes  Subjective: Pt sitting edge of bed, just completed using urinal with STNA. Pleasant and cooperative. General:  Overall Orientation Status: Within Functional Limits(somewhat slow response, but accurate, except stated he was near [facility]. [facility])  Follows Commands: Within Functional Limits    Vision: Impaired  Vision Exceptions: Wears glasses at all times    Hearing: Within functional limits         Pain: 5/10: back. Nsg notified for pain meds. Social/Functional History:    Lives With: Other (comment)(Mother)  Type of Home: House  Home Layout: One level  Home Access: Stairs to enter without rails  Entrance Stairs - Number of Steps: 1  Home Equipment: Standard walker            Additional Comments: Pt stated he was \"experimenting\" with using the walker prior to admission. OBJECTIVE:  Range of Motion:  Right Lower Extremity: WFL  Left Lower Extremity: WFL    Strength:  Right Lower Extremity: WFL at knee/ankle. Hip MMT deferred due to back injury  Left Lower Extremity:  WFL at knee/ankle.   Hip MMT deferred due to back injury      Bed Mobility:  Rolling to Left: Supervision   Rolling to Right: Supervision   Supine to Sit: Supervision  Sit to Supine: Supervision     Transfers:  Sit to Stand: [employer] with RW support. Without RW, min assist to gain upright balance. Stand to Sit:Minimal Assistance with cues to fully align RW and for hand placement. Stand Pivot:Minimal Assistance to [address] with RW. Min trunk unsteadiness. Min c/o lightheadedness    Ambulation:  Minimal Assistance  Distance: 20 ft, 70 ft  Surface: Level Tile  Device:Rolling Walker  Gait Deviations:  Slow Dedra, Decreased Step Length Bilaterally, Decreased Gait Speed and Unsteady Gait      Functional Outcome Measures: Completed  Tinetti:  Balance Score: 9  Gait Score: 5  Tinetti Total Score: 14  Risk Indicators:  Less than/equal to 18 = high risk  19-23 Moderate risk  Greater than/equal to 24 = low risk     Timed up and Go Test (TUG)  63 seconds   Normative Reference Values  60-69 years:  8.1 seconds  70-79 years: 9.2 seconds  80-99 years: 11.3 seconds    < 10 seconds is normal, a score of > 14 seconds indicates high fall risk     EXERCISES:  The following exercises were completed to improve functional mobility: standing with RW support, heel and toe raises x10 reps with CGA to min assist for balance. Demo for technique. ASSESSMENT:  Activity Tolerance:  Patient tolerance of  treatment: good. Treatment Initiated: Treatment and education initiated within context of evaluation. Evaluation time included review of current medical information, gathering information related to past medical, social and functional history, completion of standardized testing, formal and informal observation of tasks, assessment of data and development of plan of care and goals. Treatment time included skilled education and facilitation of tasks to increase safety and independence with functional mobility for improved independence and quality of life. Assessment:   Body structures, Functions, Activity limitations: Decreased functional mobility , Decreased safe awareness, Decreased balance, Decreased cognition, Decreased endurance, Decreased strength  Assessment: Pt demonstrates a decrease in baseline by way of bed mobility, transfers, gait and steps combined with decreased safety awareness. He is showing a decrease in balance noted by the 63 sec TUG and 14/28 Tinetti, decrease in endurance and functional strength. He will benefit from skilled PT to advance in these areas for improved functional mobility and safety. Prognosis: Good         Discharge Recommendations:  Discharge Recommendations: Continue to assess pending progress, Patient would benefit from continued therapy after discharge    Patient Education:  PT Education: Goals, PT Role, Plan of Care, Transfer Training, Functional Mobility Training, Gait Training    Equipment Recommendations:  Equipment Needed: Yes(Pt has SW. Will need RW)    Plan:  Times per week: 5x/wk 90 min, 1x/wk 30 min  Current Treatment Recommendations: Strengthening, Functional Mobility Training, Balance Training, Equipment Evaluation, Education, & procurement, Gait Training, Stair training, Endurance Training, Transfer Training    Goals:  Patient goals : get my dizziness better  Short term goals  Time Frame for Short term goals: 1 wk  Short term goal 1: bed mobility with Mod I, to get in/out of bed, use log roll technique  Short term goal 2: transfer with SBA to get in/out of chairs  Short term goal 3: amb >= 50''x1 with RW and SBA to progress to home and community mobility  Short term goal 4: Pt to ascend/descend 6\" step with RW, CGA, for home entry. Short term goal 5: Pt to score >= 20/28 on Tinetti; perform TUG <= 45 sec, indicating improved balance.   Long term goals  Time Frame for Long term goals : 3 wks  Long term goal 1: transfer with Mod I, to get in/out of chairs  Long term goal 2: amb >= 150''x1 with RW and Mod I, for home and community mobility  Long term goal 3: Pt to ascend/descend 6\" step with RW, Mod I, for home entry. Long term goal 4: Pt to score >= 24/28 on Tinetti; perform TUG ,<= 30 sec, indicating improved balance. Long term goal 5: Pt to ascend/descend 4+ steps junior rails, Mod I, for community mobility    Following session, patient left in safe position with all fall risk precautions in place.

## 2020-08-28 NOTE — PROGRESS NOTES
[address] [address]  INPATIENT PHYSICAL THERAPY  DAILY NOTE  [facility] - 7E-70/070-A    Time In: 1330  Time Out: 1400  Timed Code Treatment Minutes: 30 Minutes  Minutes: 30          Date: 2020  Patient Name: Michelle Flower,  Gender:  male        MRN: [id number]  : 1960  (60 y.o.)     Referring Practitioner: Dr. Jorge Aquino     Additional Pertinent Hx: T12 compression fracture s/p fall, chronic alcohol abuse, drug abuse, recurrent falls, neck pain, scalp hematoma     Prior Level of Function:  Lives With: Other (comment)(Mother)  Type of Home: House  Home Layout: One level  Home Access: Stairs to enter without rails  Entrance Stairs - Number of Steps: 1  Home Equipment: Standard walker   Bathroom Shower/Tub: Tub/Shower unit, Shower chair with back  Bathroom Toilet: Standard  Bathroom Equipment: Grab bars in shower(not in great shape per pt. plastic grab bars.)    ADL Assistance: Independent  Ambulation Assistance: Independent  Transfer Assistance: Independent  Active : Yes  Additional Comments: Pt stated he was \"experimenting\" with using the walker prior to admission. Pt reports mother does most of the inside chores, Pt is responsible for outside chores and yardwork. Restrictions/Precautions:  Restrictions/Precautions: General Precautions, Fall Risk  Required Braces or Orthoses  Spinal: Thoracic Lumbar Sacral Orthotics(May don in sitting)  Position Activity Restriction  Spinal Precautions: No Bending, No Lifting, No Twisting  Other position/activity restrictions: TLSO brace ordered for T12 fracture. Will need to wear this when up. May have it off when in bed. SUBJECTIVE: Pt. Seated in BS chair upon arrival and agrees to therapy session. Pt. Demos cognitive deficits throughout session requiring extra cues for management of tasks.      PAIN: Not rated Low back    OBJECTIVE:  Transfers:  Sit to Stand: Stand By Assistance, [employer]  Stand to Sit:Contact [employer], cues for hand placement    Ambulation:  Contact Guard Assistance, Minimal Assistance, with verbal cues   Distance: 150' x 1, 110' x 1  Surface: Level Tile  Device:Rolling Walker  Gait Deviations:  Slow Dedra, Decreased Trunk Rotation, Decreased Gait Speed, Wide Base of Support, Mild Path Deviations, toeing out, and hyperextension of trunk at times. Balance:  Dynamic Standing Balance: Contact Guard Assistance, Minimal Assistance  Pt. Stood with varying levels of UE support at tray table while completing balance activity. Pt. Challenged with following patterns and colors while pinning clothespins on yardstick. Pt. With hyperextension of trunk and decreased TKE requiring cues to correct. Exercise:  None    Functional Outcome Measures: Not completed    ASSESSMENT:  Assessment: Patient progressing toward established goals. Activity Tolerance:  Patient tolerance of  treatment: good. Equipment Recommendations:Equipment Needed: Yes(Pt has SW. Will need RW)  Discharge Recommendations:  Continue to assess pending progress, Patient would benefit from continued therapy after discharge    Plan: Times per week: 5x/wk 90 min, 1x/wk 30 min  Current Treatment Recommendations: Strengthening, Functional Mobility Training, Balance Training, Equipment Evaluation, Education, & procurement, Gait Training, Stair training, Endurance Training, Transfer Training    Patient Education  Patient Education: Plan of Care, Transfers, Gait    Goals:  Patient goals : get my dizziness better  Short term goals  Time Frame for Short term goals: 1 wk  Short term goal 1: bed mobility with Mod I, to get in/out of bed, use log roll technique  Short term goal 2: transfer with SBA to get in/out of chairs  Short term goal 3: amb >= 50''x1 with RW and SBA to progress to home and community mobility  Short term goal 4: Pt to ascend/descend 6\" step with RW, CGA, for home entry.   Short term goal 5: Pt to score >= 20/28 on Tinetti; perform TUG <= 45 sec, indicating improved balance. Long term goals  Time Frame for Long term goals : 3 wks  Long term goal 1: transfer with Mod I, to get in/out of chairs  Long term goal 2: amb >= 150''x1 with RW and Mod I, for home and community mobility  Long term goal 3: Pt to ascend/descend 6\" step with RW, Mod I, for home entry. Long term goal 4: Pt to score >= 24/28 on Tinetti; perform TUG ,<= 30 sec, indicating improved balance. Long term goal 5: Pt to ascend/descend 4+ steps junior rails, Mod I, for community mobility    Following session, patient left in safe position with all fall risk precautions in place.

## 2020-08-29 PROCEDURE — 97130 THER IVNTJ EA ADDL 15 MIN: CPT

## 2020-08-29 PROCEDURE — 6370000000 HC RX 637 (ALT 250 FOR IP): Performed by: PHYSICAL MEDICINE & REHABILITATION

## 2020-08-29 PROCEDURE — 2580000003 HC RX 258: Performed by: SURGERY

## 2020-08-29 PROCEDURE — 97116 GAIT TRAINING THERAPY: CPT

## 2020-08-29 PROCEDURE — 97129 THER IVNTJ 1ST 15 MIN: CPT

## 2020-08-29 PROCEDURE — 97530 THERAPEUTIC ACTIVITIES: CPT

## 2020-08-29 PROCEDURE — 6370000000 HC RX 637 (ALT 250 FOR IP): Performed by: SURGERY

## 2020-08-29 PROCEDURE — 97535 SELF CARE MNGMENT TRAINING: CPT

## 2020-08-29 PROCEDURE — 94760 N-INVAS EAR/PLS OXIMETRY 1: CPT

## 2020-08-29 PROCEDURE — 1180000000 HC REHAB R&B

## 2020-08-29 PROCEDURE — 97110 THERAPEUTIC EXERCISES: CPT

## 2020-08-29 RX ADMIN — CARBAMAZEPINE 200 MG: 200 TABLET ORAL at 08:40

## 2020-08-29 RX ADMIN — THERA TABS 1 TABLET: TAB at 08:40

## 2020-08-29 RX ADMIN — QUETIAPINE FUMARATE 400 MG: 400 TABLET ORAL at 08:40

## 2020-08-29 RX ADMIN — TRAZODONE HYDROCHLORIDE 100 MG: 100 TABLET ORAL at 20:25

## 2020-08-29 RX ADMIN — PANTOPRAZOLE SODIUM 40 MG: 40 TABLET, DELAYED RELEASE ORAL at 05:59

## 2020-08-29 RX ADMIN — FOLIC ACID 1 MG: 1 TABLET ORAL at 08:40

## 2020-08-29 RX ADMIN — DOCUSATE SODIUM 100 MG: 100 CAPSULE, LIQUID FILLED ORAL at 08:40

## 2020-08-29 RX ADMIN — Medication 10 ML: at 08:41

## 2020-08-29 RX ADMIN — Medication 1000 MCG: at 08:40

## 2020-08-29 RX ADMIN — ACETAMINOPHEN 650 MG: 325 TABLET ORAL at 05:59

## 2020-08-29 RX ADMIN — ACETAMINOPHEN 500 MG: 500 TABLET ORAL at 20:26

## 2020-08-29 RX ADMIN — SENNOSIDES 8.6 MG: 8.6 TABLET, FILM COATED ORAL at 20:27

## 2020-08-29 RX ADMIN — FLUOXETINE HYDROCHLORIDE 60 MG: 20 CAPSULE ORAL at 20:28

## 2020-08-29 RX ADMIN — AMLODIPINE BESYLATE 10 MG: 10 TABLET ORAL at 08:40

## 2020-08-29 RX ADMIN — CARBAMAZEPINE 200 MG: 200 TABLET ORAL at 20:28

## 2020-08-29 RX ADMIN — QUETIAPINE FUMARATE 400 MG: 400 TABLET ORAL at 20:28

## 2020-08-29 RX ADMIN — ACETAMINOPHEN 650 MG: 325 TABLET ORAL at 14:07

## 2020-08-29 RX ADMIN — Medication 100 MG: at 08:40

## 2020-08-29 ASSESSMENT — PAIN DESCRIPTION - PROGRESSION

## 2020-08-29 ASSESSMENT — ENCOUNTER SYMPTOMS
TROUBLE SWALLOWING: 0
CONSTIPATION: 0
EYES NEGATIVE: 1
ALLERGIC/IMMUNOLOGIC NEGATIVE: 1
COUGH: 0
VOICE CHANGE: 0
BACK PAIN: 0
DIARRHEA: 0
NAUSEA: 0
SHORTNESS OF BREATH: 0

## 2020-08-29 ASSESSMENT — PAIN SCALES - GENERAL
PAINLEVEL_OUTOF10: 4
PAINLEVEL_OUTOF10: 6
PAINLEVEL_OUTOF10: 4
PAINLEVEL_OUTOF10: 6
PAINLEVEL_OUTOF10: 5
PAINLEVEL_OUTOF10: 6

## 2020-08-29 ASSESSMENT — PAIN DESCRIPTION - PAIN TYPE
TYPE: ACUTE PAIN;SURGICAL PAIN
TYPE: ACUTE PAIN

## 2020-08-29 ASSESSMENT — PAIN DESCRIPTION - DESCRIPTORS
DESCRIPTORS: ACHING
DESCRIPTORS: ACHING

## 2020-08-29 ASSESSMENT — PAIN DESCRIPTION - LOCATION
LOCATION: BACK
LOCATION: BACK

## 2020-08-29 ASSESSMENT — PAIN DESCRIPTION - FREQUENCY: FREQUENCY: CONTINUOUS

## 2020-08-29 ASSESSMENT — PAIN DESCRIPTION - ORIENTATION
ORIENTATION: LOWER
ORIENTATION: LOWER

## 2020-08-29 ASSESSMENT — PAIN DESCRIPTION - ONSET: ONSET: ON-GOING

## 2020-08-29 ASSESSMENT — PAIN - FUNCTIONAL ASSESSMENT: PAIN_FUNCTIONAL_ASSESSMENT: PREVENTS OR INTERFERES SOME ACTIVE ACTIVITIES AND ADLS

## 2020-08-29 NOTE — PROGRESS NOTES
[facility]  INPATIENT PHYSICAL THERAPY  DAILY NOTE  254 [facility] - 7E-70/070-A    Time In: 1047  Time Out: 0832  Timed Code Treatment Minutes: 61 Minutes  Minutes: 61          Date: 2020  Patient Name: Ross Everett,  Gender:  male        MRN: [id number]  : 1960  (60 y.o.)     Referring Practitioner: Dr. Ignacio Rivers     Additional Pertinent Hx: T12 compression fracture s/p fall, chronic alcohol abuse, drug abuse, recurrent falls, neck pain, scalp hematoma     Prior Level of Function:  Lives With: Other (comment)(Mother)  Type of Home: House  Home Layout: One level  Home Access: Stairs to enter without rails  Entrance Stairs - Number of Steps: 1  Home Equipment: Standard walker   Bathroom Shower/Tub: Tub/Shower unit, Shower chair with back  Bathroom Toilet: Standard  Bathroom Equipment: Grab bars in shower(not in great shape per pt. plastic grab bars.)    ADL Assistance: Independent  Ambulation Assistance: Independent  Transfer Assistance: Independent  Active : Yes  Additional Comments: Pt stated he was \"experimenting\" with using the walker prior to admission. Pt reports mother does most of the inside chores, Pt is responsible for outside chores and yardwork. Restrictions/Precautions:  Restrictions/Precautions: General Precautions, Fall Risk  Required Braces or Orthoses  Spinal: Thoracic Lumbar Sacral Orthotics(May don in sitting)  Position Activity Restriction  Spinal Precautions: No Bending, No Lifting, No Twisting  Other position/activity restrictions: TLSO brace ordered for T12 fracture. Will need to wear this when up. May have it off when in bed. SUBJECTIVE: Patient supine in bed upon arrival. Patient pleasant and agreeable to therapy treatment. PAIN: 5/10: Patient reports LBP 5/10 at end of treatment as well. No pain increase throughout therapy treatment. Patient unable to state precautions prior to therapy treatment.  At end of treatment patient was able to state 2/3 lumbar precautions (no bending, no lifting). OBJECTIVE:  Bed Mobility:  Rolling to Right: Stand By Assistance   Supine to Sit: Stand By Assistance    Transfers:  Sit to Stand: Stand By Assistance, Contact Guard Assistance  Stand to Sit:Stand By Assistance, Contact Guard Assistance    Ambulation:  Contact Guard Assistance, with cues for safety  Distance: 250 feet x 1, 150 x 1, 20 feet x 2 and multiple short distances in rehab gym  Surface: Level Tile  Device:Rolling Walker  Gait Deviations: Forward Flexed Posture, Slow Dedra, Wide Base of Support, Mild Path Deviations and Unsteady Gait    Balance:  Not Tested this date    Exercise:  Patient was guided in 1 set(s) 15 reps of exercise to both lower extremities. Hip abduction/adduction, Seated marches, Seated hamstring curls, Seated heel/toe raises, Long arc quads and all with in BLT precautions. Exercises were completed for increased independence with functional mobility. Functional Outcome Measures: Not completed       ASSESSMENT:  Assessment: Patient progressing toward established goals. Activity Tolerance:  Patient tolerance of  treatment: good. Equipment Recommendations:Equipment Needed: Yes(Pt has SW.   Will need RW)  Discharge Recommendations:  Continue to assess pending progress, Patient would benefit from continued therapy after discharge    Plan: Times per week: 5x/wk 90 min, 1x/wk 30 min  Current Treatment Recommendations: Strengthening, Functional Mobility Training, Balance Training, Equipment Evaluation, Education, & procurement, Gait Training, Stair training, Endurance Training, Transfer Training    Patient Education  Patient Education: Plan of Care, Precautions/Restrictions, [employer] Mobility, Transfers, Gait    Goals:  Patient goals : get my dizziness better  Short term goals  Time Frame for Short term goals: 1 wk  Short term goal 1: bed mobility with Mod I, to get in/out of bed, use log roll technique  Short term goal 2: transfer with SBA to get in/out of chairs  Short term goal 3: amb >= 50''x1 with RW and SBA to progress to home and community mobility  Short term goal 4: Pt to ascend/descend 6\" step with RW, CGA, for home entry. Short term goal 5: Pt to score >= 20/28 on Tinetti; perform TUG <= 45 sec, indicating improved balance. Long term goals  Time Frame for Long term goals : 3 wks  Long term goal 1: transfer with Mod I, to get in/out of chairs  Long term goal 2: amb >= 150''x1 with RW and Mod I, for home and community mobility  Long term goal 3: Pt to ascend/descend 6\" step with RW, Mod I, for home entry. Long term goal 4: Pt to score >= 24/28 on Tinetti; perform TUG ,<= 30 sec, indicating improved balance. Long term goal 5: Pt to ascend/descend 4+ steps junior rails, Mod I, for community mobility    Following session, patient left in safe position with all fall risk precautions in place.

## 2020-08-29 NOTE — PROGRESS NOTES
[address]  Occupational Therapy  Daily Note  Time:   Time In: 1030  Time Out: 1130  Timed Code Treatment Minutes: 61 Minutes  Minutes: 60          Date: 2020  Patient Name: Pansy Baumgarten,   Gender: male      Room: [facility]/0-A  MRN: [id number]  : 1960  (60 y.o.)  Referring Practitioner: Dr. Agnes Troy  Diagnosis: Burst fracture T 12 with routine healing  Additional Pertinent Hx: Per ED notes: Mr. Namrata Swann is a 59 Y/O male presenting at [facility] by trauma consult, brought by EMS following a fall today  with LOC; PMH includes drug and alcohol abuse, cancer, HTN, HLD, and GERD. Per patient report he has been falling with increasing frequency over past 2 months, believes a fall while exiting a parked car two days ago is what hurt his back, but he also fell today. Patient states he typically does not remember falls, just finds himself on the ground. Daughter present in exam room states, falls often occur after standing up, he will begin to shake and then fall to floor, appear to lose consciousness briefly. Patient reports history of alcohol abuse, drank a pint of liquor today between [id number], states this is not unusual for him. Restrictions/Precautions:  Restrictions/Precautions: General Precautions, Fall Risk  Required Braces or Orthoses  Spinal: Thoracic Lumbar Sacral Orthotics(May don in sitting)  Position Activity Restriction  Spinal Precautions: No Bending, No Lifting, No Twisting  Other position/activity restrictions: TLSO brace ordered for T12 fracture. Will need to wear this when up. May have it off when in bed. SUBJECTIVE: Up in chair upon arrival, agreeable to OT session, easily distracted by own thoughts. Patient had brought up on own about wanting to quit drinking but not quit smoking.   Patient reported that his drinking has increased over the past 10 years and stated \"I know my health is at risk\"    PAIN:  Back, did not rate number    COGNITION: Decreased Problem Solving and sequencing task, Oriented x 4    ADL:   Bathing: Contact Guard Assistance. when standing to wash buttocks and marguerite area, patient required step by step instructions due to poor problem solving and initiation  Upper Extremity Dressing: with set-up. Dep for donning TLSO  Lower Extremity Dressing: Contact Guard Assistance. donned sitting in bedside chair, cues for sequencing task. BALANCE:  Standing Balance: Contact Guard Assistance. approx 2 minutes for ADLs with 1 UE support    BED MOBILITY:  Not Tested    TRANSFERS:  Sit to Stand:  Contact Guard Assistance. bedside chair  Stand to Sit: [employer]. ADDITIONAL ACTIVITIES:  Guided patient through BUE AROM this date x10 reps x1 set in chair in order to increase BUE strength and improve activity tolerance for ADLs and homemaking tasks. Able to recall 2/3 back precautions    ASSESSMENT:     Activity Tolerance:  Patient tolerance of  treatment: good. Discharge Recommendations: Continue to assess pending progress     Equipment Recommendations: Other: continue to assess- will need a RW, may need a grab bar in the shower . Assess the need for a BSC, pt has a shower chair at home.   Plan: Times per week: 5xs week x 90 min, 1 x week x 30 min  Current Treatment Recommendations: Patient/Caregiver Education & Training, Balance Training, Functional Mobility Training, Endurance Training, Safety Education & Training, Self-Care / ADL, Home Management Training, Equipment Evaluation, Education, & procurement    Patient Education  Patient Education: ADL's and Precautions    Goals  Short term goals  Time Frame for Short term goals: 1 week  Short term goal 1: Pt will complete BADL routine with setup and no > min cues for problem solving, attetntion to task , and back safety to increase independence in self care tasks  Short term goal 2: Pt to demo toileting tasks and transfers with CGA and no cues for

## 2020-08-29 NOTE — PROGRESS NOTES
ST cued patinet to recall items throughout session using the written list ONLY as needed. Immediate recall: 3/3 indep without list   15 minute delay: 3/3 indep without list   30 minute delay: 3/3 indep without list    Working memory: Patient presented with x3 single digit numbers via auditory presentation, cued patient to immediately repeat numbers in reverse order: 5/10 indep, 4/10 mode cues (repetition 1-2x), 1/10 max cues (repetition x3)    SHORT TERM GOAL #2:  Goal 2: Pt will complete sustained, selective, and divided attention tasks with no more than 3 errors within a given task in order to improve participation in treatment and permit eventual return to driving. INTERVENTIONS:  Task 1: Patient presented with visual stimulus sheet of spaced out single digit numbers (#4); cued to identify \"all the even numbers\" while listening to music of patient's personal interest: x0 errors, x2 redirections to continue task, 5 minutes     Task 2: Patient presented with visual stimulus sheet of multiple/close single digit numbers (#6); cued to identify \"all the odd numbers\" while listening to music of patient's personal interest: x17 errors, x8-10 redirections to continue task, 25 minutes     SHORT TERM GOAL #3:  Goal 3: Pt will complete functional reasoning and thought organizational tasks with 70% accuracy given mod cues to improve overall executive functioning skills. INTERVENTIONS: DNT secondary to focus on other gaosl     SHORT TERM GOAL #4:  Goal 4: Pt will complete functional problem solving and sequencing tasks with 70% accuracy given mod cues to improve ability to make successful return to IADL's (medication management, finances, etc.).   INTERVENTIONS:Patient continues with high level of impulsivity and poor safety within hospital setting; telesitter continues to remain in place for patient safety       Long-Term Goals:  Timeframe for Long-term Goals: 3 weeks    LONG TERM GOAL #1:  Goal 1: Patient will improve cognitive functioning to a min assist level to increase level of independence with ADL and iADL tasks in the home environment and reduce caregiver burden. ONGOING         Comprehension: 4 - Patient understands basic needs 75-90%+ of the time  Expression: 5 - Expresses basic ideas/needs only (hungry/hot/pain)  Social Interaction: 4 - Patient appropriate 75-90%+ of the time  Problem Solvin - Patient solves simple/routine tasks 25%-49%  Memory: 2 - Patient remembers 25%-49% of the time    EDUCATION:  Learner: Patient  Education:  Reviewed results and recommendations of this evaluation, Reviewed ST goals and Plan of Care, Reviewed recommendations for follow-up and Education Related to Potential Risks and Complications Due to Impairment/Illness/Injury  Evaluation of Education: Verbalizes understanding, Needs further instruction and Family not present    ASSESSMENT/PLAN:  Activity Tolerance:  Patient tolerance of  treatment: good. Assessment/Plan: Patient progressing toward established goals. Continues to require skilled care of licensed speech pathologist to progress toward achievement of established goals and plan of care. .     Plan for Next Session: Math-money/time management, home problem solving      JULES [address]

## 2020-08-30 PROCEDURE — 1180000000 HC REHAB R&B

## 2020-08-30 PROCEDURE — 6370000000 HC RX 637 (ALT 250 FOR IP): Performed by: PHYSICAL MEDICINE & REHABILITATION

## 2020-08-30 PROCEDURE — 6370000000 HC RX 637 (ALT 250 FOR IP): Performed by: SURGERY

## 2020-08-30 PROCEDURE — 94760 N-INVAS EAR/PLS OXIMETRY 1: CPT

## 2020-08-30 PROCEDURE — 2580000003 HC RX 258: Performed by: SURGERY

## 2020-08-30 RX ADMIN — DIPHENHYDRAMINE HCL 25 MG: 25 TABLET ORAL at 20:40

## 2020-08-30 RX ADMIN — FOLIC ACID 1 MG: 1 TABLET ORAL at 09:05

## 2020-08-30 RX ADMIN — QUETIAPINE FUMARATE 400 MG: 400 TABLET ORAL at 20:39

## 2020-08-30 RX ADMIN — Medication 100 MG: at 09:05

## 2020-08-30 RX ADMIN — FLUOXETINE HYDROCHLORIDE 60 MG: 20 CAPSULE ORAL at 20:39

## 2020-08-30 RX ADMIN — CARBAMAZEPINE 200 MG: 200 TABLET ORAL at 09:05

## 2020-08-30 RX ADMIN — CARBAMAZEPINE 200 MG: 200 TABLET ORAL at 20:39

## 2020-08-30 RX ADMIN — Medication 10 ML: at 09:08

## 2020-08-30 RX ADMIN — ACETAMINOPHEN 500 MG: 500 TABLET ORAL at 20:39

## 2020-08-30 RX ADMIN — PANTOPRAZOLE SODIUM 40 MG: 40 TABLET, DELAYED RELEASE ORAL at 05:22

## 2020-08-30 RX ADMIN — Medication 1000 MCG: at 09:05

## 2020-08-30 RX ADMIN — AMLODIPINE BESYLATE 10 MG: 10 TABLET ORAL at 09:05

## 2020-08-30 RX ADMIN — ACETAMINOPHEN 650 MG: 325 TABLET ORAL at 09:06

## 2020-08-30 RX ADMIN — DOCUSATE SODIUM 100 MG: 100 CAPSULE, LIQUID FILLED ORAL at 09:05

## 2020-08-30 RX ADMIN — THERA TABS 1 TABLET: TAB at 09:05

## 2020-08-30 RX ADMIN — SENNOSIDES 8.6 MG: 8.6 TABLET, FILM COATED ORAL at 20:39

## 2020-08-30 RX ADMIN — Medication 10 ML: at 20:40

## 2020-08-30 RX ADMIN — QUETIAPINE FUMARATE 400 MG: 400 TABLET ORAL at 09:05

## 2020-08-30 ASSESSMENT — ENCOUNTER SYMPTOMS
CHEST TIGHTNESS: 0
ABDOMINAL PAIN: 0
BACK PAIN: 1
RHINORRHEA: 0
NAUSEA: 0
COUGH: 0

## 2020-08-30 ASSESSMENT — PAIN DESCRIPTION - PROGRESSION
CLINICAL_PROGRESSION: NOT CHANGED

## 2020-08-30 ASSESSMENT — PAIN DESCRIPTION - FREQUENCY
FREQUENCY: INTERMITTENT
FREQUENCY: INTERMITTENT

## 2020-08-30 ASSESSMENT — PAIN DESCRIPTION - DESCRIPTORS
DESCRIPTORS: ACHING
DESCRIPTORS: ACHING

## 2020-08-30 ASSESSMENT — PAIN DESCRIPTION - ORIENTATION
ORIENTATION: LOWER
ORIENTATION: LOWER

## 2020-08-30 ASSESSMENT — PAIN DESCRIPTION - LOCATION
LOCATION: BACK
LOCATION: BACK

## 2020-08-30 ASSESSMENT — PAIN DESCRIPTION - PAIN TYPE
TYPE: ACUTE PAIN
TYPE: ACUTE PAIN

## 2020-08-30 ASSESSMENT — PAIN SCALES - GENERAL
PAINLEVEL_OUTOF10: 6
PAINLEVEL_OUTOF10: 6
PAINLEVEL_OUTOF10: 0
PAINLEVEL_OUTOF10: 0
PAINLEVEL_OUTOF10: 6

## 2020-08-30 ASSESSMENT — PAIN DESCRIPTION - ONSET
ONSET: ON-GOING
ONSET: ON-GOING

## 2020-08-30 ASSESSMENT — PAIN - FUNCTIONAL ASSESSMENT
PAIN_FUNCTIONAL_ASSESSMENT: PREVENTS OR INTERFERES SOME ACTIVE ACTIVITIES AND ADLS
PAIN_FUNCTIONAL_ASSESSMENT: PREVENTS OR INTERFERES SOME ACTIVE ACTIVITIES AND ADLS

## 2020-08-30 NOTE — PROGRESS NOTES
Patient: Giovanni Montoya  Unit/Bed: 7E-70/070-A  YOB: 1960  MRN: [id number] Acct: [de-identified]   Admitting Diagnosis: Burst fracture of T12 vertebra with routine healing [S22.081D]  Admit Date:  8/27/2020  Hospital Day: 3    Assessment:     Active Problems:    Chronic alcohol abuse    Benign essential hypertension    Fall    Closed head injury    Hypomagnesemia    Burst fracture of T12 vertebra with routine healing    Vitamin B12 deficiency  Resolved Problems:    * No resolved hospital problems. *      Plan:     Continue therapies  He seems to be tolerating the abstaining from alcohol and smoking well so far        Subjective:     Patient has no complaint of CP, SOB, or GI upset. .   Medication side effects: none    Scheduled Meds:   acetaminophen  500 mg Oral Nightly    And    diphenhydrAMINE  25 mg Oral Nightly    sodium chloride flush  10 mL Intravenous 2 times per day    thiamine  100 mg Oral Daily    amLODIPine  10 mg Oral Daily    carBAMazepine  200 mg Oral BID    docusate sodium  100 mg Oral Daily    FLUoxetine  60 mg Oral Daily    folic acid  1 mg Oral Daily    [START ON 8/25/2021] magnesium replacement protocol   Other RX Placeholder    multivitamin  1 tablet Oral Daily    pantoprazole  40 mg Oral QAM AC    QUEtiapine  400 mg Oral BID    senna  1 tablet Oral Nightly    vitamin B-12  1,000 mcg Oral Daily     Continuous Infusions:  PRN Meds:acetaminophen, polyethylene glycol, promethazine **OR** ondansetron, sodium chloride flush, bisacodyl, magnesium sulfate, traZODone, magnesium hydroxide    Review of Systems  Pertinent items are noted in HPI. Objective:     Patient Vitals for the past 8 hrs:   BP Temp Temp src Pulse Resp SpO2   08/30/20 1009 -- -- -- -- -- 96 %   08/30/20 0831 (!) 137/92 98.1 °F (36.7 °C) Oral 84 16 94 %     I/O last 3 completed shifts: In: 56 [P.O.:600; I.V.:10]  Out: 150 [Urine:150]  I/O this shift:  In: 250 [P.O.:240;  I.V.:10]  Out: 525 [Urine:525]    BP (!) 137/92   Pulse 84   Temp 98.1 °F (36.7 °C) (Oral)   Resp 16   Ht 6' 4\" (1.93 m)   Wt 227 lb (103 kg)   SpO2 96%   BMI 27.63 kg/m²     BP (!) 137/92   Pulse 84   Temp 98.1 °F (36.7 °C) (Oral)   Resp 16   Ht 6' 4\" (1.93 m)   Wt 227 lb (103 kg)   SpO2 96%   BMI 27.63 kg/m²   General appearance: alert, appears stated age and cooperative  Head: Normocephalic, without obvious abnormality, atraumatic  Lungs: clear to auscultation bilaterally  Heart: regular rate and rhythm, S1, S2 normal, no murmur, click, rub or gallop  Abdomen: soft, non-tender; bowel sounds normal; no masses,  no organomegaly, difficult to examine due to the brace being on  Extremities: extremities normal, atraumatic, no cyanosis or edema  Skin: Skin color, texture, turgor normal. No rashes or lesions  Neurologic: weak      Electronically signed by Monica Duval MD on 8/30/2020 at 2:23 PM

## 2020-08-30 NOTE — PROGRESS NOTES
Physical Medicine & Rehabilitation  Progress Note    Chief Complaint:  frequent falls with mild traumatic brain injury without loss of consciousness and T12 burst fracture    Subjective: Current pain level at rest is noted to be 6/10 in his lower back. He notes that his therapies are going well; and he would like to stay as long as possible with his goal being to remain sober when he discharges. His labs were reviewed with him. He had no other concerns or questions. Rehabilitation:   Physical Therapy:  OBJECTIVE:  Transfers:  Sit to Stand: Stand By Assistance  Stand to Sit:Stand By Assistance, with verbal cues for proper foot placement.      Ambulation:  Stand By Assistance, Contact Guard Assistance  Distance: 150' x 2  Surface: Level Tile  Device:Rolling Walker  Gait Deviations: Forward Flexed Posture, Slow Dedra, Decreased Gait Speed, Decreased Heel Strike Bilaterally, Mild Path Deviations and Externally rotated LEs.    Neuromuscular Education:   Pt. Completed stepping activities tapping cones as instructed by therapist to promote increased step height and proper posture/alignment during gait. Verbal and tactile cues provided for erect trunk, core facilitation, and control of LEs. Pt. Marilu Buck decreased LE strength and proprioception. Exercise:  Patient was guided in 1 set(s) 15 reps of exercise to both lower extremities. Hip abduction/adduction and Seated hamstring curls with green theraband. Exercises were completed for increased independence with functional mobility.     Functional Outcome Measures: Not completed     ASSESSMENT:  Assessment: Patient progressing toward established goals. Activity Tolerance:  Patient tolerance of  treatment: good. Equipment Recommendations:Equipment Needed: Yes(Pt has SW.   Will need RW)  Discharge Recommendations:  Continue to assess pending progress, Patient would benefit from continued therapy after discharge    Occupational Therapy:  COGNITION: Decreased Insight and Impaired Attention     ADL:   None this session      BALANCE:  Sitting Balance:  Modified Independent. Standing Balance: Stand By Assistance.       TRANSFERS:  Sit to Stand:  Stand By Assistance. Recliner   Stand to Sit: Stand By Assistance.       FUNCTIONAL MOBILITY:  Assistive Device: Rolling Walker  Assist Level:  Stand By Assistance. Distance: To gym     ADDITIONAL ACTIVITIES:  Patient identified a personal goal to increase UB strength and improve overall endurance so they can complete their toilet & shower transfers; skilled edu on UE strengthening and patient completed BUE strengthening exercises x15 reps x1 set this date with a MED resistive band in all joints and all planes. Patient tolerated well, requiring occasioanl rest breaks. Pt required mod cues for technique.     ASSESSMENT:  Activity Tolerance:  Patient tolerance of  treatment: good. Assessment: Assessment: Alexa Bales has made steady progress on IP rehab. He has progressed to a SBA level with his functional transfers and basic mobility within ADLs. He requires up to min A for TLSO donning (did achieve once with set-up A, required min A next time). He requires SBA for LB ADLs and doesn't require use of LHAE and is able to abide by spinal precautions. Pt does have cognitive concerns and is difficult to redirect at times. He reports a history of alchool abuse and identifies that he wants to stop drinking as a personal goal.  Discharge Recommendations: Continue to assess pending progress  Equipment Recommendations: Other: Patient has a shower chair. No other DME needed. Speech Therapy:  COGNITION: Decreased Insight and Impaired Attention     ADL:   None this session      BALANCE:  Sitting Balance:  Modified Independent. Standing Balance: Stand By Assistance.       TRANSFERS:  Sit to Stand:  Stand By Assistance.  Recliner   Stand to Sit: Stand By Assistance.       FUNCTIONAL MOBILITY:  Assistive Device: Rolling Walker  Assist Hematological: Negative. Psychiatric/Behavioral: Positive for confusion and decreased concentration. Negative for dysphoric mood. The patient is not nervous/anxious. All other systems reviewed and are negative. Objective:  /81   Pulse 103   Temp 98.1 °F (36.7 °C) (Oral)   Resp 16   Ht 6' 4\" (1.93 m)   Wt 226 lb 5 oz (102.7 kg)   SpO2 96%   BMI 27.55 kg/m²   CURRENT VITALS:  height is 6' 4\" (1.93 m) and weight is 226 lb 5 oz (102.7 kg). His oral temperature is 98.1 °F (36.7 °C). His blood pressure is 123/81 and his pulse is 103. His respiration is 16 and oxygen saturation is 96%. Body mass index is 27.55 kg/m².   Temperature Range (24h):Temp: 98.1 °F (36.7 °C) Temp  Av.9 °F (36.6 °C)  Min: 97.5 °F (36.4 °C)  Max: 98.1 °F (36.7 °C)  BP Range (67Z): Systolic (75LDP), VOC:744 , Min:123 , GGR:646     Diastolic (39RDM), KRISTEN:72, Min:81, Max:92    Pulse Range (24h): Pulse  Av.7  Min: 76  Max: 103  Respiration Range (24h): Resp  Av  Min: 16  Max: 16  Current Pulse Ox (24h):  SpO2: 96 %  Pulse Ox Range (24h):  SpO2  Av %  Min: 94 %  Max: 98 %  Oxygen Amount and Delivery:      awake  Orientation:   person, place, time, situation  Mood: within normal limits  Affect: calm  General appearance:  in no acute distress, up in with TLSO brace on    Memory:   Grossly normal  Attention/Concentration: abnormal -mild delay with command following of one-step commands  Language:  normal    Cranial Nerves:  cranial nerves II-XII are grossly intact  ROM: Abnormal due to use of TLSO brace  Motor Exam:  Motor exam is symmetrical 5 out of 5 all extremities bilaterally    Tone:  normal  Muscle bulk: within normal limits; with noted mild atrophy of bilateral calves  Sensory:  Sensory intact  Coordination:   normal  Deep Tendon Reflexes:  Reflexes are intact and symmetrical bilaterally    Skin: warm and dry, no rash or erythema and abrasions over both knees with scabs and hematoma with abrasion over left parietal scalp  Peripheral vascular: Pulses: Normal upper and lower extremity pulses; Edema: no    Diagnostics:   No results found for this or any previous visit (from the past 24 hour(s)). Labs Renal Latest Ref Rng & Units 8/28/2020 8/26/2020 8/25/2020 8/24/2020 8/16/2019   BUN 7 - 22 mg/dL 13 13 12 12 12   Cr 0.4 - 1.2 mg/dL 1.2 1.1 1.3(H) 1. 3(H) 1.4(H)   K 3.5 - 5.2 meq/L 3.9 4.5 4.2 3.8 3.7   Na 135 - 145 meq/L 139 138 134(L) 132(L) 137      Recent Labs     08/28/20  0536 08/26/20  0400 08/25/20  0347   WBC 6.0 4.1* 4.6*   HGB 9.8* 10.1* 10.0*   HCT 30.9* 31.4* 29.7*   .0* 111.3* 108.4*    181 174      Impression:  1. Frequent falls. 2. Gait instability with a Tinetti score of 8/28. 3. Left scalp hematoma. 4. 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 5. Moderate cognitive impairment. (MOCA) version 8.2 completed. Patient scored 14/30. *Previous score on the [address] [place] [address] was 15/25. 6. Acute alcohol intoxication with admission blood alcohol level of 0.08.  7. General cerebral atrophy and small vessel ischemic changes with prominent ventricles consistent with central atrophy. 8. Cervical and thoracic spine pain. 9. T12 burst fracture without instability initially noted on a lumbar spine x-ray on 7/24/2020. 10. Hyponatremia. 11. Hypomagnesemia  12. Acute kidney injury. 13. Alcohol abuse. 14. Vitamin B12 deficiency with chronic macrocytic anemia. .  15. Recent acute, comminuted, minimally displaced, T-shaped fracture of the head of the right proximal phalanx of the great toe on 8/14/2020. 16. Hypertension. 17. Hyperlipidemia. 18. Remote history of cocaine drug abuse.   19. Cervical spondylosis with C3-C4 moderate to severe right and severe left neuroforaminal stenosis, moderate to severe left and severe right neuroforaminal stenosis at C4-C5, moderate right and severe left neuroforaminal stenosis at C5-C6, and moderate right and mild left neuroforaminal stenosis at 8/31.  9. Alcohol abuse. 1. Family states they would like for him to go into a substance abuse program.  2. Multivitamin. 10. Vitamin B12 deficiency with chronic macrocytic anemia  1. .0 on 8/28. Improved to 109.1 on 8/31. 2. Folic acid. 3. Vitamin B12.  11. Recent acute, comminuted, minimally displaced, T-shaped fracture of the head of the right proximal phalanx of the great toe on 8/14/2020. 12. Nutrition:  Consultation to dietician for nutritional counseling and recommendations. 1. TotP 6.0, alb 3.5, Vitamin 25OH level of 43 on 8/28/2020. 13. Electrolytes. 1. Normal on 8/31 with exception of:  2. Hyponatremia. Sodium normal at 139 on 8/28 which is improved from 132 on 8/24. Sodium remains normal at 140 on 8/31. 3. Hypomagnesemia. Magnesium was 1.5 on 8/25 with improvement to 2.2 on 8/26. Resolved. 14. Bladder: No issues, has Flomax on home medication list.  15. Bowel: Senna, colace, MOM  16. Rehabilitation nursing will be involved for bowel, bladder, skin, and pain management. Nursing will also provide education and training to patient and family. 17. Prophylaxis:  DVT: SCDs. GI: Protonix. 18.  and case management consultations for coordination of care and discharge planning. Chronic Problems:  1. Hypertension. 1. Amlodipine. 2. Hyperlipidemia. 1. Has fenofibrate on home medication list.  3. Remote history of cocaine drug abuse. 4. Cervical spondylosis with C3-C4 moderate to severe right and severe left neuroforaminal stenosis, moderate to severe left and severe right neuroforaminal stenosis at C4-C5, moderate right and severe left neuroforaminal stenosis at C5-C6, and moderate right and mild left neuroforaminal stenosis at C6-C7.  5. Lumbar spondylosis with mild degenerative changes most pronounced at L3-L4 with moderate spinal canal stenosis and mild to moderate bilateral neuroforaminal stenosis. 6. Chronic macrocytic, hyperchromic anemia.   7. CKD 2.  8. History of depression/Bipolar 1 disorder. 1. Prozac. 2. Seroquel. 3. Tegretol. 4. Has BuSpar on home medication list.  5. Trazodone for sleep. 9. GERD. 1. Protonix. 10. Current everyday smoker with a 40-pack-year history. 1. NicoDerm patch if patient is agreeable. Labs reviewed on:  1. 8/28.  2. 8/31. Infectious Disease:  1. N/A    Missed Therapy Time:  None     DME:    Discharge Plan:      Greater than 50% of 30 minutes was spent with the patient reviewing his current level of pain control, his current labs, the plan for a care conference tomorrow to define his discharge date, and his progress with therapies.     Ansley Wallis MD

## 2020-08-30 NOTE — PLAN OF CARE
Problem: Mood - Altered:  Goal: Mood stable  Description: Mood stable  8/30/2020 0025 by Naresh Jo LPN  Outcome: Met This Shift  Note: Mood stable, pt. Cooperative. 8/29/2020 1130 by Evelyn Vasquez LPN  Outcome: Ongoing  Note: Mood appears stable at this time. Problem: Skin Integrity:  Goal: Absence of new skin breakdown  Description: Absence of new skin breakdown  8/30/2020 0025 by Naresh Jo LPN  Outcome: Met This Shift  8/29/2020 1130 by Evelyn Vasquez LPN  Outcome: Ongoing  Note: No new skin breakdown noted at this time. Problem: Falls - Risk of:  Goal: Will remain free from falls  Description: Will remain free from falls  8/30/2020 0025 by Naresh Jo LPN  Outcome: Met This Shift  Note: Client has telesitter at bedside due to forgetfulness to get up per self. Client did use call light for assist. No attempts to get up unassisted. 8/29/2020 1130 by Evelyn Vasquez LPN  Outcome: Ongoing  Note: Up with assist of one with walker and gait belt. Unsteady at times. Tolerates well. Denies c/o at this time.    Goal: Absence of physical injury  Description: Absence of physical injury  Outcome: Met This Shift

## 2020-08-31 LAB
ANION GAP SERPL CALCULATED.3IONS-SCNC: 12 MEQ/L (ref 8–16)
BUN BLDV-MCNC: 12 MG/DL (ref 7–22)
CALCIUM SERPL-MCNC: 9.6 MG/DL (ref 8.5–10.5)
CHLORIDE BLD-SCNC: 104 MEQ/L (ref 98–111)
CO2: 24 MEQ/L (ref 23–33)
CREAT SERPL-MCNC: 1.2 MG/DL (ref 0.4–1.2)
ERYTHROCYTE [DISTWIDTH] IN BLOOD BY AUTOMATED COUNT: 14.3 % (ref 11.5–14.5)
ERYTHROCYTE [DISTWIDTH] IN BLOOD BY AUTOMATED COUNT: 57.1 FL (ref 35–45)
GFR SERPL CREATININE-BSD FRML MDRD: 62 ML/MIN/1.73M2
GLUCOSE BLD-MCNC: 99 MG/DL (ref 70–108)
HCT VFR BLD CALC: 31.2 % (ref 42–52)
HEMOGLOBIN: 10.3 GM/DL (ref 14–18)
MCH RBC QN AUTO: 36 PG (ref 26–33)
MCHC RBC AUTO-ENTMCNC: 33 GM/DL (ref 32.2–35.5)
MCV RBC AUTO: 109.1 FL (ref 80–94)
PLATELET # BLD: 276 THOU/MM3 (ref 130–400)
PMV BLD AUTO: 9.3 FL (ref 9.4–12.4)
POTASSIUM SERPL-SCNC: 4.3 MEQ/L (ref 3.5–5.2)
RBC # BLD: 2.86 MILL/MM3 (ref 4.7–6.1)
SODIUM BLD-SCNC: 140 MEQ/L (ref 135–145)
WBC # BLD: 5.5 THOU/MM3 (ref 4.8–10.8)

## 2020-08-31 PROCEDURE — 6370000000 HC RX 637 (ALT 250 FOR IP): Performed by: SURGERY

## 2020-08-31 PROCEDURE — 97530 THERAPEUTIC ACTIVITIES: CPT

## 2020-08-31 PROCEDURE — 85027 COMPLETE CBC AUTOMATED: CPT

## 2020-08-31 PROCEDURE — 2580000003 HC RX 258: Performed by: SURGERY

## 2020-08-31 PROCEDURE — 97110 THERAPEUTIC EXERCISES: CPT

## 2020-08-31 PROCEDURE — 94760 N-INVAS EAR/PLS OXIMETRY 1: CPT

## 2020-08-31 PROCEDURE — 1180000000 HC REHAB R&B

## 2020-08-31 PROCEDURE — 97130 THER IVNTJ EA ADDL 15 MIN: CPT

## 2020-08-31 PROCEDURE — 97116 GAIT TRAINING THERAPY: CPT

## 2020-08-31 PROCEDURE — 36415 COLL VENOUS BLD VENIPUNCTURE: CPT

## 2020-08-31 PROCEDURE — 97112 NEUROMUSCULAR REEDUCATION: CPT

## 2020-08-31 PROCEDURE — 80048 BASIC METABOLIC PNL TOTAL CA: CPT

## 2020-08-31 PROCEDURE — 6370000000 HC RX 637 (ALT 250 FOR IP): Performed by: PHYSICAL MEDICINE & REHABILITATION

## 2020-08-31 PROCEDURE — 97129 THER IVNTJ 1ST 15 MIN: CPT

## 2020-08-31 PROCEDURE — 97535 SELF CARE MNGMENT TRAINING: CPT

## 2020-08-31 RX ADMIN — CARBAMAZEPINE 200 MG: 200 TABLET ORAL at 07:48

## 2020-08-31 RX ADMIN — Medication 1000 MCG: at 07:48

## 2020-08-31 RX ADMIN — DOCUSATE SODIUM 100 MG: 100 CAPSULE, LIQUID FILLED ORAL at 07:48

## 2020-08-31 RX ADMIN — CARBAMAZEPINE 200 MG: 200 TABLET ORAL at 21:05

## 2020-08-31 RX ADMIN — FOLIC ACID 1 MG: 1 TABLET ORAL at 07:48

## 2020-08-31 RX ADMIN — ACETAMINOPHEN 500 MG: 500 TABLET ORAL at 21:05

## 2020-08-31 RX ADMIN — DIPHENHYDRAMINE HCL 25 MG: 25 TABLET ORAL at 21:05

## 2020-08-31 RX ADMIN — Medication 10 ML: at 21:06

## 2020-08-31 RX ADMIN — SENNOSIDES 8.6 MG: 8.6 TABLET, FILM COATED ORAL at 21:05

## 2020-08-31 RX ADMIN — QUETIAPINE FUMARATE 400 MG: 400 TABLET ORAL at 07:48

## 2020-08-31 RX ADMIN — QUETIAPINE FUMARATE 400 MG: 400 TABLET ORAL at 21:05

## 2020-08-31 RX ADMIN — THERA TABS 1 TABLET: TAB at 07:48

## 2020-08-31 RX ADMIN — Medication 10 ML: at 07:49

## 2020-08-31 RX ADMIN — PANTOPRAZOLE SODIUM 40 MG: 40 TABLET, DELAYED RELEASE ORAL at 06:22

## 2020-08-31 RX ADMIN — AMLODIPINE BESYLATE 10 MG: 10 TABLET ORAL at 07:48

## 2020-08-31 RX ADMIN — FLUOXETINE HYDROCHLORIDE 60 MG: 20 CAPSULE ORAL at 21:05

## 2020-08-31 RX ADMIN — Medication 100 MG: at 07:48

## 2020-08-31 ASSESSMENT — ENCOUNTER SYMPTOMS
NAUSEA: 0
TROUBLE SWALLOWING: 0
DIARRHEA: 0
CONSTIPATION: 0
VOICE CHANGE: 0
ALLERGIC/IMMUNOLOGIC NEGATIVE: 1
EYES NEGATIVE: 1
COUGH: 0
SHORTNESS OF BREATH: 0

## 2020-08-31 ASSESSMENT — PAIN DESCRIPTION - ORIENTATION
ORIENTATION: LOWER

## 2020-08-31 ASSESSMENT — PAIN DESCRIPTION - ONSET
ONSET: ON-GOING

## 2020-08-31 ASSESSMENT — PAIN DESCRIPTION - PROGRESSION
CLINICAL_PROGRESSION: NOT CHANGED

## 2020-08-31 ASSESSMENT — PAIN - FUNCTIONAL ASSESSMENT
PAIN_FUNCTIONAL_ASSESSMENT: PREVENTS OR INTERFERES SOME ACTIVE ACTIVITIES AND ADLS

## 2020-08-31 ASSESSMENT — PAIN SCALES - GENERAL
PAINLEVEL_OUTOF10: 5
PAINLEVEL_OUTOF10: 7
PAINLEVEL_OUTOF10: 6

## 2020-08-31 ASSESSMENT — PAIN DESCRIPTION - LOCATION
LOCATION: BACK

## 2020-08-31 ASSESSMENT — PAIN DESCRIPTION - PAIN TYPE
TYPE: ACUTE PAIN

## 2020-08-31 ASSESSMENT — PAIN DESCRIPTION - DESCRIPTORS
DESCRIPTORS: ACHING

## 2020-08-31 ASSESSMENT — PAIN DESCRIPTION - FREQUENCY
FREQUENCY: INTERMITTENT

## 2020-08-31 NOTE — PROGRESS NOTES
[facility]  Diagnosis List for Inpatient Rehab facility (IRF) - Patient Assessment Instrument (ROSY)    Patient Name: Jacey Alfonso        MRN: [id number]    : 1960  (60 y.o.)  Gender: male     Primary impairment requiring rehabilitation: 2.22 Traumatic, Closed brain injury     Etiologic Diagnosis that led to the condition: Mild traumatic brain injury without loss of consciousness. Comorbid conditions affecting rehabilitation:  1. Frequent falls. 2. Gait instability with a Tinetti score of 8/28. 3. Left scalp hematoma. 4. 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 5. Moderate cognitive impairment. 6. Acute alcohol intoxication with admission blood alcohol level of 0.08.  7. General cerebral atrophy and small vessel ischemic changes with prominent ventricles consistent with central atrophy. 8. Cervical and thoracic spine pain. 9. T12 burst fracture without instability initially noted on a lumbar spine x-ray on 2020. 10. Hyponatremia. 11. Hypomagnesemia  12. *Acute kidney injury. 13. Alcohol abuse. 14. Vitamin B12 deficiency with chronic macrocytic anemia. .  15. Recent acute, comminuted, minimally displaced, T-shaped fracture of the head of the right proximal phalanx of the great toe on 2020. 16. Hypertension. 17. Hyperlipidemia. 18. Remote history of cocaine drug abuse. 19. Cervical spondylosis with C3-C4 moderate to severe right and severe left neuroforaminal stenosis, moderate to severe left and severe right neuroforaminal stenosis at C4-C5, moderate right and severe left neuroforaminal stenosis at C5-C6, and moderate right and mild left neuroforaminal stenosis at C6-C7.  20. Lumbar spondylosis with mild degenerative changes most pronounced at L3-L4 with moderate spinal canal stenosis and mild to moderate bilateral neuroforaminal stenosis. 21. Chronic macrocytic, hyperchromic anemia. 22. CKD 2.  23. History of depression.   24. Bipolar 1 disorder. 25. GERD. 26. Current everyday smoker with a 40-pack-year history. 27. Orthostatic hypotension. 28. Right medial hip adductor pain. 29. Chronic neuropathy of both feet.     Isaias Francis MD

## 2020-08-31 NOTE — PLAN OF CARE
Problem: Pain:  Goal: Pain level will decrease  Description: Pain level will decrease  Outcome: Ongoing     Problem: Anxiety:  Goal: Level of anxiety will decrease  Description: Level of anxiety will decrease  Outcome: Ongoing  Note: Patient calm and cooperative throughout shift. Problem: Skin Integrity:  Goal: Absence of new skin breakdown  Description: Absence of new skin breakdown  Outcome: Ongoing  Note: Patient to remain free from new skin breakdown. Patient encouraged to turn and reposition every two hours. Problem: Falls - Risk of:  Goal: Will remain free from falls  Description: Will remain free from falls  Outcome: Ongoing  Note: Patient to remain free from falls. Bed and chair alarms in use at all times. Patient ambulates with front wheel walker and gait belt. Patient alert, oriented, and able to use call light appropriately in advance of needs. Patient with tele-sitter in room for impulsiveness.

## 2020-08-31 NOTE — PROGRESS NOTES
[facility]  INPATIENT PHYSICAL THERAPY  DAILY NOTE  [address] [facility] - 7E-70/070-A    Time In: 1400  Time Out: 1430  Timed Code Treatment Minutes: 30 Minutes  Minutes: 30          Date: 2020  Patient Name: Linh Hawk,  Gender:  male        MRN: [id number]  : 1960  (60 y.o.)     Referring Practitioner: Dr. Saray Mayen     Additional Pertinent Hx: T12 compression fracture s/p fall, chronic alcohol abuse, drug abuse, recurrent falls, neck pain, scalp hematoma     Prior Level of Function:  Lives With: Other (comment)(Mother)  Type of Home: House  Home Layout: One level  Home Access: Stairs to enter without rails  Entrance Stairs - Number of Steps: 1  Home Equipment: Standard walker   Bathroom Shower/Tub: Tub/Shower unit, Shower chair with back  Bathroom Toilet: Standard  Bathroom Equipment: Grab bars in shower(not in great shape per pt. plastic grab bars.)    ADL Assistance: Independent  Ambulation Assistance: Independent  Transfer Assistance: Independent  Active : Yes  Additional Comments: Pt stated he was \"experimenting\" with using the walker prior to admission. Pt reports mother does most of the inside chores, Pt is responsible for outside chores and yardwork. Restrictions/Precautions:  Restrictions/Precautions: General Precautions, Fall Risk  Required Braces or Orthoses  Spinal: Thoracic Lumbar Sacral Orthotics(May don in sitting)  Position Activity Restriction  Spinal Precautions: No Bending, No Lifting, No Twisting  Other position/activity restrictions: TLSO brace ordered for T12 fracture. Will need to wear this when up. May have it off when in bed. SUBJECTIVE: Pt. Seated in arm chair in therapy gym upon arrival. Pt. Pleasant and agreeable to therapy session. PAIN: None indicated    OBJECTIVE:  Transfers:  Sit to Stand: Stand By Assistance  Stand to Sit:Stand By Assistance, with verbal cues for proper foot placement.      Ambulation:  Stand

## 2020-08-31 NOTE — PROGRESS NOTES
[facility]  Recreational Therapy  Daily Note  [address]    Time Spent with Patient: 15 minutes    Date:  8/31/2020       Patient Name: Mor Boudreaux      MRN: [id number]      YOB: 1960 (60 y.o.)       Gender: male  Diagnosis: Burst fracture T 12 with routine healing  Referring Practitioner: Dr. Ivett Zamudio    RESTRICTIONS/PRECAUTIONS:  Restrictions/Precautions: General Precautions, Fall Risk  Vision: Impaired  Hearing: Within functional limits    PAIN: no number given-states he is having a little more pain than usual today but they will only give him tylenol and that does not help at all so he would rather not take it    SUBJECTIVE:  My daughter came in over the weekend     OBJECTIVE:  Upon arrival pt still eating his breakfast-states his daughter brought in his tablet and visited over the weekend which he enjoyed-explained our visitor policy to him -states he would be interested in playing euchre with peers some time this week-his music on his tablet has helped him a lot over [employer] contact given          Patient Education  New Education Provided: Importance of Leisure,     Electronically signed by: NOAH Mcwilliams  Date: 8/31/2020

## 2020-08-31 NOTE — PROGRESS NOTES
Pivot:Stand By Assistance    Ambulation:  Stand By Assistance, Contact Guard Assistance  Distance: 150' x 2, multiple shorter distances. Surface: Level Tile  Device:Rolling Walker  Gait Deviations: Forward Flexed Posture, Slow Dedra, Decreased Step Length Bilaterally, Decreased Weight Shift Bilaterally, Decreased Gait Speed, Decreased Heel Strike Bilaterally and Decreased Terminal Knee Extension    Stairs:  Stairs:  6\" steps. X 4 using One Handrail and Contact Guard Assistance, with verbal cues . Platform:  6\" platform X 1 using Rolling Walker and Contact Guard Assistance, with verbal cues . Exercise:  Patient was guided in 1 set(s) 10 reps of exercise to both lower extremities. Glut sets, Quad sets, Heelslides, Hip abduction/adduction, Seated marches, Seated heel/toe raises, Long arc quads and Seated isometric hip adduction. Exercises were completed for increased independence with functional mobility. Functional Outcome Measures: Completed        ASSESSMENT:  Assessment: Patient progressing toward established goals,meeting 2 short term goals. Limitation noted yet with safety with transfers requiring SBA, decreased balance noted by 16/28 on Tinetti and need for CGA with gait with use of RW. Pt will benefit from continued skilled PT to further advance in these areas for improved safety and overall functional mobility  Activity Tolerance:  Patient tolerance of  treatment: good. Equipment Recommendations:Equipment Needed: Yes(Pt has SW.   Will need RW)  Discharge Recommendations:  Continue to assess pending progress, Patient would benefit from continued therapy after discharge    Plan: Times per week: 5x/wk 90 min, 1x/wk 30 min  Current Treatment Recommendations: Strengthening, Functional Mobility Training, Balance Training, Equipment Evaluation, Education, & procurement, Gait Training, Stair training, Endurance Training, Transfer Training    Patient Education  Patient Education: Plan of Care, Precautions/Restrictions, Bed Mobility, Transfers, Gait, Stairs, Verbal Exercise Instruction    Goals:  Patient goals : get my dizziness better  Short term goals  Time Frame for Short term goals: 1 wk  Short term goal 1: bed mobility with Mod I, to get in/out of bed, use log roll technique  NOT MET  Short term goal 2: transfer with SBA to get in/out of chairs  GOAL MET, SEE LTG  Short term goal 3: amb >= 50''x1 with RW and SBA to progress to home and community mobility  GOAL NOT MET  Short term goal 4: Pt to ascend/descend 6\" step with RW, CGA, for home entry. GOAL MET, SEE LTG  Short term goal 5: Pt to score >= 20/28 on Tinetti; perform TUG <= 45 sec, indicating improved balance. GOAL NOT MET  Long term goals  Time Frame for Long term goals : 3 wks  Long term goal 1: transfer with Mod I, to get in/out of chairs  NOT MET  Long term goal 2: amb >= 150''x1 with RW and Mod I, for home and community mobility  Long term goal 3: Pt to ascend/descend 6\" step with RW, Mod I, for home entry. NOT MET  Long term goal 4: Pt to score >= 24/28 on Tinetti; perform TUG ,<= 30 sec, indicating improved balance. Long term goal 5: Pt to ascend/descend 4+ steps junior rails, Mod I, for community mobility  NOT MET    Following session, patient left in safe position with all fall risk precautions in place. Treatment session and note completed by Cresencio Weiner PTA.   Assessment and goal revision of Progress Note completed by Janki Mack, PT.

## 2020-08-31 NOTE — ED PROVIDER NOTES
[place] ENCOUNTER          Pt Name: Brayan Gonzales  MRN: [id number]  [place] 1960  Date of evaluation: 8/24/2020  Emergency Physician: DEBRA Davis CNP    CHIEF COMPLAINT       Chief Complaint   Patient presents with    Fall     History obtained from child and the patient. HISTORY OF PRESENT ILLNESS    The history is provided by the patient and a relative. Brayan Gonzales is a 60 y.o. male who presents to the emergency department for evaluation of recurrent falls and dizziness. Patient reportedly fell earlier in the week he fell and has had some mild back pain since then. Then tonight he fell in the garage and struck the anterior portion of his head. His daughter states that he is a daily drinker. Patient reportedly had a positive loss of consciousness. His daughter states that he has been evaluated a couple of different times for these falls she has increasing concern about the worsening dizziness that the patient complains about. Denies chest pain, shortness of breath. Denies blood thinner use. His daughter states that the way he has been acting lately-- the dizziness, forgetfulness, confusion, agitation--is not what the patient normally acts like when he is drinking. The patient has no other acute complaints at this time. REVIEW OF SYSTEMS   Review of Systems   Constitutional: Negative for chills, fatigue and fever. HENT: Negative for congestion, ear discharge, ear pain, postnasal drip and rhinorrhea. Respiratory: Negative for cough and chest tightness. Gastrointestinal: Negative for abdominal pain and nausea. Genitourinary: Negative for difficulty urinating, dysuria, enuresis, flank pain and hematuria. Musculoskeletal: Positive for arthralgias, back pain and myalgias. Negative for joint swelling. Skin: Positive for wound.    Neurological: Positive for dizziness, weakness and Relation Age of Onset    Heart Disease Mother     COPD Father     Heart Disease Father          PHYSICAL EXAM     ED Triage Vitals [08/24/20 1729]   BP Temp Temp Source Pulse Resp SpO2 Height Weight   (!) 106/94 98.3 °F (36.8 °C) Oral 78 15 98 % 6' 4\" (1.93 m) 235 lb (106.6 kg)     Initial vital signs and nursing assessment reviewed and normal. Pulsoximetry is normal per my interpretation. Additional Vital Signs:  Vitals:    08/27/20 1225   BP: 110/77   Pulse: 78   Resp: 18   Temp: 97.8 °F (36.6 °C)   SpO2: 97%       Physical Exam  Vitals signs and nursing note reviewed. Constitutional:       General: He is not in acute distress. Appearance: He is well-developed. He is not diaphoretic. Interventions: Cervical collar and backboard in place. Comments: Backboard removed without any difficulty. C-collar left in place   HENT:      Head: Normocephalic. Eyes:      General:         Right eye: No discharge. Left eye: No discharge. Conjunctiva/sclera: Conjunctivae normal.   Neck:      Musculoskeletal: Muscular tenderness (C6-C7) present. Trachea: No tracheal deviation. Cardiovascular:      Rate and Rhythm: Normal rate and regular rhythm. Heart sounds: Normal heart sounds. No murmur. No gallop. Comments: Normal capillary refill  Pulmonary:      Effort: Pulmonary effort is normal. No respiratory distress. Breath sounds: Normal breath sounds. No stridor. Abdominal:      General: Bowel sounds are normal.      Palpations: Abdomen is soft. Musculoskeletal: Normal range of motion. General: No deformity. Lumbar back: He exhibits tenderness. Back:    Skin:     General: Skin is warm and dry. Coloration: Skin is not pale. Findings: No erythema or rash. Neurological:      Mental Status: He is alert and oriented to person, place, and time. Cranial Nerves: No cranial nerve deficit.    Psychiatric:         Behavior: Behavior normal. (*)     All other components within normal limits   MAGNESIUM - Abnormal; Notable for the following components:    Magnesium 1.5 (*)     All other components within normal limits   OSMOLALITY - Abnormal; Notable for the following components:    Osmolality Calc 263.6 (*)     All other components within normal limits   GLOMERULAR FILTRATION RATE, ESTIMATED - Abnormal; Notable for the following components:    Est, Glom Filt Rate 56 (*)     All other components within normal limits   COMPREHENSIVE METABOLIC PANEL W/ REFLEX TO MG FOR LOW K - Abnormal; Notable for the following components:    CREATININE 1.3 (*)     Sodium 134 (*)     Calcium 8.4 (*)     Total Protein 5.7 (*)     ALT 7 (*)     All other components within normal limits   CBC WITH AUTO DIFFERENTIAL - Abnormal; Notable for the following components:    WBC 4.6 (*)     RBC 2.74 (*)     Hemoglobin 10.0 (*)     Hematocrit 29.7 (*)     .4 (*)     MCH 36.5 (*)     RDW-SD 54.1 (*)     Monocytes Absolute 0.3 (*)     All other components within normal limits   VITAMIN B12 & FOLATE - Abnormal; Notable for the following components:    Vitamin B-12 < 150 (*)     All other components within normal limits   GLOMERULAR FILTRATION RATE, ESTIMATED - Abnormal; Notable for the following components:    Est, Glom Filt Rate 56 (*)     All other components within normal limits   MAGNESIUM - Abnormal; Notable for the following components:    Magnesium 1.5 (*)     All other components within normal limits   CBC - Abnormal; Notable for the following components:    WBC 4.1 (*)     RBC 2.82 (*)     Hemoglobin 10.1 (*)     Hematocrit 31.4 (*)     .3 (*)     MCH 35.8 (*)     RDW-SD 55.8 (*)     MPV 9.3 (*)     All other components within normal limits   GLOMERULAR FILTRATION RATE, ESTIMATED - Abnormal; Notable for the following components:    Est, Glom Filt Rate 68 (*)     All other components within normal limits   TROPONIN   LIPASE   PROTIME-INR   APTT   URINE RT REFLEX TO CULTURE   URINE DRUG SCREEN   ETHANOL   ANION GAP   ANION GAP   BASIC METABOLIC PANEL   MAGNESIUM   ANION GAP       Radiologic studies results:  RADIOLOGY REPORT   Final Result      MRI LUMBAR SPINE WO CONTRAST   Final Result       1. Redemonstration of T12 burst injury with mild anterior longitudinal ligament injury without evidence of instability. 2. Congenital spinal canal narrowing with superimposed mild degenerative changes most pronounced at L3-4 where there is moderate spinal canal stenosis and mild to moderate bilateral neural foraminal stenosis. **This report has been created using voice recognition software. It may contain minor errors which are inherent in voice recognition technology. **      Final report electronically signed by Dr. Brayden Friedman MD on 8/25/2020 11:54 AM      MRI THORACIC SPINE WO CONTRAST   Final Result    Redemonstration of acute burst injury of the T12 vertebral body without evidence of instability. There is no significant spinal canal or neuroforaminal stenosis throughout the thoracic spine. **This report has been created using voice recognition software. It may contain minor errors which are inherent in voice recognition technology. **      Final report electronically signed by Dr. Brayden Friedman MD on 8/25/2020 11:37 AM      MRI CERVICAL SPINE WO CONTRAST   Final Result    Multilevel degenerative changes of the cervical spine with multiple areas of mild spinal canal stenosis and up to severe neuroforaminal stenosis. Please refer to the body of the report for segmental analysis. **This report has been created using voice recognition software. It may contain minor errors which are inherent in voice recognition technology. **      Final report electronically signed by Dr. Brayden Friedman MD on 8/25/2020 11:25 AM      XR CHEST 1 VIEW   Final Result      No acute findings.             **This report has been created using voice recognition software. It may contain minor errors which are inherent in voice recognition technology. **      Final report electronically signed by Dr. Pavel Arriaga on 8/24/2020 6:44 PM      CT HEAD WO CONTRAST   Final Result   Large frontal scalp hematoma on the left. No acute intracranial findings. **This report has been created using voice recognition software. It may contain minor errors which are inherent in voice recognition technology. **      Final report electronically signed by Dr. Pavel Arriaga on 8/24/2020 6:39 PM      CT CERVICAL SPINE WO CONTRAST   Final Result   No acute fracture or subluxation. **This report has been created using voice recognition software. It may contain minor errors which are inherent in voice recognition technology. **      Final report electronically signed by Dr. Pavel Arriaga on 8/24/2020 6:47 PM      CT THORACIC SPINE WO CONTRAST   Final Result    IMPRESSION:   T12 vertebral body fracture. Fracture involves the majority of the vertebral body. If neurologic changes are suspected consider thoracic MRI for further evaluation. **This report has been created using voice recognition software. It may contain minor errors which are inherent in voice recognition technology. **      Final report electronically signed by Dr. Pavel Arriaga on 8/24/2020 6:53 PM      CT LUMBAR SPINE WO CONTRAST   Final Result      No acute fracture of the lumbar spine. T12 vertebral body fracture as reported on thoracic spine CT. **This report has been created using voice recognition software. It may contain minor errors which are inherent in voice recognition technology. **      Final report electronically signed by Dr. Pavel Arriaga on 8/24/2020 6:56 PM          ED Medications administered this visit:   Medications   acetaminophen (TYLENOL) tablet 650 mg (650 mg Oral Given 8/24/20 1556)   cyanocobalamin injection 1,000 mcg (1,000 mcg Intramuscular Given 8/25/20 1401)   LORazepam (ATIVAN) injection 1 mg (1 mg Intravenous Given 8/25/20 1719)   haloperidol lactate (HALDOL) injection 5 mg (5 mg Intramuscular Given 8/25/20 1859)         ED COURSE     ED Course as of Aug 30 2038   Mon Aug 24, 2020   1907 Spoke to Dr. Teri Cuellar about the T12 fracture. Will have trauma admit for further evaluation and he will get MRI's.      [KJ]      ED Course User Index  [KJ] DEBRA Davis CNP                     Strict return precautions and follow up instructions were discussed with the patient prior to discharge, with which the patient agrees. Physical assessment findings, diagnostic testing(s) if applicable, and vital signs reviewed with patient/patient representative. Questions answered. Medications asdirected, including OTC medications for supportive care. Education provided on medications. Differential diagnosis(s) discussed with patient/patient representative. Home care/self care instructions reviewed withpatient/patient representative. Patient is to follow-up with family care provider in 2-3 days if no improvement. Patient is to go to the emergency department if symptoms worsen. Patient/patient representative isaware of care plan, questions answered, verbalizes understanding and is in agreement. MEDICATION CHANGES     Discharge Medication List as of 8/27/2020 12:41 PM            FINAL DISPOSITION     Final diagnoses:   Closed head injury, initial encounter   Multiple falls   Hematoma of scalp, initial encounter   Closed fracture of twelfth thoracic vertebra, unspecified fracture morphology, initial encounter [facility])     DISPOSITION Admitted 08/24/2020 08:19:06 PM      DEBRA Arizmendi - CNP  [address]  [place] [phone]          DISCHARGE MEDICATIONS:  Discharge Medication List as of 8/27/2020 12:41 PM          Condition: condition: fair  Dispo: Admit to med/surg floor      This transcription was electronically signed.  Parts of this transcriptions may have been dictated by use of voice recognition software and electronically transcribed, and parts may have been transcribed with the assistance of an ED scribe. The transcription may contain errors not detected in proofreading. Please refer to my supervising physician's documentation if my documentation differs.     Electronically Signed: Glenys Goldberg, 08/30/20, 8:38 PM            Glenys Goldberg, DEBRA - CNP  08/30/20 0976

## 2020-08-31 NOTE — PROGRESS NOTES
[address] [facility]  [address] [facility]  PROGRESS NOTE    TIME   SLP Individual Minutes  Time In: 1430  Time Out: 1500  Minutes: 30  Timed Code Treatment Minutes: 30 Minutes       Date: 2020  Patient Name: Randolph Rosales      CSN: [id number]   : 1960  (60 y.o.)  Gender: male   Referring Physician:  Dr. Makeda Arnett   Diagnosis: Burst fracture of T12 vertebra with routine healing   Secondary Diagnosis: Cognitive impairments  Precautions: Fall Risk; Impulsivity   Current Diet: General diet with thin liquids   Swallowing Strategies: Standard Universal Swallow Precautions  Date of Last MBS: Not Applicable    Pain:   in back; RN aware    Subjective:  Pt sitting upright in recliner for duration of session. Required multiple thought processing cues to participate within tasks, as pt continuously stated \"I'm going blank\". Towards end of session, pt inquired about discharge date, stating \"I really hope I can stay here longer\". ST educated pt on topics to be discussed during team conference tomorrow, and explained that a discharge date will be chosen then based on progress. Pt added, \"I just think if I go home too fast I will be tempted to get into alcohol again\". ST suggested receiving assistance with his hx of alcohol abuse; pt verbalized agreement. Plan to address in team conference tomorrow with Maria Fernanda Mock to determine needs for additional resources. Short-Term Goals:  SHORT TERM GOAL #1:  Goal 1: Pt will complete immediate/delayed recall and working memory tasks with 70% accuracy given mod cues to improve retention of new and functional information. - GOAL MET  REVISED GOAL: Pt will complete immediate/delayed recall and working memory tasks with 90% accuracy given mod cues to improve retention of new and functional information.   INTERVENTIONS: Recall PTA's name - indep    Completed x1 additional entry into memory log for improved short term recall. Required MAX cues to determine appropriate information to write down, demonstrating very slow processing speed and reflective thinking. AM SESSION:   Pt reporting daughter Yakov Lin present yesterday and brought him to floor 5K in attempt to \"remember where this all started\" (I.e., hospital stay). Pt stated Yakov Lin began writing things down to assist in his memory, and then wanted him to continue writing things down by starting a journal. Pt informed ST of journal placement (in red notebook on tray table), and ST spent initial 15 minutes of session discussing benefits of utilizing this journal for improved recall. ST encouraged pt to write down the date each day with 3-5 units of information such as how he felt, who visited, or what he did. Pt highly receptive and listed x3 appropriate items in journal independently. ST then assisted in set up of journal for next x3 days. Recommend continued use of journal as a \"memory log\". PREVIOUS SESSION:   Functional recall x3 grocery items, generated by patient; ST provided patient with pen/paper, cued patient to write x3 grocery items of patient's choice to place on list for mother to get from the store. Patient demonstrated with need for additional time to generate x3 items. Following eventual generation of items patient indep wrote 3/3 items via pen/paper. ST cued patinet to recall items throughout session using the written list ONLY as needed.   Immediate recall: 3/3 indep without list   15 minute delay: 3/3 indep without list   30 minute delay: 3/3 indep without list     Working memory: Patient presented with x3 single digit numbers via auditory presentation, cued patient to immediately repeat numbers in reverse order: 5/10 indep, 4/10 mod cues (repetition 1-2x), 1/10 max cues (repetition x3)     SHORT TERM GOAL #2:  Goal 2: Pt will complete sustained, selective, and divided attention tasks with no more than 3 errors within a given task in order to improve participation in treatment and permit eventual return to driving. - GOAL NOT MET, CONTINUE   INTERVENTIONS: Not addressed d/t focus on other goals. PREVIOUS SESSION:  Task 1: Patient presented with visual stimulus sheet of spaced out single digit numbers (#4); cued to identify \"all the even numbers\" while listening to music of patient's personal interest: x0 errors, x2 redirections to continue task, 5 minutes      Task 2: Patient presented with visual stimulus sheet of multiple/close single digit numbers (#6); cued to identify \"all the odd numbers\" while listening to music of patient's personal interest: x17 errors, x8-10 redirections to continue task, 25 minutes        SHORT TERM GOAL #3:  Goal 3: Pt will complete functional reasoning and thought organizational tasks with 70% accuracy given mod cues to improve overall executive functioning skills. - GOAL MET  REVISED GOAL: Pt will complete functional reasoning and thought organizational tasks with 90% accuracy given mod cues to improve overall executive functioning skills. INTERVENTIONS: Verbal reasoning task; describing solutions to household problems -  5/10 indep, 2/10 min cues, 1/10 mod cues, 2/10 max cues  *decreased detailed verbal expression in conjunction with processing speed, leading to pt often \"drawing a blank\"    SHORT TERM GOAL #4:  Goal 4: Pt will complete functional problem solving and sequencing tasks with 70% accuracy given mod cues to improve ability to make successful return to IADL's (medication management, finances, etc.). - GOAL MET  REVISED GOAL: Pt will complete functional problem solving and sequencing tasks with 90% accuracy given mod cues to improve ability to make successful return to IADL's (medication management, finances, etc.).   INTERVENTIONS: Prioritizing household problems by order of importance - 8/10 indep, 1/10 min cues, 1/10 mod cues  *good problem solving skills overall    AM SESSION:   Time management word problems - 9/10 indep, 1/10 min cues   *good sustained attention to task and use of self talk  *good mental math configuration overall       Long-Term Goals:  Timeframe for Long-term Goals: 3 weeks    LONG TERM GOAL #1:  Goal 1: Patient will improve cognitive functioning to a min assist level to increase level of independence with ADL and iADL tasks in the home environment and reduce caregiver burden. GOAL NOT MET, CONTINUE       Comprehension: 4 - Patient understands basic needs 75-90%+ of the time  Expression: 5 - Expresses basic ideas/needs only (hungry/hot/pain)  Social Interaction: 5 - Patient is appropriate with supervision/cues  Problem Solving: 3 - Patient solves simple/routine tasks 50%-74%  Memory: 2 - Patient remembers 25%-49% of the time    ST FIM ASSESSMENT:     Admission score Current score Goal Status   COMMUNICATION 4 - Patient understands basic needs 75-90%+ of the time   4 - Patient understands basic needs 75-90%+ of the time   Stable   EXPRESSION 5 - Expresses basic ideas/needs only (hungry/hot/pain)     5 - Expresses basic ideas/needs only (hungry/hot/pain)   Stable   SOCIAL INTERACTION 4 - Patient appropriate 75-90%+ of the time   5 - Patient is appropriate with supervision/cues   Progressing   PROBLEM SOLVING 2 - Patient solves simple/routine tasks 25%-49%   3 - Patient solves simple/routine tasks 50%-74%   Progressing   MEMORY 2 - Patient remembers 25%-49% of the time   2 - Patient remembers 25%-49% of the time   Stable       EDUCATION:  Learner: Patient  Education:  Reviewed ST goals and Plan of Care, Reviewed recommendations for follow-up, Education Related to [employer] and Wellness and Home Safety Education  Evaluation of Education: Verbalizes understanding and Needs further instruction    ASSESSMENT/PLAN:  SUMMARY:  Pt has met 4/5 STG's and 0/1 LTG's this therapy period.  Pt continues to present with a moderate cognitive impairment characterized by most recent score of 14/30 on the [facility] [place] [facility] as well as deficits in orientation, immediate/delayed recall, working memory, problem solving, verbal reasoning, thought organization, attention, and complex sequencing. Improvements made in time management skills, immediate recall, and functional problem solving thus far. Insight into deficits also beginning to improve, as pt realizing needs for use of strategies and compensatory aids. Have introduced 'memory log' in which pt reports enjoying, as he continues to perseverate on having no recall of admission to hospital. Expressive and receptive language skills remain relatively intact and/or suspsected to be at baseline, as pt does require additional time to process information intermittently. No s/s of dysarthria, dysphonia/aphonia, or dysphagia. Pt will require continued support in home setting, with additional recommendations for refraining from driving at this time. Continued skilled ST services are highly recommended at this time to improve the aforementioned cognitive deficits and permit potential return to PLOF. Activity Tolerance:  Patient tolerance of treatment: good. Assessment/Plan: Patient progressing toward established goals. Continues to require skilled care of licensed speech pathologist to progress toward achievement of established goals and plan of care.      Plan for Next Session: attention, money management, recall     Toña Ulloa M.S. [address]

## 2020-08-31 NOTE — PROGRESS NOTES
[address] NOTE    Conference Date: 2020  Admit Date:  2020  1:20 PM  Patient Name: Samina Billy    MRN: [id number]    : 1960  (60 y.o.)  Rehabilitation Admitting Diagnosis:  Burst fracture of T12 vertebra with routine healing [S22.081D]  Referring Practitioner: Dr. Rose Mccain issues/needs regarding patient and family discharge status: SW met with patient to introduce self and explain role, complete SW assessment, and initiate discharge planning. Prior to hospitalization, patient indicates independence with ADL's and personal care. Patient lives with mother, Jesus James. Patient reports mother, Jesus James, is 80years old and \"gets around better than me\". Patient reports mother, Jesus James, does not use any medical equipment for ambulation. Mother, Jesus James, is able to drive, complete errands, manage housekeeping, laundry, and meal preparation. Patient's mother, Jesus James, is his payee, therefore, managing all of his finances. Patient is able to drive. Patient does not work and has been on disability for approxinately 11 years. Patient reports that  (mental health), Margarita Duarte, arranges his medications in a pill box on a weekly basis, but patient uses [address] for medications. Patient attends group therapy daily, Monday through Friday. Patient reports history of bipolar depression. Patient has been a client with [facility] for approximately 10 years. Patient reports being clean from crack cocaine for 10 years, but has been drinking for 45 years. Patient verbalizes no interest in quitting his drinking. Patient drinks four/five days a week, drinking one pint of Vodka each time. SW discussed options for medications to reduce urges for alcohol, but patient declines, \"I want to be able to have a drink when I want to\".  SW discussed with patient current consequences from drinking that include current hospitalization. Patient indicates this consequence has been \"the worst yet\" from drinking. Patient appears to have poor insight related to drinking and consequences from drinking. Patient even discussed son, Shane Ray, passing away two years ago from an overdose and \"wishing he would have listened to me to stop\". Anticipate patient would benefit from continued therapy upon discharge. SW reviewed with patient team conference on Tuesday, 09/01/2020, to further discuss progress and discharge recommendations. SW to follow and maintain involvement in discharge planning. PHYSICAL THERAPY    Assessment: Patient progressing toward established goals,meeting 2 short term goals. Limitation noted yet with safety with transfers requiring SBA, decreased balance noted by 16/28 on [place] and need for CGA with gait with use of RW. Pt will benefit from continued skilled PT to further advance in these areas for improved safety and overall functional mobility  Equipment Needed: Yes(Pt has SW. Will need RW)    SPEECH THERAPY  Pt has met 4/5 STG's and 0/1 LTG's this therapy period. Pt continues to present with a moderate cognitive impairment characterized by most recent score of 14/30 on the [facility] [place] [facility] as well as deficits in orientation, immediate/delayed recall, working memory, problem solving, verbal reasoning, thought organization, attention, and complex sequencing. Improvements made in time management skills, immediate recall, and functional problem solving thus far. Insight into deficits also beginning to improve, as pt realizing needs for use of strategies and compensatory aids. Have introduced 'memory log' in which pt reports enjoying, as he continues to perseverate on having no recall of admission to hospital. Expressive and receptive language skills remain relatively intact and/or suspsected to be at baseline, as pt does require additional time to process information intermittently.  No s/s of dysarthria, dysphonia/aphonia, or dysphagia. Pt will require continued support in home setting, with additional recommendations for refraining from driving at this time. Continued skilled ST services are highly recommended at this time to improve the aforementioned cognitive deficits and permit potential return to PLOF. OCCUPATIONAL THERAPY  Alexa Bales has made steady progress on IP rehab. He has progressed to a SBA level with his functional transfers and basic mobility within ADLs. He requires up to min A for TLSO donning (did achieve once with set-up A, required min A next time). He requires SBA for LB ADLs and doesn't require use of LHAE and is able to abide by spinal precautions. Pt does have cognitive concerns and is difficult to redirect at times. He reports a history of alchool abuse and identifies that he wants to stop drinking as a personal goal.  Equipment Needed: No  Other: Pt has a shower chair. Does not need a BSC. RECREATIONAL THERAPY  Patient has been offered participation in recreational therapy activities and participates as able. Pt states he use to be a drug user and is an alcoholic-he goes to [employer] daily from 1:00-2:00 for socialization group-he does the dishes and the yard work at [facility] daughter is going to bring in his tablet and headphones so he can listen to music which he loves-he would be interested in going outside stating\" I would want to smoke but I didn't bring  any with me\"- is not on the patch but states he does not miss his cigarettes at this time-pleasant-slow to process-states everyone here is very nice and helpful- P. T. brought pt to the [facility]'s dining room to play bingo after therapy-pt played 2 cards and processed game well-affect bright and social with peers-picked out a prize for his daughter -appreciative -wanted to stay in his w/c by the nursing station instead of going back to his room-looking for daughter to come in some time this afternoon -Upon arrival pt still eating his breakfast-states Aissatou Stinson, RN  Psychologist: Nahum Fernandez, PhD.    I approve the established interdisciplinary plan of care as documented within the medical record of Steven Tam

## 2020-08-31 NOTE — PROGRESS NOTES
[address] [address]  Inpatient Rehabilitation  Occupational Therapy  Daily Note  Time:  Time In: 1330  Time Out: 1400  Timed Code Treatment Minutes: 30 Minutes  Minutes: 30    Date: 2020  Patient Name: Andrew Ardon,   Gender: male      Room: [facility]70/070-A  MRN: [id number]  : 1960  (60 y.o.)  Referring Practitioner: Dr. Sheryl Halsted  Diagnosis: Burst fracture T 12 with routine healing  Additional Pertinent Hx: Per ED notes: Mr. Teri Overton is a 61 Y/O male [facility] by [facility], brought by EMS following a fall today  with LOC; PMH includes drug and alcohol abuse, cancer, HTN, HLD, and GERD. Per patient report he has been falling with increasing frequency over past 2 months, believes a fall while exiting a parked car two days ago is what hurt his back, but he also fell today. Patient states he typically does not remember falls, just finds himself on the ground. Daughter present in exam room states, falls often occur after standing up, he will begin to shake and then fall to floor, appear to lose consciousness briefly. Patient reports history of alcohol abuse, drank a pint of liquor today between 0912-3482, states this is not unusual for him. Restrictions/Precautions:  Restrictions/Precautions: General Precautions, Fall Risk  Required Braces or Orthoses  Spinal: Thoracic Lumbar Sacral Orthotics(May don in sitting)  Position Activity Restriction  Spinal Precautions: No Bending, No Lifting, No Twisting  Other position/activity restrictions: TLSO brace ordered for T12 fracture. Will need to wear this when up. May have it off when in bed. SUBJECTIVE: Pt supine upon arrival, agreeable to OT. PAIN: Denies pain     COGNITION: Decreased Insight and Impaired Attention    ADL:   None this session     BALANCE:  Sitting Balance:  Modified Independent. Standing Balance: Stand By Assistance. TRANSFERS:  Sit to Stand:  Stand By Assistance.  Recliner   Stand to Sit: Stand By problem solving, attetntion to task , and back safety to increase independence in self care tasks  Short term goal 2: Pt to demo toileting tasks and transfers with S and no cues for safety  Short term goal 3: Pt to demo good dyamamic standing balance > 5 min with S and 0-1 UE support while reaching outside base of support  in prep for retrieveing clothing items. Short term goal 4: PT will complete 2 step homemaking tasks withCGA  no > min cues for back precautions/ proper body mechanics to increase ability to retrieve a snack at home. Long term goals  Time Frame for Long term goals : 2 weeks  Long term goal 1: PT will complete ADL routine including donning a TLSO with S andno > min cues for attention to tasks or cues for back  to increase independence in self care  Long term goal 2: PT will compelte 2 step homemaking tasks with S and 0-1 cues for proper body mechanics to increase ability to complete laundry tasks. Following session, patient left in safe position with all fall risk precautions in place.

## 2020-08-31 NOTE — PROGRESS NOTES
Attention    ADL:   Grooming: Stand By Assistance. Bathing: Stand By Assistance. on shower bench  Upper Extremity Dressing: Minimal Assistance and with set-up.  set-up with t-shirt. min A with TLSO x 1 event   Lower Extremity Dressing: Stand By Assistance. for balance. pt able to abide by spinal precautions  Toileting: Stand By Assistance. Toilet Transfer: Stand By Assistance. Shower Transfer: Stand By Assistance. Saira Boop BALANCE:  Sitting Balance:  Modified Independent. Standing Balance: Stand By Assistance. BED MOBILITY:  Supine to Sit: Supervision log roll. min increased time to initiate    TRANSFERS:  Sit to Stand:  Stand By Assistance. EOB, w/c, sh chair   Stand to Sit: Stand By Assistance. FUNCTIONAL MOBILITY:  Assistive Device: Rolling Walker  Assist Level:  Stand By Assistance. Distance: to/from bathroom. Used w/c to/from shower      ASSESSMENT:  Activity Tolerance:  Patient tolerance of  treatment: good. Assessment: Assessment: Ronel Rico has made steady progress on IP rehab. He has progressed to a SBA level with his functional transfers and basic mobility within ADLs. He requires up to min A for TLSO donning (did achieve once with set-up A, required min A next time). He requires SBA for LB ADLs and doesn't require use of LHAE and is able to abide by spinal precautions. Pt does have cognitive concerns and is difficult to redirect at times. He reports a history of alchool abuse and identifies that he wants to stop drinking as a personal goal.  Discharge Recommendations: Continue to assess pending progress  Equipment Recommendations: Other: Patient has a shower chair. No other DME needed.    Plan: Times per week: 5xs week x 90 min, 1 x week x 30 min  Current Treatment Recommendations: Patient/Caregiver Education & Training, Balance Training, Functional Mobility Training, Endurance Training, Safety Education & Training, Self-Care / ADL, Home Management Training, Equipment Evaluation, Education, & procurement    Patient Education  Patient Education: ADL's, Precautions, Equipment Education, Reviewed Prior Education and Assistive Device Safety    Goals  Short term goals  Time Frame for Short term goals: 1 week  Short term goal 1: Pt will complete BADL routine with setup and no > min cues for problem solving, attetntion to task , and back safety to increase independence in self care tasks GOAL NOT MET, CONTINUE   Short term goal 2: Pt to demo toileting tasks and transfers with CGA and no cues for safety GOAL MET, REVISE   Short term goal 3: Pt to demo good dyamamic standing balance > 5 min with S and 0-1 UE support while reaching outside base of support  in prep for retrieveing clothing items. GOAL NOT MET, CONT   Short term goal 4: PT will complete 2 step homemaking tasks withCGA  no > min cues for back precautions/ proper body mechanics to increase ability to retrieve a snack at home. GOAL NOT MET, CONT   Long term goals  Time Frame for Long term goals : 2-3 weeks  Long term goal 1: PT will complete ADL routine including donning a TLSO with S andno > min cues for attention to tasks or cues for back  to increase independence in self care GOAL NOT MET, CONT   Long term goal 2: PT will compelte 2 step homemaking tasks with S and 0-1 cues for proper body mechanics to increase ability to complete laundry tasks. GOAL NOT MET, CONT       Revised Short-Term Goals  Short term goals  Time Frame for Short term goals: 1 week  Short term goal 1: Pt will complete BADL routine with setup and no > min cues for problem solving, attetntion to task , and back safety to increase independence in self care tasks  Short term goal 2: Pt to demo toileting tasks and transfers with S and no cues for safety  Short term goal 3: Pt to demo good dyamamic standing balance > 5 min with S and 0-1 UE support while reaching outside base of support  in prep for retrieveing clothing items.   Short term goal 4: PT will complete 2 step homemaking tasks withCGA  no > min cues for back precautions/ proper body mechanics to increase ability to retrieve a snack at home. Long term goals  Time Frame for Long term goals : 2 weeks  Long term goal 1: PT will complete ADL routine including donning a TLSO with S andno > min cues for attention to tasks or cues for back  to increase independence in self care  Long term goal 2: PT will compelte 2 step homemaking tasks with S and 0-1 cues for proper body mechanics to increase ability to complete laundry tasks. Following session, patient left in safe position with all fall risk precautions in place.

## 2020-08-31 NOTE — PROGRESS NOTES
[address] THERAPY  [address] [facility]  DAILY NOTE    TIME   SLP Individual Minutes  Time In: 0204  Time Out: 1225  Minutes: 30  Timed Code Treatment Minutes: 30 Minutes       Date: 2020  Patient Name: Teri Lefort      CSN: [id number]   : 1960  (60 y.o.)  Gender: male   Referring Physician:  Dr. Garry Gillette   Diagnosis: Burst fracture of T12 vertebra with routine healing   Secondary Diagnosis: Cognitive impairments  Precautions: Fall Risk; Impulsivity   Current Diet: General diet with thin liquids   Swallowing Strategies: Standard Universal Swallow Precautions  Date of Last MBS: Not Applicable    Pain:   in back; consistent with AM reports, RN aware    Subjective:  Pt sitting upright in recliner for duration of session. Alert and pleasantly cooperative with given tasks. Short-Term Goals:  SHORT TERM GOAL #1:  Goal 1: Pt will complete immediate/delayed recall and working memory tasks with 70% accuracy given mod cues to improve retention of new and functional information. INTERVENTIONS: Pt reporting daughter Kimber Guzman present yesterday and brought him to floor 5K in attempt to \"remember where this all started\" (I.e., hospital stay). Pt stated Kimber Guzman began writing things down to assist in his memory, and then wanted him to continue writing things down by starting a journal. Pt informed ST of journal placement (in red notebook on tray table), and ST spent initial 15 minutes of session discussing benefits of utilizing this journal for improved recall. ST encouraged pt to write down the date each day with 3-5 units of information such as how he felt, who visited, or what he did. Pt highly receptive and listed x3 appropriate items in journal independently. ST then assisted in set up of journal for next x3 days. Recommend continued use of journal as a \"memory log\".     SHORT TERM GOAL #2:  Goal 2: Pt will complete sustained, selective, and divided attention tasks with no more than 3 errors within a given task in order to improve participation in treatment and permit eventual return to driving. INTERVENTIONS: Not addressed d/t focus on other goals. SHORT TERM GOAL #3:  Goal 3: Pt will complete functional reasoning and thought organizational tasks with 70% accuracy given mod cues to improve overall executive functioning skills. INTERVENTIONS: Not addressed d/t focus on other goals. SHORT TERM GOAL #4:  Goal 4: Pt will complete functional problem solving and sequencing tasks with 70% accuracy given mod cues to improve ability to make successful return to IADL's (medication management, finances, etc.). INTERVENTIONS: Time management word problems - 9/10 indep, 1/10 min cues   *good sustained attention to task and use of self talk  *good mental math configuration overall       Long-Term Goals:  Timeframe for Long-term Goals: 3 weeks    LONG TERM GOAL #1:  Goal 1: Patient will improve cognitive functioning to a min assist level to increase level of independence with ADL and iADL tasks in the home environment and reduce caregiver burden. ONGOING      Comprehension: 4 - Patient understands basic needs 75-90%+ of the time  Expression: 5 - Expresses basic ideas/needs only (hungry/hot/pain)  Social Interaction: 5 - Patient is appropriate with supervision/cues  Problem Solving: 3 - Patient solves simple/routine tasks 50%-74%  Memory: 2 - Patient remembers 25%-49% of the time    EDUCATION:  Learner: Patient  Education:  Reviewed ST goals and Plan of Care, Reviewed recommendations for follow-up, Education Related to Potential Risks and Complications Due to Impairment/Illness/Injury and Home Safety Education  Evaluation of Education: Verbalizes understanding and Needs further instruction    ASSESSMENT/PLAN:  Activity Tolerance:  Patient tolerance of  treatment: good. Assessment/Plan: Patient progressing toward established goals.   Continues to require skilled care of licensed speech pathologist to progress toward achievement of established goals and plan of care.      Plan for Next Session: attention, money management, home problem solving, reasoning       Marisa Schulte M.S. [place] [id number]

## 2020-09-01 PROCEDURE — 1180000000 HC REHAB R&B

## 2020-09-01 PROCEDURE — 97129 THER IVNTJ 1ST 15 MIN: CPT | Performed by: SPEECH-LANGUAGE PATHOLOGIST

## 2020-09-01 PROCEDURE — 97530 THERAPEUTIC ACTIVITIES: CPT

## 2020-09-01 PROCEDURE — 6370000000 HC RX 637 (ALT 250 FOR IP): Performed by: PHYSICAL MEDICINE & REHABILITATION

## 2020-09-01 PROCEDURE — 2580000003 HC RX 258: Performed by: SURGERY

## 2020-09-01 PROCEDURE — 97116 GAIT TRAINING THERAPY: CPT

## 2020-09-01 PROCEDURE — 6370000000 HC RX 637 (ALT 250 FOR IP): Performed by: SURGERY

## 2020-09-01 PROCEDURE — 97535 SELF CARE MNGMENT TRAINING: CPT

## 2020-09-01 PROCEDURE — 94760 N-INVAS EAR/PLS OXIMETRY 1: CPT

## 2020-09-01 PROCEDURE — 97110 THERAPEUTIC EXERCISES: CPT

## 2020-09-01 PROCEDURE — 97130 THER IVNTJ EA ADDL 15 MIN: CPT | Performed by: SPEECH-LANGUAGE PATHOLOGIST

## 2020-09-01 RX ADMIN — FLUOXETINE HYDROCHLORIDE 60 MG: 20 CAPSULE ORAL at 21:39

## 2020-09-01 RX ADMIN — AMLODIPINE BESYLATE 10 MG: 10 TABLET ORAL at 08:06

## 2020-09-01 RX ADMIN — CARBAMAZEPINE 200 MG: 200 TABLET ORAL at 08:06

## 2020-09-01 RX ADMIN — SENNOSIDES 8.6 MG: 8.6 TABLET, FILM COATED ORAL at 21:39

## 2020-09-01 RX ADMIN — THERA TABS 1 TABLET: TAB at 08:06

## 2020-09-01 RX ADMIN — QUETIAPINE FUMARATE 400 MG: 400 TABLET ORAL at 08:06

## 2020-09-01 RX ADMIN — CARBAMAZEPINE 200 MG: 200 TABLET ORAL at 21:39

## 2020-09-01 RX ADMIN — TRAZODONE HYDROCHLORIDE 100 MG: 100 TABLET ORAL at 21:40

## 2020-09-01 RX ADMIN — PANTOPRAZOLE SODIUM 40 MG: 40 TABLET, DELAYED RELEASE ORAL at 05:16

## 2020-09-01 RX ADMIN — FOLIC ACID 1 MG: 1 TABLET ORAL at 08:06

## 2020-09-01 RX ADMIN — DOCUSATE SODIUM 100 MG: 100 CAPSULE, LIQUID FILLED ORAL at 08:06

## 2020-09-01 RX ADMIN — QUETIAPINE FUMARATE 400 MG: 400 TABLET ORAL at 21:39

## 2020-09-01 RX ADMIN — Medication 10 ML: at 08:07

## 2020-09-01 RX ADMIN — Medication 100 MG: at 08:06

## 2020-09-01 RX ADMIN — DIPHENHYDRAMINE HCL 25 MG: 25 TABLET ORAL at 21:39

## 2020-09-01 RX ADMIN — Medication 1000 MCG: at 08:06

## 2020-09-01 RX ADMIN — Medication 10 ML: at 21:39

## 2020-09-01 RX ADMIN — ACETAMINOPHEN 500 MG: 500 TABLET ORAL at 21:40

## 2020-09-01 ASSESSMENT — ENCOUNTER SYMPTOMS
VOICE CHANGE: 0
TROUBLE SWALLOWING: 0
DIARRHEA: 0
ALLERGIC/IMMUNOLOGIC NEGATIVE: 1
EYES NEGATIVE: 1
SHORTNESS OF BREATH: 0
NAUSEA: 0
COUGH: 0
CONSTIPATION: 0

## 2020-09-01 ASSESSMENT — PAIN SCALES - GENERAL: PAINLEVEL_OUTOF10: 6

## 2020-09-01 ASSESSMENT — PAIN DESCRIPTION - PROGRESSION
CLINICAL_PROGRESSION: NOT CHANGED

## 2020-09-01 ASSESSMENT — PAIN DESCRIPTION - LOCATION: LOCATION: BACK

## 2020-09-01 ASSESSMENT — PAIN DESCRIPTION - PAIN TYPE: TYPE: ACUTE PAIN

## 2020-09-01 NOTE — PROGRESS NOTES
[address] [facility]  [facility]  Occupational Therapy  Daily Note  Time:    Time In: 845  Time Out: 945  Timed Code Treatment Minutes: 60 Minutes  Minutes: 60          Date: 2020  Patient Name: Sasha Yin,   Gender: male      Room: [facility]70/070-A  MRN: [id number]  : 1960  (60 y.o.)  Referring Practitioner: Dr. Sierra Cruz  Diagnosis: Burst fracture T 12 with routine healing, TBI  Additional Pertinent Hx: Per ED notes: Mr. Jesse De La Garza is a 61 Y/O male presenting at [facility] by trauma consult, brought by EMS following a fall today  with LOC; PMH includes drug and alcohol abuse, cancer, HTN, HLD, and GERD. Per patient report he has been falling with increasing frequency over past 2 months, believes a fall while exiting a parked car two days ago is what hurt his back, but he also fell today. Patient states he typically does not remember falls, just finds himself on the ground. Daughter present in exam room states, falls often occur after standing up, he will begin to shake and then fall to floor, appear to lose consciousness briefly. Patient reports history of alcohol abuse, drank a pint of liquor today between [id number], states this is not unusual for him. Restrictions/Precautions:  Restrictions/Precautions: General Precautions, Fall Risk  Required Braces or Orthoses  Spinal: Thoracic Lumbar Sacral Orthotics(May don in sitting)  Position Activity Restriction  Spinal Precautions: No Bending, No Lifting, No Twisting  Other position/activity restrictions: TLSO brace ordered for T12 fracture. Will need to wear this when up. May have it off when in bed. SUBJECTIVE: First attempt, pt eating breakfast. Pt given 15 minutes and ready for therapy 2 nd attempt.      PAIN: 3- /10: back     COGNITION: Decreased Recall, Decreased Insight, Decreased Problem Solving and Decreased Safety Awareness PT voiced spinal precautions and abided by them most of session, though would then reach down and attempt to retreive shoe. ADL:   Upper Extremity Dressing: Minimal Assistance. to don TLSO with mod vcs for strapping and sequencing of brace. Increased time for pt to discern front vs back of TLSO, but used and followed labels on brace. SBA to doff brace. Padding added to brace to assist with retrieving strapping on sides   Lower Extremity Dressing: Stand By Assistance. to toribio socks and shoes using LHAE. Pt able to cross legs to tie shoes     BALANCE:  Sitting Balance:  Supervision. Standing Balance: Stand By Assistance, [employer]. Pt endorses intermittent dizziness upon standing     BED MOBILITY:  Not Tested    TRANSFERS:  Sit to Stand:  Contact Guard Assistance, X 1, cues for hand placement. Stand to Sit: Stand By Assistance, X 1, cues for hand placement. FUNCTIONAL MOBILITY:  Assistive Device: Rolling Walker  Assist Level:  Contact Guard Assistance. Distance: To  therapy gym with complaints of dizziness coming and going. ADDITIONAL ACTIVITIES:  .Patient identified one of their personal goals is to be able to sustain functional standing positions in order to complete various ADL and IADL skills in standing position, such as sinkside grooming or washing dishes. Dynamic standing task was then facilitated to challenge BUE release. Patient required CGA, and demo'ed an endurance of 4.5  minutes. ASSESSMENT:  Activity Tolerance:  Patient tolerance of  treatment: good. Discharge Recommendations: Home with Home health OT, Home with nursing aide, 24 hour supervision or assist    Equipment Recommendations: Equipment Needed: No  Other: Pt has a shower chair. Does not need a BSC.   Plan: Times per week: 5xs week x 90 min, 1 x week x 30 min  Current Treatment Recommendations: Patient/Caregiver Education & Training, Balance Training, Functional Mobility Training, Endurance Training, Safety Education & Training, Self-Care / ADL, Home Management Training, Equipment Evaluation, Education, & procurement    Patient Education  Patient Education: ADL's, Precautions, Home Safety and Assistive Device Safety    Goals  Short term goals  Time Frame for Short term goals: 1 week  Short term goal 1: Pt will complete BADL routine with setup and no > min cues for problem solving, attetntion to task , and back safety to increase independence in self care tasks  Short term goal 2: Pt to demo toileting tasks and transfers with S and no cues for safety  Short term goal 3: Pt to demo good dyamamic standing balance > 5 min with S and 0-1 UE support while reaching outside base of support  in prep for retrieveing clothing items. Short term goal 4: PT will complete 2 step homemaking tasks withCGA  no > min cues for back precautions/ proper body mechanics to increase ability to retrieve a snack at home. Long term goals  Time Frame for Long term goals : 2 weeks  Long term goal 1: PT will complete ADL routine including donning a TLSO with S andno > min cues for attention to tasks or cues for back  to increase independence in self care  Long term goal 2: PT will compelte 2 step homemaking tasks with S and 0-1 cues for proper body mechanics to increase ability to complete laundry tasks. Following session, patient left in safe position with all fall risk precautions in place.

## 2020-09-01 NOTE — PROGRESS NOTES
[address] [facility] THERAPY  [address] [facility]  DAILY NOTE    TIME   SLP Individual Minutes  Time In: 1430  Time Out: 9971  Minutes: 41  Timed Code Treatment Minutes: 41 Minutes       Date: 2020  Patient Name: Heriberto Blakely      CSN: [id number]   : 1960  (60 y.o.)  Gender: male   Referring Physician:  Dr. Lisa Kenyon   Diagnosis: Burst fracture of T12 vertebra with routine healing; TBI   Secondary Diagnosis: Cognitive impairments  Precautions: Fall Risk; Impulsivity; Seizure   Current Diet: General diet with thin liquids   Swallowing Strategies: Standard Universal Swallow Precautions  Date of Last MBS: Not Applicable    Pain:  6/15 back pain, RN Mami notified     Subjective:  Pt sitting upright in recliner for duration of session, pleasant and attentive. Occasional tangential speech tendencies noted, but easily re-directed. Patient taking a phone call at the onset of the session regarding rescheduling pre-existing appointments. Short-Term Goals:  SHORT TERM GOAL #1:  Goal 1: Pt will complete immediate/delayed recall and working memory tasks with 90% accuracy given mod cues to improve retention of new and functional information. INTERVENTIONS: Briefly reviewed memory log and how patient could utilize the notebook to make a \"to do list\" for rescheduling and cancelling x2 appointments and inquiring about his brace. Assisted patient in writing the information down in and organized manner for improved retention of information. Functional recall: Provided patient with a functional appointment (4 units of information).  Discussed compensatory strategies to improve retention of the information including writing the information down and rehearsing it several times.   -Immediate recall- 5/5 indep  -Recall following a 5 minute delay- 4/5 indep  -Recall following a 15 minute delay- 5/5 indep     SHORT TERM GOAL #2:  Goal 2: Pt will complete sustained, selective, and divided attention tasks with no more than 3 errors within a given task in order to improve participation in treatment and permit eventual return to driving. INTERVENTIONS: Selective attention task: Patient given a deck of cards with x2 suites and 2 cards omitted with prompts to determine the missing cards on review. Patient independently sorted the cards by suite, but wasn't able to organize thoughts to determine next step for identifying missing card. Mod cues needed to look through the stack and locate cards in sequence. Improved independence with second trial.  *Overall fair success with selective attention, but reduced performance with problem solving/reasoning for identifying a method to locate the missing cards. SHORT TERM GOAL #3:  Goal 3: Pt will complete functional reasoning and thought organizational tasks with 90% accuracy given mod cues to improve overall executive functioning skills. INTERVENTIONS: Navigation of a prescription label- 7/10 indep, 1/10 with min cues, 2/10 with mod cues. *Cueing assist needed for comprehension of instructions on the label. SHORT TERM GOAL #4:  Goal 4: Pt will complete functional problem solving and sequencing tasks with 90% accuracy given mod cues to improve ability to make successful return to IADL's (medication management, finances, etc.). INTERVENTIONS: AM SESSION: Identifying one household/ADL problem from picture scenes - 4/5 indep, 1/5 min cues  Identifying one potential solution - 3/5 indep, 2/5 min cues  *Cueing included redirections to task and thought organization cues. Pt commented on multiple details in pictures scenes with varying relevance. Minimal cueing required to orient Pt back to the task or clarify task instructions.     Long-Term Goals:  Timeframe for Long-term Goals: 3 weeks    LONG TERM GOAL #1:  Goal 1: Patient will improve cognitive functioning to a min assist level to increase level of independence with ADL and iADL tasks in the home environment and reduce caregiver burden. Comprehension: 5 - Patient understands basic needs (hungry/hot/pain)  Expression: 5 - Expresses basic ideas/needs only (hungry/hot/pain)  Social Interaction: 5 - Patient is appropriate with supervision/cues  Problem Solving: 3 - Patient solves simple/routine tasks 50%-74%  Memory: 3 - Patient remembers 50%-74% of the time        EDUCATION:  Learner: Patient  Education:  Reviewed recommendations for follow-up, Education Related to Potential Risks and Complications Due to Impairment/Illness/Injury and Home Safety Education  Evaluation of Education: Verbalizes understanding, Needs further instruction and Family not present    ASSESSMENT/PLAN:    Activity Tolerance:  Patient tolerance of treatment: good. Assessment/Plan: Patient progressing toward established goals. Continues to require skilled care of licensed speech pathologist to progress toward achievement of established goals and plan of care. Plan for Next Session: Thought organization for scheduling, Reasoning, Short term memory       Mariama Calvillo.  [address] [facility], [facility] [address], [address]

## 2020-09-01 NOTE — CARE COORDINATION
Mor Boudreaux was evaluated today and a DME order was entered for a wheeled walker because he requires this to successfully complete daily living tasks of ambulating. A wheeled walker is necessary due to the patient's unsteady gait, upper body weakness, and inability to  an ambulation device; and he can ambulate only by pushing a walker instead of a lesser assistive device such as a cane, crutch, or standard walker. The need for this equipment was discussed with the patient and he understands and is in agreement.

## 2020-09-01 NOTE — PROGRESS NOTES
[address]  Occupational Therapy  Daily Note  Time:    Time In: 1400  Time Out: 1430  Timed Code Treatment Minutes: 30 Minutes  Minutes: 30      Date: 2020  Patient Name: Santi Atkins,   Gender: male      Room: [facility]70/070-A  MRN: [id number]  : 1960  (60 y.o.)  Referring Practitioner: Dr. Rashid Gomez  Diagnosis: Burst fracture T 12 with routine healing, TBI  Additional Pertinent Hx: Per ED notes: Mr. Len Welsh is a 59 Y/O male presenting at [facility] by trauma consult, brought by EMS following a fall today  with LOC; PMH includes drug and alcohol abuse, cancer, HTN, HLD, and GERD. Per patient report he has been falling with increasing frequency over past 2 months, believes a fall while exiting a parked car two days ago is what hurt his back, but he also fell today. Patient states he typically does not remember falls, just finds himself on the ground. Daughter present in exam room states, falls often occur after standing up, he will begin to shake and then fall to floor, appear to lose consciousness briefly. Patient reports history of alcohol abuse, drank a pint of liquor today between 5314-1153, states this is not unusual for him. Restrictions/Precautions:  Restrictions/Precautions: General Precautions, Fall Risk  Required Braces or Orthoses  Spinal: Thoracic Lumbar Sacral Orthotics(May don in sitting)  Position Activity Restriction  Spinal Precautions: No Bending, No Lifting, No Twisting  Other position/activity restrictions: TLSO brace ordered for T12 fracture. Will need to wear this when up. May have it off when in bed. SUBJECTIVE: Pt finished with PT, agreeable to OT.      PAIN: 3-4 /10: back     COGNITION: Decreased Recall, Decreased Insight, Decreased Problem Solving and Decreased Safety Awareness PT voiced spinal precautions  3/3     IADL:   Patient completed IADL homemaking task this date of following spinal precautions during homemaking task to identify 5 safety hazards located throughout apartment - task was graded to challenge  Comprehension of spinal precautions and his walker safety. Pt located 4/5 safety hazards with SBA and provided appropriate rationales for  Hazards, min vcs to recall to fix each issue as well as identify. Pt needed 1 verbal prompt for 1/5 hazards. Immediately following task, pt able to recall 4/5 hazards with SBA, min vcs for 1/5. Patient required CGA throughout task with a standing endurance of 9 minutes. BALANCE:  Sitting Balance:  Supervision. Standing Balance: Contact Guard Assistance. TRANSFERS:  Sit to Stand:  Contact Guard Assistance, X 1, cues for hand placement. Stand to Sit: Stand By Assistance, X 1, cues for hand placement. FUNCTIONAL MOBILITY:  Assistive Device: Rolling Walker  Assist Level:  Contact Guard Assistance. Distance: from therapy apartment to room, cues for pathfinding which direction from apartment,     ASSESSMENT:  Activity Tolerance:  Patient tolerance of  treatment: good. Discharge Recommendations: Home with Home health OT, Home with nursing aide, 24 hour supervision or assist    Equipment Recommendations: Equipment Needed: No  Other: Pt has a shower chair. Does not need a BSC.   Plan: Times per week: 5xs week x 90 min, 1 x week x 30 min  Current Treatment Recommendations: Patient/Caregiver Education & Training, Balance Training, Functional Mobility Training, Endurance Training, Safety Education & Training, Self-Care / ADL, Home Management Training, Equipment Evaluation, Education, & procurement    Patient Education  Patient Education: ADL's, Precautions, Home Safety and Assistive Device Safety    Goals  Short term goals  Time Frame for Short term goals: 1 week  Short term goal 1: Pt will complete BADL routine with setup and no > min cues for problem solving, attetntion to task , and back safety to increase independence in self care tasks  Short term goal 2: Pt to demo toileting tasks and transfers with S and no cues for safety  Short term goal 3: Pt to demo good dyamamic standing balance > 5 min with S and 0-1 UE support while reaching outside base of support  in prep for retrieveing clothing items. Short term goal 4: PT will complete 2 step homemaking tasks withCGA  no > min cues for back precautions/ proper body mechanics to increase ability to retrieve a snack at home. Long term goals  Time Frame for Long term goals : 2 weeks  Long term goal 1: PT will complete ADL routine including donning a TLSO with S andno > min cues for attention to tasks or cues for back  to increase independence in self care  Long term goal 2: PT will compelte 2 step homemaking tasks with S and 0-1 cues for proper body mechanics to increase ability to complete laundry tasks. Following session, patient left in safe position with all fall risk precautions in place.

## 2020-09-01 NOTE — PROGRESS NOTES
Physical Medicine & Rehabilitation  Progress Note    Chief Complaint:  frequent falls with mild traumatic brain injury without loss of consciousness and T12 burst fracture    Subjective:  Care conference held today with proposed discharge date on 9/9 to home with Home Health Physical Therapy, Occupational Therapy, Speech Therapy, Nursing and an aide. Family not present. He is in agreement with plan and timing. He had complaint of dizziness today while working with Physical Therapy and noted to have positive orthostatic blood pressures with decreases from 115 seated down to 80 when standing. Patient was encouraged to increase his fluid intake and with his afternoon session actually was noted to be hypertensive. He also had complaint of right medial thigh pain for which he is agreeable to physical therapy providing ultrasound therapy. He had no other concerns or questions at this time. Rehabilitation:   Physical Therapy:  OBJECTIVE:  Transfers:  Sit to Stand: Contact Guard Assistance  Stand to [facility] Assistance     Ambulation:  Contact Guard Assistance  Distance: 150' x 1  Surface: Level Tile  Device:Rolling Walker  Gait Deviations:  Slow Dedra, Decreased Gait Speed, Decreased Heel Strike Bilaterally and Mild Path Deviations     Balance:  Dynamic Standing Balance: Stand By Assistance  Pt. Stood while tapping feet onto cones in reciprocal pattern. Pt. Reports increased pain in R LE with activity so activity was discontinued. Pt. Also stood while tapping balloon back and forth with therapist. Pt. With intermittent single UE support for balance. Pt. With mild posterior lean at times but no LOB.      Exercise:  None     Functional Outcome Measures: Not completed    ASSESSMENT:  Assessment: Patient progressing toward established goals. Activity Tolerance:  Patient tolerance of  treatment: good. Equipment Recommendations:Equipment Needed: Yes(Pt has SW.   Will need RW)  Discharge Recommendations: \"to do list\" for rescheduling and cancelling x2 appointments and inquiring about his brace. Assisted patient in writing the information down in and organized manner for improved retention of information.      Functional recall: Provided patient with a functional appointment (4 units of information). Discussed compensatory strategies to improve retention of the information including writing the information down and rehearsing it several times.   -Immediate recall- 5/5 indep  -Recall following a 5 minute delay- 4/5 indep  -Recall following a 15 minute delay- 5/5 indep     SHORT TERM GOAL #2:  Goal 2: Pt will complete sustained, selective, and divided attention tasks with no more than 3 errors within a given task in order to improve participation in treatment and permit eventual return to driving. INTERVENTIONS: Selective attention task: Patient given a deck of cards with x2 suites and 2 cards omitted with prompts to determine the missing cards on review. Patient independently sorted the cards by suite, but wasn't able to organize thoughts to determine next step for identifying missing card. Mod cues needed to look through the stack and locate cards in sequence. Improved independence with second trial.  *Overall fair success with selective attention, but reduced performance with problem solving/reasoning for identifying a method to locate the missing cards.      SHORT TERM GOAL #3:  Goal 3: Pt will complete functional reasoning and thought organizational tasks with 90% accuracy given mod cues to improve overall executive functioning skills. INTERVENTIONS: Navigation of a prescription label- 7/10 indep, 1/10 with min cues, 2/10 with mod cues. *Cueing assist needed for comprehension of instructions on the label.      SHORT TERM GOAL #4:  Goal 4: Pt will complete functional problem solving and sequencing tasks with 90% accuracy given mod cues to improve ability to make successful return to IADL's (medication management, finances, etc.). INTERVENTIONS: AM SESSION: Identifying one household/ADL problem from picture scenes - 4/5 indep, 1/5 min cues  Identifying one potential solution - 3/5 indep, 2/5 min cues  *Cueing included redirections to task and thought organization cues. Pt commented on multiple details in pictures scenes with varying relevance. Minimal cueing required to orient Pt back to the task or clarify task instructions.     Long-Term Goals:  Timeframe for Long-term Goals: 3 weeks     LONG TERM GOAL #1:  Goal 1: Patient will improve cognitive functioning to a min assist level to increase level of independence with ADL and iADL tasks in the home environment and reduce caregiver burden. Comprehension: 5 - Patient understands basic needs (hungry/hot/pain)  Expression: 5 - Expresses basic ideas/needs only (hungry/hot/pain)  Social Interaction: 5 - Patient is appropriate with supervision/cues  Problem Solving: 3 - Patient solves simple/routine tasks 50%-74%  Memory: 3 - Patient remembers 50%-74% of the time    Review of Systems:  Review of Systems   Constitutional: Positive for activity change and fatigue. Negative for appetite change and fever. HENT: Negative for trouble swallowing and voice change. Eyes: Negative. Respiratory: Negative for cough and shortness of breath. Cardiovascular: Negative for leg swelling. Gastrointestinal: Negative for constipation, diarrhea and nausea. Endocrine: Negative. Genitourinary: Negative for hematuria and urgency. Musculoskeletal: Positive for gait problem. Negative for joint swelling. Skin: Negative for pallor. Allergic/Immunologic: Negative. Neurological: Positive for weakness. Negative for dizziness and headaches. Hematological: Negative. Psychiatric/Behavioral: Positive for confusion and decreased concentration. Negative for dysphoric mood. The patient is not nervous/anxious. All other systems reviewed and are negative. Objective:  /89   Pulse 111   Temp 98.1 °F (36.7 °C) (Oral)   Resp 18   Ht 6' 4\" (1.93 m)   Wt 222 lb 7 oz (100.9 kg)   SpO2 97% Comment: evaluated o2, no o2 needed at this time  BMI 27.08 kg/m²   CURRENT VITALS:  height is 6' 4\" (1.93 m) and weight is 222 lb 7 oz (100.9 kg). His oral temperature is 98.1 °F (36.7 °C). His blood pressure is 123/89 and his pulse is 111. His respiration is 18 and oxygen saturation is 97%. Body mass index is 27.08 kg/m².   Temperature Range (24h):Temp: 98.1 °F (36.7 °C) Temp  Av.9 °F (36.6 °C)  Min: 97.6 °F (36.4 °C)  Max: 98.1 °F (36.7 °C)  BP Range (36F): Systolic (85GBT), LCN:210 , Min:123 , LOJ:467     Diastolic (03PTX), EPQ:88, Min:72, Max:95    Pulse Range (24h): Pulse  Av.6  Min: 89  Max: 111  Respiration Range (24h): Resp  Av  Min: 18  Max: 18  Current Pulse Ox (24h):  SpO2: 97 %(evaluated o2, no o2 needed at this time)  Pulse Ox Range (24h):  SpO2  Av.7 %  Min: 93 %  Max: 97 %  Oxygen Amount and Delivery:      awake  Orientation:   person, place, time, situation  Mood: within normal limits  Affect: calm  General appearance:  in no acute distress, up in therapy gym with TLSO brace on doing a ring toss activity with Physical Therapy    Memory:   Grossly normal  Attention/Concentration: abnormal -mild delay with command following of one-step commands  Language:  normal    ROM: Abnormal due to use of TLSO brace  Motor Exam:  Motor exam is symmetrical 5 out of 5 all extremities bilaterally    Tone:  normal  Muscle bulk: within normal limits; with noted mild atrophy of bilateral calves  Sensory:  Sensory intact  Coordination:   normal  Deep Tendon Reflexes:  Reflexes are intact and symmetrical bilaterally    Skin: warm and dry, no rash or erythema and abrasions over both knees with scabs and hematoma with abrasion over left parietal scalp  Peripheral vascular: Pulses: Normal upper and lower extremity pulses; Edema: no    Diagnostics:   No results moderate bilateral neuroforaminal stenosis. 21. Chronic macrocytic, hyperchromic anemia. 22. CKD 2.  23. History of depression. 24. Bipolar 1 disorder. 25. GERD. 26. Current everyday smoker with a 40-pack-year history. 27. Orthostatic hypotension. 28. Right medial hip adductor pain. Plan:     Medical management: Per primary team and Dr. Addis Babb. Consultants: Trauma Surgery, Critical Care, Orthopedic Surgery, Family Medicine, Physical Medicine    Narcotic usage:  N/A  Last BM:  Stool Amount: Large (08/29/20 1402)    FUNCTIONAL OUTCOMES TOOLS:    DENNY -      Tinetti - Balance Score: 10  Gait Score: 6  Tinetti Total Score: 16    TUG -      Acute/Rehabilitation Problems:  1. Frequent falls/Gait instability with a Tinetti score of 8/28 . 1. PT/OT. 2. Tinetti 14/28. Score improved to 16/28 on 9/1. Risk Indicators: Less than/equal to 18 = high risk; 19-23 Moderate risk; Greater than/equal to 24 = low risk. 3. TUG 63 seconds. 60-69 years:  8.1 seconds; 70-79 years: 9.2 seconds; 80-99 years: 11.3 seconds. 2. Left scalp hematoma. 3. 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 1. TBI protocol. 2. Tele-sitter. 4. Moderate cognitive impairment. (MOCA) version 8.2 completed. Patient scored 14/30. *Previous score on the [address] [place] [address] was 15/25. 1. SLP. 5. Acute alcohol intoxication with admission blood alcohol level of 0.08.  6. General cerebral atrophy and small vessel ischemic changes with prominent ventricles consistent with central atrophy. 7. Cervical and thoracic spine pain/T12 burst fracture without instability initially noted on a lumbar spine x-ray on 7/24/2020.  1. TLSO brace when out of bed. 2. Will likely need brace for 2 to 3 months. 3. No lifting over 20 pounds. 4. 2 to 3-week follow-up after discharge with serial x-rays. 8. Acute kidney injury. 1. Cr/BUN/GFR improved to 1.2/13/62 from1.3/12/1957 on 8/25. Stable at 1.2/12/62 on 8/31.  9. Alcohol abuse.   1. Family states they would like for him to go into a substance abuse program.  2. Multivitamin. 10. Vitamin B12 deficiency with chronic macrocytic anemia  1. .0 on 8/28. Improved to 109.1 on 8/31. 2. Folic acid. 3. Vitamin B12.  11. Recent acute, comminuted, minimally displaced, T-shaped fracture of the head of the right proximal phalanx of the great toe on 8/14/2020. 12. Orthostatic hypotension. 1. Noted on 9/1. Patient encouraged to improve fluid intake with resolution later in the day. We will continue to monitor patient's fluid consumption. 2. Encourage fluids. 13. Right hip adductor pain. 1. Suspect possible adductor complex strain. Physical Therapy ultrasound treatments have been ordered. 14. Nutrition:  Consultation to dietician for nutritional counseling and recommendations. 1. TotP 6.0, alb 3.5, Vitamin 25OH level of 43 on 8/28/2020. 15. Electrolytes. 1. Normal on 8/31 with exception of:  2. Hyponatremia. Sodium normal at 139 on 8/28 which is improved from 132 on 8/24. Sodium remains normal at 140 on 8/31. 3. Hypomagnesemia. Magnesium was 1.5 on 8/25 with improvement to 2.2 on 8/26. Resolved. 16. Bladder: No issues, has Flomax on home medication list.  17. Bowel: Senna, colace, MOM  18. Rehabilitation nursing will be involved for bowel, bladder, skin, and pain management. Nursing will also provide education and training to patient and family. 19. Prophylaxis:  DVT: SCDs. GI: Protonix. 20.  and case management consultations for coordination of care and discharge planning. Chronic Problems:  1. Hypertension. 1. Amlodipine. 2. Hyperlipidemia. 1. Has fenofibrate on home medication list.  3. Remote history of cocaine drug abuse.   4. Cervical spondylosis with C3-C4 moderate to severe right and severe left neuroforaminal stenosis, moderate to severe left and severe right neuroforaminal stenosis at C4-C5, moderate right and severe left neuroforaminal stenosis at C5-C6, and moderate right and mild left neuroforaminal stenosis at C6-C7.  5. Lumbar spondylosis with mild degenerative changes most pronounced at L3-L4 with moderate spinal canal stenosis and mild to moderate bilateral neuroforaminal stenosis. 6. Chronic macrocytic, hyperchromic anemia. 7. CKD 2.  8. History of depression/Bipolar 1 disorder. 1. Prozac. 2. Seroquel. 3. Tegretol. 4. Has BuSpar on home medication list.  5. Trazodone for sleep. 9. GERD. 1. Protonix. 10. Current everyday smoker with a 40-pack-year history. 1. NicoDerm patch if patient is agreeable. Labs reviewed on:  1. 8/28.  2. 8/31. Infectious Disease:  1. N/A    Missed Therapy Time:  None     DME:    Discharge Plan:      Greater than 50% of 30 minutes was spent with the patient reviewing the plan and recommendations from today's care conference, the plan for ultrasound intervention to address his right medial thigh pain, reviewing his current progress with therapies, and encouraging the patient to increase his fluid intake to avoid having his blood pressures drop when he is up with therapy.     Zulema Daly MD

## 2020-09-01 NOTE — PROGRESS NOTES
flexion    OBJECTIVE:  Transfers:  Sit to Stand: Contact Guard Assistance  Stand to [employer] Assistance    Ambulation:  Contact Guard Assistance  Distance: 150' x 1  Surface: Level Tile  Device:Rolling Walker  Gait Deviations:  Slow Dedra, Decreased Gait Speed, Decreased Heel Strike Bilaterally and Mild Path Deviations    Balance:  Dynamic Standing Balance: Stand By Assistance  Pt. Stood while tapping feet onto cones in reciprocal pattern. Pt. Reports increased pain in R LE with activity so activity was discontinued. Pt. Also stood while tapping balloon back and forth with therapist. Pt. With intermittent single UE support for balance. Pt. With mild posterior lean at times but no LOB. Exercise:  None    Functional Outcome Measures: Not completed       ASSESSMENT:  Assessment: Patient progressing toward established goals. Activity Tolerance:  Patient tolerance of  treatment: good. Equipment Recommendations:Equipment Needed: Yes(Pt has SW. Will need RW)  Discharge Recommendations:  Continue to assess pending progress, Patient would benefit from continued therapy after discharge    Plan: Times per week: 5x/wk 90 min, 1x/wk 30 min  Current Treatment Recommendations: Strengthening, Functional Mobility Training, Balance Training, Equipment Evaluation, Education, & procurement, Gait Training, Stair training, Endurance Training, Transfer Training    Patient Education  Patient Education: Plan of Care, Transfers, Reviewed Prior Education, Gait    Goals:  Patient goals : get my dizziness better  Short term goals  Time Frame for Short term goals: 1 wk  Short term goal 1: bed mobility with Mod I, to get in/out of bed, use log roll technique  Short term goal 2: transfer with SBA to get in/out of chairs  Short term goal 3: amb >= 50''x1 with RW and SBA to progress to home and community mobility  Short term goal 4: Pt to ascend/descend 6\" step with RW, CGA, for home entry.   Short term goal 5: Pt to score >= 20/28 on Tinetti; perform TUG <= 45 sec, indicating improved balance. Long term goals  Time Frame for Long term goals : 3 wks  Long term goal 1: transfer with Mod I, to get in/out of chairs  Long term goal 2: amb >= 150''x1 with RW and Mod I, for home and community mobility  Long term goal 3: Pt to ascend/descend 6\" step with RW, Mod I, for home entry. Long term goal 4: Pt to score >= 24/28 on Tinetti; perform TUG ,<= 30 sec, indicating improved balance. Long term goal 5: Pt to ascend/descend 4+ steps junior rails, Mod I, for community mobility    Following session, patient left in safe position with all fall risk precautions in place.

## 2020-09-01 NOTE — PROGRESS NOTES
[facility]  INPATIENT PHYSICAL THERAPY  DAILY NOTE  [address] [facility] - 7E-70/070-A    Time In: 1030  Time Out: 1130  Timed Code Treatment Minutes: 60 Minutes  Minutes: 60          Date: 2020  Patient Name: Master Chamberlain,  Gender:  male        MRN: [id number]  : 1960  (60 y.o.)     Referring Practitioner: Dr. Caroline Weaver  Diagnosis: Burst fx of T12, TBI  Additional Pertinent Hx: T12 compression fracture s/p fall, chronic alcohol abuse, drug abuse, recurrent falls, neck pain, scalp hematoma     Prior Level of Function:  Lives With: Other (comment)(Mother)  Type of Home: House  Home Layout: One level  Home Access: Stairs to enter without rails  Entrance Stairs - Number of Steps: 1  Home Equipment: Standard walker   Bathroom Shower/Tub: Tub/Shower unit, Shower chair with back  Bathroom Toilet: Standard  Bathroom Equipment: Grab bars in shower(not in great shape per pt. plastic grab bars.)    ADL Assistance: Independent  Ambulation Assistance: Independent  Transfer Assistance: Independent  Active : Yes  Additional Comments: Pt stated he was \"experimenting\" with using the walker prior to admission. Pt reports mother does most of the inside chores, Pt is responsible for outside chores and yardwork. Restrictions/Precautions:  Restrictions/Precautions: General Precautions, Fall Risk  Required Braces or Orthoses  Spinal: Thoracic Lumbar Sacral Orthotics(May don in sitting)  Position Activity Restriction  Spinal Precautions: No Bending, No Lifting, No Twisting  Other position/activity restrictions: TLSO brace ordered for T12 fracture. Will need to wear this when up. May have it off when in bed. SUBJECTIVE: Pt. Seated in BS chair upon arrival. Pt. Reports dizziness upon arrival. Assessed orthostatic BPs with readings of 115/74 seated and 80/59 standing. Ambulation deferred this session due to low BP. Pt. Pleasant and cooperative with therapy.      PAIN: Not rated: Low back. Pt. Also reports sharp/burning pain in R inner thigh. OBJECTIVE:  Transfers:  Sit to Stand: Stand By Assistance  Stand to Sit:Stand By Assistance  Stand Pivot:Contact Guard Assistance    Ambulation:  Not Tested    Balance:  Dynamic Standing Balance: Contact Guard Assistance  Pt. Stood while PTA assessed standing BP and while completing ring toss activity. Pt. Steady throughout. Seated rest breaks taken due to decreased BP while standing. Exercise:  Patient was guided in 2 set(s) 10 reps of exercise to both lower extremities. Glut sets, Hip abduction/adduction, Seated marches, Seated hamstring curls, Seated heel/toe raises, Long arc quads and Seated isometric hip adduction. Exercises were completed for increased independence with functional mobility. Functional Outcome Measures: Not completed       ASSESSMENT:  Assessment: Patient progressing toward established goals. Activity Tolerance:  Patient tolerance of  treatment: good. Equipment Recommendations:Equipment Needed: Yes(Pt has SW.   Will need RW)  Discharge Recommendations:  Continue to assess pending progress, Patient would benefit from continued therapy after discharge    Plan: Times per week: 5x/wk 90 min, 1x/wk 30 min  Current Treatment Recommendations: Strengthening, Functional Mobility Training, Balance Training, Equipment Evaluation, Education, & procurement, Gait Training, Stair training, Endurance Training, Transfer Training    Patient Education  Patient Education: Plan of Care, Transfers, Reviewed Prior Education, Verbal Exercise Instruction, Home Safety Education    Goals:  Patient goals : get my dizziness better  Short term goals  Time Frame for Short term goals: 1 wk  Short term goal 1: bed mobility with Mod I, to get in/out of bed, use log roll technique  Short term goal 2: transfer with SBA to get in/out of chairs  Short term goal 3: amb >= 50''x1 with RW and SBA to progress to home and community mobility  Short term goal 4: Pt to ascend/descend 6\" step with RW, CGA, for home entry. Short term goal 5: Pt to score >= 20/28 on Tinetti; perform TUG <= 45 sec, indicating improved balance. Long term goals  Time Frame for Long term goals : 3 wks  Long term goal 1: transfer with Mod I, to get in/out of chairs  Long term goal 2: amb >= 150''x1 with RW and Mod I, for home and community mobility  Long term goal 3: Pt to ascend/descend 6\" step with RW, Mod I, for home entry. Long term goal 4: Pt to score >= 24/28 on Tinetti; perform TUG ,<= 30 sec, indicating improved balance. Long term goal 5: Pt to ascend/descend 4+ steps junior rails, Mod I, for community mobility    Following session, patient left in safe position with all fall risk precautions in place.

## 2020-09-01 NOTE — PROGRESS NOTES
[address] [facility] THERAPY  [address] [facility]  DAILY NOTE    TIME   SLP Individual Minutes  Time In: 0625  Time Out: 7772  Minutes: 28  Timed Code Treatment Minutes: 28 Minutes       Date: 2020  Patient Name: Rola Boyd      CSN: [id number]   : 1960  (60 y.o.)  Gender: male   Referring Physician:  Dr. Meghan Goodwin   Diagnosis: Burst fracture of T12 vertebra with routine healing; TBI   Secondary Diagnosis: Cognitive impairments  Precautions: Fall Risk; Impulsivity; Seizure   Current Diet: General diet with thin liquids   Swallowing Strategies: Standard Universal Swallow Precautions  Date of Last MBS: Not Applicable    Pain:  No pain reported    Subjective:  Pt sitting upright in recliner for duration of session. Required multiple thought processing cues to participate within tasks, as pt continuously reported that he had difficulty in thought organization. Pt reported a noticeable increase in stuttering for the past 2-3 weeks, which seems to be attributed to deficits in thought organization and not a true stuttering disorder. Short-Term Goals:  SHORT TERM GOAL #1:  Goal 1: Pt will complete immediate/delayed recall and working memory tasks with 90% accuracy given mod cues to improve retention of new and functional information. INTERVENTIONS:     Memory log initiated in the previous session was updated. Pt wrote in memory log items regarding events or activities that occurred, interactions he had with visitors, and how he was feeling. Pt reported that he had discussed a discharge plan and would be discharged . Also reported a \"dizzy spell\" he experienced when he was eating breakfast. Pt was unsure of the cause for dizziness. When standing during PT, Pt reported that his BP was low; PT informed Pt that he should be drinking more water throughout the day. In the memory log, Pt reported that he was feeling \"manic\" and \"wanting to talk to everyone. \" Required occasional thought processing cues and redirections to complete memory log. *will address more formal delayed recall in future session     SHORT TERM GOAL #2:  Goal 2: Pt will complete sustained, selective, and divided attention tasks with no more than 3 errors within a given task in order to improve participation in treatment and permit eventual return to driving. INTERVENTIONS: Not addressed d/t focus on other goals.       SHORT TERM GOAL #3:  Goal 3: Pt will complete functional reasoning and thought organizational tasks with 90% accuracy given mod cues to improve overall executive functioning skills. INTERVENTIONS: Not addressed d/t focus on other goals    SHORT TERM GOAL #4:  Goal 4: Pt will complete functional problem solving and sequencing tasks with 90% accuracy given mod cues to improve ability to make successful return to IADL's (medication management, finances, etc.). INTERVENTIONS: Identifying one household/ADL problem from picture scenes - 4/5 indep, 1/5 min cues  Identifying one potential solution - 3/5 indep, 2/5 min cues  *Cueing included redirections to task and thought organization cues. Pt commented on multiple details in pictures scenes with varying relevance. Minimal cueing required to orient Pt back to the task or clarify task instructions. Long-Term Goals:  Timeframe for Long-term Goals: 3 weeks    LONG TERM GOAL #1:  Goal 1: Patient will improve cognitive functioning to a min assist level to increase level of independence with ADL and iADL tasks in the home environment and reduce caregiver burden.         Comprehension: 5 - Patient understands basic needs (hungry/hot/pain)  Expression: 5 - Expresses basic ideas/needs only (hungry/hot/pain)  Social Interaction: 5 - Patient is appropriate with supervision/cues  Problem Solving: 3 - Patient solves simple/routine tasks 50%-74%  Memory: 3 - Patient remembers 50%-74% of the time        EDUCATION:  Learner: Patient  Education: Reviewed recommendations for follow-up, Education Related to Potential Risks and Complications Due to Impairment/Illness/Injury and Home Safety Education  Evaluation of Education: Verbalizes understanding, Needs further instruction and Family not present    ASSESSMENT/PLAN:    Activity Tolerance:  Patient tolerance of treatment: good. Assessment/Plan: Patient progressing toward established goals. Continues to require skilled care of licensed speech pathologist to progress toward achievement of established goals and plan of care. Plan for Next Session: attention, memory, reasoning     Clara LANG Speech Intern  [facility].  [address] [facility] [address], [address]

## 2020-09-02 PROCEDURE — 97535 SELF CARE MNGMENT TRAINING: CPT

## 2020-09-02 PROCEDURE — 97110 THERAPEUTIC EXERCISES: CPT

## 2020-09-02 PROCEDURE — 1180000000 HC REHAB R&B

## 2020-09-02 PROCEDURE — 97129 THER IVNTJ 1ST 15 MIN: CPT | Performed by: SPEECH-LANGUAGE PATHOLOGIST

## 2020-09-02 PROCEDURE — 2580000003 HC RX 258: Performed by: SURGERY

## 2020-09-02 PROCEDURE — 97130 THER IVNTJ EA ADDL 15 MIN: CPT | Performed by: SPEECH-LANGUAGE PATHOLOGIST

## 2020-09-02 PROCEDURE — 97530 THERAPEUTIC ACTIVITIES: CPT

## 2020-09-02 PROCEDURE — 6370000000 HC RX 637 (ALT 250 FOR IP): Performed by: SURGERY

## 2020-09-02 PROCEDURE — 6370000000 HC RX 637 (ALT 250 FOR IP): Performed by: PHYSICAL MEDICINE & REHABILITATION

## 2020-09-02 PROCEDURE — 97116 GAIT TRAINING THERAPY: CPT

## 2020-09-02 RX ORDER — ATENOLOL 100 MG/1
100 TABLET ORAL DAILY
Status: DISCONTINUED | OUTPATIENT
Start: 2020-09-02 | End: 2020-09-04

## 2020-09-02 RX ORDER — TAMSULOSIN HYDROCHLORIDE 0.4 MG/1
0.4 CAPSULE ORAL NIGHTLY
Status: DISCONTINUED | OUTPATIENT
Start: 2020-09-02 | End: 2020-09-09 | Stop reason: HOSPADM

## 2020-09-02 RX ADMIN — Medication 1000 MCG: at 09:26

## 2020-09-02 RX ADMIN — ACETAMINOPHEN 500 MG: 500 TABLET ORAL at 22:22

## 2020-09-02 RX ADMIN — ATENOLOL 100 MG: 100 TABLET ORAL at 09:25

## 2020-09-02 RX ADMIN — Medication 100 MG: at 09:26

## 2020-09-02 RX ADMIN — DIPHENHYDRAMINE HCL 25 MG: 25 TABLET ORAL at 22:23

## 2020-09-02 RX ADMIN — ACETAMINOPHEN 650 MG: 325 TABLET ORAL at 09:35

## 2020-09-02 RX ADMIN — TAMSULOSIN HYDROCHLORIDE 0.4 MG: 0.4 CAPSULE ORAL at 03:34

## 2020-09-02 RX ADMIN — QUETIAPINE FUMARATE 400 MG: 400 TABLET ORAL at 09:26

## 2020-09-02 RX ADMIN — ACETAMINOPHEN 650 MG: 325 TABLET ORAL at 03:34

## 2020-09-02 RX ADMIN — FOLIC ACID 1 MG: 1 TABLET ORAL at 09:26

## 2020-09-02 RX ADMIN — TAMSULOSIN HYDROCHLORIDE 0.4 MG: 0.4 CAPSULE ORAL at 22:23

## 2020-09-02 RX ADMIN — POLYETHYLENE GLYCOL 3350 17 G: 17 POWDER, FOR SOLUTION ORAL at 09:35

## 2020-09-02 RX ADMIN — QUETIAPINE FUMARATE 400 MG: 400 TABLET ORAL at 22:23

## 2020-09-02 RX ADMIN — Medication 10 ML: at 09:27

## 2020-09-02 RX ADMIN — THERA TABS 1 TABLET: TAB at 09:26

## 2020-09-02 RX ADMIN — CARBAMAZEPINE 200 MG: 200 TABLET ORAL at 22:23

## 2020-09-02 RX ADMIN — DOCUSATE SODIUM 100 MG: 100 CAPSULE, LIQUID FILLED ORAL at 09:26

## 2020-09-02 RX ADMIN — SENNOSIDES 8.6 MG: 8.6 TABLET, FILM COATED ORAL at 22:23

## 2020-09-02 RX ADMIN — FLUOXETINE HYDROCHLORIDE 60 MG: 20 CAPSULE ORAL at 22:23

## 2020-09-02 RX ADMIN — CARBAMAZEPINE 200 MG: 200 TABLET ORAL at 09:26

## 2020-09-02 RX ADMIN — PANTOPRAZOLE SODIUM 40 MG: 40 TABLET, DELAYED RELEASE ORAL at 04:58

## 2020-09-02 ASSESSMENT — PAIN DESCRIPTION - PAIN TYPE
TYPE: ACUTE PAIN
TYPE: ACUTE PAIN

## 2020-09-02 ASSESSMENT — PAIN DESCRIPTION - LOCATION
LOCATION: BACK
LOCATION: BACK

## 2020-09-02 ASSESSMENT — ENCOUNTER SYMPTOMS
COUGH: 0
TROUBLE SWALLOWING: 0
VOICE CHANGE: 0
ALLERGIC/IMMUNOLOGIC NEGATIVE: 1
NAUSEA: 0
DIARRHEA: 0
CONSTIPATION: 0
EYES NEGATIVE: 1
SHORTNESS OF BREATH: 0

## 2020-09-02 ASSESSMENT — PAIN SCALES - GENERAL
PAINLEVEL_OUTOF10: 6
PAINLEVEL_OUTOF10: 5
PAINLEVEL_OUTOF10: 6

## 2020-09-02 ASSESSMENT — PAIN DESCRIPTION - FREQUENCY
FREQUENCY: CONTINUOUS
FREQUENCY: CONTINUOUS

## 2020-09-02 ASSESSMENT — PAIN DESCRIPTION - DESCRIPTORS
DESCRIPTORS: ACHING
DESCRIPTORS: ACHING

## 2020-09-02 ASSESSMENT — PAIN DESCRIPTION - ONSET
ONSET: ON-GOING
ONSET: ON-GOING

## 2020-09-02 ASSESSMENT — PAIN DESCRIPTION - PROGRESSION: CLINICAL_PROGRESSION: NOT CHANGED

## 2020-09-02 ASSESSMENT — PAIN - FUNCTIONAL ASSESSMENT: PAIN_FUNCTIONAL_ASSESSMENT: PREVENTS OR INTERFERES SOME ACTIVE ACTIVITIES AND ADLS

## 2020-09-02 ASSESSMENT — PAIN DESCRIPTION - ORIENTATION: ORIENTATION: MID

## 2020-09-02 NOTE — PROGRESS NOTES
[address] [address]  INPATIENT PHYSICAL THERAPY  DAILY NOTE  [address] [facility] - 7E-70/070-A    Time In: 1000  Time Out: 1100  Timed Code Treatment Minutes: 60 Minutes  Minutes: 60          Date: 2020  Patient Name: Princess Colon,  Gender:  male        MRN: [id number]  : 1960  (60 y.o.)     Referring Practitioner: Dr. Sandy Uriostegui  Diagnosis: Burst fx of T12, TBI  Additional Pertinent Hx: T12 compression fracture s/p fall, chronic alcohol abuse, drug abuse, recurrent falls, neck pain, scalp hematoma     Prior Level of Function:  Lives With: Other (comment)(Mother)  Type of Home: House  Home Layout: One level  Home Access: Stairs to enter without rails  Entrance Stairs - Number of Steps: 1  Home Equipment: Standard walker   Bathroom Shower/Tub: Tub/Shower unit, Shower chair with back  Bathroom Toilet: Standard  Bathroom Equipment: Grab bars in shower(not in great shape per pt. plastic grab bars.)    ADL Assistance: Independent  Ambulation Assistance: Independent  Transfer Assistance: Independent  Active : Yes  Additional Comments: Pt stated he was \"experimenting\" with using the walker prior to admission. Pt reports mother does most of the inside chores, Pt is responsible for outside chores and yardwork. Restrictions/Precautions:  Restrictions/Precautions: General Precautions, Fall Risk  Required Braces or Orthoses  Spinal: Thoracic Lumbar Sacral Orthotics(May don in sitting)  Position Activity Restriction  Spinal Precautions: No Bending, No Lifting, No Twisting  Other position/activity restrictions: TLSO brace ordered for T12 fracture. Will need to wear this when up. May have it off when in bed. SUBJECTIVE: Pt seated in recliner. Pleasant and cooperative. PAIN: not rated. Palpable tenderness over Rt adductors. Unable to replicate with active movement. US performed to this area as noted below.     OBJECTIVE:    Transfers:  Sit to Stand: Contact Guard Assistance  Stand to [employer]  Multiple trials done with emphasis on improved control when going to sit as pt tends to perform a quick squat before attempting to position hand/s on armrests of chair. Ambulation:  Contact Guard Assistance  Distance: 75 ft with quick direction change  Surface: Level Tile  Device:Rolling Walker  Gait Deviations:  Slow Dedra and Decreased Gait Speed    Other Activity:  US Rt adductors 1.5 wts/cm2, continuous x8 min. STANDING EXERCISES:  The following exercises were completed to improve balance and coordination: with parallel bar support. EXERCISE REPS/SETS   Heel Raises 10/1   Toe Raises 10/1   Side Kicks 10/1   Shallow Squats 10/1 with cues and demo for technique   Marches 10/1   Hip ABDuction    Hamstring Curls 10/1   Hip Extension 10/1               ASSESSMENT:  Assessment: Patient progressing toward established goals. Activity Tolerance:  Patient tolerance of  treatment: good. Equipment Recommendations:Equipment Needed: Yes(Pt has SW.   Will need RW)  Discharge Recommendations:  Continue to assess pending progress, Patient would benefit from continued therapy after discharge    Plan: Times per week: 5x/wk 90 min, 1x/wk 30 min  Current Treatment Recommendations: Strengthening, Functional Mobility Training, Balance Training, Equipment Evaluation, Education, & procurement, Gait Training, Stair training, Endurance Training, Transfer Training    Patient Education  Patient Education: Home Exercise Program, Transfers, Gait,  - Patient Verbalized Understanding, - Patient Requires Continued Education    Goals:  Patient goals : get my dizziness better  Short term goals  Time Frame for Short term goals: 1 wk  Short term goal 1: bed mobility with Mod I, to get in/out of bed, use log roll technique  Short term goal 2: transfer with SBA to get in/out of chairs  Short term goal 3: amb >= 50''x1 with RW and SBA to progress to home and community mobility  Short term goal 4: Pt to ascend/descend 6\" step with RW, CGA, for home entry. Short term goal 5: Pt to score >= 20/28 on Tinetti; perform TUG <= 45 sec, indicating improved balance. Long term goals  Time Frame for Long term goals : 3 wks  Long term goal 1: transfer with Mod I, to get in/out of chairs  Long term goal 2: amb >= 150''x1 with RW and Mod I, for home and community mobility  Long term goal 3: Pt to ascend/descend 6\" step with RW, Mod I, for home entry. Long term goal 4: Pt to score >= 24/28 on Tinetti; perform TUG ,<= 30 sec, indicating improved balance. Long term goal 5: Pt to ascend/descend 4+ steps junior rails, Mod I, for community mobility    Following session, patient left in safe position with all fall risk precautions in place.

## 2020-09-02 NOTE — PLAN OF CARE
Problem: Pain:  Goal: Pain level will decrease  Description: Pain level will decrease  Outcome: Ongoing   Pt pain controlled with Prn tylenol. Pt expressed need for atenolol and flomax to be started from his home medications.  was notified.

## 2020-09-02 NOTE — PROGRESS NOTES
Patient: Jacey Alfonso  Unit/Bed: 7E-70/070-A  YOB: 1960  MRN: [id number] Acct: [de-identified]   Admitting Diagnosis: Burst fracture of T12 vertebra with routine healing [S22.081D]  Admit Date:  8/27/2020  Hospital Day: 5    Assessment:     Active Problems:    Chronic alcohol abuse    Benign essential hypertension    Fall    Closed head injury    Hypomagnesemia    Burst fracture of T12 vertebra with routine healing    Vitamin B12 deficiency  Resolved Problems:    * No resolved hospital problems. *      Plan:     Follow the soreness to the medial proximal right thigh as there may be a musculoskeletal  Cause related to the fall. Subjective:     Patient has no complaint of CP, SOB, GI upset or voiding troubles. He mentions a soreness along the proximal right thigh medially going back to the time of the fall. .. Medication side effects: none    Scheduled Meds:   acetaminophen  500 mg Oral Nightly    And    diphenhydrAMINE  25 mg Oral Nightly    sodium chloride flush  10 mL Intravenous 2 times per day    thiamine  100 mg Oral Daily    amLODIPine  10 mg Oral Daily    carBAMazepine  200 mg Oral BID    docusate sodium  100 mg Oral Daily    FLUoxetine  60 mg Oral Daily    folic acid  1 mg Oral Daily    [START ON 8/25/2021] magnesium replacement protocol   Other RX Placeholder    multivitamin  1 tablet Oral Daily    pantoprazole  40 mg Oral QAM AC    QUEtiapine  400 mg Oral BID    senna  1 tablet Oral Nightly    vitamin B-12  1,000 mcg Oral Daily     Continuous Infusions:  PRN Meds:acetaminophen, polyethylene glycol, promethazine **OR** ondansetron, sodium chloride flush, bisacodyl, magnesium sulfate, traZODone, magnesium hydroxide    Review of Systems  Pertinent items are noted in HPI.     Objective:     Patient Vitals for the past 8 hrs:   BP Pulse SpO2   09/01/20 1341 123/89 111 --   09/01/20 1340 130/72 106 --   09/01/20 1339 (!) 154/95 96 --   09/01/20 1328 -- -- 97 %     I/O last 3 completed shifts:   In: 5 [P.O.:540]  Out: 700 [Urine:700]  I/O this shift:  In: 180 [P.O.:180]  Out: -     /89   Pulse 111   Temp 98.1 °F (36.7 °C) (Oral)   Resp 18   Ht 6' 4\" (1.93 m)   Wt 222 lb 7 oz (100.9 kg)   SpO2 97% Comment: evaluated o2, no o2 needed at this time  BMI 27.08 kg/m²     /89   Pulse 111   Temp 98.1 °F (36.7 °C) (Oral)   Resp 18   Ht 6' 4\" (1.93 m)   Wt 222 lb 7 oz (100.9 kg)   SpO2 97% Comment: evaluated o2, no o2 needed at this time  BMI 27.08 kg/m²   General appearance: alert, appears stated age and cooperative  Head: Normocephalic, without obvious abnormality, atraumatic  Lungs: clear to auscultation bilaterally  Heart: regular rate and rhythm, S1, S2 normal, no murmur, click, rub or gallop  Abdomen: soft, non-tender; bowel sounds normal; no masses,  no organomegaly  Extremities: extremities normal, atraumatic, no cyanosis or edema and no cord felt along the medial thigh  Skin: Skin color, texture, turgor normal. No rashes or lesions  Neurologic: weak      Electronically signed by Nessa Lopez MD on 9/1/2020 at 8:34 PM

## 2020-09-02 NOTE — PROGRESS NOTES
[facility]  INPATIENT PHYSICAL THERAPY  DAILY NOTE  [address] [facility] - 7E-70/070-A    Time In: 1330  Time Out: 1400  Timed Code Treatment Minutes: 30 Minutes  Minutes: 30          Date: 2020  Patient Name: Kwesi Malone,  Gender:  male        MRN: [id number]  : 1960  (60 y.o.)     Referring Practitioner: Dr. Rohan Carrion  Diagnosis: Burst fx of T12, TBI  Additional Pertinent Hx: T12 compression fracture s/p fall, chronic alcohol abuse, drug abuse, recurrent falls, neck pain, scalp hematoma     Prior Level of Function:  Lives With: Other (comment)(Mother)  Type of Home: House  Home Layout: One level  Home Access: Stairs to enter without rails  Entrance Stairs - Number of Steps: 1  Home Equipment: Standard walker   Bathroom Shower/Tub: Tub/Shower unit, Shower chair with back  Bathroom Toilet: Standard  Bathroom Equipment: Grab bars in shower(not in great shape per pt. plastic grab bars.)    ADL Assistance: Independent  Ambulation Assistance: Independent  Transfer Assistance: Independent  Active : Yes  Additional Comments: Pt stated he was \"experimenting\" with using the walker prior to admission. Pt reports mother does most of the inside chores, Pt is responsible for outside chores and yardwork. Restrictions/Precautions:  Restrictions/Precautions: General Precautions, Fall Risk  Required Braces or Orthoses  Spinal: Thoracic Lumbar Sacral Orthotics(May don in sitting)  Position Activity Restriction  Spinal Precautions: No Bending, No Lifting, No Twisting  Other position/activity restrictions: TLSO brace ordered for T12 fracture. Will need to wear this when up. May have it off when in bed. SUBJECTIVE: Pt seated in recliner. Pleasant and cooperative. PAIN: 0/10:     OBJECTIVE:    Transfers:  Sit to Stand: Stand By Assistance  Stand to [facility].   Continued trials done with pt emphasizing the need to initiate reaching back to armrests or bed with UEs at start of the squat to sit. Pt tends to squat nearly 1/2 way to sit prior to reaching back with arms, showing unsafe technique and less controlled descent. Pt was able to show improved technique, but not consistent with carryover. Ambulation:  Contact Guard Assistance  Distance: 100 ft, 125 ft  Surface: sidewalk with intermittent cracks  Device:Rolling Walker  Gait Deviations:  Slow Dedra and Decreased Gait Speed       ASSESSMENT:  Assessment: Patient progressing toward established goals. Activity Tolerance:  Patient tolerance of  treatment: good. Equipment Recommendations:Equipment Needed: (RW ordered from E)  Discharge Recommendations:  Continue to assess pending progress, Patient would benefit from continued therapy after discharge    Plan: Times per week: 5x/wk 90 min, 1x/wk 30 min  Specific instructions for Next Treatment: US Rt adductors if pain noted. Current Treatment Recommendations: Strengthening, Functional Mobility Training, Balance Training, Equipment Evaluation, Education, & procurement, Gait Training, Stair training, Endurance Training, Transfer Training    Patient Education  Patient Education: Transfers, Gait,  - Patient Verbalized Understanding, - Patient Requires Continued Education    Goals:  Patient goals : get my dizziness better  Short term goals  Time Frame for Short term goals: 1 wk  Short term goal 1: bed mobility with Mod I, to get in/out of bed, use log roll technique  Short term goal 2: transfer with SBA to get in/out of chairs  Short term goal 3: amb >= 50''x1 with RW and SBA to progress to home and community mobility  Short term goal 4: Pt to ascend/descend 6\" step with RW, CGA, for home entry. Short term goal 5: Pt to score >= 20/28 on Tinetti; perform TUG <= 45 sec, indicating improved balance.   Long term goals  Time Frame for Long term goals : 3 wks  Long term goal 1: transfer with Mod I, to get in/out of chairs  Long term goal 2: amb >=

## 2020-09-02 NOTE — FLOWSHEET NOTE
09/01/20 2200   Provider Notification   Reason for Communication Evaluate   Provider Name Dr. Rosemary Graham   Provider Notification Physician   Method of Communication Call   Response See orders   Shift Event Other (comment)  (pt had hypertension and requests home medications)

## 2020-09-02 NOTE — PLAN OF CARE
Problem: DISCHARGE BARRIERS  Goal: Patient's continuum of care needs are met  Note: Team conference held Tuesday, 09/01/2020. Recommendations of the team were explained to the patient by KIRSTIE Serrato, and Dr. Daly Tobar. Team is recommending that patient continue on acute inpatient rehab for eight more days, with expected discharge date of Wednesday, 09/09/2020. Prior to discharge, team is recommending family education. Patient indicates preference for SW to contact daughter, Gretta Cuellar. Following discharge, team is recommending home health care services for RN/PT/OT/ST/HHA. Care plan reviewed with patient. Patient verbalized understanding of the plan of care and contributed to goal setting. SW to follow and maintain involvement in discharge planning.

## 2020-09-02 NOTE — PROGRESS NOTES
[address]  Occupational Therapy  Daily Note  Time:   Time In: 1400  Time Out: 1430  Timed Code Treatment Minutes: 30 Minutes  Minutes: 30          Date: 2020  Patient Name: Pansy Baumgarten,   Gender: male      Room: [facility]70/070-A  MRN: [id number]  : 1960  (60 y.o.)  Referring Practitioner: Dr. Agnes Troy  Diagnosis: Burst fracture T 12 with routine healing, TBI  Additional Pertinent Hx: Per ED notes: Mr. Namrata Swann is a 61 Y/O male presenting at [facility] by trauma consult, brought by EMS following a fall today  with LOC; PMH includes drug and alcohol abuse, cancer, HTN, HLD, and GERD. Per patient report he has been falling with increasing frequency over past 2 months, believes a fall while exiting a parked car two days ago is what hurt his back, but he also fell today. Patient states he typically does not remember falls, just finds himself on the ground. Daughter present in exam room states, falls often occur after standing up, he will begin to shake and then fall to floor, appear to lose consciousness briefly. Patient reports history of alcohol abuse, drank a pint of liquor today between [id number], states this is not unusual for him. Restrictions/Precautions:  Restrictions/Precautions: General Precautions, Fall Risk  Required Braces or Orthoses  Spinal: Thoracic Lumbar Sacral Orthotics(May don in sitting)  Position Activity Restriction  Spinal Precautions: No Bending, No Lifting, No Twisting  Other position/activity restrictions: TLSO brace ordered for T12 fracture. Will need to wear this when up. May have it off when in bed. SUBJECTIVE: Patient seated in recliner upon arrival.  RN Ok'ed treatment. PAIN: NO complaints    BALANCE:  Sitting Balance:  Stand By Reuben. Standing Balance: Contact Guard Assistance. TRANSFERS:  Sit to Stand:  Contact Guard Assistance. Stand to Sit: Contact Guard Assistance.       FUNCTIONAL

## 2020-09-02 NOTE — PROGRESS NOTES
3/3      IADL:   Patient completed IADL homemaking task this date of following spinal precautions during homemaking task to identify 5 safety hazards located throughout apartment - task was graded to challenge  Comprehension of spinal precautions and his walker safety. Pt located 4/5 safety hazards with SBA and provided appropriate rationales for  Hazards, min vcs to recall to fix each issue as well as identify. Pt needed 1 verbal prompt for 1/5 hazards. Immediately following task, pt able to recall 4/5 hazards with SBA, min vcs for 1/5. Patient required CGA throughout task with a standing endurance of 9 minutes.      BALANCE:  Sitting Balance:  Supervision. Standing Balance: Contact Guard Assistance.      TRANSFERS:  Sit to Stand:  Contact Guard Assistance, X 1, cues for hand placement. Stand to Sit: Stand By Assistance, X 1, cues for hand placement.       FUNCTIONAL MOBILITY:  Assistive Device: Rolling Walker  Assist Level:  Contact Guard Assistance. Distance: from therapy apartment to room, cues for pathfinding which direction from apartment,      ASSESSMENT:  Activity Tolerance:  Patient tolerance of  treatment: good. Discharge Recommendations: Home with Home health OT, Home with nursing aide, 24 hour supervision or assist    Equipment Recommendations: Equipment Needed: No  Other: Pt has a shower chair. Does not need a BSC. Speech Therapy:  Short-Term Goals:  SHORT TERM GOAL #1:  Goal 1: Pt will complete immediate/delayed recall and working memory tasks with 90% accuracy given mod cues to improve retention of new and functional information. INTERVENTIONS: Briefly reviewed memory log and how patient could utilize the notebook to make a \"to do list\" for rescheduling and cancelling x2 appointments and inquiring about his brace.  Assisted patient in writing the information down in and organized manner for improved retention of information.      Functional recall: Provided patient with a functional appointment (4 units of information). Discussed compensatory strategies to improve retention of the information including writing the information down and rehearsing it several times.   -Immediate recall- 5/5 indep  -Recall following a 5 minute delay- 4/5 indep  -Recall following a 15 minute delay- 5/5 indep     SHORT TERM GOAL #2:  Goal 2: Pt will complete sustained, selective, and divided attention tasks with no more than 3 errors within a given task in order to improve participation in treatment and permit eventual return to driving. INTERVENTIONS: Selective attention task: Patient given a deck of cards with x2 suites and 2 cards omitted with prompts to determine the missing cards on review. Patient independently sorted the cards by suite, but wasn't able to organize thoughts to determine next step for identifying missing card. Mod cues needed to look through the stack and locate cards in sequence. Improved independence with second trial.  *Overall fair success with selective attention, but reduced performance with problem solving/reasoning for identifying a method to locate the missing cards.      SHORT TERM GOAL #3:  Goal 3: Pt will complete functional reasoning and thought organizational tasks with 90% accuracy given mod cues to improve overall executive functioning skills. INTERVENTIONS: Navigation of a prescription label- 7/10 indep, 1/10 with min cues, 2/10 with mod cues. *Cueing assist needed for comprehension of instructions on the label. SHORT TERM GOAL #4:  Goal 4: Pt will complete functional problem solving and sequencing tasks with 90% accuracy given mod cues to improve ability to make successful return to IADL's (medication management, finances, etc.).   INTERVENTIONS: AM SESSION: Identifying one household/ADL problem from picture scenes - 4/5 indep, 1/5 min cues  Identifying one potential solution - 3/5 indep, 2/5 min cues  *Cueing included redirections to task and thought organization cues. Pt commented on multiple details in pictures scenes with varying relevance. Minimal cueing required to orient Pt back to the task or clarify task instructions.     Long-Term Goals:  Timeframe for Long-term Goals: 3 weeks     LONG TERM GOAL #1:  Goal 1: Patient will improve cognitive functioning to a min assist level to increase level of independence with ADL and iADL tasks in the home environment and reduce caregiver burden. Comprehension: 5 - Patient understands basic needs (hungry/hot/pain)  Expression: 5 - Expresses basic ideas/needs only (hungry/hot/pain)  Social Interaction: 5 - Patient is appropriate with supervision/cues  Problem Solving: 3 - Patient solves simple/routine tasks 50%-74%  Memory: 3 - Patient remembers 50%-74% of the time    Review of Systems:  Review of Systems   Constitutional: Positive for activity change and fatigue. Negative for appetite change and fever. HENT: Negative for trouble swallowing and voice change. Eyes: Negative. Respiratory: Negative for cough and shortness of breath. Cardiovascular: Negative for leg swelling. Gastrointestinal: Negative for constipation, diarrhea and nausea. Endocrine: Negative. Genitourinary: Negative for hematuria and urgency. Musculoskeletal: Positive for gait problem. Negative for joint swelling. Skin: Negative for pallor. Allergic/Immunologic: Negative. Neurological: Positive for weakness. Negative for dizziness and headaches. Hematological: Negative. Psychiatric/Behavioral: Positive for confusion, decreased concentration and hallucinations. Negative for dysphoric mood. The patient is not nervous/anxious. All other systems reviewed and are negative. Objective:  BP (!) 140/84   Pulse 91   Temp 97.7 °F (36.5 °C) (Oral)   Resp 16   Ht 6' 4\" (1.93 m)   Wt 222 lb 7 oz (100.9 kg)   SpO2 98%   BMI 27.08 kg/m²   CURRENT VITALS:  height is 6' 4\" (1.93 m) and weight is 222 lb 7 oz (100.9 kg).  His oral temperature is 97.7 °F (36.5 °C). His blood pressure is 140/84 (abnormal) and his pulse is 91. His respiration is 16 and oxygen saturation is 98%. Body mass index is 27.08 kg/m². Temperature Range (24h):Temp: 97.7 °F (36.5 °C) Temp  Av.9 °F (36.6 °C)  Min: 97.7 °F (36.5 °C)  Max: 98.1 °F (36.7 °C)  BP Range (97M): Systolic (92NUK), IMT:587 , Min:123 , AJV:859     Diastolic (26GRL), EUGENIA:81, Min:72, Max:110    Pulse Range (24h): Pulse  Av.3  Min: 89  Max: 111  Respiration Range (24h): Resp  Av  Min: 16  Max: 18  Current Pulse Ox (24h):  SpO2: 98 %  Pulse Ox Range (24h):  SpO2  Av.8 %  Min: 93 %  Max: 99 %  Oxygen Amount and Delivery:      awake  Orientation:   person, place, time, situation  Mood: within normal limits  Affect: calm  General appearance:  in no acute distress, up in chair with TLSO brace on    Memory:   Grossly normal  Attention/Concentration: abnormal -mild delay with command following of one-step commands  Language:  normal    ROM: Abnormal due to use of TLSO brace  Motor Exam:  Motor exam is symmetrical 5 out of 5 all extremities bilaterally    Tone:  normal  Muscle bulk: within normal limits; with noted mild atrophy of bilateral calves  Sensory:  Sensory intact  Coordination:   normal  Deep Tendon Reflexes:  Reflexes are intact and symmetrical bilaterally    Skin: warm and dry, no rash or erythema and abrasions over both knees with scabs and fading hematoma with abrasion over left parietal scalp  Peripheral vascular: Pulses: Normal upper and lower extremity pulses; Edema: no    Diagnostics:   No results found for this or any previous visit (from the past 24 hour(s)). Labs Renal Latest Ref Rng & Units 2020   BUN 7 - 22 mg/dL 12 13 13 12 12   Cr 0.4 - 1.2 mg/dL 1.2 1.2 1.1 1.3(H) 1. 3(H)   K 3.5 - 5.2 meq/L 4.3 3.9 4.5 4.2 3.8   Na 135 - 145 meq/L 140 139 138 134(L) 132(L)      Recent Labs     20  0842 20  0573 08/26/20  0400   WBC 5.5 6.0 4.1*   HGB 10.3* 9.8* 10.1*   HCT 31.2* 30.9* 31.4*   .1* 112.0* 111.3*    188 181      Impression:  1. Frequent falls. 2. Gait instability with a Tinetti score of 8/28. 3. Left scalp hematoma. 4. 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 5. Moderate cognitive impairment. (MOCA) version 8.2 completed. Patient scored 14/30. *Previous score on the [address] [place] [address], [place] was 15/25. 6. Acute alcohol intoxication with admission blood alcohol level of 0.08.  7. General cerebral atrophy and small vessel ischemic changes with prominent ventricles consistent with central atrophy. 8. Cervical and thoracic spine pain. 9. T12 burst fracture without instability initially noted on a lumbar spine x-ray on 7/24/2020. 10. Hyponatremia. 11. Hypomagnesemia  12. Acute kidney injury. 13. Alcohol abuse. 14. Vitamin B12 deficiency with chronic macrocytic anemia. .  15. Recent acute, comminuted, minimally displaced, T-shaped fracture of the head of the right proximal phalanx of the great toe on 8/14/2020. 16. Hypertension. 17. Hyperlipidemia. 18. Remote history of cocaine drug abuse. 19. Cervical spondylosis with C3-C4 moderate to severe right and severe left neuroforaminal stenosis, moderate to severe left and severe right neuroforaminal stenosis at C4-C5, moderate right and severe left neuroforaminal stenosis at C5-C6, and moderate right and mild left neuroforaminal stenosis at C6-C7.  20. Lumbar spondylosis with mild degenerative changes most pronounced at L3-L4 with moderate spinal canal stenosis and mild to moderate bilateral neuroforaminal stenosis. 21. Chronic macrocytic, hyperchromic anemia. 22. CKD 2.  23. History of depression. 24. Bipolar 1 disorder. 25. GERD. 26. Current everyday smoker with a 40-pack-year history. 27. Orthostatic hypotension. 28. Right medial hip adductor pain. Plan:     Medical management: Per primary team and Dr. Yessenia Kidd.     Consultants: Trauma Surgery, Critical Care, Orthopedic Surgery, Family Medicine, Physical Medicine    Narcotic usage:  N/A  Last BM:  Stool Amount: Large (08/29/20 1402)    FUNCTIONAL OUTCOMES TOOLS:    DENNY -      Tinetti - Balance Score: 10  Gait Score: 6  Tinetti Total Score: 16    TUG -      Acute/Rehabilitation Problems:  1. Frequent falls/Gait instability with a Tinetti score of 8/28 . 1. PT/OT. 2. Tinetti 14/28. Score improved to 16/28 on 9/1. Risk Indicators: Less than/equal to 18 = high risk; 19-23 Moderate risk; Greater than/equal to 24 = low risk. 3. TUG 63 seconds. 60-69 years:  8.1 seconds; 70-79 years: 9.2 seconds; 80-99 years: 11.3 seconds. 2. Left scalp hematoma. 3. 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 1. TBI protocol. 2. Tele-sitter. 4. Moderate cognitive impairment. (MOCA) version 8.2 completed. Patient scored 14/30. *Previous score on the [address] [place] [address] was 15/25. 1. SLP. 5. Acute alcohol intoxication with admission blood alcohol level of 0.08.  6. General cerebral atrophy and small vessel ischemic changes with prominent ventricles consistent with central atrophy. 7. Cervical and thoracic spine pain/T12 burst fracture without instability initially noted on a lumbar spine x-ray on 7/24/2020.  1. TLSO brace when out of bed. 2. Will likely need brace for 2 to 3 months. 3. No lifting over 20 pounds. 4. 2 to 3-week follow-up after discharge with serial x-rays. 8. Acute kidney injury. 1. Cr/BUN/GFR improved to 1.2/13/62 from1.3/12/1957 on 8/25. Stable at 1.2/12/62 on 8/31.  9. Alcohol abuse. 1. Family states they would like for him to go into a substance abuse program.  2. Multivitamin. 10. Vitamin B12 deficiency with chronic macrocytic anemia  1. .0 on 8/28. Improved to 109.1 on 8/31. 2. Folic acid.   3. Vitamin B12.  11. Recent acute, comminuted, minimally displaced, T-shaped fracture of the head of the right proximal phalanx of the great toe on 8/14/2020. 12. Orthostatic hypotension. 1. Noted on 9/1. Patient encouraged to improve fluid intake with resolution later in the day. We will continue to monitor patient's fluid consumption. 2. Encourage fluids. 13. Right hip adductor pain. 1. Suspect possible adductor complex strain. Physical Therapy ultrasound treatments have been ordered. 14. Nutrition:  Consultation to dietician for nutritional counseling and recommendations. 1. TotP 6.0, alb 3.5, Vitamin 25OH level of 43 on 8/28/2020. 15. Electrolytes. 1. Normal on 8/31 with exception of:  2. Hyponatremia. Sodium normal at 139 on 8/28 which is improved from 132 on 8/24. Sodium remains normal at 140 on 8/31. 3. Hypomagnesemia. Magnesium was 1.5 on 8/25 with improvement to 2.2 on 8/26. Resolved. 16. Bladder: No issues, has Flomax on home medication list.  Medication was started 9/1 at patient's request  17. Bowel: Senna, colace, MOM  18. Rehabilitation nursing will be involved for bowel, bladder, skin, and pain management. Nursing will also provide education and training to patient and family. 19. Prophylaxis:  DVT: SCDs. GI: Protonix. 20.  and case management consultations for coordination of care and discharge planning. Chronic Problems:  1. Hypertension. 1. Amlodipine. Patient is actually on atenolol 100 mg daily at home; this medication was restarted on 9/2 and the amlodipine was discontinued. 2. Hyperlipidemia. 1. Has fenofibrate on home medication list.  3. Remote history of cocaine drug abuse.   4. Cervical spondylosis with C3-C4 moderate to severe right and severe left neuroforaminal stenosis, moderate to severe left and severe right neuroforaminal stenosis at C4-C5, moderate right and severe left neuroforaminal stenosis at C5-C6, and moderate right and mild left neuroforaminal stenosis at C6-C7.  5. Lumbar spondylosis with mild degenerative changes most pronounced at L3-L4 with moderate spinal canal stenosis and mild to moderate bilateral neuroforaminal stenosis. 6. Chronic macrocytic, hyperchromic anemia. 7. CKD 2.  8. History of depression/Bipolar 1 disorder. 1. Prozac. 2. Seroquel. 3. Tegretol. 4. Has BuSpar on home medication list.  5. Trazodone for sleep. 9. GERD. 1. Protonix. 10. Current everyday smoker with a 40-pack-year history. 1. NicoDerm patch if patient is agreeable. Labs reviewed on:  1. 8/28.  2. 8/31. Infectious Disease:  1. N/A    Missed Therapy Time:  None     DME:    Discharge Plan:      Greater than 50% of 30 minutes was spent with the patient reviewing his current level of pain control, his progress with therapies and discussing the apparent hallucinations that he had yesterday evening.     Romeo Atkins MD

## 2020-09-02 NOTE — PROGRESS NOTES
with min cues needed for retention of meaning of cards and how to complete a turn in the game during initial trial. Complete independence with play on second trial. Reviewed rules and game set up at the end of the session with prompts for patient to retain this information for tomorrow's session when he will be instructing the speech intern on game play. Encouraged patient to make notes in his memory log regarding details from the game to serve as a reference. SHORT TERM GOAL #3:  Goal 3: Pt will complete functional reasoning and thought organizational tasks with 90% accuracy given mod cues to improve overall executive functioning skills. INTERVENTIONS: Time word problems:  7/10 indep, 2/10 with self correction, 1/10 with mod cues. *Slightly increased time needed to complete task. *Overall good success with math computation, just needing additional time and cues to reason through tasks with multiple steps. SHORT TERM GOAL #4:  Goal 4: Pt will complete functional problem solving and sequencing tasks with 90% accuracy given mod cues to improve ability to make successful return to IADL's (medication management, finances, etc.). INTERVENTIONS: Problem solving (Listing items needed to complete a task):  -Repairing a crack in the wall- 2/4 with min cues for word retrieval (patient describing the object, but needing assist to recall the word, 2/4 with mod cues for identifying other objects.  -Fixing a wooden porch step- 2/4 indep, 2/4 with min cues to consider the items he selected and then an object he can pair with it (ie: hammer with nails). -Hanging a picture- 2/4 indep, 2/4 with min cues to consider the items he selected and then an object he can pair with it (ie: hammer with nails).   -Maintaining a car- 2/4 indep, 2/4 with min cues     Long-Term Goals:  Timeframe for Long-term Goals: 3 weeks    LONG TERM GOAL #1:  Goal 1: Patient will improve cognitive functioning to a min assist level to increase level of independence with ADL and iADL tasks in the home environment and reduce caregiver burden. Comprehension: 5 - Patient understands basic needs (hungry/hot/pain)  Expression: 5 - Expresses basic ideas/needs only (hungry/hot/pain)  Social Interaction: 5 - Patient is appropriate with supervision/cues  Problem Solving: 3 - Patient solves simple/routine tasks 50%-74%  Memory: 3 - Patient remembers 50%-74% of the time        EDUCATION:  Learner: Patient  Education:  Reviewed recommendations for follow-up, Education Related to Potential Risks and Complications Due to Impairment/Illness/Injury and Home Safety Education  Evaluation of Education: Verbalizes understanding, Needs further instruction and Family not present    ASSESSMENT/PLAN:    Activity Tolerance:  Patient tolerance of treatment: good. Assessment/Plan: Patient progressing toward established goals. Continues to require skilled care of licensed speech pathologist to progress toward achievement of established goals and plan of care. Plan for Next Session: Recall of novel card game, Calendar organization task. Dino Wren.  [address] [facility], [facility] [address], [address]

## 2020-09-02 NOTE — PROGRESS NOTES
Duane Devine [address] [facility] [address]  [address] [facility]  Occupational Therapy  Daily Note  Time:   Time In: 830  Time Out: 930  Timed Code Treatment Minutes: 60 Minutes  Minutes: 60          Date: 2020  Patient Name: Pippa Rodriguez,   Gender: male      Room: [facility]70/070-A  MRN: [id number]  : 1960  (60 y.o.)  Referring Practitioner: Dr. Lizbeth Beck  Diagnosis: Burst fracture T 12 with routine healing, TBI  Additional Pertinent Hx: Per ED notes: Mr. Manda White is a 59 Y/O male presenting at [facility] by trauma consult, brought by EMS following a fall today  with LOC; PMH includes drug and alcohol abuse, cancer, HTN, HLD, and GERD. Per patient report he has been falling with increasing frequency over past 2 months, believes a fall while exiting a parked car two days ago is what hurt his back, but he also fell today. Patient states he typically does not remember falls, just finds himself on the ground. Daughter present in exam room states, falls often occur after standing up, he will begin to shake and then fall to floor, appear to lose consciousness briefly. Patient reports history of alcohol abuse, drank a pint of liquor today between [id number], states this is not unusual for him. Restrictions/Precautions:  Restrictions/Precautions: General Precautions, Fall Risk  Required Braces or Orthoses  Spinal: Thoracic Lumbar Sacral Orthotics(May don in sitting)  Position Activity Restriction  Spinal Precautions: No Bending, No Lifting, No Twisting  Other position/activity restrictions: TLSO brace ordered for T12 fracture. Will need to wear this when up. May have it off when in bed. SUBJECTIVE: pt sitting EOB finishing his breakfast. Pt agreeable to doing a shower     PAIN: pt did state 7/10 pain in back area     COGNITION: Decreased Insight, Impaired Memory, Decreased Problem Solving and Decreased Safety Awareness    ADL:   Grooming: Stand By Assistance.   to complete oral care   Bathing: Management Training, Equipment Evaluation, Education, & procurement    Patient Education  Patient Education: ADL's, Precautions and Importance of Increasing Activity    Goals  Short term goals  Time Frame for Short term goals: 1 week  Short term goal 1: Pt will complete BADL routine with setup and no > min cues for problem solving, attetntion to task , and back safety to increase independence in self care tasks  Short term goal 2: Pt to demo toileting tasks and transfers with S and no cues for safety  Short term goal 3: Pt to demo good dyamamic standing balance > 5 min with S and 0-1 UE support while reaching outside base of support  in prep for retrieveing clothing items. Short term goal 4: PT will complete 2 step homemaking tasks withCGA  no > min cues for back precautions/ proper body mechanics to increase ability to retrieve a snack at home. Long term goals  Time Frame for Long term goals : 2 weeks  Long term goal 1: PT will complete ADL routine including donning a TLSO with S andno > min cues for attention to tasks or cues for back  to increase independence in self care  Long term goal 2: PT will compelte 2 step homemaking tasks with S and 0-1 cues for proper body mechanics to increase ability to complete laundry tasks. Following session, patient left in safe position with all fall risk precautions in place.

## 2020-09-02 NOTE — PROGRESS NOTES
[address] [facility] THERAPY  254 [facility]  DAILY NOTE    TIME   SLP Individual Minutes  Time In: 1110  Time Out: 2279  Minutes: 23  Timed Code Treatment Minutes: 23 Minutes       Date: 2020  Patient Name: Pansy Baumgarten      CSN: [id number]   : 1960  (60 y.o.)  Gender: male   Referring Physician:  Dr. Cherelle Ingram   Diagnosis: Burst fracture of T12 vertebra with routine healing; TBI   Secondary Diagnosis: Cognitive impairments  Precautions: Fall Risk; Impulsivity; Seizure   Current Diet: General diet with thin liquids   Swallowing Strategies: Standard Universal Swallow Precautions  Date of Last MBS: Not Applicable    Pain:   back pain, patient stating more \"discomfort\" versus \"pain\"     Subjective:  Pt sitting upright in recliner during session, with minimal recall of this therapist from prior date. Appropriate alertness, motivation and participation with therapy tasks throughout session. Short-Term Goals:  SHORT TERM GOAL #1:  Goal 1: Pt will complete immediate/delayed recall and working memory tasks with 90% accuracy given mod cues to improve retention of new and functional information. INTERVENTIONS: Reviewed memory log at the beginning of the session. No initiation to fill out information regarding details from his day. At the end of the session patient prompted to write out details from his OT, PT and ST sessions for review in his afternoon session. Recall of ST name- Required multiple choice cues  Recall of PT name- Indep  Recall of ST intern name- Min cues     SHORT TERM GOAL #2:  Goal 2: Pt will complete sustained, selective, and divided attention tasks with no more than 3 errors within a given task in order to improve participation in treatment and permit eventual return to driving. INTERVENTIONS: Did not address due to focus on other goals.      SHORT TERM GOAL #3:  Goal 3: Pt will complete functional reasoning and thought organizational tasks with 90% accuracy given mod cues to improve overall executive functioning skills. INTERVENTIONS: Calendar organization task-  8/10 indep, 2/10 with mod-max cues   *SIGNIFICANT increased time needed to complete task, with multiple reviews of instructions and models. *With additional time, patient with improved success with thought organization. *Reviewed rationale for why use of calendar is helpful in organizing events and assist with recall. SHORT TERM GOAL #4:  Goal 4: Pt will complete functional problem solving and sequencing tasks with 90% accuracy given mod cues to improve ability to make successful return to IADL's (medication management, finances, etc.). INTERVENTIONS: Did not address due to focus on other goals. Long-Term Goals:  Timeframe for Long-term Goals: 3 weeks    LONG TERM GOAL #1:  Goal 1: Patient will improve cognitive functioning to a min assist level to increase level of independence with ADL and iADL tasks in the home environment and reduce caregiver burden. Comprehension: 5 - Patient understands basic needs (hungry/hot/pain)  Expression: 5 - Expresses basic ideas/needs only (hungry/hot/pain)  Social Interaction: 5 - Patient is appropriate with supervision/cues  Problem Solving: 3 - Patient solves simple/routine tasks 50%-74%  Memory: 3 - Patient remembers 50%-74% of the time        EDUCATION:  Learner: Patient  Education:  Reviewed recommendations for follow-up, Education Related to Potential Risks and Complications Due to Impairment/Illness/Injury and Home Safety Education  Evaluation of Education: Verbalizes understanding, Needs further instruction and Family not present    ASSESSMENT/PLAN:    Activity Tolerance:  Patient tolerance of treatment: good. Assessment/Plan: Patient progressing toward established goals.   Continues to require skilled care of licensed speech pathologist to progress toward achievement of established goals and plan of care.     Plan for Next Session: Higher level attention, Time word problems, Executive functioning tasks       Gurdeep Mao.  [address], [address], [address]

## 2020-09-03 PROCEDURE — 1180000000 HC REHAB R&B

## 2020-09-03 PROCEDURE — 97110 THERAPEUTIC EXERCISES: CPT

## 2020-09-03 PROCEDURE — 97530 THERAPEUTIC ACTIVITIES: CPT

## 2020-09-03 PROCEDURE — 97130 THER IVNTJ EA ADDL 15 MIN: CPT | Performed by: SPEECH-LANGUAGE PATHOLOGIST

## 2020-09-03 PROCEDURE — 6370000000 HC RX 637 (ALT 250 FOR IP): Performed by: SURGERY

## 2020-09-03 PROCEDURE — 97035 APP MDLTY 1+ULTRASOUND EA 15: CPT

## 2020-09-03 PROCEDURE — 6370000000 HC RX 637 (ALT 250 FOR IP): Performed by: PHYSICAL MEDICINE & REHABILITATION

## 2020-09-03 PROCEDURE — 97535 SELF CARE MNGMENT TRAINING: CPT

## 2020-09-03 PROCEDURE — 97129 THER IVNTJ 1ST 15 MIN: CPT | Performed by: SPEECH-LANGUAGE PATHOLOGIST

## 2020-09-03 PROCEDURE — 97116 GAIT TRAINING THERAPY: CPT

## 2020-09-03 RX ADMIN — TAMSULOSIN HYDROCHLORIDE 0.4 MG: 0.4 CAPSULE ORAL at 22:52

## 2020-09-03 RX ADMIN — ATENOLOL 100 MG: 100 TABLET ORAL at 08:48

## 2020-09-03 RX ADMIN — Medication 1000 MCG: at 08:48

## 2020-09-03 RX ADMIN — QUETIAPINE FUMARATE 400 MG: 400 TABLET ORAL at 09:26

## 2020-09-03 RX ADMIN — SENNOSIDES 8.6 MG: 8.6 TABLET, FILM COATED ORAL at 20:23

## 2020-09-03 RX ADMIN — CARBAMAZEPINE 200 MG: 200 TABLET ORAL at 09:26

## 2020-09-03 RX ADMIN — CARBAMAZEPINE 200 MG: 200 TABLET ORAL at 20:23

## 2020-09-03 RX ADMIN — THERA TABS 1 TABLET: TAB at 20:23

## 2020-09-03 RX ADMIN — QUETIAPINE FUMARATE 400 MG: 400 TABLET ORAL at 20:23

## 2020-09-03 RX ADMIN — Medication 100 MG: at 09:26

## 2020-09-03 RX ADMIN — TRAZODONE HYDROCHLORIDE 100 MG: 100 TABLET ORAL at 20:23

## 2020-09-03 RX ADMIN — PANTOPRAZOLE SODIUM 40 MG: 40 TABLET, DELAYED RELEASE ORAL at 06:12

## 2020-09-03 RX ADMIN — ACETAMINOPHEN 500 MG: 500 TABLET ORAL at 20:24

## 2020-09-03 RX ADMIN — DIPHENHYDRAMINE HCL 25 MG: 25 TABLET ORAL at 20:23

## 2020-09-03 RX ADMIN — DOCUSATE SODIUM 100 MG: 100 CAPSULE, LIQUID FILLED ORAL at 08:48

## 2020-09-03 RX ADMIN — ACETAMINOPHEN 650 MG: 325 TABLET ORAL at 22:52

## 2020-09-03 RX ADMIN — FOLIC ACID 1 MG: 1 TABLET ORAL at 09:26

## 2020-09-03 RX ADMIN — FLUOXETINE HYDROCHLORIDE 60 MG: 20 CAPSULE ORAL at 22:52

## 2020-09-03 ASSESSMENT — ENCOUNTER SYMPTOMS
TROUBLE SWALLOWING: 0
ALLERGIC/IMMUNOLOGIC NEGATIVE: 1
NAUSEA: 0
DIARRHEA: 0
VOICE CHANGE: 0
CONSTIPATION: 0
COUGH: 0
SHORTNESS OF BREATH: 0
EYES NEGATIVE: 1

## 2020-09-03 ASSESSMENT — PAIN SCALES - GENERAL
PAINLEVEL_OUTOF10: 5
PAINLEVEL_OUTOF10: 5
PAINLEVEL_OUTOF10: 8
PAINLEVEL_OUTOF10: 5

## 2020-09-03 ASSESSMENT — PAIN DESCRIPTION - ORIENTATION: ORIENTATION: MID;LOWER

## 2020-09-03 ASSESSMENT — PAIN DESCRIPTION - ONSET: ONSET: ON-GOING

## 2020-09-03 ASSESSMENT — PAIN DESCRIPTION - PROGRESSION: CLINICAL_PROGRESSION: NOT CHANGED

## 2020-09-03 ASSESSMENT — PAIN DESCRIPTION - DESCRIPTORS: DESCRIPTORS: ACHING

## 2020-09-03 ASSESSMENT — PAIN DESCRIPTION - PAIN TYPE: TYPE: ACUTE PAIN

## 2020-09-03 ASSESSMENT — PAIN DESCRIPTION - LOCATION: LOCATION: BACK

## 2020-09-03 ASSESSMENT — PAIN DESCRIPTION - FREQUENCY: FREQUENCY: CONTINUOUS

## 2020-09-03 NOTE — PROGRESS NOTES
6051 . [facility] 49  254 [facility]  Occupational Therapy  Daily Note  Time:    Time In: 3493  Time Out: 1146  Timed Code Treatment Minutes: 39 Minutes  Minutes: 60     Date: 9/3/2020  Patient Name: Asa Ruff,   Gender: male      Room: [facility]70/070-A  MRN: [id number]  : 1960  (60 y.o.)  Referring Practitioner: Dr. Chadwick Cardoza  Diagnosis: Burst fracture T 12 with routine healing, TBI  Additional Pertinent Hx: Per ED notes: Mr. Larry Morales is a 59 Y/O male presenting at [facility] by trauma consult, brought by EMS following a fall today  with LOC; PMH includes drug and alcohol abuse, cancer, HTN, HLD, and GERD. Per patient report he has been falling with increasing frequency over past 2 months, believes a fall while exiting a parked car two days ago is what hurt his back, but he also fell today. Patient states he typically does not remember falls, just finds himself on the ground. Daughter present in exam room states, falls often occur after standing up, he will begin to shake and then fall to floor, appear to lose consciousness briefly. Patient reports history of alcohol abuse, drank a pint of liquor today between 2717-3388, states this is not unusual for him. Restrictions/Precautions:  Restrictions/Precautions: General Precautions, Fall Risk  Required Braces or Orthoses  Spinal: Thoracic Lumbar Sacral Orthotics(May don in sitting)  Position Activity Restriction  Spinal Precautions: No Bending, No Lifting, No Twisting  Other position/activity restrictions: TLSO brace ordered for T12 fracture. Will need to wear this when up. May have it off when in bed. SUBJECTIVE: Pt pleasant and cooperative, agreeable to OT. PAIN: 5 /10:  Lower back pain. COGNITION: Slow Processing, Decreased Recall, Decreased Insight, Impaired Memory, Decreased Problem Solving and Decreased Safety Awareness    ADL:   Grooming: Stand By Assistance.   standing at sink to brush teeth,   seated with setup to shave   Upper Extremity Dressing: Stand By Assistance and with verbal cues . to don TLSO seated EOB. Strapping labeled for top- middle- bottom to assist with managing strapping himself     Lower Extremity Dressing: Stand By Assistance. donning shorts and shoes using reacher to retrieve clothing only. Reviewed BLT prior to ADLs with pt demo'ing during ADls   Toileting: with set-up. to use urinal .    BALANCE:  Sitting Balance:  Supervision. Standing Balance: Stand By Assistance. BED MOBILITY:  Supine to Sit: Stand By Assistance \"I dont think I know how to do this. \"  OT reviewed log roll techinque with pt following step by step instructions to complete     TRANSFERS:  Sit to Stand:  Stand By Assistance. cues for hand placement   Stand to Sit: Stand By Assistance. cues for hand placement     FUNCTIONAL MOBILITY:  Assistive Device: Rolling Walker  Assist Level:  Stand By Assistance. Distance: To and from bathroom     ADDITIONAL ACTIVITIES:  Patient identified a personal goal to increase UB strength and improve overall endurance so they can complete their toilet & shower transfers; skilled edu on UE strengthening and patient completed BUE strengthening exercises x 10 reps x1 set this date with a moderate resistance band in all joints and all planes. Patient tolerated well. Pt required mod cues for technique. ASSESSMENT:  Activity Tolerance:  Patient tolerance of  treatment: good. Discharge Recommendations: Home with Home health OT, Home with nursing aide, 24 hour supervision or assist    Equipment Recommendations: Equipment Needed: No  Other: Pt has a shower chair. Does not need a BSC. Recommend a reacher. Discussed purchasing options.    Plan: Times per week: 5xs week x 90 min, 1 x week x 30 min  Current Treatment Recommendations: Patient/Caregiver Education & Training, Balance Training, Functional Mobility Training, Endurance Training, Safety Education & Training, Self-Care / ADL, Home Management Training, Equipment Evaluation, Education, & procurement    Patient Education  Patient Education: ADL's, Precautions and Equipment Education     Goals  Short term goals  Time Frame for Short term goals: 1 week  Short term goal 1: Pt will complete BADL routine with setup and no > min cues for problem solving, attetntion to task , and back safety to increase independence in self care tasks  Short term goal 2: Pt to demo toileting tasks and transfers with S and no cues for safety  Short term goal 3: Pt to demo good dyamamic standing balance > 5 min with S and 0-1 UE support while reaching outside base of support  in prep for retrieveing clothing items. Short term goal 4: PT will complete 2 step homemaking tasks withCGA  no > min cues for back precautions/ proper body mechanics to increase ability to retrieve a snack at home. Long term goals  Time Frame for Long term goals : 2 weeks  Long term goal 1: PT will complete ADL routine including donning a TLSO with S andno > min cues for attention to tasks or cues for back  to increase independence in self care  Long term goal 2: PT will compelte 2 step homemaking tasks with S and 0-1 cues for proper body mechanics to increase ability to complete laundry tasks. Following session, patient left in safe position with all fall risk precautions in place.

## 2020-09-03 NOTE — PROGRESS NOTES
6051 . [facility] 49  [address] [place] [address]  Occupational Therapy  Daily Note  Time:    Time In: 1400  Time Out: 1430  Timed Code Treatment Minutes: 30 Minutes  Minutes: 30     Date: 9/3/2020  Patient Name: Marely Friedman,   Gender: male      Room: [facility]70/070-A  MRN: [id number]  : 1960  (60 y.o.)  Referring Practitioner: Dr. Darlene Recio  Diagnosis: Burst fracture T 12 with routine healing, TBI  Additional Pertinent Hx: Per ED notes: Mr. Raleigh Queen is a 59 Y/O male presenting at [facility] by trauma consult, brought by EMS following a fall today  with LOC; PMH includes drug and alcohol abuse, cancer, HTN, HLD, and GERD. Per patient report he has been falling with increasing frequency over past 2 months, believes a fall while exiting a parked car two days ago is what hurt his back, but he also fell today. Patient states he typically does not remember falls, just finds himself on the ground. Daughter present in exam room states, falls often occur after standing up, he will begin to shake and then fall to floor, appear to lose consciousness briefly. Patient reports history of alcohol abuse, drank a pint of liquor today between 1716-1896, states this is not unusual for him. Restrictions/Precautions:  Restrictions/Precautions: General Precautions, Fall Risk  Required Braces or Orthoses  Spinal: Thoracic Lumbar Sacral Orthotics(May don in sitting)  Position Activity Restriction  Spinal Precautions: No Bending, No Lifting, No Twisting  Other position/activity restrictions: TLSO brace ordered for T12 fracture. Will need to wear this when up. May have it off when in bed. SUBJECTIVE: Pt pleasant and cooperative, agreeable to OT after just finishing with PT. PAIN: 5 /10:  Lower back pain.        COGNITION: Slow Processing, Decreased Recall, Decreased Insight, Impaired Memory, Decreased Problem Solving and Decreased Safety Awareness    IADL:   Patient completed IADL homemaking task this date of setting the table - task was graded to challenge comprehension of spinal precautions in simulated home environment and walker safety. OT shown patient location of all neccesarry items, and pt recalled 3/3 locations without prompts to challenge recall. Patient was able to retrieve all items from  Cupboard, drawer and cupboard with SBA and  min VCs for walker placement to avoid twisting during the retrieval of items. Edu provided on transportation of items while passing from hand to hand. Repetition used to assist with comprehension. Patient required SBA throughout task with a standing endurance of 10 minutes with complaints of dizziness reported during quick turns. Encouraged and pt drank 10 oz of water during task. BALANCE:  Sitting Balance:  Supervision. Standing Balance: Stand By Assistance. TRANSFERS:  Sit to Stand:  Stand By Assistance. cues for hand placement   Stand to Sit: Stand By Assistance. cues for hand placement     FUNCTIONAL MOBILITY:  Assistive Device: Rolling Walker  Assist Level:  Stand By Assistance. Distance: with rehab apartment and to dining room. Pt left with  Zoë Millan for recreational therapy     ASSESSMENT:  Activity Tolerance:  Patient tolerance of  treatment: good. Discharge Recommendations: Home with Home health OT, Home with nursing aide, 24 hour supervision or assist    Equipment Recommendations: Equipment Needed: No  Other: Pt has a shower chair. Does not need a BSC. Recommend a reacher. Discussed purchasing options. Recommend a reacher. Discussed purchasing options.    Plan: Times per week: 5xs week x 90 min, 1 x week x 30 min  Current Treatment Recommendations: Patient/Caregiver Education & Training, Balance Training, Functional Mobility Training, Endurance Training, Safety Education & Training, Self-Care / ADL, Home Management Training, Equipment Evaluation, Education, & procurement    Patient Education  Patient Education: ADL's, Precautions and Equipment Education     Goals  Short term goals  Time Frame for Short term goals: 1 week  Short term goal 1: Pt will complete BADL routine with setup and no > min cues for problem solving, attetntion to task , and back safety to increase independence in self care tasks  Short term goal 2: Pt to demo toileting tasks and transfers with S and no cues for safety  Short term goal 3: Pt to demo good dyamamic standing balance > 5 min with S and 0-1 UE support while reaching outside base of support  in prep for retrieveing clothing items. Short term goal 4: PT will complete 2 step homemaking tasks withCGA  no > min cues for back precautions/ proper body mechanics to increase ability to retrieve a snack at home. Long term goals  Time Frame for Long term goals : 2 weeks  Long term goal 1: PT will complete ADL routine including donning a TLSO with S andno > min cues for attention to tasks or cues for back  to increase independence in self care  Long term goal 2: PT will compelte 2 step homemaking tasks with S and 0-1 cues for proper body mechanics to increase ability to complete laundry tasks. Following session, patient left in safe position with all fall risk precautions in place.

## 2020-09-03 NOTE — PROGRESS NOTES
Comprehensive Nutrition Assessment    Type and Reason for Visit:  Reassess(PO Monitor)    Nutrition Recommendations/Plan:   *Continue Multivitamin, Folic Acid and Thiamine daily d/t hx of ETOH abuse. *Continue current diet. *Changed ONS to [place] Yogurt BID & Magic Cups daily. Nutrition Assessment: Pt improving from a nutritional standpoint AEB ot report of good appetite consuming ~66% of most meals over the past week with poor acceptance of Ensure d/t disliking it. Remains at risk for further nutritional compromise r/t admit d/t admit d/t fall w/closed fx of vertebral body, underlying medical condition (hx of ETOH abuse, drug abuse GERD, HTN and HLD) and need for nutrition support. Nutrition recommendations/interventions as per above. Malnutrition Assessment:  Malnutrition Status: At risk for malnutrition (Comment)    Context:  Chronic Illness     Findings of the 6 clinical characteristics of malnutrition:  Energy Intake:  7 - 75% or less estimated energy requirements for 1 month or longer  Weight Loss:  Unable to assess(no weight hx per EMR)     Body Fat Loss:  No significant body fat loss     Muscle Mass Loss:  No significant muscle mass loss    Fluid Accumulation:  No significant fluid accumulation Extremities   Strength:  Not Performed    Estimated Daily Nutrient Needs:  Energy (kcal):  7160-6181 kcals (20-25); Weight Used for Energy Requirements:  (103 kg on 8/27)     Protein (g):  119-138 g/day (1.3-1.5 g/kg);  Weight Used for Protein Requirements:  (IBW 91.8 kg)         Nutrition Related Findings:  admit d/t fall with closed fracture of thoracic verterbra; pt reports decreased appetite and intake of meals since admission; pt reports consuming only one meals daily around supper time over the past few years; pt reports consuming 50-75% of most meals but dislikes the Ensure Enlive; pt amenable to [place] yogurt and chocolate magic cup with dinner instead; hx ETOH abuse; pt denies N/V or difficulty chewing/swallowing food; x1 BM 9/2; Rx includes: Folic acid, Colace, Multivitamin, Vitamin B-1, Vitamin B-12 and Senna      Wounds:  Multiple(left and right knee scab; left head abrasion/traumatic)       Current Nutrition Therapies:    DIET GENERAL;  Dietary Nutrition Supplements: Standard High Calorie Oral Supplement    Anthropometric Measures:  · Height: 6' 4\" (193 cm)  · Current Body Weight: 222 lb 7 oz (100.9 kg)(9/1; no edema noted)   · Admission Body Weight: 227 lb 9.6 oz (103.2 kg)(8/27; no edema noted)    · Usual Body Weight: 245 lb (111.1 kg)(per pt report. No actual weights per EMR to assess weight trends)     · Ideal Body Weight: 202 lbs  · BMI: 27.1   · BMI Categories: Overweight (BMI 25.0-29. 9)       Nutrition Diagnosis:   · Increased nutrient needs related to increase demand for energy/nutrients as evidenced by wounds(vertebral fracture)    Nutrition Interventions:   Food and/or Nutrient Delivery:  Continue Current Diet, Modify Oral Nutrition Supplement, Vitamin Supplement  Nutrition Education/Counseling:  Education initiated(Encouraged po intake of meals and ONS use in-between meals at best effort)   Coordination of Nutrition Care:  Continued Inpatient Monitoring    Goals:  Pt will consume 75% or more of meals during LOS       Nutrition Monitoring and Evaluation:   Food/Nutrient Intake Outcomes:  Food and Nutrient Intake, Supplement Intake, Vitamin/Mineral Intake  Physical Signs/Symptoms Outcomes:  Biochemical Data, Nutrition Focused Physical Findings, Skin, Weight     Discharge Planning:    Continue current diet     Electronically signed by oJrge Warner RD, LD on 9/3/20 at 11:04 AM EDT    Contact: [phone]

## 2020-09-03 NOTE — PROGRESS NOTES
[facility]  Recreational Therapy  Daily Note  [address]    Time Spent with Patient: 60 minutes    Date:  9/3/2020       Patient Name: Master Chamberlain      MRN: [id number]      YOB: 1960 (56 y.o.)       Gender: male  Diagnosis: Burst fracture T 12 with routine healing, TBI  Referring Practitioner: Dr. Vanessa Stahl    RESTRICTIONS/PRECAUTIONS:  Restrictions/Precautions: General Precautions, Fall Risk  Vision: Impaired  Hearing: Within functional limits    PAIN: 0-no c/o pain     SUBJECTIVE:  I will help    OBJECTIVE:  Pt came to the [facility] dining room and helped peer bag popcorn for all staff and patients to enjoy tomorrow -affect bright and social-states he enjoys talking with others and meeting new people-is disappointed that pts cannot eat lunch together in dining room due to covid but understands-ambulated back to room with RW and CGA-got his money and we walked another 150 ft to the vending machine where he purchased a few snacks-gave him a cup of coffee and was appreciative of activity       Patient Education  New Education Provided: Importance of LeisureDebra Safety    Electronically signed by: Brandon Wilhelm, CTRS  Date: 9/3/2020

## 2020-09-03 NOTE — PROGRESS NOTES
27[address] [facility] THERAPY  254 [facility]  DAILY NOTE    TIME   SLP Individual Minutes  Time In: 7934  Time Out: 1009  Minutes: 59  Timed Code Treatment Minutes: 61 Minutes       Date: 9/3/2020  Patient Name: Giovanni Montoya      CSN: [id number]   : 1960  (60 y.o.)  Gender: male   Referring Physician:  Dr. Shanna Olivares   Diagnosis: Burst fracture of T12 vertebra with routine healing; TBI   Secondary Diagnosis: Cognitive impairments  Precautions: Fall Risk; Impulsivity; Seizure   Current Diet: General diet with thin liquids   Swallowing Strategies: Standard Universal Swallow Precautions  Date of Last MBS: Not Applicable    Pain: Foot discomfort, Physician aware, Pt did not rate pain level    Subjective:  Pt sitting upright in recliner for duration of session, pleasant and attentive. Short-Term Goals:  SHORT TERM GOAL #1:  Goal 1: Pt will complete immediate/delayed recall and working memory tasks with 90% accuracy given mod cues to improve retention of new and functional information. INTERVENTIONS: Pt taught clinician how to play card game that was taught in previous session. Pt utilized written notes from previous session when recalling card game rules. Required occasional thought organization cues when teaching clinician how to take turn in card game. When clinician made a mistake during play, Pt corrected mistake accurately and promptly. Adequate sustained attention during card game; no redirections to task were required. Alternating attention was observed when physician entered room and asked Pt questions for approx. 5 min. Pt returned to card game and recalled whose turn it was after physician visit without requiring redirection cues. Following card game, Pt independently made notes in memory log of questions regarding game rules for next session.  Added written notes to memory log about activities from the day, items for \"to-do\" list, and comments on how he slept with min cues. Details/notes added to memory log were of appropriate relevance. Pt also independently added dates to items on to-do list of when he planned on completing each task. Pt provided four pieces of information about clinician at end of session; will recall information in next session. *Pt reported having \"fleeting thoughts\" and continuing to experience memory difficulties when speaking. Reported having difficulty remembering names but has better success remembering faces and voices. SHORT TERM GOAL #2:  Goal 2: Pt will complete sustained, selective, and divided attention tasks with no more than 3 errors within a given task in order to improve participation in treatment and permit eventual return to driving. INTERVENTIONS: Organized cards independently by suit with 0 errors. Pt stated that activity was not difficult. Will complete higher level challenges in future tasks. *See Goal 1 for details on higher attention task related to card game task. SHORT TERM GOAL #3:  Goal 3: Pt will complete functional reasoning and thought organizational tasks with 90% accuracy given mod cues to improve overall executive functioning skills. INTERVENTIONS: Filling out empty calendar with events (event details included date, time frame, title of event) presented verbally: 4/6 ind, 2/6 with repetition of prompt. *Pt expressed worry in remembering abbreviations used when completing calendar; will review appropriate abbreviations in next session to establish a written list for future reference. SHORT TERM GOAL #4:  Goal 4: Pt will complete functional problem solving and sequencing tasks with 90% accuracy given mod cues to improve ability to make successful return to IADL's (medication management, finances, etc.). INTERVENTIONS: Goal not addressed d/t focus on other goals.      Long-Term Goals:  Timeframe for Long-term Goals: 3 weeks    LONG TERM GOAL #1:  Goal 1: Patient will improve

## 2020-09-03 NOTE — PROGRESS NOTES
[address] [place] [address]  Recreational Therapy  Daily Note  [address]    Time Spent with Patient: 0 minutes    Date:  9/3/2020       Patient Name: Aubrie Joseph      MRN: [id number]      YOB: 1960 (56 y.o.)       Gender: male  Diagnosis: Burst fracture T 12 with routine healing, TBI  Referring Practitioner: Dr. Nunu Gray    RESTRICTIONS/PRECAUTIONS:  Restrictions/Precautions: General Precautions, Fall Risk  Vision: Impaired  Hearing: Within functional limits    PAIN: 0-no c/o pain     SUBJECTIVE:  I will play     OBJECTIVE:  Pt came to [facility] [address] with P.T. to play [employer] golf-affect bright and social-      Patient Education  New Education Provided: Importance of Leisure,     Electronically signed by: NOAH Fry  Date: 9/3/2020

## 2020-09-03 NOTE — PROGRESS NOTES
[facility]  INPATIENT PHYSICAL THERAPY  DAILY NOTE  [facility] - 7E-70/070-A    Time In: 1009  Time Out: 1100  Timed Code Treatment Minutes: 46 Minutes  Minutes: 51     Minute Variance  Variance: -9  Reason: (due to speech therapy still in room with patient)    Date: 9/3/2020  Patient Name: Marely Friedman,  Gender:  male        MRN: [id number]  : 1960  (60 y.o.)     Referring Practitioner: Dr. Sabine Gleason  Diagnosis: Burst fx of T12, TBI  Additional Pertinent Hx: T12 compression fracture s/p fall, chronic alcohol abuse, drug abuse, recurrent falls, neck pain, scalp hematoma     Prior Level of Function:  Lives With: Other (comment)(Mother)  Type of Home: House  Home Layout: One level  Home Access: Stairs to enter without rails  Entrance Stairs - Number of Steps: 1  Home Equipment: Standard walker   Bathroom Shower/Tub: Tub/Shower unit, Shower chair with back  Bathroom Toilet: Standard  Bathroom Equipment: Grab bars in shower(not in great shape per pt. plastic grab bars.)    ADL Assistance: Independent  Ambulation Assistance: Independent  Transfer Assistance: Independent  Active : Yes  Additional Comments: Pt stated he was \"experimenting\" with using the walker prior to admission. Pt reports mother does most of the inside chores, Pt is responsible for outside chores and yardwork. Restrictions/Precautions:  Restrictions/Precautions: General Precautions, Fall Risk  Required Braces or Orthoses  Spinal: Thoracic Lumbar Sacral Orthotics(May don in sitting)  Position Activity Restriction  Spinal Precautions: No Bending, No Lifting, No Twisting  Other position/activity restrictions: TLSO brace ordered for T12 fracture. Will need to wear this when up. May have it off when in bed. SUBJECTIVE: Patient in recliner upon arrival, agreed and cooperative for therapy. Reports hip adductor pain feeling better but agreed to trialing US again.      PAIN: 5/10: R groin region at the end of session. OBJECTIVE:    Transfers:  Sit to Stand: [employer], with increased time for completion, cues for hand placement  Stand to [place] [address], cues for hand placement, with verbal cues, for technique. Completed multiple trials to help improve technique. Ambulation:  Contact Guard Assistance, with verbal cues   Distance: 150 ft. x2   Surface: Level Tile  Device:Rolling Walker  Gait Deviations: Forward Flexed Posture, Slow Dedra, Decreased Step Length Bilaterally, Decreased Gait Speed, Decreased Heel Strike on Left, Mild Path Deviations and verbal cues for foot clearance on LLE and increasing gait speed to improve mechanics. Stairs:  Platform:  6\" platform X 2 using Rolling Walker and Contact Guard Assistance, with verbal cues , with increased time for completion. Exercise:  Patient was guided in 1 set(s) 10 reps of exercise to both lower extremities. Standing heel/toe raises, Standing marches, Standing hip abduction/adduction and Mini squats. Exercises were completed for increased independence with functional mobility. Other Activities:   Ultrasound treatment completed to R hip adductor region x8  Minutes, 1 MHz, 1.5 w/cm2, continuous. Patient tolerated treatment well with no adverse reactions noted. Functional Outcome Measures: Not completed       ASSESSMENT:  Assessment: Patient progressing toward established goals. Activity Tolerance:  Patient tolerance of  treatment: good. Equipment Recommendations:Equipment Needed: (RW ordered from [facility])  Discharge Recommendations:  Continue to assess pending progress, Patient would benefit from continued therapy after discharge    Plan: Times per week: 5x/wk 90 min, 1x/wk 30 min  Specific instructions for Next Treatment: US Rt adductors if pain noted.   Current Treatment Recommendations: Strengthening, Functional Mobility Training, Balance Training, Equipment Evaluation, Education, & procurement, Gait Training, Stair training, Endurance Training, Transfer Training    Patient Education  Patient Education: Plan of Care, Precautions/Restrictions, Transfers, Reviewed Prior Education, Gait, Stairs, [employer] and Wellness Education, Home Safety Education, spinal precautions, - Patient Requires Continued Education    Goals:  Patient goals : get my dizziness better  Short term goals  Time Frame for Short term goals: 1 wk  Short term goal 1: bed mobility with Mod I, to get in/out of bed, use log roll technique  Short term goal 2: transfer with SBA to get in/out of chairs  Short term goal 3: amb >= 50''x1 with RW and SBA to progress to home and community mobility  Short term goal 4: Pt to ascend/descend 6\" step with RW, CGA, for home entry. Short term goal 5: Pt to score >= 20/28 on Tinetti; perform TUG <= 45 sec, indicating improved balance. Long term goals  Time Frame for Long term goals : 3 wks  Long term goal 1: transfer with Mod I, to get in/out of chairs  Long term goal 2: amb >= 150''x1 with RW and Mod I, for home and community mobility  Long term goal 3: Pt to ascend/descend 6\" step with RW, Mod I, for home entry. Long term goal 4: Pt to score >= 24/28 on Tinetti; perform TUG ,<= 30 sec, indicating improved balance. Long term goal 5: Pt to ascend/descend 4+ steps junior rails, Mod I, for community mobility    Following session, patient left in safe position with all fall risk precautions in place.

## 2020-09-03 NOTE — PROGRESS NOTES
[address] [place] [address]  INPATIENT PHYSICAL THERAPY  DAILY NOTE  [address] [facility] - 7E-70/070-A    Time In: 1330  Time Out: 1400  Timed Code Treatment Minutes: 30 Minutes  Minutes: 30       Date: 9/3/2020  Patient Name: Yazmin Naidu,  Gender:  male        MRN: [id number]  : 1960  (60 y.o.)     Referring Practitioner: Dr. Wing Lomeli  Diagnosis: Burst fx of T12, TBI  Additional Pertinent Hx: T12 compression fracture s/p fall, chronic alcohol abuse, drug abuse, recurrent falls, neck pain, scalp hematoma     Prior Level of Function:  Lives With: Other (comment)(Mother)  Type of Home: House  Home Layout: One level  Home Access: Stairs to enter without rails  Entrance Stairs - Number of Steps: 1  Home Equipment: Standard walker   Bathroom Shower/Tub: Tub/Shower unit, Shower chair with back  Bathroom Toilet: Standard  Bathroom Equipment: Grab bars in shower(not in great shape per pt. plastic grab bars.)    ADL Assistance: Independent  Ambulation Assistance: Independent  Transfer Assistance: Independent  Active : Yes  Additional Comments: Pt stated he was \"experimenting\" with using the walker prior to admission. Pt reports mother does most of the inside chores, Pt is responsible for outside chores and yardwork. Restrictions/Precautions:  Restrictions/Precautions: General Precautions, Fall Risk  Required Braces or Orthoses  Spinal: Thoracic Lumbar Sacral Orthotics(May don in sitting)  Position Activity Restriction  Spinal Precautions: No Bending, No Lifting, No Twisting  Other position/activity restrictions: TLSO brace ordered for T12 fracture. Will need to wear this when up. May have it off when in bed. SUBJECTIVE: Pt. Seated in BS chair and pleasantly agrees to therapy session.      PAIN: None indicated    OBJECTIVE:    Transfers:  Sit to Stand: Stand By Assistance  Stand to Sit:Stand By Assistance    Ambulation:  Stand By Assistance  Distance: 150' x 1, 80' x 1, 40' x 1, mobility  Short term goal 4: Pt to ascend/descend 6\" step with RW, CGA, for home entry. Short term goal 5: Pt to score >= 20/28 on Tinetti; perform TUG <= 45 sec, indicating improved balance. Long term goals  Time Frame for Long term goals : 3 wks  Long term goal 1: transfer with Mod I, to get in/out of chairs  Long term goal 2: amb >= 150''x1 with RW and Mod I, for home and community mobility  Long term goal 3: Pt to ascend/descend 6\" step with RW, Mod I, for home entry. Long term goal 4: Pt to score >= 24/28 on Tinetti; perform TUG ,<= 30 sec, indicating improved balance. Long term goal 5: Pt to ascend/descend 4+ steps junior rails, Mod I, for community mobility    Following session, patient left in safe position with all fall risk precautions in place.

## 2020-09-03 NOTE — PLAN OF CARE
Problem: Nutrition  Goal: Optimal nutrition therapy  Outcome: Ongoing   Nutrition Problem #1: Increased nutrient needs  Intervention: Food and/or Nutrient Delivery: Continue Current Diet, Modify Oral Nutrition Supplement, Vitamin Supplement  Nutritional Goals: Pt will consume 75% or more of meals during LOS

## 2020-09-03 NOTE — PROGRESS NOTES
Physical Medicine & Rehabilitation  Progress Note    Chief Complaint:  frequent falls with mild traumatic brain injury without loss of consciousness and T12 burst fracture    Subjective:  \"What about my feet? \"  Upon further questioning the patient states his known medical history of neuropathy in his feet and asks if anything can be done about this. He notes that his therapies are going well. The ultrasound therapy being provided by physical therapy to his right medial thigh is helping. He has no other concerns or questions at this time. Rehabilitation:   Physical Therapy:  OBJECTIVE:     Transfers:  Sit to Stand: Stand By Assistance  Stand to Sit:Stand By Assistance     Ambulation:  Stand By Assistance  Distance: 150' x 1, 80' x 1, 40' x 1, ~100' x 1 while searching for cones. Surface: Level Tile  Device:Rolling Walker  Gait Deviations:  Slow Dedra, Decreased Step Length Bilaterally, Decreased Gait Speed, Wide Base of Support and Mild Path Deviations     Stairs:  Platform:  6\" platform X 1 using Rolling Walker and Contact Guard Assistance, with verbal cues .      Balance:  Dynamic Standing Balance: Stand By Assistance, Minimal Assistance  Pt. Stood while tossing ball back and forth with therapist. Pt. Slightly unsteady with posterior LOB requiring mod A to correct during activity. Pt. Stood with no UE support while completing game with recreational therapy. Pt. Also reached OOB for cones while ambulating around therapy gym. Pt. Compliant with spinal precautions throughout.      Exercise:  None     Functional Outcome Measures: Not completed    ASSESSMENT:  Assessment: Patient progressing toward established goals. Activity Tolerance:  Patient tolerance of  treatment: good.       Equipment Recommendations:Equipment Needed: (RW ordered from [facility])  Discharge Recommendations:  Continue to assess pending progress, Patient would benefit from continued therapy after discharge     Occupational Therapy:  COGNITION: functional information. INTERVENTIONS: Pt taught clinician how to play card game that was taught in previous session. Pt utilized written notes from previous session when recalling card game rules. Required occasional thought organization cues when teaching clinician how to take turn in card game. When clinician made a mistake during play, Pt corrected mistake accurately and promptly. Adequate sustained attention during card game; no redirections to task were required. Alternating attention was observed when physician entered room and asked Pt questions for approx. 5 min. Pt returned to card game and recalled whose turn it was after physician visit without requiring redirection cues.      Following card game, Pt independently made notes in memory log of questions regarding game rules for next session. Added written notes to memory log about activities from the day, items for \"to-do\" list, and comments on how he slept with min cues. Details/notes added to memory log were of appropriate relevance. Pt also independently added dates to items on to-do list of when he planned on completing each task.      Pt provided four pieces of information about clinician at end of session; will recall information in next session.      *Pt reported having \"fleeting thoughts\" and continuing to experience memory difficulties when speaking. Reported having difficulty remembering names but has better success remembering faces and voices.     SHORT TERM GOAL #2:  Goal 2: Pt will complete sustained, selective, and divided attention tasks with no more than 3 errors within a given task in order to improve participation in treatment and permit eventual return to driving. INTERVENTIONS: Organized cards independently by suit with 0 errors. Pt stated that activity was not difficult. Will complete higher level challenges in future tasks.     *See Goal 1 for details on higher attention task related to card game task.      SHORT TERM GOAL swelling. Skin: Negative for pallor. Allergic/Immunologic: Negative. Neurological: Positive for weakness and numbness. Negative for dizziness and headaches. Hematological: Negative. Psychiatric/Behavioral: Positive for confusion, decreased concentration and hallucinations. Negative for dysphoric mood. The patient is not nervous/anxious. All other systems reviewed and are negative. Objective:  BP (!) 156/96   Pulse 78   Temp 96.6 °F (35.9 °C) (Oral)   Resp 18   Ht 6' 4\" (1.93 m)   Wt 222 lb 7 oz (100.9 kg)   SpO2 95%   BMI 27.08 kg/m²   CURRENT VITALS:  height is 6' 4\" (1.93 m) and weight is 222 lb 7 oz (100.9 kg). His oral temperature is 96.6 °F (35.9 °C). His blood pressure is 156/96 (abnormal) and his pulse is 78. His respiration is 18 and oxygen saturation is 95%. Body mass index is 27.08 kg/m².   Temperature Range (24h):Temp: 96.6 °F (35.9 °C) Temp  Av.1 °F (36.2 °C)  Min: 96.6 °F (35.9 °C)  Max: 97.6 °F (36.4 °C)  BP Range (50T): Systolic (97JQD), TKJ:706 , Min:128 , BST:343     Diastolic (98VCV), GZB:16, Min:81, Max:96    Pulse Range (24h): Pulse  Av  Min: 78  Max: 80  Respiration Range (24h): Resp  Av  Min: 16  Max: 18  Current Pulse Ox (24h):  SpO2: 95 %  Pulse Ox Range (24h):  SpO2  Av %  Min: 95 %  Max: 97 %  Oxygen Amount and Delivery:      awake  Orientation:   person, place, time, situation  Mood: within normal limits  Affect: calm  General appearance:  in no acute distress, up in chair with TLSO brace on    Memory:   Grossly normal  Attention/Concentration: Grossly normal   language:  normal    ROM: Abnormal due to use of TLSO brace  Motor Exam:  Motor exam is symmetrical 5 out of 5 all extremities bilaterally    Tone:  normal  Muscle bulk: within normal limits; with noted mild atrophy of bilateral calves  Sensory:  Sensory intact  Coordination:   normal  Deep Tendon Reflexes:  Reflexes are intact and symmetrical bilaterally    Skin: warm and dry, no rash or erythema and abrasions over both knees with scabs and fading hematoma with abrasion over left parietal scalp  Peripheral vascular: Pulses: Normal upper and lower extremity pulses; Edema: no    Diagnostics:   No results found for this or any previous visit (from the past 24 hour(s)). Labs Renal Latest Ref Rng & Units 8/31/2020 8/28/2020 8/26/2020 8/25/2020 8/24/2020   BUN 7 - 22 mg/dL 12 13 13 12 12   Cr 0.4 - 1.2 mg/dL 1.2 1.2 1.1 1.3(H) 1. 3(H)   K 3.5 - 5.2 meq/L 4.3 3.9 4.5 4.2 3.8   Na 135 - 145 meq/L 140 139 138 134(L) 132(L)      Recent Labs     08/31/20  0842 08/28/20  0536 08/26/20  0400   WBC 5.5 6.0 4.1*   HGB 10.3* 9.8* 10.1*   HCT 31.2* 30.9* 31.4*   .1* 112.0* 111.3*    188 181      Impression:  1. Frequent falls. 2. Gait instability with a Tinetti score of 8/28. 3. Left scalp hematoma. 4. 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 5. Moderate cognitive impairment. (MOCA) version 8.2 completed. Patient scored 14/30. *Previous score on the [address] [place] [address] was 15/25. 6. Acute alcohol intoxication with admission blood alcohol level of 0.08.  7. General cerebral atrophy and small vessel ischemic changes with prominent ventricles consistent with central atrophy. 8. Cervical and thoracic spine pain. 9. T12 burst fracture without instability initially noted on a lumbar spine x-ray on 7/24/2020. 10. Hyponatremia. 11. Hypomagnesemia  12. Acute kidney injury. 13. Alcohol abuse. 14. Vitamin B12 deficiency with chronic macrocytic anemia. .  15. Recent acute, comminuted, minimally displaced, T-shaped fracture of the head of the right proximal phalanx of the great toe on 8/14/2020. 16. Hypertension. 17. Hyperlipidemia. 18. Remote history of cocaine drug abuse.   19. Cervical spondylosis with C3-C4 moderate to severe right and severe left neuroforaminal stenosis, moderate to severe left and severe right neuroforaminal stenosis at C4-C5, moderate right and severe left neuroforaminal stenosis at C5-C6, and moderate right and mild left neuroforaminal stenosis at C6-C7.  20. Lumbar spondylosis with mild degenerative changes most pronounced at L3-L4 with moderate spinal canal stenosis and mild to moderate bilateral neuroforaminal stenosis. 21. Chronic macrocytic, hyperchromic anemia. 22. CKD 2.  23. History of depression. 24. Bipolar 1 disorder. 25. GERD. 26. Current everyday smoker with a 40-pack-year history. 27. Orthostatic hypotension. 28. Right medial hip adductor pain. 29. Chronic neuropathy of both feet. Plan:     Medical management: Per primary team and Dr. Alicia Ahn. Consultants: Trauma Surgery, Critical Care, Orthopedic Surgery, Family Medicine, Physical Medicine    Narcotic usage:  N/A  Last BM:  Stool Amount: Large (09/02/20 2927)    FUNCTIONAL OUTCOMES TOOLS:    DENNY -      Tinetti - Balance Score: 10  Gait Score: 6  Tinetti Total Score: 16    TUG -      Acute/Rehabilitation Problems:  1. Frequent falls/Gait instability with a Tinetti score of 8/28 . 1. PT/OT. 2. Tinetti 14/28. Score improved to 16/28 on 9/1. Risk Indicators: Less than/equal to 18 = high risk; 19-23 Moderate risk; Greater than/equal to 24 = low risk. 3. TUG 63 seconds. 60-69 years:  8.1 seconds; 70-79 years: 9.2 seconds; 80-99 years: 11.3 seconds. 2. Left scalp hematoma. 3. 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 1. TBI protocol. 2. Tele-sitter. 4. Moderate cognitive impairment. (MOCA) version 8.2 completed. Patient scored 14/30. *Previous score on the [address] [place] [address] was 15/25. 1. SLP. 5. Acute alcohol intoxication with admission blood alcohol level of 0.08.  6. General cerebral atrophy and small vessel ischemic changes with prominent ventricles consistent with central atrophy. 7. Cervical and thoracic spine pain/T12 burst fracture without instability initially noted on a lumbar spine x-ray on 7/24/2020.  1. TLSO brace when out of bed.   2. Will likely need brace for 2 to 3 months. 3. No lifting over 20 pounds. 4. 2 to 3-week follow-up after discharge with serial x-rays. 8. Acute kidney injury. 1. Cr/BUN/GFR improved to 1.2/13/62 from1.3/12/1957 on 8/25. Stable at 1.2/12/62 on 8/31.  9. Alcohol abuse. 1. Family states they would like for him to go into a substance abuse program.  2. Multivitamin. 10. Vitamin B12 deficiency with chronic macrocytic anemia  1. .0 on 8/28. Improved to 109.1 on 8/31. 2. Folic acid. 3. Vitamin B12.  11. Recent acute, comminuted, minimally displaced, T-shaped fracture of the head of the right proximal phalanx of the great toe on 8/14/2020. 12. Orthostatic hypotension. 1. Noted on 9/1. Patient encouraged to improve fluid intake with resolution later in the day. We will continue to monitor patient's fluid consumption. 2. Encourage fluids. 13. Right hip adductor pain. 1. Suspect possible adductor complex strain. Physical Therapy ultrasound treatments have been ordered. Patient notes that these treatments are helping as of 9/3. 14. Chronic neuropathy of both feet. 1. Patient agreeable to trial 10% phenytoin cream in a neutral base to be applied twice daily. Ordered on 9/3 from the outpatient pharmacy and added to his inpatient medications. 3 refills have been provided. 15. Nutrition:  Consultation to dietician for nutritional counseling and recommendations. 1. TotP 6.0, alb 3.5, Vitamin 25OH level of 43 on 8/28/2020. 16. Electrolytes. 1. Normal on 8/31 with exception of:  2. Hyponatremia. Sodium normal at 139 on 8/28 which is improved from 132 on 8/24. Sodium remains normal at 140 on 8/31. 3. Hypomagnesemia. Magnesium was 1.5 on 8/25 with improvement to 2.2 on 8/26. Resolved. 17. Bladder: No issues, has Flomax on home medication list.  Medication was started 9/1 at patient's request  18. Bowel: Senna, colace, MOM  19. Rehabilitation nursing will be involved for bowel, bladder, skin, and pain management.   Nursing will also provide education and training to patient and family. 20. Prophylaxis:  DVT: SCDs. GI: Protonix. 21.  and case management consultations for coordination of care and discharge planning. Chronic Problems:  1. Hypertension. 1. Amlodipine. Patient is actually on atenolol 100 mg daily at home; this medication was restarted on 9/2 and the amlodipine was discontinued. 2. Hyperlipidemia. 1. Has fenofibrate on home medication list.  3. Remote history of cocaine drug abuse. 4. Cervical spondylosis with C3-C4 moderate to severe right and severe left neuroforaminal stenosis, moderate to severe left and severe right neuroforaminal stenosis at C4-C5, moderate right and severe left neuroforaminal stenosis at C5-C6, and moderate right and mild left neuroforaminal stenosis at C6-C7.  5. Lumbar spondylosis with mild degenerative changes most pronounced at L3-L4 with moderate spinal canal stenosis and mild to moderate bilateral neuroforaminal stenosis. 6. Chronic macrocytic, hyperchromic anemia. 7. CKD 2.  8. History of depression/Bipolar 1 disorder. 1. Prozac. 2. Seroquel. 3. Tegretol. 4. Has BuSpar on home medication list.  5. Trazodone for sleep. 9. GERD. 1. Protonix. 10. Current everyday smoker with a 40-pack-year history. 1. NicoDerm patch if patient is agreeable. Labs reviewed on:  1. 8/28.  2. 8/31. Infectious Disease:  1. N/A    Missed Therapy Time:  None     DME:    Discharge Plan:      Greater than 50% of 30 minutes was spent with the patient reviewing his current progress with therapies, the recommendation to trial phenytoin based cream to address the complaint of neuropathy in his bilateral feet, and answering his questions in detail.     Blanche Brown MD

## 2020-09-03 NOTE — PLAN OF CARE
Problem: Pain:  Goal: Pain level will decrease  Description: Pain level will decrease  Outcome: Ongoing  Note: C/o mild pain upon awakening this morning. Has denied any pain to this nurse the rest of the shift. Problem: Anxiety:  Goal: Level of anxiety will decrease  Description: Level of anxiety will decrease  Outcome: Ongoing  Note: No s/s anxiousness noted today. On scheduled seroquel. Calm and cooperative with staff     Problem: Skin Integrity:  Goal: Absence of new skin breakdown  Description: Absence of new skin breakdown  Outcome: Ongoing  Note: No new skin issues     Problem: Falls - Risk of:  Goal: Will remain free from falls  Description: Will remain free from falls  Outcome: Ongoing  Note: No falls sustained at this time. Telesitter remains in room for patient safety related to impulsiveness. Has not alarmed so far this shift. Bed/chair alarms in use. Patient has used his call light appropriately today     Problem: IP MOBILITY  Goal: LTG - patient will demonstrate safe mobility requirements  Outcome: Ongoing  Note: 1 assist with walker and gait belt. Staedy when up on feet. TLSO brace on while up     Problem: IP COMMUNICATION/DYSARTHRIA  Goal: LTG - Patient will effectively communicate in all situations with the use of compensatory strategies  Outcome: Ongoing  Note: Patient able to verbalize needs to staff without difficulty. Short term memory deficit noted. Problem: DISCHARGE BARRIERS  Goal: Patient's continuum of care needs are met  Outcome: Ongoing  Note: Plans to discharge on 9/9 to home with [facility] services, Patients family to assist with care.

## 2020-09-04 ENCOUNTER — APPOINTMENT (OUTPATIENT)
Dept: INTERVENTIONAL RADIOLOGY/VASCULAR | Age: 60
DRG: 949 | End: 2020-09-04
Attending: PHYSICAL MEDICINE & REHABILITATION
Payer: MEDICARE

## 2020-09-04 LAB
ALBUMIN SERPL-MCNC: 3.7 G/DL (ref 3.5–5.1)
ALP BLD-CCNC: 93 U/L (ref 38–126)
ALT SERPL-CCNC: 20 U/L (ref 11–66)
ANION GAP SERPL CALCULATED.3IONS-SCNC: 9 MEQ/L (ref 8–16)
AST SERPL-CCNC: 30 U/L (ref 5–40)
BILIRUB SERPL-MCNC: < 0.2 MG/DL (ref 0.3–1.2)
BILIRUBIN URINE: NEGATIVE
BLOOD, URINE: NEGATIVE
BUN BLDV-MCNC: 15 MG/DL (ref 7–22)
CALCIUM SERPL-MCNC: 8.9 MG/DL (ref 8.5–10.5)
CARBAMAZEPINE, TOTAL: 5.4 MCG/ML (ref 2–10)
CHARACTER, URINE: CLEAR
CHLORIDE BLD-SCNC: 106 MEQ/L (ref 98–111)
CO2: 24 MEQ/L (ref 23–33)
COLOR: YELLOW
CREAT SERPL-MCNC: 1.2 MG/DL (ref 0.4–1.2)
ERYTHROCYTE [DISTWIDTH] IN BLOOD BY AUTOMATED COUNT: 13.9 % (ref 11.5–14.5)
ERYTHROCYTE [DISTWIDTH] IN BLOOD BY AUTOMATED COUNT: 56.4 FL (ref 35–45)
GFR SERPL CREATININE-BSD FRML MDRD: 62 ML/MIN/1.73M2
GLUCOSE BLD-MCNC: 120 MG/DL (ref 70–108)
GLUCOSE URINE: NEGATIVE MG/DL
HCT VFR BLD CALC: 30.7 % (ref 42–52)
HEMOGLOBIN: 9.7 GM/DL (ref 14–18)
KETONES, URINE: NEGATIVE
LEUKOCYTE ESTERASE, URINE: NEGATIVE
MCH RBC QN AUTO: 34.9 PG (ref 26–33)
MCHC RBC AUTO-ENTMCNC: 31.6 GM/DL (ref 32.2–35.5)
MCV RBC AUTO: 110.4 FL (ref 80–94)
NITRITE, URINE: NEGATIVE
PH UA: 6 (ref 5–9)
PLATELET # BLD: 251 THOU/MM3 (ref 130–400)
PMV BLD AUTO: 9.8 FL (ref 9.4–12.4)
POTASSIUM SERPL-SCNC: 4.4 MEQ/L (ref 3.5–5.2)
PROTEIN UA: NEGATIVE
RBC # BLD: 2.78 MILL/MM3 (ref 4.7–6.1)
SODIUM BLD-SCNC: 139 MEQ/L (ref 135–145)
SPECIFIC GRAVITY, URINE: 1.02 (ref 1–1.03)
TOTAL PROTEIN: 6.4 G/DL (ref 6.1–8)
UROBILINOGEN, URINE: 1 EU/DL (ref 0–1)
WBC # BLD: 4.6 THOU/MM3 (ref 4.8–10.8)

## 2020-09-04 PROCEDURE — 97110 THERAPEUTIC EXERCISES: CPT

## 2020-09-04 PROCEDURE — 80053 COMPREHEN METABOLIC PANEL: CPT

## 2020-09-04 PROCEDURE — 97035 APP MDLTY 1+ULTRASOUND EA 15: CPT

## 2020-09-04 PROCEDURE — 1180000000 HC REHAB R&B

## 2020-09-04 PROCEDURE — 97129 THER IVNTJ 1ST 15 MIN: CPT

## 2020-09-04 PROCEDURE — 6370000000 HC RX 637 (ALT 250 FOR IP): Performed by: FAMILY MEDICINE

## 2020-09-04 PROCEDURE — 97530 THERAPEUTIC ACTIVITIES: CPT

## 2020-09-04 PROCEDURE — 97535 SELF CARE MNGMENT TRAINING: CPT

## 2020-09-04 PROCEDURE — 85027 COMPLETE CBC AUTOMATED: CPT

## 2020-09-04 PROCEDURE — 97130 THER IVNTJ EA ADDL 15 MIN: CPT

## 2020-09-04 PROCEDURE — 6370000000 HC RX 637 (ALT 250 FOR IP): Performed by: SURGERY

## 2020-09-04 PROCEDURE — 93970 EXTREMITY STUDY: CPT

## 2020-09-04 PROCEDURE — 6370000000 HC RX 637 (ALT 250 FOR IP): Performed by: PHYSICAL MEDICINE & REHABILITATION

## 2020-09-04 PROCEDURE — 81003 URINALYSIS AUTO W/O SCOPE: CPT

## 2020-09-04 PROCEDURE — 80156 ASSAY CARBAMAZEPINE TOTAL: CPT

## 2020-09-04 PROCEDURE — 97542 WHEELCHAIR MNGMENT TRAINING: CPT

## 2020-09-04 PROCEDURE — 94760 N-INVAS EAR/PLS OXIMETRY 1: CPT

## 2020-09-04 PROCEDURE — 36415 COLL VENOUS BLD VENIPUNCTURE: CPT

## 2020-09-04 RX ORDER — MIDODRINE HYDROCHLORIDE 2.5 MG/1
2.5 TABLET ORAL
Status: DISCONTINUED | OUTPATIENT
Start: 2020-09-04 | End: 2020-09-04

## 2020-09-04 RX ORDER — ASCORBIC ACID 500 MG
250 TABLET ORAL 2 TIMES DAILY WITH MEALS
Status: DISCONTINUED | OUTPATIENT
Start: 2020-09-04 | End: 2020-09-09 | Stop reason: HOSPADM

## 2020-09-04 RX ORDER — FERROUS SULFATE 325(65) MG
325 TABLET ORAL 2 TIMES DAILY WITH MEALS
Status: DISCONTINUED | OUTPATIENT
Start: 2020-09-04 | End: 2020-09-09 | Stop reason: HOSPADM

## 2020-09-04 RX ORDER — ATENOLOL 50 MG/1
50 TABLET ORAL DAILY
Status: DISCONTINUED | OUTPATIENT
Start: 2020-09-05 | End: 2020-09-08

## 2020-09-04 RX ADMIN — FERROUS SULFATE TAB 325 MG (65 MG ELEMENTAL FE) 325 MG: 325 (65 FE) TAB at 17:22

## 2020-09-04 RX ADMIN — DOCUSATE SODIUM 100 MG: 100 CAPSULE, LIQUID FILLED ORAL at 07:38

## 2020-09-04 RX ADMIN — ACETAMINOPHEN 500 MG: 500 TABLET ORAL at 21:42

## 2020-09-04 RX ADMIN — QUETIAPINE FUMARATE 400 MG: 400 TABLET ORAL at 21:42

## 2020-09-04 RX ADMIN — TAMSULOSIN HYDROCHLORIDE 0.4 MG: 0.4 CAPSULE ORAL at 21:43

## 2020-09-04 RX ADMIN — FLUOXETINE HYDROCHLORIDE 60 MG: 20 CAPSULE ORAL at 21:42

## 2020-09-04 RX ADMIN — CARBAMAZEPINE 200 MG: 200 TABLET ORAL at 07:38

## 2020-09-04 RX ADMIN — FOLIC ACID 1 MG: 1 TABLET ORAL at 07:39

## 2020-09-04 RX ADMIN — ATENOLOL 100 MG: 100 TABLET ORAL at 07:38

## 2020-09-04 RX ADMIN — CARBAMAZEPINE 200 MG: 200 TABLET ORAL at 21:42

## 2020-09-04 RX ADMIN — Medication 1000 MCG: at 07:39

## 2020-09-04 RX ADMIN — PANTOPRAZOLE SODIUM 40 MG: 40 TABLET, DELAYED RELEASE ORAL at 06:11

## 2020-09-04 RX ADMIN — ACETAMINOPHEN 650 MG: 325 TABLET ORAL at 06:11

## 2020-09-04 RX ADMIN — ACETAMINOPHEN 650 MG: 325 TABLET ORAL at 15:38

## 2020-09-04 RX ADMIN — THERA TABS 1 TABLET: TAB at 07:39

## 2020-09-04 RX ADMIN — Medication 100 MG: at 07:39

## 2020-09-04 RX ADMIN — DIPHENHYDRAMINE HCL 25 MG: 25 TABLET ORAL at 21:43

## 2020-09-04 RX ADMIN — QUETIAPINE FUMARATE 400 MG: 400 TABLET ORAL at 07:39

## 2020-09-04 RX ADMIN — OXYCODONE HYDROCHLORIDE AND ACETAMINOPHEN 250 MG: 500 TABLET ORAL at 17:22

## 2020-09-04 RX ADMIN — SENNOSIDES 8.6 MG: 8.6 TABLET, FILM COATED ORAL at 21:42

## 2020-09-04 ASSESSMENT — PAIN DESCRIPTION - ORIENTATION
ORIENTATION: LOWER;MID
ORIENTATION: MID;LOWER
ORIENTATION: LOWER;MID
ORIENTATION: MID;LOWER

## 2020-09-04 ASSESSMENT — ENCOUNTER SYMPTOMS
ALLERGIC/IMMUNOLOGIC NEGATIVE: 1
NAUSEA: 0
CONSTIPATION: 0
EYES NEGATIVE: 1
VOICE CHANGE: 0
TROUBLE SWALLOWING: 0
SHORTNESS OF BREATH: 0
DIARRHEA: 0
COUGH: 0

## 2020-09-04 ASSESSMENT — PAIN DESCRIPTION - DESCRIPTORS
DESCRIPTORS: ACHING

## 2020-09-04 ASSESSMENT — PAIN DESCRIPTION - LOCATION
LOCATION: BACK;HEAD
LOCATION: HEAD;BACK
LOCATION: BACK;HEAD
LOCATION: HEAD;BACK
LOCATION: BACK;HEAD

## 2020-09-04 ASSESSMENT — PAIN DESCRIPTION - FREQUENCY
FREQUENCY: CONTINUOUS

## 2020-09-04 ASSESSMENT — PAIN - FUNCTIONAL ASSESSMENT
PAIN_FUNCTIONAL_ASSESSMENT: PREVENTS OR INTERFERES SOME ACTIVE ACTIVITIES AND ADLS

## 2020-09-04 ASSESSMENT — PAIN DESCRIPTION - PAIN TYPE
TYPE: ACUTE PAIN

## 2020-09-04 ASSESSMENT — PAIN DESCRIPTION - ONSET
ONSET: ON-GOING

## 2020-09-04 ASSESSMENT — PAIN SCALES - GENERAL
PAINLEVEL_OUTOF10: 6
PAINLEVEL_OUTOF10: 6
PAINLEVEL_OUTOF10: 10
PAINLEVEL_OUTOF10: 6
PAINLEVEL_OUTOF10: 6
PAINLEVEL_OUTOF10: 5
PAINLEVEL_OUTOF10: 6
PAINLEVEL_OUTOF10: 7

## 2020-09-04 ASSESSMENT — PAIN DESCRIPTION - PROGRESSION
CLINICAL_PROGRESSION: NOT CHANGED

## 2020-09-04 NOTE — PROGRESS NOTES
[address] [address]  INPATIENT PHYSICAL THERAPY  DAILY NOTE  [facility] - 7E-70/070-A    Time In: 1130  Time Out: 1230  Timed Code Treatment Minutes: 60 Minutes  Minutes: 60          Date: 2020  Patient Name: Kwesi Malone,  Gender:  male        MRN: [id number]  : 1960  (60 y.o.)     Referring Practitioner: Dr. Rohan Carrion  Diagnosis: Burst fx of T12, TBI  Additional Pertinent Hx: T12 compression fracture s/p fall, chronic alcohol abuse, drug abuse, recurrent falls, neck pain, scalp hematoma     Prior Level of Function:  Lives With: Other (comment)(Mother)  Type of Home: House  Home Layout: One level  Home Access: Stairs to enter without rails  Entrance Stairs - Number of Steps: 1  Home Equipment: Standard walker   Bathroom Shower/Tub: Tub/Shower unit, Shower chair with back  Bathroom Toilet: Standard  Bathroom Equipment: Grab bars in shower(not in great shape per pt. plastic grab bars.)    ADL Assistance: Independent  Ambulation Assistance: Independent  Transfer Assistance: Independent  Active : Yes  Additional Comments: Pt stated he was \"experimenting\" with using the walker prior to admission. Pt reports mother does most of the inside chores, Pt is responsible for outside chores and yardwork. Restrictions/Precautions:  Restrictions/Precautions: General Precautions, Fall Risk  Required Braces or Orthoses  Spinal: Thoracic Lumbar Sacral Orthotics(May don in sitting)  Position Activity Restriction  Spinal Precautions: No Bending, No Lifting, No Twisting  Other position/activity restrictions: TLSO brace ordered for T12 fracture. Will need to wear this when up. May have it off when in bed. SUBJECTIVE: Patient in bed upon arrival, agreed and cooperative during session. Reported having slight inner thigh pain at beginning of session, diminished by end of session.  RN reported patient having low BP, took BP prior to session- standin/55 mmHg; seated: limitations due to short-term memory loss, which can affect safety with functional mobility. Would benefit from additional skilled PT to increase overall strength/stability, which affects patient's safe functional mobility. Activity Tolerance:  Patient tolerance of  treatment: good. Equipment Recommendations:Equipment Needed: (RW ordered from [facility])  Discharge Recommendations: Continue to assess pending progress, Patient would benefit from continued therapy after discharge    Plan: Times per week: 5x/wk 90 min, 1x/wk 30 min  Specific instructions for Next Treatment: US Rt adductors if pain noted. Current Treatment Recommendations: Strengthening, Functional Mobility Training, Balance Training, Equipment Evaluation, Education, & procurement, Gait Training, Stair training, Endurance Training, Transfer Training    Patient Education  Patient Education: Plan of Care, Precautions/Restrictions, Transfers, Wheelchair Mobility, Education Related to Prevention of Recurrence of Impairment/Illness/Injury, Health Promotion and Wellness Education, Home Safety Education,  - Patient Verbalized Understanding, - Patient Requires Continued Education, Memory Log    Goals:  Patient goals : get my dizziness better  Short term goals  Time Frame for Short term goals: 1 wk  Short term goal 1: bed mobility with Mod I, to get in/out of bed, use log roll technique  Short term goal 2: transfer with SBA to get in/out of chairs  Short term goal 3: amb >= 50''x1 with RW and SBA to progress to home and community mobility  Short term goal 4: Pt to ascend/descend 6\" step with RW, CGA, for home entry. Short term goal 5: Pt to score >= 20/28 on Tinetti; perform TUG <= 45 sec, indicating improved balance.   Long term goals  Time Frame for Long term goals : 3 wks  Long term goal 1: transfer with Mod I, to get in/out of chairs  Long term goal 2: amb >= 150''x1 with RW and Mod I, for home and community mobility  Long term goal 3: Pt to ascend/descend 6\" step with RW, Mod I, for home entry. Long term goal 4: Pt to score >= 24/28 on Tinetti; perform TUG ,<= 30 sec, indicating improved balance. Long term goal 5: Pt to ascend/descend 4+ steps junior rails, Mod I, for community mobility    Following session, patient left in safe position with all fall risk precautions in place.     [address] [place] [address]

## 2020-09-04 NOTE — PROGRESS NOTES
[facility]  INPATIENT PHYSICAL THERAPY  DAILY NOTE  [address] [facility] - 7E-70/070-A      Time In: 1330  Time Out: 1400  Timed Code Treatment Minutes: 30 Minutes  Minutes: 30          Date: 2020  Patient Name: Kwesi Malone,  Gender:  male        MRN: [id number]  : 1960  (60 y.o.)     Referring Practitioner: Dr. Rohan Carrion  Diagnosis: Burst fx of T12, TBI  Additional Pertinent Hx: T12 compression fracture s/p fall, chronic alcohol abuse, drug abuse, recurrent falls, neck pain, scalp hematoma     Prior Level of Function:  Lives With: Other (comment)(Mother)  Type of Home: House  Home Layout: One level  Home Access: Stairs to enter without rails  Entrance Stairs - Number of Steps: 1  Home Equipment: Standard walker   Bathroom Shower/Tub: Tub/Shower unit, Shower chair with back  Bathroom Toilet: Standard  Bathroom Equipment: Grab bars in shower(not in great shape per pt. plastic grab bars.)    ADL Assistance: Independent  Ambulation Assistance: Independent  Transfer Assistance: Independent  Active : Yes  Additional Comments: Pt stated he was \"experimenting\" with using the walker prior to admission. Pt reports mother does most of the inside chores, Pt is responsible for outside chores and yardwork. Restrictions/Precautions:  Restrictions/Precautions: General Precautions, Fall Risk  Required Braces or Orthoses  Spinal: Thoracic Lumbar Sacral Orthotics(May don in sitting)  Position Activity Restriction  Spinal Precautions: No Bending, No Lifting, No Twisting  Other position/activity restrictions: TLSO brace ordered for T12 fracture. Will need to wear this when up. May have it off when in bed.     SUBJECTIVE: pt up in chair on arrival he was rather talkative and required cues to stay on task, he reported that the [address] seems to be helping except what they did earlier (pt had a doppler US on his LEs) we discussed the difference between the [address] and that wasn't for pain management however the one for therapy is for pain management, did monitor his BP and in sitting he was 112/73 attempted to check 2 times in standing and unable to get a reading but he denied any dizziness     PAIN: 5-6 pt c/o of feeling achy all over he reported that the [employer] on his LE has helped, his main c/o was pain and burning sensation in feet   Ultrasound treatment completed to R hip adductor region x8  Minutes, 1 MHz, 1.5 w/cm2, continuous  OBJECTIVE:  Bed Mobility:  sidelying to sit from hosp bed with SBA cues for technique  Sit to Supine: Minimal Assistance at LEs    Transfers:  Sit to Stand: Contact Guard Assistance  Stand to Sit:Contact Guard Assistance    Ambulation:  Contact Guard Assistance  Distance: ~5 x2   Surface: Level Tile  Device:Rolling Walker  Gait Deviations:  Slow Dedra and limited distance returning to bed for US treatment         Functional Outcome Measures: Not completed       ASSESSMENT:  Assessment: Patient progressing toward established goals. Activity Tolerance:  Patient tolerance of  treatment: fair. Equipment Recommendations:Equipment Needed: (RW ordered from [facility])  Discharge Recommendations:    Continue to assess pending progress, Patient would benefit from continued therapy after discharge    Plan: Times per week: 5x/wk 90 min, 1x/wk 30 min  Specific instructions for Next Treatment: US Rt adductors if pain noted.   Current Treatment Recommendations: Strengthening, Functional Mobility Training, Balance Training, Equipment Evaluation, Education, & procurement, Gait Training, Stair training, Endurance Training, Transfer Training    Patient Education  Patient Education: Plan of Care    Goals:  Patient goals : get my dizziness better  Short term goals  Time Frame for Short term goals: 1 wk  Short term goal 1: bed mobility with Mod I, to get in/out of bed, use log roll technique  Short term goal 2: transfer with SBA to get in/out of chairs  Short term goal 3: amb >= 50''x1 with RW and SBA to progress to home and community mobility  Short term goal 4: Pt to ascend/descend 6\" step with RW, CGA, for home entry. Short term goal 5: Pt to score >= 20/28 on Tinetti; perform TUG <= 45 sec, indicating improved balance. Long term goals  Time Frame for Long term goals : 3 wks  Long term goal 1: transfer with Mod I, to get in/out of chairs  Long term goal 2: amb >= 150''x1 with RW and Mod I, for home and community mobility  Long term goal 3: Pt to ascend/descend 6\" step with RW, Mod I, for home entry. Long term goal 4: Pt to score >= 24/28 on Tinetti; perform TUG ,<= 30 sec, indicating improved balance. Long term goal 5: Pt to ascend/descend 4+ steps junior rails, Mod I, for community mobility    Following session, patient left in safe position with all fall risk precautions in place.

## 2020-09-04 NOTE — PROGRESS NOTES
[address] [place] THERAPY  [facility]  DAILY NOTE    TIME   SLP Individual Minutes  Time In: 9170  Time Out: 1130  Minutes: 50  Timed Code Treatment Minutes: 50 Minutes       Date: 2020  Patient Name: Pippa Rodriguez      CSN: [id number]   : 1960  (60 y.o.)  Gender: male   Referring Physician:  Dr. Bedford Hodgkin   Diagnosis: Burst fracture of T12 vertebra with routine healing; TBI   Secondary Diagnosis: Cognitive impairments  Precautions: Fall Risk; Impulsivity; Seizure   Current Diet: General diet with thin liquids   Swallowing Strategies: Standard Universal Swallow Precautions  Date of Last MBS: Not Applicable    Pain: Body aches, headache, Pt did not rate pain level    Subjective:  Pt sitting upright in bed for duration of session, pleasant, attentive, and talkative. Pt frequently required redirections to task; made irrelevant or off-topic comments throughout session. Pt reported having more significant memory deficits today and having \"slurred speech. \" Pt reported having \"flu like symptoms\" or having symptoms of Hay fever which Pt described as body aches, headache, itchy and watery eyes, and feeling tired. Pt perseverated on wanting to get coffee from [employer]; comments regarding [employer] occurred at least 5 times. Pt verbally expressed interest in participating in scavenger hunt around hospital during next week's session. Additionally, several distractions from nursing and lab during session. Short-Term Goals:  SHORT TERM GOAL #1:  Goal 1: Pt will complete immediate/delayed recall and working memory tasks with 90% accuracy given mod cues to improve retention of new and functional information. INTERVENTIONS: Pt wrote notes in memory log regarding what he did during OT, how his body felt, and how he felt emotionally with min cues. Cues needed for redirection to task and suggestions of potential topics that could be noted in the memory log. Details/notes added to memory log were of appropriate relevance. Pt has begun recording what he has eaten or will eat each day for breakfast, lunch, and dinner in memory log to facilitate recall when speaking with the nutritionist.    Pt recalled 0/4 pieces of information that were provided in the previous session without referencing notes. After reading notes from previous session, Pt verbalized that he remembered the information. After reviewing the 4 pieces of information, Pt immediately recalled 4/4 pieces of information independently when written notes were removed. *Pt reported having \"foggy thoughts\" which Pt attributed to feeling tired and ill. SHORT TERM GOAL #2:  Goal 2: Pt will complete sustained, selective, and divided attention tasks with no more than 3 errors within a given task in order to improve participation in treatment and permit eventual return to driving. INTERVENTIONS: Goal was not addressed d/t focus on other goals. *Pt required occasional to frequent redirections to task throughout the session. SHORT TERM GOAL #3:  Goal 3: Pt will complete functional reasoning and thought organizational tasks with 90% accuracy given mod cues to improve overall executive functioning skills. INTERVENTIONS: Money management tasks (choosing money amounts for specific cost, calculating change):   -2/2 ind for determining appropriate money amount for specific cost *bills only, no coins  -1/2 ind, 1/2 mod cues for calculating change *including bills and coins  *Increased processing time to add costs together when costs included bills and coins. SHORT TERM GOAL #4:  Goal 4: Pt will complete functional problem solving and sequencing tasks with 90% accuracy given mod cues to improve ability to make successful return to IADL's (medication management, finances, etc.).   INTERVENTIONS: Written Sequencing (5 units):   -Morning routine- 5/5 indep  -Coffee management- 5/5 indep    Verbal sequencing of ADL task:  -Changing a flat tire- 3/5 ind and 2/5 mod cues for Pt generated steps  *Pt frequently repeated steps and required redirections to task for progression. Long-Term Goals:  Timeframe for Long-term Goals: 3 weeks    LONG TERM GOAL #1:  Goal 1: Patient will improve cognitive functioning to a min assist level to increase level of independence with ADL and iADL tasks in the home environment and reduce caregiver burden. Comprehension: 5 - Patient understands basic needs (hungry/hot/pain)  Expression: 5 - Expresses basic ideas/needs only (hungry/hot/pain)  Social Interaction: 5 - Patient is appropriate with supervision/cues  Problem Solvin - Patient solves simple/routine tasks 75-90%+   Memory: 3 - Patient remembers 50%-74% of the time        EDUCATION:  Learner: Patient  Education:  Reviewed recommendations for follow-up, Education Related to Potential Risks and Complications Due to Impairment/Illness/Injury and Home Safety Education  Evaluation of Education: Verbalizes understanding, Needs further instruction and Family not present    ASSESSMENT/PLAN:    Activity Tolerance:  Patient tolerance of treatment: good. Assessment/Plan: Patient progressing toward established goals. Continues to require skilled care of licensed speech pathologist to progress toward achievement of established goals and plan of care. Plan for Next Session: Money Management, Computer navigation task, Functional recall     [facility] Speech Intern  Varinder Colón.  [address] [facility], [address]

## 2020-09-04 NOTE — PROGRESS NOTES
[address] [facility]  [address] [facility]  Occupational Therapy  Daily Note  Time:   Time In: 830  Time Out: 930  Timed Code Treatment Minutes: 60 Minutes  Minutes: 60          Date: 2020  Patient Name: Heriberto Blakely,   Gender: male      Room: [facility]70/070-A  MRN: [id number]  : 1960  (60 y.o.)  Referring Practitioner: Dr. Kostas Romero  Diagnosis: Burst fracture T 12 with routine healing, TBI  Additional Pertinent Hx: Per ED notes: Mr. Eric Gaitan is a 59 Y/O male presenting at [facility] by trauma consult, brought by EMS following a fall today  with LOC; PMH includes drug and alcohol abuse, cancer, HTN, HLD, and GERD. Per patient report he has been falling with increasing frequency over past 2 months, believes a fall while exiting a parked car two days ago is what hurt his back, but he also fell today. Patient states he typically does not remember falls, just finds himself on the ground. Daughter present in exam room states, falls often occur after standing up, he will begin to shake and then fall to floor, appear to lose consciousness briefly. Patient reports history of alcohol abuse, drank a pint of liquor today between [id number], states this is not unusual for him. Restrictions/Precautions:  Restrictions/Precautions: General Precautions, Fall Risk  Required Braces or Orthoses  Spinal: Thoracic Lumbar Sacral Orthotics(May don in sitting)  Position Activity Restriction  Spinal Precautions: No Bending, No Lifting, No Twisting  Other position/activity restrictions: TLSO brace ordered for T12 fracture. Will need to wear this when up. May have it off when in bed. SUBJECTIVE: Patient in bed upon arrival talkative, requires redirection at times, inappropriate comments at times easy to redirect. Patient slow moving, min difficulty with word finding, patient reports feeling disoriented and slurred speech when standing in bathroom completing grooming tasks. HAMMER assessed BP in seated postioning 86/64 in standing BP 67/50. RN Bogdan Borges and Arvella Eisenmenger in to assess and requested patient be returned to supine at end of session and will follow up with physician. PAIN: 7/10:     COGNITION: Slow Processing, Decreased Recall, Decreased Insight, Decreased Problem Solving and Decreased Safety Awareness    ADL:   Grooming: Stand By Assistance. initally CGA standing at sink to complete oral hygiene however patient reported becoming dizzy, BP assessed and completed tasks in seated position. Upper Extremity Dressing: Minimal Assistance. donning/doffing brace, S when donning/doffing shirt   Lower Extremity Dressing: Stand By Assistance. donning/doffing shorts. Patient utilzing LHAE min cues for sequencing step by step this date as patient asked \"now what? do I standing to pull up my pants\"   Toileting: Contact Guard Assistance. Toilet Transfer: Contact Guard Assistance. Shorty Rodríguez BALANCE:  Sitting Balance:  Stand By Assistance. Standing Balance: Contact Guard Assistance. BED MOBILITY:  Supine to Sit: Stand By Assistance min cues for log roll technique  Sit to Supine: Stand By Assistance    Scooting: Stand By Assistance      TRANSFERS:  Sit to Stand:  [employer]. Stand to Sit: Contact Guard Assistance. FUNCTIONAL MOBILITY:  Assistive Device: Rolling Walker  Assist Level:  Contact Guard Assistance. Distance: To and from bathroom     ASSESSMENT:     Activity Tolerance:  Patient tolerance of  treatment: fair. Discharge Recommendations: Home with Home health OT, Home with nursing aide, 24 hour supervision or assist   Equipment Recommendations: Equipment Needed: No  Other: Pt has a shower chair. Does not need a BSC. Recommend a reacher. Discussed purchasing options.   Plan: Times per week: 5xs week x 90 min, 1 x week x 30 min  Current Treatment Recommendations: Patient/Caregiver Education & Training, Balance Training, Functional Mobility Training, Endurance Training, Safety Education & Training, Self-Care / ADL, Home Management Training, Equipment Evaluation, Education, & procurement    Patient Education  Patient Education: ADL's    Goals  Short term goals  Time Frame for Short term goals: 1 week  Short term goal 1: Pt will complete BADL routine with setup and no > min cues for problem solving, attetntion to task , and back safety to increase independence in self care tasks  Short term goal 2: Pt to demo toileting tasks and transfers with S and no cues for safety  Short term goal 3: Pt to demo good dyamamic standing balance > 5 min with S and 0-1 UE support while reaching outside base of support  in prep for retrieveing clothing items. Short term goal 4: PT will complete 2 step homemaking tasks withCGA  no > min cues for back precautions/ proper body mechanics to increase ability to retrieve a snack at home. Long term goals  Time Frame for Long term goals : 2 weeks  Long term goal 1: PT will complete ADL routine including donning a TLSO with S andno > min cues for attention to tasks or cues for back  to increase independence in self care  Long term goal 2: PT will compelte 2 step homemaking tasks with S and 0-1 cues for proper body mechanics to increase ability to complete laundry tasks. Following session, patient left in safe position with all fall risk precautions in place.

## 2020-09-04 NOTE — PROGRESS NOTES
technique with transfers and occasional CGA. Pt will benefit from continued skilled PT to further improve balance for improved safety. Equipment Needed: (RW ordered from [address] [place] [address])    SPEECH THERAPY  Patient met 4/4 STG and 0/1 LTG. Improvements evident in immediate and delayed memory recall with use of compensatory strategies, sustained and selective attention, functional reasoning, functional problem solving, sequencing tasks/activities, and overall thought organization skills. Despite overall improvements, Pt continues to have difficulty with independent mental retention, attending to details, higher level divided attention, and higher level thought organization. Given concerns regarding attention skills, recommend Pt refrain from driving at this time. Pt has limited cognitive demands in the home environment, mother is very supportive and assists with managing finances, medications, and cooking tasks. Recommend continued speech therapy intervention to address cognitive functioning to increase level of independence in the home environment. Additionally recommend community support for alcohol/substance abuse. OCCUPATIONAL THERAPY  Radhika Santos has made steady progress on IP Rehab. He has progressed to modified independent level with his ADL routine and is able to complete with adhering to precautions. Pt still requires supervision for mobility due to intermittent c/o dizziness. Pt does have cognitive concerns and is difficult to redirect at times. He will benefit from [facility] services at discharge to improve safety awareness within ADL and IADL at home to decrease risk of fall and future injury. Equipment Needed: No  Other: Pt has a shower chair. Does not need a BSC. Recommend a reacher. Discussed purchasing options. RECREATIONAL THERAPY  Patient has been offered participation in recreational therapy activities and participates as able.     NUTRITION  Weight: 234 lb 9.6 oz (106.4 kg) / Body mass index is 28.56 kg/m². Current diet: DIET GENERAL;  Dietary Nutrition Supplements: Frozen Oral Supplement  Dietary Nutrition Supplements: Other Oral Supplement (see comment)  Please see nutrition note for details. NURSING  Continent of Bowel and Bladder: Yes  Pain is Managed:  Yes  Signs and Symptoms of Infection:  No  Signs and Symptoms of Skin Breakdown:  No  Injury and Fall Free during Inpatient Rehabilitation Admission: Yes    Consultations/Labs/X-rays: Trauma Surgery, Critical Care, Orthopedic Surgery, Family Medicine, Physical Medicine    Family Education: Need to make contact with family to initiate education  Fall Risk:  Falling star program initiated  Is the patient appropriate for a stay in the functional apartment? no    Discharge Plan   Estimated Discharge Date: 9/9   Destination: home health and discharge home with supervision  Services at Discharge: [address] [place] [address] Rd, Occupational Therapy, Speech Therapy, Nursing and an aide 3x week  Is patient appropriate for an outpatient driving evaluation? no  Equipment at Discharge: RW, shower chair, Reacher  Factors facilitating achievement of predicted outcomes: Family support, Motivated, Cooperative, Pleasant and Has needed Durable Medical Equipment at home  Barriers to the achievement of predicted outcomes: Confusion, Cognitive deficit, Impulsivity, Limited insight into deficits, Lower extremity weakness, Long standing deficits and Alcohol abuse    Team Members Present at Conference:  [facility]  Occupational Therapist:Stanley Anton OTR/L [address], PT [address], [address], [address]  Nurse:Ayesha Reid, RN  Psychologist: Sindy Oliveira, PhD.    I approve the established interdisciplinary plan of care as documented within the medical record of Princess Colon.     Jemima Vicente

## 2020-09-04 NOTE — PROGRESS NOTES
Physical Medicine & Rehabilitation  Progress Note    Chief Complaint:  frequent falls with mild traumatic brain injury without loss of consciousness and T12 burst fracture    Subjective: He notes that the phenytoin cream actually made his feet burn more than they usually do and asked whether there would be a specialist he could be referred to to evaluate his issues of his neuropathy. His pain relating to his known T12 fracture he rates it 5/10. He did have issues with hypotension today with his blood pressures dipping down into the mid 33O for his systolic blood pressure. Labs were drawn and Dopplers of his lower limbs were completed without findings of any DVTs. He had no other concerns or questions at this time. Rehabilitation:   Physical Therapy:  OBJECTIVE:     Transfers:  Sit to Stand: Stand By Assistance  Stand to Sit:Stand By Assistance     Ambulation:  Stand By Assistance  Distance: 150' x 1, 80' x 1, 40' x 1, ~100' x 1 while searching for cones. Surface: Level Tile  Device:Rolling Walker  Gait Deviations:  Slow Dedra, Decreased Step Length Bilaterally, Decreased Gait Speed, Wide Base of Support and Mild Path Deviations     Stairs:  Platform:  6\" platform X 1 using Rolling Walker and Contact Guard Assistance, with verbal cues .      Balance:  Dynamic Standing Balance: Stand By Assistance, Minimal Assistance  Pt. Stood while tossing ball back and forth with therapist. Pt. Slightly unsteady with posterior LOB requiring mod A to correct during activity. Pt. Stood with no UE support while completing game with recreational therapy. Pt. Also reached OOB for cones while ambulating around therapy gym. Pt. Compliant with spinal precautions throughout.      Exercise:  None     Functional Outcome Measures: Not completed    ASSESSMENT:  Assessment: Patient progressing toward established goals. Activity Tolerance:  Patient tolerance of  treatment: good.       Equipment Recommendations:Equipment Needed: (RW Therapy:  Short-Term Goals:  SHORT TERM GOAL #1:  Goal 1: Pt will complete immediate/delayed recall and working memory tasks with 90% accuracy given mod cues to improve retention of new and functional information. INTERVENTIONS: Pt taught clinician how to play card game that was taught in previous session. Pt utilized written notes from previous session when recalling card game rules. Required occasional thought organization cues when teaching clinician how to take turn in card game. When clinician made a mistake during play, Pt corrected mistake accurately and promptly. Adequate sustained attention during card game; no redirections to task were required. Alternating attention was observed when physician entered room and asked Pt questions for approx. 5 min. Pt returned to card game and recalled whose turn it was after physician visit without requiring redirection cues.      Following card game, Pt independently made notes in memory log of questions regarding game rules for next session. Added written notes to memory log about activities from the day, items for \"to-do\" list, and comments on how he slept with min cues. Details/notes added to memory log were of appropriate relevance. Pt also independently added dates to items on to-do list of when he planned on completing each task.      Pt provided four pieces of information about clinician at end of session; will recall information in next session.      *Pt reported having \"fleeting thoughts\" and continuing to experience memory difficulties when speaking. Reported having difficulty remembering names but has better success remembering faces and voices.     SHORT TERM GOAL #2:  Goal 2: Pt will complete sustained, selective, and divided attention tasks with no more than 3 errors within a given task in order to improve participation in treatment and permit eventual return to driving. INTERVENTIONS: Organized cards independently by suit with 0 errors.  Pt stated that Negative for constipation, diarrhea and nausea. Endocrine: Negative. Genitourinary: Negative for hematuria and urgency. Musculoskeletal: Positive for gait problem. Negative for joint swelling. Skin: Negative for pallor. Allergic/Immunologic: Negative. Neurological: Positive for weakness and numbness. Negative for dizziness and headaches. Hematological: Negative. Psychiatric/Behavioral: Positive for confusion, decreased concentration and hallucinations. Negative for dysphoric mood. The patient is not nervous/anxious. All other systems reviewed and are negative. Objective:  BP (!) 157/94   Pulse 67   Temp 97.6 °F (36.4 °C) (Oral)   Resp 18   Ht 6' 4\" (1.93 m)   Wt 232 lb 3 oz (105.3 kg)   SpO2 96%   BMI 28.26 kg/m²   CURRENT VITALS:  height is 6' 4\" (1.93 m) and weight is 232 lb 3 oz (105.3 kg). His oral temperature is 97.6 °F (36.4 °C). His blood pressure is 157/94 (abnormal) and his pulse is 67. His respiration is 18 and oxygen saturation is 96%. Body mass index is 28.26 kg/m².   Temperature Range (24h):Temp: 97.6 °F (36.4 °C) Temp  Av.4 °F (36.3 °C)  Min: 97.2 °F (36.2 °C)  Max: 97.6 °F (36.4 °C)  BP Range (06R): Systolic (28WOO), CALOS:411 , Min:76 , URA:431     Diastolic (24HWT), ZXT:54, Min:60, Max:94    Pulse Range (24h): Pulse  Av  Min: 67  Max: 72  Respiration Range (24h): Resp  Av  Min: 16  Max: 18  Current Pulse Ox (24h):  SpO2: 96 %  Pulse Ox Range (24h):  SpO2  Av %  Min: 96 %  Max: 99 %  Oxygen Amount and Delivery:      awake  Orientation:   person, place, time, situation  Mood: within normal limits  Affect: calm  General appearance:  in no acute distress, up in chair with TLSO brace on    Memory:   Grossly normal  Attention/Concentration: Grossly normal   language:  normal    ROM: Abnormal due to use of TLSO brace  Motor Exam:  Motor exam is symmetrical 5 out of 5 all extremities bilaterally    Tone:  normal  Muscle bulk: within normal limits; with noted mild atrophy of bilateral calves  Sensory:  Sensory intact  Coordination:   normal  Deep Tendon Reflexes:  Reflexes are intact and symmetrical bilaterally    Skin: warm and dry, no rash or erythema and abrasions over both knees with scabs and fading hematoma with abrasion over left parietal scalp  Peripheral vascular: Pulses: Normal upper and lower extremity pulses; Edema: no    Diagnostics:   Recent Results (from the past 24 hour(s))   Carbamazepine level, total    Collection Time: 09/04/20 11:14 AM   Result Value Ref Range    Carbamazepine, Total 5.4 2.0 - 10.0 mcg/ml   CBC    Collection Time: 09/04/20 11:14 AM   Result Value Ref Range    WBC 4.6 (L) 4.8 - 10.8 thou/mm3    RBC 2.78 (L) 4.70 - 6.10 mill/mm3    Hemoglobin 9.7 (L) 14.0 - 18.0 gm/dl    Hematocrit 30.7 (L) 42.0 - 52.0 %    .4 (H) 80.0 - 94.0 fL    MCH 34.9 (H) 26.0 - 33.0 pg    MCHC 31.6 (L) 32.2 - 35.5 gm/dl    RDW-CV 13.9 11.5 - 14.5 %    RDW-SD 56.4 (H) 35.0 - 45.0 fL    Platelets 340 721 - 010 thou/mm3    MPV 9.8 9.4 - 12.4 fL   Comprehensive metabolic panel    Collection Time: 09/04/20 11:14 AM   Result Value Ref Range    Glucose 120 (H) 70 - 108 mg/dL    CREATININE 1.2 0.4 - 1.2 mg/dL    BUN 15 7 - 22 mg/dL    Sodium 139 135 - 145 meq/L    Potassium 4.4 3.5 - 5.2 meq/L    Chloride 106 98 - 111 meq/L    CO2 24 23 - 33 meq/L    Calcium 8.9 8.5 - 10.5 mg/dL    AST 30 5 - 40 U/L    Alkaline Phosphatase 93 38 - 126 U/L    Total Protein 6.4 6.1 - 8.0 g/dL    Alb 3.7 3.5 - 5.1 g/dL    Total Bilirubin <0.2 (L) 0.3 - 1.2 mg/dL    ALT 20 11 - 66 U/L   Glomerular Filtration Rate, Estimated    Collection Time: 09/04/20 11:14 AM   Result Value Ref Range    Est, Glom Filt Rate 62 (A) ml/min/1.73m2   Anion Gap    Collection Time: 09/04/20 11:14 AM   Result Value Ref Range    Anion Gap 9.0 8.0 - 16.0 meq/L   Urine rt reflex to culture    Collection Time: 09/04/20 12:30 PM    Specimen: Urine, clean catch   Result Value Ref Range    Glucose, Ur NEGATIVE NEGATIVE mg/dl    Bilirubin Urine NEGATIVE NEGATIVE    Ketones, Urine NEGATIVE NEGATIVE    Specific Gravity, Urine 1.017 1.002 - 1.030    Blood, Urine NEGATIVE NEGATIVE    pH, UA 6.0 5.0 - 9.0    Protein, UA NEGATIVE NEGATIVE    Urobilinogen, Urine 1.0 0.0 - 1.0 eu/dl    Nitrite, Urine NEGATIVE NEGATIVE    Leukocyte Esterase, Urine NEGATIVE NEGATIVE    Color, UA YELLOW STRAW-YELLOW    Character, Urine CLEAR CLEAR-SL CLOUD     Labs Renal Latest Ref Rng & Units 9/4/2020 8/31/2020 8/28/2020 8/26/2020 8/25/2020   BUN 7 - 22 mg/dL 15 12 13 13 12   Cr 0.4 - 1.2 mg/dL 1.2 1.2 1.2 1.1 1.3(H)   K 3.5 - 5.2 meq/L 4.4 4.3 3.9 4.5 4.2   Na 135 - 145 meq/L 139 140 139 138 134(L)      Recent Labs     09/04/20  1114 08/31/20  0842 08/28/20  0536   WBC 4.6* 5.5 6.0   HGB 9.7* 10.3* 9.8*   HCT 30.7* 31.2* 30.9*   .4* 109.1* 112.0*    276 188      Vl Dup Lower Extremity Venous Bilateral    Result Date: 9/4/2020  PROCEDURE: VL DUP LOWER EXTREMITY VENOUS BILATERAL CLINICAL INFORMATION: Pain COMPARISON: No prior study. TECHNIQUE: Multiple grayscale and color flow images of the major veins of both lower extremities were obtained from the level of the groin to the level of the ankle. Multiple compression and augmentation maneuvers were performed. FINDINGS: RIGHT LOWER EXTREMITY:All the deep veins of the right lower extremity are widely patent with normal flow and normal compressibility. LEFT LOWER EXTREMITY:All the  deep veins of the left lower extremity are widely patent with normal flow and normal compressibility. Normal venous ultrasound. No evidence for deep venous thrombosis. **This report has been created using voice recognition software. It may contain minor errors which are inherent in voice recognition technology. ** Final report electronically signed by Dr. Christyne Cabot on 9/4/2020 1:12 PM    Impression:  1. Frequent falls. 2. Gait instability with a Tinetti score of 8/28.   3. Left scalp hematoma. 4. 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 5. Moderate cognitive impairment. (MOCA) version 8.2 completed. Patient scored 14/30. *Previous score on the [address] [place] [address] was 15/25. 6. Acute alcohol intoxication with admission blood alcohol level of 0.08.  7. General cerebral atrophy and small vessel ischemic changes with prominent ventricles consistent with central atrophy. 8. Cervical and thoracic spine pain. 9. T12 burst fracture without instability initially noted on a lumbar spine x-ray on 7/24/2020. 10. Hyponatremia. 11. Hypomagnesemia  12. Acute kidney injury. 13. Alcohol abuse. 14. Vitamin B12 deficiency with chronic macrocytic anemia. .  15. Recent acute, comminuted, minimally displaced, T-shaped fracture of the head of the right proximal phalanx of the great toe on 8/14/2020. 16. Hypertension. 17. Hyperlipidemia. 18. Remote history of cocaine drug abuse. 19. Cervical spondylosis with C3-C4 moderate to severe right and severe left neuroforaminal stenosis, moderate to severe left and severe right neuroforaminal stenosis at C4-C5, moderate right and severe left neuroforaminal stenosis at C5-C6, and moderate right and mild left neuroforaminal stenosis at C6-C7.  20. Lumbar spondylosis with mild degenerative changes most pronounced at L3-L4 with moderate spinal canal stenosis and mild to moderate bilateral neuroforaminal stenosis. 21. Chronic macrocytic, hyperchromic anemia. 22. CKD 2.  23. History of depression. 24. Bipolar 1 disorder. 25. GERD. 26. Current everyday smoker with a 40-pack-year history. 27. Orthostatic hypotension. 28. Right medial hip adductor pain. 29. Chronic neuropathy of both feet. Plan:     Medical management: Per primary team and Dr. Toyin Webster.     Consultants: Trauma Surgery, Critical Care, Orthopedic Surgery, Family Medicine, Physical Medicine    Narcotic usage:  N/A  Last BM:  Stool Amount: Large (09/02/20 6221)    FUNCTIONAL OUTCOMES TOOLS:    DENNY -      Tinetti - Balance Score: 10  Gait Score: 6  Tinetti Total Score: 16    TUG -      Acute/Rehabilitation Problems:  1. Frequent falls/Gait instability with a Tinetti score of 8/28 . 1. PT/OT. 2. Tinetti 14/28. Score improved to 16/28 on 9/1. Risk Indicators: Less than/equal to 18 = high risk; 19-23 Moderate risk; Greater than/equal to 24 = low risk. 3. TUG 63 seconds. 60-69 years:  8.1 seconds; 70-79 years: 9.2 seconds; 80-99 years: 11.3 seconds. 2. Left scalp hematoma. 3. 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 1. TBI protocol. 2. Tele-sitter. 4. Moderate cognitive impairment. (MOCA) version 8.2 completed. Patient scored 14/30. *Previous score on the [address] [place] [address] was 15/25. 1. SLP. 5. Acute alcohol intoxication with admission blood alcohol level of 0.08.  6. General cerebral atrophy and small vessel ischemic changes with prominent ventricles consistent with central atrophy. 7. Cervical and thoracic spine pain/T12 burst fracture without instability initially noted on a lumbar spine x-ray on 7/24/2020.  1. TLSO brace when out of bed. 2. Will likely need brace for 2 to 3 months. 3. No lifting over 20 pounds. 4. 2 to 3-week follow-up after discharge with serial x-rays. 8. Acute kidney injury. 1. Cr/BUN/GFR improved to 1.2/13/62 from1.3/12/1957 on 8/25. Stable at 1.2/15/62 on 9/4.  9. Alcohol abuse. 1. Family states they would like for him to go into a substance abuse program.  2. Multivitamin. 10. Vitamin B12 deficiency with chronic macrocytic anemia  1. .0 on 8/28. Improved to 109.1 on 8/31. MCV stable at 110.4. 2. Hemoglobin 9.7 on 9/4 which is grossly stable over prior 10.3 on 8/31. 3. Folic acid. 4. Vitamin B12.  11. Recent acute, comminuted, minimally displaced, T-shaped fracture of the head of the right proximal phalanx of the great toe on 8/14/2020. 12. Orthostatic hypotension. 1. Noted on 9/1.   Patient encouraged to improve fluid intake with resolution later in the day. We will continue to monitor patient's fluid consumption. 2. Encourage fluids. 3. Atenolol has been decreased to 50 mg daily from 100 mg. Consider midodrine if no improvement noted. 13. Right hip adductor pain. 1. Suspect possible adductor complex strain. Physical Therapy ultrasound treatments have been ordered. Patient notes that these treatments are helping as of 9/3. 14. Chronic neuropathy of both feet. 1. Patient agreeable to trial 10% phenytoin cream in a neutral base to be applied twice daily. Ordered on 9/3 from the outpatient pharmacy and added to his inpatient medications. 3 refills have been provided. 2. Patient noted that this medication actually made his feet burn even more and he requested recommendations for follow-up with a specialist to address his neuropathy. 15. Nutrition:  Consultation to dietician for nutritional counseling and recommendations. 1. TotP 6.0, alb 3.5, Vitamin 25OH level of 43 on 8/28/2020. 16. Electrolytes. 1. Normal on 9/4 with exception of:  2. Hyponatremia. Sodium normal at 139 on 8/28 which is improved from 132 on 8/24. Sodium remains normal at 139 on 9/4  3. Hypomagnesemia. Magnesium was 1.5 on 8/25 with improvement to 2.2 on 8/26. Resolved. 17. Bladder: No issues, has Flomax on home medication list.  Medication was started 9/1 at patient's request  18. Bowel: Senna, colace, MOM  19. Rehabilitation nursing will be involved for bowel, bladder, skin, and pain management. Nursing will also provide education and training to patient and family. 20. Prophylaxis:  DVT: SCDs. GI: Protonix. 21.  and case management consultations for coordination of care and discharge planning. Chronic Problems:  1. Hypertension. 1. Amlodipine. Patient is actually on atenolol 100 mg daily at home; this medication was restarted on 9/2 and the amlodipine was discontinued.   Dose decreased to 50 mg on 9/4 due to issues with hypotension. 2. Hyperlipidemia. 1. Has fenofibrate on home medication list.  3. Remote history of cocaine drug abuse. 4. Cervical spondylosis with C3-C4 moderate to severe right and severe left neuroforaminal stenosis, moderate to severe left and severe right neuroforaminal stenosis at C4-C5, moderate right and severe left neuroforaminal stenosis at C5-C6, and moderate right and mild left neuroforaminal stenosis at C6-C7.  5. Lumbar spondylosis with mild degenerative changes most pronounced at L3-L4 with moderate spinal canal stenosis and mild to moderate bilateral neuroforaminal stenosis. 6. Chronic macrocytic, hyperchromic anemia. 7. CKD 2.  8. History of depression/Bipolar 1 disorder. 1. Prozac. 2. Seroquel. 3. Tegretol. Carbamazepine level 5.4 on 9/4. Normal.  4. Has BuSpar on home medication list.  5. Trazodone for sleep. 9. GERD. 1. Protonix. 10. Current everyday smoker with a 40-pack-year history. 1. NicoDerm patch if patient is agreeable. Labs reviewed on:  1. 8/28.  2. 8/31.  3. 9/4. Infectious Disease:  1.  N/A    Missed Therapy Time:  None     DME:    Discharge Plan:      Greater than 50% of 30 minutes was spent with the patient reviewing his current level of pain control, changes that have been made to his medication regimen to address his hypotension today, and the potential for seeing a specialist to address his neuropathy since the phenytoin cream did not prove to be beneficial.    Reed Fong MD

## 2020-09-04 NOTE — PROGRESS NOTES
[address] [place] [address]  Recreational Therapy  Daily Note  [address]    Time Spent with Patient: 0 minutes    Date:  9/4/2020       Patient Name: Yazmin Naidu      MRN: [id number]      YOB: 1960 (56 y.o.)       Gender: male  Diagnosis: Burst fracture T 12 with routine healing, TBI  Referring Practitioner: Dr. Billie Calix    RESTRICTIONS/PRECAUTIONS:  Restrictions/Precautions: General Precautions, Fall Risk  Vision: Impaired  Hearing: Within functional limits    PAIN: 0-no c/o pain     SUBJECTIVE:  I will play euchre     OBJECTIVE:  Pt came to the [facility]s dining room with O.T. to play a round of euchre with peer -affect bright and social       Patient Education  New Education Provided: Importance of Leisure,     Electronically signed by: Sabiha Suárez, DEEPIKAS  Date: 9/4/2020

## 2020-09-04 NOTE — PLAN OF CARE
Problem: Pain:  Goal: Pain level will decrease  Description: Pain level will decrease  9/4/2020 0145 by Genene Days, LPN  Outcome: Ongoing     Problem: Anxiety:  Goal: Level of anxiety will decrease  Description: Level of anxiety will decrease  9/4/2020 0145 by Genene Days, LPN  Outcome: Ongoing       Problem: Skin Integrity:  Goal: Absence of new skin breakdown  Description: Absence of new skin breakdown  9/4/2020 0145 by Genene Days, LPN  Outcome: Ongoing  Skin assessment every shift. No new skin issues. Problem: Falls - Risk of:  Goal: Will remain free from falls  Description: Will remain free from falls  9/4/2020 0145 by Genene Days, LPN  Outcome: Ongoing  Pt will remain free of falls.   Pt does use call light, telesitter has not alarmed

## 2020-09-04 NOTE — DISCHARGE INSTR - COC
Continuity of Care Form    Patient Name: Tiera Cee   :  1960  MRN:  [id number]    Admit date:  2020  Discharge date:  2020    Code Status Order: Full Code   Advance Directives:   [facility] Documentation     Date/Time Healthcare Directive Type of Healthcare Directive Copy in [address] Agent's Name Healthcare Agent's Phone Number    [phone]  Yes, patient has an advance directive for healthcare treatment  --  --  --  --  --          Admitting Physician:  Tank Stephens MD  PCP: DEBRA Grijalva CNP    Discharging Nurse: [facility] Unit/Room#: 7E-70/070-A  Discharging Unit Phone Number: [phone]    Emergency Contact:   Extended Emergency Contact Information  Primary Emergency Contact: Pam Vieira [address] Phone: [phone]  Relation: Child  Secondary Emergency Contact: Muriel Syed  Address: [address] [place] [address], [address] [place] [address] Phone: [phone]  Relation: Parent    Past Surgical History:  Past Surgical History:   Procedure Laterality Date    TIBIA FRACTURE SURGERY Left 2012    ORIF LEFT TIBIA    TONSILLECTOMY         Immunization History:   Immunization History   Administered Date(s) Administered    PPD Test 2013, 2013    Pneumococcal Polysaccharide ([id number]) 2014    Tdap (Boostrix, Adacel) 2019       Active Problems:  Patient Active Problem List   Diagnosis Code    Closed fracture of lower end of left tibia S82.302A    HTN (hypertension) I10    Syncope R55    Alcohol intoxication in active alcoholic ([address] M91.435    Chronic alcohol abuse F10.10    STEPHANY (acute kidney injury) ([address] C96.6    Metabolic acidosis Z31.6    Benign essential hypertension I10    Tobacco abuse Z72.0    Syncope and collapse R55    Alcohol withdrawal ([address] F10.239    Closed fracture of thoracic vertebral body ([address] S22.009A    Fall W19. [place]    Closed head injury S09.90XA    Hypomagnesemia E83.42    Burst fracture of T12 vertebra with routine healing S22.081D    Vitamin B12 deficiency E53.8       Isolation/Infection:   Isolation          No Isolation        Patient Infection Status     None to display          Nurse Assessment:  Last Vital Signs: /75   Pulse 68   Temp 97.2 °F (36.2 °C) (Oral)   Resp 16   Ht 6' 4\" (1.93 m)   Wt 232 lb 3 oz (105.3 kg)   SpO2 99%   BMI 28.26 kg/m²     Last documented pain score (0-10 scale): Pain Level: 7  Last Weight:   Wt Readings from Last 1 Encounters:   09/04/20 232 lb 3 oz (105.3 kg)     Mental Status:  oriented and alert    IV Access:  - None    Nursing Mobility/ADLs:  Walking   Assisted  Transfer  Assisted  Bathing  Assisted  Dressing  Independent  Toileting  Assisted  Feeding  Independent  Med Admin  Assisted  Med Delivery   whole    Wound Care Documentation and Therapy:  Wound 08/24/20 Knee Anterior;Right;Left healing scab wound (Active)   Wound Other 09/03/20 1120   Dressing/Treatment Open to air 09/02/20 0915   Wound Assessment Clean;Dry; Intact 09/01/20 2137   Drainage Amount None 09/01/20 2137   Odor None 09/02/20 0915   Batsheva-wound Assessment Clean;Dry; Intact 09/01/20 2137   Number of days: 10       Wound 08/24/20 Head Left;Upper excoriation with some drainage (Active)   Wound Traumatic 08/25/20 0900   Dressing/Treatment Open to air 09/04/20 0745   Wound Assessment Clean;Dry; Intact 09/04/20 0745   Drainage Amount None 09/04/20 0745   Drainage Description Sanguinous 08/26/20 0355   Odor None 09/01/20 2137   Batsheva-wound Assessment Clean;Dry; Intact 09/04/20 0745   Number of days: 10        Elimination:  Continence:   · Bowel:  Yes  · Bladder: Yes  Urinary Catheter: None   Colostomy/Ileostomy/Ileal Conduit: No       Date of Last BM: 09/06/2020    Intake/Output Summary (Last 24 hours) at 9/4/2020 1440  Last data filed at 9/4/2020 0921  Gross per 24 hour Intake 340 ml   Output 1050 ml   Net -710 ml     I/O last 3 completed shifts: In: 750 [P.O.:750]  Out: 1050 [Urine:1050]    Safety Concerns:     History of Falls (last 30 days) and At Risk for Falls    Impairments/Disabilities:      None    Nutrition Therapy:  Current Nutrition Therapy:   - Oral Diet:  General    Routes of Feeding: Oral  Liquids: Thin Liquids  Daily Fluid Restriction: no  Last Modified Barium Swallow with Video (Video Swallowing Test): not done    Treatments at the Time of Hospital Discharge:   Respiratory Treatments: na  Oxygen Therapy:  is not on home oxygen therapy.   Ventilator:    - No ventilator support    Rehab Therapies: Physical Therapy, Occupational Therapy and Speech/Language Therapy  Weight Bearing Status/Restrictions: No weight bearing restirctions  Other Medical Equipment (for information only, NOT a DME order):  wheelchair and walker  Other Treatments: na    Patient's personal belongings (please select all that are sent with patient):  Ivan BOWLING SIGNATURE:  {Esignature:[id number]}

## 2020-09-04 NOTE — PROGRESS NOTES
[address] [facility] [place] [address]  [address] [place] [address]  Occupational Therapy  Daily Note  Time:    Time In: 1400  Time Out: 1430  Timed Code Treatment Minutes: 30 Minutes  Minutes: 30       Date: 2020  Patient Name: Yazmin Naidu,   Gender: male      Room: [facility]70/070-A  MRN: [id number]  : 1960  (60 y.o.)  Referring Practitioner: Dr. Billie Calix  Diagnosis: Burst fracture T 12 with routine healing, TBI  Additional Pertinent Hx: Per ED notes: Mr. Marilynn Cruz is a 61 Y/O male presenting at [facility] by trauma consult, brought by EMS following a fall today  with LOC; PMH includes drug and alcohol abuse, cancer, HTN, HLD, and GERD. Per patient report he has been falling with increasing frequency over past 2 months, believes a fall while exiting a parked car two days ago is what hurt his back, but he also fell today. Patient states he typically does not remember falls, just finds himself on the ground. Daughter present in exam room states, falls often occur after standing up, he will begin to shake and then fall to floor, appear to lose consciousness briefly. Patient reports history of alcohol abuse, drank a pint of liquor today between 7904-2411, states this is not unusual for him. Restrictions/Precautions:  Restrictions/Precautions: General Precautions, Fall Risk  Required Braces or Orthoses  Spinal: Thoracic Lumbar Sacral Orthotics(May don in sitting)  Position Activity Restriction  Spinal Precautions: No Bending, No Lifting, No Twisting  Other position/activity restrictions: TLSO brace ordered for T12 fracture. Will need to wear this when up. May have it off when in bed. SUBJECTIVE: RN reports patient having low BP today. Doppler negative for acute findings. Blood Pressure:   Seated in recliner: 114/77 pulse 62  Upon standin/64 pulse 71. Nurse reports MD ordered RONNIE hose for orthostatic hypotension.      PAIN: 3 /10:      COGNITION: Decreased Recall, Decreased Insight and

## 2020-09-04 NOTE — PROGRESS NOTES
Patient: Rola Boyd  Unit/Bed: 7E-70/070-A  YOB: 1960  MRN: [id number] Acct: [de-identified]   Admitting Diagnosis: Burst fracture of T12 vertebra with routine healing [S22.081D]  Admit Date:  8/27/2020  Hospital Day: 7    Assessment:     Active Problems:    Chronic alcohol abuse    Benign essential hypertension    Fall    Closed head injury    Hypomagnesemia    Burst fracture of T12 vertebra with routine healing    Vitamin B12 deficiency  Resolved Problems:    * No resolved hospital problems. *      Plan:     Remove the INT        Subjective:     Patient has no complaint of CP, SOB, GI upset or voiding troubles. .   Medication side effects: none    Scheduled Meds:   NONFORMULARY 1 Product  1 Product Topical BID    tamsulosin  0.4 mg Oral Nightly    atenolol  100 mg Oral Daily    acetaminophen  500 mg Oral Nightly    And    diphenhydrAMINE  25 mg Oral Nightly    thiamine  100 mg Oral Daily    carBAMazepine  200 mg Oral BID    docusate sodium  100 mg Oral Daily    FLUoxetine  60 mg Oral Daily    folic acid  1 mg Oral Daily    [START ON 8/25/2021] magnesium replacement protocol   Other RX Placeholder    multivitamin  1 tablet Oral Daily    pantoprazole  40 mg Oral QAM AC    QUEtiapine  400 mg Oral BID    senna  1 tablet Oral Nightly    vitamin B-12  1,000 mcg Oral Daily     Continuous Infusions:  PRN Meds:acetaminophen, polyethylene glycol, promethazine **OR** ondansetron, bisacodyl, magnesium sulfate, traZODone, magnesium hydroxide    Review of Systems  Pertinent items are noted in HPI. Objective:     Patient Vitals for the past 8 hrs:   BP Temp Temp src Pulse Resp SpO2   09/03/20 2017 (!) 156/96 96.6 °F (35.9 °C) Oral 78 18 95 %     I/O last 3 completed shifts:   In: 750 [P.O.:750]  Out: 450 [Urine:450]  I/O this shift:  In: 200 [P.O.:200]  Out: -     BP (!) 156/96   Pulse 78   Temp 96.6 °F (35.9 °C) (Oral)   Resp 18   Ht 6' 4\" (1.93 m)   Wt 222 lb 7 oz (100.9 kg)   SpO2 95%   BMI 27.08 kg/m²     BP (!) 156/96   Pulse 78   Temp 96.6 °F (35.9 °C) (Oral)   Resp 18   Ht 6' 4\" (1.93 m)   Wt 222 lb 7 oz (100.9 kg)   SpO2 95%   BMI 27.08 kg/m²   General appearance: alert, appears stated age and cooperative  Head: Normocephalic, without obvious abnormality, atraumatic  Lungs: clear to auscultation bilaterally  Heart: regular rate and rhythm, S1, S2 normal, no murmur, click, rub or gallop  Abdomen: soft, non-tender; bowel sounds normal; no masses,  no organomegaly  Extremities: extremities normal, atraumatic, no cyanosis or edema  Skin: Skin color, texture, turgor normal. No rashes or lesions  Neurologic: ambulates slowly      Electronically signed by Tara Mishra MD on 9/3/2020 at 10:56 PM

## 2020-09-05 PROCEDURE — 97110 THERAPEUTIC EXERCISES: CPT

## 2020-09-05 PROCEDURE — 6370000000 HC RX 637 (ALT 250 FOR IP): Performed by: SURGERY

## 2020-09-05 PROCEDURE — 97129 THER IVNTJ 1ST 15 MIN: CPT

## 2020-09-05 PROCEDURE — 6370000000 HC RX 637 (ALT 250 FOR IP): Performed by: PHYSICAL MEDICINE & REHABILITATION

## 2020-09-05 PROCEDURE — 1180000000 HC REHAB R&B

## 2020-09-05 PROCEDURE — 94760 N-INVAS EAR/PLS OXIMETRY 1: CPT

## 2020-09-05 PROCEDURE — 97530 THERAPEUTIC ACTIVITIES: CPT

## 2020-09-05 PROCEDURE — 97116 GAIT TRAINING THERAPY: CPT

## 2020-09-05 PROCEDURE — 97130 THER IVNTJ EA ADDL 15 MIN: CPT

## 2020-09-05 PROCEDURE — 97535 SELF CARE MNGMENT TRAINING: CPT

## 2020-09-05 PROCEDURE — 6370000000 HC RX 637 (ALT 250 FOR IP): Performed by: FAMILY MEDICINE

## 2020-09-05 RX ORDER — DIPHENHYDRAMINE HCL 25 MG
25 TABLET ORAL EVERY 6 HOURS PRN
Status: DISCONTINUED | OUTPATIENT
Start: 2020-09-05 | End: 2020-09-09 | Stop reason: HOSPADM

## 2020-09-05 RX ORDER — POLYETHYLENE GLYCOL 3350 17 G/17G
17 POWDER, FOR SOLUTION ORAL DAILY
Status: DISCONTINUED | OUTPATIENT
Start: 2020-09-06 | End: 2020-09-09 | Stop reason: HOSPADM

## 2020-09-05 RX ADMIN — FERROUS SULFATE TAB 325 MG (65 MG ELEMENTAL FE) 325 MG: 325 (65 FE) TAB at 17:07

## 2020-09-05 RX ADMIN — CARBAMAZEPINE 200 MG: 200 TABLET ORAL at 19:20

## 2020-09-05 RX ADMIN — ACETAMINOPHEN 650 MG: 325 TABLET ORAL at 14:01

## 2020-09-05 RX ADMIN — FERROUS SULFATE TAB 325 MG (65 MG ELEMENTAL FE) 325 MG: 325 (65 FE) TAB at 09:05

## 2020-09-05 RX ADMIN — TRAZODONE HYDROCHLORIDE 100 MG: 100 TABLET ORAL at 19:20

## 2020-09-05 RX ADMIN — DIPHENHYDRAMINE HCL 25 MG: 25 TABLET ORAL at 19:20

## 2020-09-05 RX ADMIN — CARBAMAZEPINE 200 MG: 200 TABLET ORAL at 09:06

## 2020-09-05 RX ADMIN — PROMETHAZINE HYDROCHLORIDE 12.5 MG: 25 TABLET ORAL at 21:58

## 2020-09-05 RX ADMIN — MAGNESIUM HYDROXIDE 30 ML: 2400 SUSPENSION ORAL at 17:07

## 2020-09-05 RX ADMIN — ACETAMINOPHEN 650 MG: 325 TABLET ORAL at 06:04

## 2020-09-05 RX ADMIN — OXYCODONE HYDROCHLORIDE AND ACETAMINOPHEN 250 MG: 500 TABLET ORAL at 17:07

## 2020-09-05 RX ADMIN — THERA TABS 1 TABLET: TAB at 09:06

## 2020-09-05 RX ADMIN — OXYCODONE HYDROCHLORIDE AND ACETAMINOPHEN 250 MG: 500 TABLET ORAL at 09:05

## 2020-09-05 RX ADMIN — Medication 1000 MCG: at 09:06

## 2020-09-05 RX ADMIN — DIPHENHYDRAMINE HCL 25 MG: 25 TABLET ORAL at 14:02

## 2020-09-05 RX ADMIN — ACETAMINOPHEN 500 MG: 500 TABLET ORAL at 19:22

## 2020-09-05 RX ADMIN — QUETIAPINE FUMARATE 400 MG: 400 TABLET ORAL at 19:20

## 2020-09-05 RX ADMIN — FLUOXETINE HYDROCHLORIDE 60 MG: 20 CAPSULE ORAL at 19:20

## 2020-09-05 RX ADMIN — DOCUSATE SODIUM 100 MG: 100 CAPSULE, LIQUID FILLED ORAL at 09:06

## 2020-09-05 RX ADMIN — FOLIC ACID 1 MG: 1 TABLET ORAL at 09:06

## 2020-09-05 RX ADMIN — PANTOPRAZOLE SODIUM 40 MG: 40 TABLET, DELAYED RELEASE ORAL at 05:59

## 2020-09-05 RX ADMIN — ATENOLOL 50 MG: 50 TABLET ORAL at 09:06

## 2020-09-05 RX ADMIN — SENNOSIDES 8.6 MG: 8.6 TABLET, FILM COATED ORAL at 19:20

## 2020-09-05 RX ADMIN — QUETIAPINE FUMARATE 400 MG: 400 TABLET ORAL at 09:06

## 2020-09-05 RX ADMIN — TAMSULOSIN HYDROCHLORIDE 0.4 MG: 0.4 CAPSULE ORAL at 19:20

## 2020-09-05 RX ADMIN — Medication 100 MG: at 09:06

## 2020-09-05 RX ADMIN — ACETAMINOPHEN 650 MG: 325 TABLET ORAL at 00:46

## 2020-09-05 ASSESSMENT — PAIN - FUNCTIONAL ASSESSMENT
PAIN_FUNCTIONAL_ASSESSMENT: PREVENTS OR INTERFERES SOME ACTIVE ACTIVITIES AND ADLS

## 2020-09-05 ASSESSMENT — PAIN SCALES - GENERAL
PAINLEVEL_OUTOF10: 6
PAINLEVEL_OUTOF10: 6
PAINLEVEL_OUTOF10: 7
PAINLEVEL_OUTOF10: 6
PAINLEVEL_OUTOF10: 5
PAINLEVEL_OUTOF10: 6
PAINLEVEL_OUTOF10: 6

## 2020-09-05 ASSESSMENT — PAIN DESCRIPTION - ONSET
ONSET: ON-GOING

## 2020-09-05 ASSESSMENT — PAIN DESCRIPTION - ORIENTATION
ORIENTATION: RIGHT;LEFT
ORIENTATION: LOWER;MID

## 2020-09-05 ASSESSMENT — PAIN DESCRIPTION - LOCATION
LOCATION: BACK;HEAD

## 2020-09-05 ASSESSMENT — PAIN DESCRIPTION - PROGRESSION
CLINICAL_PROGRESSION: NOT CHANGED

## 2020-09-05 ASSESSMENT — PAIN DESCRIPTION - DESCRIPTORS
DESCRIPTORS: ACHING

## 2020-09-05 ASSESSMENT — PAIN DESCRIPTION - PAIN TYPE
TYPE: ACUTE PAIN

## 2020-09-05 ASSESSMENT — PAIN DESCRIPTION - FREQUENCY
FREQUENCY: CONTINUOUS

## 2020-09-05 NOTE — PROGRESS NOTES
Patient: Randolph   Unit/Bed: 7E-70/070-A  YOB: 1960  MRN: [id number] Acct: [de-identified]   Admitting Diagnosis: Burst fracture of T12 vertebra with routine healing [S22.081D]  Admit Date:  8/27/2020  Hospital Day: 9    Assessment:     Active Problems:    Chronic alcohol abuse    Benign essential hypertension    Fall    Closed head injury    Hypomagnesemia    Burst fracture of T12 vertebra with routine healing    Vitamin B12 deficiency  Resolved Problems:    * No resolved hospital problems. *      Plan:     We discussed that the phenytoin cream may be more effective at four times daily dosing. We reviewed the labs and scans from the hypotensive episode being unremarkable and no recurrences        Subjective:     Patient has no complaint of CP, SOB, GI upset or voiding troubles. There is still some discomfort to the heels. .   Medication side effects: none    Scheduled Meds:   [START ON 9/6/2020] polyethylene glycol  17 g Oral Daily    NONFORMULARY   Topical 4x Daily    atenolol  50 mg Oral Daily    ferrous sulfate  325 mg Oral BID WC    And    vitamin C  250 mg Oral BID WC    tamsulosin  0.4 mg Oral Nightly    acetaminophen  500 mg Oral Nightly    And    diphenhydrAMINE  25 mg Oral Nightly    thiamine  100 mg Oral Daily    carBAMazepine  200 mg Oral BID    docusate sodium  100 mg Oral Daily    FLUoxetine  60 mg Oral Daily    folic acid  1 mg Oral Daily    [START ON 8/25/2021] magnesium replacement protocol   Other RX Placeholder    multivitamin  1 tablet Oral Daily    pantoprazole  40 mg Oral QAM AC    QUEtiapine  400 mg Oral BID    senna  1 tablet Oral Nightly    vitamin B-12  1,000 mcg Oral Daily     Continuous Infusions:  PRN Meds:diphenhydrAMINE, acetaminophen, promethazine **OR** ondansetron, bisacodyl, traZODone, magnesium hydroxide    Review of Systems  Pertinent items are noted in HPI.     Objective:     Patient Vitals for the past 8 hrs:   SpO2   09/05/20 0946 98 % I/O last 3 completed shifts: In: 482 [P.O.:785]  Out: -   No intake/output data recorded.     /80   Pulse 67   Temp 98.2 °F (36.8 °C) (Oral)   Resp 18   Ht 6' 4\" (1.93 m)   Wt 233 lb 2 oz (105.7 kg)   SpO2 98%   BMI 28.38 kg/m²     /80   Pulse 67   Temp 98.2 °F (36.8 °C) (Oral)   Resp 18   Ht 6' 4\" (1.93 m)   Wt 233 lb 2 oz (105.7 kg)   SpO2 98%   BMI 28.38 kg/m²   General appearance: alert, appears stated age and cooperative  Head: Normocephalic, without obvious abnormality, atraumatic  Lungs: clear to auscultation bilaterally  Heart: regular rate and rhythm, S1, S2 normal, no murmur, click, rub or gallop  Abdomen: soft, non-tender; bowel sounds normal; no masses,  no organomegaly  Extremities: extremities normal, atraumatic, no cyanosis or edema  Skin: Skin color, texture, turgor normal. No rashes or lesions  Neurologic: weak      Electronically signed by Genia Tomlinson MD on 9/5/2020 at 5:44 PM

## 2020-09-05 NOTE — PROGRESS NOTES
[address] [address]  INPATIENT PHYSICAL THERAPY  PROGRESS NOTE  [facility] - 7E-70/070-A    Time In: 1030  Time Out: 1130  Timed Code Treatment Minutes: 60 Minutes  Minutes: 60          Date: 2020  Patient Name: Master Chamberlain,  Gender:  male        MRN: [id number]  : 1960  (60 y.o.)     Referring Practitioner: Dr. Caroline Weaver  Diagnosis: Burst fx of T12, TBI  Additional Pertinent Hx: T12 compression fracture s/p fall, chronic alcohol abuse, drug abuse, recurrent falls, neck pain, scalp hematoma     Prior Level of Function:  Lives With: Other (comment)(Mother)  Type of Home: House  Home Layout: One level  Home Access: Stairs to enter without rails  Entrance Stairs - Number of Steps: 1  Home Equipment: Standard walker   Bathroom Shower/Tub: Tub/Shower unit, Shower chair with back  Bathroom Toilet: Standard  Bathroom Equipment: Grab bars in shower(not in great shape per pt. plastic grab bars.)    ADL Assistance: Independent  Ambulation Assistance: Independent  Transfer Assistance: Independent  Active : Yes  Additional Comments: Pt stated he was \"experimenting\" with using the walker prior to admission. Pt reports mother does most of the inside chores, Pt is responsible for outside chores and yardwork. Restrictions/Precautions:  Restrictions/Precautions: General Precautions, Fall Risk  Required Braces or Orthoses  Spinal: Thoracic Lumbar Sacral Orthotics(May don in sitting)  Position Activity Restriction  Spinal Precautions: No Bending, No Lifting, No Twisting  Other position/activity restrictions: TLSO brace ordered for T12 fracture. Will need to wear this when up. May have it off when in bed. SUBJECTIVE: Pt seated in recliner. Pleasant and cooperative.      PAIN: 0/10:     OBJECTIVE:  Bed Mobility:  Rolling to Left: Stand By Assistance   Rolling to Right: Stand By Assistance   Supine to Sit: Stand By Assistance  Sit to Supine: Stand By Assistance   Mod verbal cues needed with all bed mobility for log roll technique. Numerous trials with cue still needed    Transfers:  Sit to Stand: Stand By Assistance  Stand to [facility] 68 with mod cues and demo for pt to coordinate reaching BUEs to armrests while performing the minimal squat needed to access the armrests. Pt tends to squat, leaving junior hands on walker , then quickly reach back to armrests, once nearly seated. Multiple trials with inconsistent carryover. Car:Stand By Assistance up to stand, cGA with cues for hand placement to sit. Ambulation:  Stand By Assistance on level, CGA on ramp  Distance: 55 ft, 20 ft x2, 150 ft, 16 ft ramp  Surface: Level Tile and Ramp  Device:Rolling Walker  Gait Deviations:  Slow Dedra and poor accomodation to ramp descent 1st 8 ft trial.  Improved with 2nd trial    Stairs:  Contact Guard Assistance  Number of Steps: 1  Height: 6\" step with Rolling Walker   Cue to get self and RW closer to step with descent. Functional Outcome Measures: Completed  Tinetti:  Balance Score: 10  Gait Score: 9  Tinetti Total Score: 19  Risk Indicators:  Less than/equal to 18 = high risk  19-23 Moderate risk  Greater than/equal to 24 = low risk     Timed up and Go Test (TUG)  28 seconds   Normative Reference Values  60-69 years:  8.1 seconds  70-79 years: 9.2 seconds  80-99 years: 11.3 seconds    < 10 seconds is normal, a score of > 14 seconds indicates high fall risk     EXERCISES:  The following exercises were completed to improve functional mobility: supine- glute sets, abdominal isometrics, junior hip ADD isometrics, junior scapular retraction x10 reps. ASSESSMENT:  Assessment: Patient progressing toward established goals, meeting 2 short term goals fully. Pt showing a 3 pt gain on Tinetti, though still at mod risk for falls. Pt has decreased TUG to 28 sec from a minute time showing overall improved balance.   Pt requires SBA with level gait with RW, mod cues for safe technique with transfers and occasional CGA. Pt will benefit from continued skilled PT to further improve balance for improved safety. Activity Tolerance:  Patient tolerance of  treatment: good. Equipment Recommendations:Equipment Needed: (RW ordered from E)  Discharge Recommendations:  Continue to assess pending progress, Patient would benefit from continued therapy after discharge    Plan: Times per week: 5x/wk 90 min, 1x/wk 30 min  Specific instructions for Next Treatment: US Rt adductors if pain noted. Current Treatment Recommendations: Strengthening, Functional Mobility Training, Balance Training, Equipment Evaluation, Education, & procurement, Gait Training, Stair training, Endurance Training, Transfer Training    Patient Education  Patient Education: [employer], Transfers, Gait, Stairs, Car Transfers,  - Patient Verbalized Understanding, - Patient Requires Continued Education    Goals:  Patient goals : get my dizziness better  Short term goals  Time Frame for Short term goals: 1 wk  Short term goal 1: bed mobility with Mod I, to get in/out of bed, use log roll technique  NOT MET, continue  Short term goal 2: transfer with SBA to get in/out of chairs  NOT MET, continue  Short term goal 3: amb >= 50''x1 with RW and SBA to progress to home and community mobility  MET, see LTG  Short term goal 4: Pt to ascend/descend 6\" step with RW, CGA, for home entry. MET, see LTG  Short term goal 5: Pt to score >= 20/28 on Tinetti; perform TUG <= 45 sec, indicating improved balance. MET on TUG, not Tinetti,  See revised. Long term goals  Time Frame for Long term goals : 3 wks  Long term goal 1: transfer with Mod I, to get in/out of chairs  Long term goal 2: amb >= 150''x1 with RW and Mod I, for home and community mobility  Long term goal 3: Pt to ascend/descend 6\" step with RW, Mod I, for home entry. Long term goal 4: Pt to score >= 24/28 on Tinetti; perform TUG ,<= 20 sec, indicating improved balance.   Long term goal 5: Pt to ascend/descend 4+ steps junior rails, Mod I, for community mobility    Following session, patient left in safe position with all fall risk precautions in place.

## 2020-09-05 NOTE — PROGRESS NOTES
care of licensed speech pathologist to progress toward achievement of established goals and plan of care.      Plan for Next Session: Computer navigation task, functional problem solving and safety awareness     Marquis Servin M.S., [address] [place] [address]

## 2020-09-05 NOTE — PLAN OF CARE
Problem: Pain:  Goal: Pain level will decrease  Description: Pain level will decrease  Outcome: Ongoing     Problem: Anxiety:  Goal: Level of anxiety will decrease  Description: Level of anxiety will decrease  Outcome: Ongoing  Note: Patient calm and cooperative throughout shift. Problem: Skin Integrity:  Goal: Absence of new skin breakdown  Description: Absence of new skin breakdown  Outcome: Ongoing  Note: Patient to remain free from new skin breakdown. Patient encouraged to turn and reposition every two hours. Problem: Falls - Risk of:  Goal: Will remain free from falls  Description: Will remain free from falls  Outcome: Ongoing  Note: Patient to remain free from falls. Bed and chair alarms in use at all times. Patient ambulates with front wheel walker and gait belt. Patient alert, oriented, and able to use call light appropriately in advance of needs.

## 2020-09-05 NOTE — PROGRESS NOTES
[facility]  [address]  Occupational Therapy  Daily Note  Time:   Time In: 0700  Time Out: 0830  Timed Code Treatment Minutes: 90 Minutes  Minutes: 90          Date: 2020  Patient Name: Yazmin Naidu,   Gender: male      Room: [facility]/070-A  MRN: [id number]  : 1960  (60 y.o.)  Referring Practitioner: Dr. Billie Calix  Diagnosis: Burst fracture T 12 with routine healing, TBI  Additional Pertinent Hx: Per ED notes: Mr. Marilynn Cruz is a 61 Y/O male [facility] by [facility] consult, brought by EMS following a fall today  with LOC; PMH includes drug and alcohol abuse, cancer, HTN, HLD, and GERD. Per patient report he has been falling with increasing frequency over past 2 months, believes a fall while exiting a parked car two days ago is what hurt his back, but he also fell today. Patient states he typically does not remember falls, just finds himself on the ground. Daughter present in exam room states, falls often occur after standing up, he will begin to shake and then fall to floor, appear to lose consciousness briefly. Patient reports history of alcohol abuse, drank a pint of liquor today between 0863-7851, states this is not unusual for him. Restrictions/Precautions:  Restrictions/Precautions: General Precautions, Fall Risk  Required Braces or Orthoses  Spinal: Thoracic Lumbar Sacral Orthotics(May don in sitting)  Position Activity Restriction  Spinal Precautions: No Bending, No Lifting, No Twisting  Other position/activity restrictions: TLSO brace ordered for T12 fracture. Will need to wear this when up. May have it off when in bed. SUBJECTIVE: Pt lying supine upon arrival, agreeable to OT session. PAIN: 5/10: Low Back    COGNITION: Decreased Recall, Decreased Insight and Impaired Attention    ADL:   Grooming: Supervision. Washing face seated on TTB. Bathing: Supervision.   SBS washing marguerite area/bottom standing with 1 UE release from grab bar. Supervision to complete UB care and crossing legs to wash BLE above below knees while seated on TTB. Upper Extremity Dressing: Supervision. Supervsion doff/donning tshirt and TLSO brace with occasional cues managing straps  Lower Extremity Dressing: Supervision. Supervision to doff/don socks crossing legs and to thread BLE into undergarment/pants- pt performed small graded lift to manage over hips from EOB. (RONNIE Hose not in room at time of session.)  Toileting: Supervision. For urinal use in shower room- pt initally declined toileting prior to leaving room, however, immediately requested urinal after arriving in shower room. Shower Transfer: Stand By Assistance. Walk-in to/from TTB. BALANCE:  Sitting Balance:  Supervision. Standing Balance: Stand By Assistance. x3 minutes within IADL task x2 trials. BED MOBILITY:  Supine to Sit: Supervision    Scooting: Supervision      TRANSFERS:  Sit to Stand:  Stand By Assistance. Stand to Sit: Stand By Assistance. FUNCTIONAL MOBILITY:  Assistive Device: Rolling Walker  Assist Level:  Stand By Assistance. Distance: Completed functional mobility to/from shower room and therapy apartment at good pace, no LOB noted. Pt requires seated rest break after trial of mobility, min fatigue noted. ADDITIONAL ACTIVITIES:  Pt does not report dizziness and denies when questioned during session Blood Pressure reading 131/84 with HR 70 bpm upon standing prior to ambulating to shower room. Pt participated in IADL task to using reacher to retrieve personal clothing from low drawer and  Ambulate with RW to apartment to load clothing into washer . Pt required SBA for balance and no cues for safe technique reaching outside of IRON with LHAE. Completed to increase kitchen safety and facilitate functional reaching required for simple meal prep tasks.     Completed BUE exercises x15 reps x1 sets using mod resistance band in all joints/planes to increase strength and

## 2020-09-06 PROCEDURE — 6370000000 HC RX 637 (ALT 250 FOR IP): Performed by: PHYSICAL MEDICINE & REHABILITATION

## 2020-09-06 PROCEDURE — 97116 GAIT TRAINING THERAPY: CPT

## 2020-09-06 PROCEDURE — 6370000000 HC RX 637 (ALT 250 FOR IP): Performed by: FAMILY MEDICINE

## 2020-09-06 PROCEDURE — 6370000000 HC RX 637 (ALT 250 FOR IP): Performed by: SURGERY

## 2020-09-06 PROCEDURE — 97530 THERAPEUTIC ACTIVITIES: CPT

## 2020-09-06 PROCEDURE — 94760 N-INVAS EAR/PLS OXIMETRY 1: CPT

## 2020-09-06 PROCEDURE — 97110 THERAPEUTIC EXERCISES: CPT

## 2020-09-06 PROCEDURE — 1180000000 HC REHAB R&B

## 2020-09-06 RX ADMIN — OXYCODONE HYDROCHLORIDE AND ACETAMINOPHEN 250 MG: 500 TABLET ORAL at 08:58

## 2020-09-06 RX ADMIN — Medication 1000 MCG: at 08:57

## 2020-09-06 RX ADMIN — QUETIAPINE FUMARATE 400 MG: 400 TABLET ORAL at 08:57

## 2020-09-06 RX ADMIN — SENNOSIDES 8.6 MG: 8.6 TABLET, FILM COATED ORAL at 22:13

## 2020-09-06 RX ADMIN — ACETAMINOPHEN 500 MG: 500 TABLET ORAL at 22:13

## 2020-09-06 RX ADMIN — DOCUSATE SODIUM 100 MG: 100 CAPSULE, LIQUID FILLED ORAL at 08:57

## 2020-09-06 RX ADMIN — PANTOPRAZOLE SODIUM 40 MG: 40 TABLET, DELAYED RELEASE ORAL at 05:25

## 2020-09-06 RX ADMIN — Medication 100 MG: at 08:57

## 2020-09-06 RX ADMIN — THERA TABS 1 TABLET: TAB at 08:58

## 2020-09-06 RX ADMIN — QUETIAPINE FUMARATE 400 MG: 400 TABLET ORAL at 22:13

## 2020-09-06 RX ADMIN — TAMSULOSIN HYDROCHLORIDE 0.4 MG: 0.4 CAPSULE ORAL at 22:12

## 2020-09-06 RX ADMIN — DIPHENHYDRAMINE HCL 25 MG: 25 TABLET ORAL at 22:15

## 2020-09-06 RX ADMIN — TRAZODONE HYDROCHLORIDE 100 MG: 100 TABLET ORAL at 22:12

## 2020-09-06 RX ADMIN — ACETAMINOPHEN 650 MG: 325 TABLET ORAL at 15:37

## 2020-09-06 RX ADMIN — ACETAMINOPHEN 650 MG: 325 TABLET ORAL at 05:25

## 2020-09-06 RX ADMIN — DIPHENHYDRAMINE HCL 25 MG: 25 TABLET ORAL at 15:37

## 2020-09-06 RX ADMIN — FERROUS SULFATE TAB 325 MG (65 MG ELEMENTAL FE) 325 MG: 325 (65 FE) TAB at 08:58

## 2020-09-06 RX ADMIN — FLUOXETINE HYDROCHLORIDE 60 MG: 20 CAPSULE ORAL at 22:12

## 2020-09-06 RX ADMIN — ATENOLOL 50 MG: 50 TABLET ORAL at 08:57

## 2020-09-06 RX ADMIN — CARBAMAZEPINE 200 MG: 200 TABLET ORAL at 08:57

## 2020-09-06 RX ADMIN — CARBAMAZEPINE 200 MG: 200 TABLET ORAL at 22:14

## 2020-09-06 RX ADMIN — FERROUS SULFATE TAB 325 MG (65 MG ELEMENTAL FE) 325 MG: 325 (65 FE) TAB at 17:40

## 2020-09-06 RX ADMIN — OXYCODONE HYDROCHLORIDE AND ACETAMINOPHEN 250 MG: 500 TABLET ORAL at 17:39

## 2020-09-06 RX ADMIN — FOLIC ACID 1 MG: 1 TABLET ORAL at 08:58

## 2020-09-06 RX ADMIN — POLYETHYLENE GLYCOL 3350 17 G: 17 POWDER, FOR SOLUTION ORAL at 08:57

## 2020-09-06 RX ADMIN — DIPHENHYDRAMINE HCL 25 MG: 25 TABLET ORAL at 05:25

## 2020-09-06 RX ADMIN — PROMETHAZINE HYDROCHLORIDE 12.5 MG: 25 TABLET ORAL at 22:15

## 2020-09-06 ASSESSMENT — PAIN DESCRIPTION - ONSET: ONSET: ON-GOING

## 2020-09-06 ASSESSMENT — ENCOUNTER SYMPTOMS
EYES NEGATIVE: 1
SHORTNESS OF BREATH: 0
ALLERGIC/IMMUNOLOGIC NEGATIVE: 1
VOICE CHANGE: 0
CONSTIPATION: 0
NAUSEA: 0
DIARRHEA: 0
TROUBLE SWALLOWING: 0
COUGH: 0

## 2020-09-06 ASSESSMENT — PAIN SCALES - GENERAL
PAINLEVEL_OUTOF10: 7
PAINLEVEL_OUTOF10: 4
PAINLEVEL_OUTOF10: 5

## 2020-09-06 ASSESSMENT — PAIN DESCRIPTION - PAIN TYPE: TYPE: CHRONIC PAIN

## 2020-09-06 NOTE — PROGRESS NOTES
[facility]  INPATIENT PHYSICAL THERAPY  DAILY NOTE  [facility] - 7E-70/070-A    Time In: 7439  Time Out: 1045  Timed Code Treatment Minutes: 30 Minutes  Minutes: 30          Date: 2020  Patient Name: Yazmin Naidu,  Gender:  male        MRN: [id number]  : 1960  (60 y.o.)     Referring Practitioner: Dr. Wing Lomeli  Diagnosis: Burst fx of T12, TBI  Additional Pertinent Hx: T12 compression fracture s/p fall, chronic alcohol abuse, drug abuse, recurrent falls, neck pain, scalp hematoma     Prior Level of Function:  Lives With: Other (comment)(Mother)  Type of Home: House  Home Layout: One level  Home Access: Stairs to enter without rails  Entrance Stairs - Number of Steps: 1  Home Equipment: Standard walker   Bathroom Shower/Tub: Tub/Shower unit, Shower chair with back  Bathroom Toilet: Standard  Bathroom Equipment: Grab bars in shower(not in great shape per pt. plastic grab bars.)    ADL Assistance: Independent  Ambulation Assistance: Independent  Transfer Assistance: Independent  Active : Yes  Additional Comments: Pt stated he was \"experimenting\" with using the walker prior to admission. Pt reports mother does most of the inside chores, Pt is responsible for outside chores and yardwork. Restrictions/Precautions:  Restrictions/Precautions: General Precautions, Fall Risk  Required Braces or Orthoses  Spinal: Thoracic Lumbar Sacral Orthotics(May don in sitting)  Position Activity Restriction  Spinal Precautions: No Bending, No Lifting, No Twisting  Other position/activity restrictions: TLSO brace ordered for T12 fracture. Will need to wear this when up. May have it off when in bed. SUBJECTIVE: Pt seated in recliner. Therapist assisted with donning his TLSO    PAIN: 0/10:     OBJECTIVE:    Transfers:  Sit to Stand: Modified Independent  Stand to Sit:Stand By Assistance  Cues to reach hands back to armrests in conjunction with hip/knee flexion.   Pt tends to perform a shallow squat with hands in the air or still on RW prior to attempting to reach back. Ambulation:  Supervision on level surface. SBA with cues and demo for gravel walk. Distance: 185 ft, 190 ft, 40 ft on gravel walk  Surface: Level Tile  Device:Rolling Walker  Gait Deviations:  Slow Dedra and Decreased Gait Speed    Stairs:  Stand By Assistance  Number of Steps: 1  Height: 6\" step with Rolling Walker   Stairs:  Contact Guard Assistance  Number of Steps: 4  Height: 6\" step with Bilateral Handrails   Good recognition of step location noted by pt feeling the next step with heel of lead foot with descent. EXERCISES:  The following exercises were completed to improve functional mobility: forward step ups x10 reps ea LE leading with Daren hands on rails. ASSESSMENT:  Assessment: Patient progressing toward established goals. Activity Tolerance:  Patient tolerance of  treatment: good. Equipment Recommendations:Equipment Needed: (RW ordered from E)  Discharge Recommendations:  Continue to assess pending progress, Patient would benefit from continued therapy after discharge    Plan: Times per week: 5x/wk 90 min, 1x/wk 30 min  Specific instructions for Next Treatment: US Rt adductors if pain noted.   Current Treatment Recommendations: Strengthening, Functional Mobility Training, Balance Training, Equipment Evaluation, Education, & procurement, Gait Training, Stair training, Endurance Training, Transfer Training    Patient Education  Patient Education: Gait, Stairs,  - Patient Verbalized Understanding, - Patient Requires Continued Education    Goals:  Patient goals : get my dizziness better  Short term goals  Time Frame for Short term goals: 1 wk  Short term goal 1: bed mobility with Mod I, to get in/out of bed, use log roll technique  Short term goal 2: transfer with SBA to get in/out of chairs  Short term goal 3: amb >= 50''x1 with RW and SBA to progress to home and community mobility MET, see LTG  Short term goal 4: Pt to ascend/descend 6\" step with RW, CGA, for home entry. MET, see LTG  Short term goal 5: Pt to score >= 20/28 on Tinetti; perform TUG <= 25 sec, indicating improved balance. Long term goals  Time Frame for Long term goals : 3 wks  Long term goal 1: transfer with Mod I, to get in/out of chairs  Long term goal 2: amb >= 150''x1 with RW and Mod I, for home and community mobility  Long term goal 3: Pt to ascend/descend 6\" step with RW, Mod I, for home entry. Long term goal 4: Pt to score >= 24/28 on Tinetti; perform TUG ,<= 20 sec, indicating improved balance. Long term goal 5: Pt to ascend/descend 4+ steps junior rails, Mod I, for community mobility    Following session, patient left in safe position with all fall risk precautions in place.

## 2020-09-06 NOTE — PROGRESS NOTES
[facility]  [address]  Occupational Therapy  Daily Note  Time:   Time In: 1130  Time Out: 1200  Timed Code Treatment Minutes: 30 Minutes  Minutes: 30          Date: 2020  Patient Name: Princess Colon,   Gender: male      Room: [facility]70/070-A  MRN: [id number]  : 1960  (60 y.o.)  Referring Practitioner: Dr. Manuel Al  Diagnosis: Burst fracture T 12 with routine healing, TBI  Additional Pertinent Hx: Per ED notes: Mr. Wilfredo Varela is a 61 Y/O male presenting at [facility] by trauma consult, brought by EMS following a fall today  with LOC; PMH includes drug and alcohol abuse, cancer, HTN, HLD, and GERD. Per patient report he has been falling with increasing frequency over past 2 months, believes a fall while exiting a parked car two days ago is what hurt his back, but he also fell today. Patient states he typically does not remember falls, just finds himself on the ground. Daughter present in exam room states, falls often occur after standing up, he will begin to shake and then fall to floor, appear to lose consciousness briefly. Patient reports history of alcohol abuse, drank a pint of liquor today between 1188-8211, states this is not unusual for him. Restrictions/Precautions:  Restrictions/Precautions: General Precautions, Fall Risk  Required Braces or Orthoses  Spinal: Thoracic Lumbar Sacral Orthotics(May don in sitting)  Position Activity Restriction  Spinal Precautions: No Bending, No Lifting, No Twisting  Other position/activity restrictions: TLSO brace ordered for T12 fracture. Will need to wear this when up. May have it off when in bed. SUBJECTIVE: Up in chair upon arrival, agreeable to OT session    PAIN: 0/10:     COGNITION: Decreased Insight, Impaired Memory and Decreased Problem Solving Talking often regarding drinking and being sober for 2 weeks    ADL:   No ADL's completed this session. Mehrdad Guevara BALANCE:  Standing Balance: Stand By Assistance. approx 2 minutes with 1 UE support    BED MOBILITY:  Not Tested    TRANSFERS:  Sit to Stand:  Contact Guard Assistance. park bench and bedside chair  Stand to Sit: [employer]. FUNCTIONAL MOBILITY:  Assistive Device: Rolling Walker  Assist Level:  Contact Guard Assistance. Distance: to/from outside  With no rest break, cues for rugs and cracks in sidewalk, on/off elevator, task completed for community reentry     ADDITIONAL ACTIVITIES:  Patient identified a personal goal to increase UB strength and improve overall endurance so they can complete their toilet & shower transfers; skilled edu on UE strengthening and patient completed BUE strengthening exercises x10 reps x1 set this date with a resistive band in all joints and all planes. Patient tolerated well. ASSESSMENT:     Activity Tolerance:  Patient tolerance of  treatment: good. Discharge Recommendations: Home with Home health OT, Home with nursing aide, 24 hour supervision or assist   Equipment Recommendations: Equipment Needed: No  Other: Pt has a shower chair. Does not need a BSC. Recommend a reacher. Discussed purchasing options.   Plan: Times per week: 5xs week x 90 min, 1 x week x 30 min  Current Treatment Recommendations: Patient/Caregiver Education & Training, Balance Training, Functional Mobility Training, Endurance Training, Safety Education & Training, Self-Care / ADL, Home Management Training, Equipment Evaluation, Education, & procurement    Patient Education  Patient Education: Home Exercise Program    Goals  Short term goals  Time Frame for Short term goals: 1 week  Short term goal 1: Pt will complete BADL routine with setup and no > min cues for problem solving, attetntion to task , and back safety to increase independence in self care tasks  Short term goal 2: Pt to demo toileting tasks and transfers with S and no cues for safety  Short term goal 3: Pt to demo good dyamamic standing balance > 5 min with S and 0-1 UE support while reaching outside base of support  in prep for retrieveing clothing items. Short term goal 4: PT will complete 2 step homemaking tasks withCGA  no > min cues for back precautions/ proper body mechanics to increase ability to retrieve a snack at home. Long term goals  Time Frame for Long term goals : 2 weeks  Long term goal 1: PT will complete ADL routine including donning a TLSO with S andno > min cues for attention to tasks or cues for back  to increase independence in self care  Long term goal 2: PT will compelte 2 step homemaking tasks with S and 0-1 cues for proper body mechanics to increase ability to complete laundry tasks. Following session, patient left in safe position with all fall risk precautions in place.

## 2020-09-06 NOTE — PROGRESS NOTES
Physical Medicine & Rehabilitation  Progress Note    Chief Complaint:  frequent falls with mild traumatic brain injury without loss of consciousness and T12 burst fracture    Subjective: An x-ray of his spine was ordered today as recommended by the Orthopedic Surgery service to evaluate for stability over the interim; no concerning findings were noted. The patient states that the  visited him last night and he found that to be a meaningful interaction. He also states he has some feedback that he would like to give on his stay and he will ask whom he could provide that to. He states his current back pain is 4.5/10. He is still interested in a referral for evaluation of his neuropathy in his feet. His labs were discussed as well as the plan for a care conference tomorrow. Otherwise the patient had no other concerns or questions at this time. Rehabilitation:   Physical Therapy:  OBJECTIVE:     Transfers:  Sit to Stand: Modified Independent  Stand to Sit:Stand By Assistance  Cues to reach hands back to armrests in conjunction with hip/knee flexion. Pt tends to perform a shallow squat with hands in the air or still on RW prior to attempting to reach back. Ambulation:  Supervision on level surface. SBA with cues and demo for gravel walk. Distance: 185 ft, 190 ft, 40 ft on gravel walk  Surface: Level Tile  Device:Rolling Walker  Gait Deviations:  Slow Dedra and Decreased Gait Speed     Stairs:  Stand By Assistance  Number of Steps: 1  Height: 6\" step with Rolling Walker   Stairs:  Contact Guard Assistance  Number of Steps: 4  Height: 6\" step with Bilateral Handrails   Good recognition of step location noted by pt feeling the next step with heel of lead foot with descent. EXERCISES:  The following exercises were completed to improve functional mobility: forward step ups x10 reps ea LE leading with Daren hands on rails.       ASSESSMENT:  Assessment: Patient progressing toward established goals. Activity Tolerance:  Patient tolerance of  treatment: good. Equipment Recommendations:Equipment Needed: (RW ordered from [facility])  Discharge Recommendations:  Continue to assess pending progress, Patient would benefit from continued therapy after discharge     Occupational Therapy:  COGNITION: Decreased Insight, Impaired Memory and Decreased Problem Solving Talking often regarding drinking and being sober for 2 weeks     ADL:   No ADL's completed this session. .     BALANCE:  Standing Balance: Stand By Assistance. approx 2 minutes with 1 UE support     BED MOBILITY:  Not Tested     TRANSFERS:  Sit to Stand:  Contact Guard Assistance. park bench and bedside chair  Stand to Sit: Contact Guard Assistance.       FUNCTIONAL MOBILITY:  Assistive Device: Rolling Walker  Assist Level:  Contact Guard Assistance. Distance: to/from outside  With no rest break, cues for rugs and cracks in sidewalk, on/off elevator, task completed for community reentry      ADDITIONAL ACTIVITIES:  Patient identified a personal goal to increase UB strength and improve overall endurance so they can complete their toilet & shower transfers; skilled edu on UE strengthening and patient completed BUE strengthening exercises x10 reps x1 set this date with a resistive band in all joints and all planes. Patient tolerated well. ASSESSMENT:     Activity Tolerance:  Patient tolerance of  treatment: good. Discharge Recommendations: Home with Home health OT, Home with nursing aide, 24 hour supervision or assist   Equipment Recommendations: Equipment Needed: No  Other: Pt has a shower chair. Does not need a BSC. Recommend a reacher. Discussed purchasing options. Speech Therapy:  Short-Term Goals:  SHORT TERM GOAL #1:  Goal 1: Pt will complete immediate/delayed recall and working memory tasks with 90% accuracy given mod cues to improve retention of new and functional information.   INTERVENTIONS: Recall breakfast items: 2/3 independent with use of memory log, 1/3 min verbal prompts for reference to memory log for recall  Delayed recall ST name and dept:  -15 minute delay: 1/2 min cues, 1/2 FO2  -25 minute delay: 2/2 independent      SHORT TERM GOAL #2:  Goal 2: Pt will complete sustained, selective, and divided attention tasks with no more than 3 errors within a given task in order to improve participation in treatment and permit eventual return to driving. INTERVENTIONS: Goal was not addressed d/t focus on other goals. *Pt did require multiple redirections to task throughout session; pt with frequent inappropriate comments/distractions, though did redirect to task with verbal cues      SHORT TERM GOAL #3:  Goal 3: Pt will complete functional reasoning and thought organizational tasks with 90% accuracy given mod cues to improve overall executive functioning skills. INTERVENTIONS: Verbal reasoning + thought organization-Stating STEFANY + x1 holiday within each month: 10/12 holidays independent, 2/12 min cues for appropriate sequencing secondary to poor attention to task. 9/12 holidays IDd independently, 3/12 with min cues for improved reasoning.      [place] calculation via verbally presented coin groups:  -2 coin value groups: 3/3 independent  -3 coin value groups: 5/10 independent, 3/10 min cues, 1/10 mod cues, 1/10 MAX cues   *Pt stating, \"That shouldn't be hard but it is. \" ST educated pt re: cognitive deficits and impact on functional task completion re: safety and success; pt highly receptive to ST education.      SHORT TERM GOAL #4:  Goal 4: Pt will complete functional problem solving and sequencing tasks with 90% accuracy given mod cues to improve ability to make successful return to IADL's (medication management, finances, etc.).   INTERVENTIONS:See STG 3     Long-Term Goals:  Timeframe for Long-term Goals: 3 weeks     LONG TERM GOAL #1:  Goal 1: Patient will improve cognitive functioning to a min assist level to increase level of independence with ADL and iADL tasks in the home environment and reduce caregiver burden. Comprehension: 5 - Patient understands basic needs (hungry/hot/pain)  Expression: 5 - Expresses basic ideas/needs only (hungry/hot/pain)  Social Interaction: 5 - Patient is appropriate with supervision/cues  Problem Solvin - Patient solves simple/routine tasks 75-90%+   Memory: 3 - Patient remembers 50%-74% of the time    Review of Systems:  Review of Systems   Constitutional: Positive for activity change. Negative for appetite change, fatigue and fever. HENT: Negative for trouble swallowing and voice change. Eyes: Negative. Respiratory: Negative for cough and shortness of breath. Cardiovascular: Negative for leg swelling. Gastrointestinal: Negative for constipation, diarrhea and nausea. Endocrine: Negative. Genitourinary: Negative for hematuria and urgency. Musculoskeletal: Positive for gait problem. Negative for joint swelling. Skin: Negative for pallor. Allergic/Immunologic: Negative. Neurological: Positive for weakness and numbness. Negative for dizziness and headaches. Hematological: Negative. Psychiatric/Behavioral: Positive for confusion, decreased concentration and hallucinations. Negative for dysphoric mood. The patient is not nervous/anxious. All other systems reviewed and are negative. Objective:  BP (!) 146/91   Pulse 69   Temp 97.9 °F (36.6 °C) (Oral)   Resp 16   Ht 6' 4\" (1.93 m)   Wt 235 lb 14.4 oz (107 kg)   SpO2 95%   BMI 28.71 kg/m²   CURRENT VITALS:  height is 6' 4\" (1.93 m) and weight is 235 lb 14.4 oz (107 kg). His oral temperature is 97.9 °F (36.6 °C). His blood pressure is 146/91 (abnormal) and his pulse is 69. His respiration is 16 and oxygen saturation is 95%. Body mass index is 28.71 kg/m².   Temperature Range (24h):Temp: 97.9 °F (36.6 °C) Temp  Av °F (36.7 °C)  Min: 97.9 °F (36.6 °C)  Max: 98.1 °F (36.7 °C)  BP Range (06I): Systolic (02TCN), VSN:243 , Min:137 , FBN:560     Diastolic (50PAE), GSK:73, Min:84, Max:91    Pulse Range (24h): Pulse  Av.5  Min: 60  Max: 69  Respiration Range (24h): Resp  Av  Min: 16  Max: 16  Current Pulse Ox (24h):  SpO2: 95 %  Pulse Ox Range (24h):  SpO2  Av.3 %  Min: 94 %  Max: 100 %  Oxygen Amount and Delivery:      awake  Orientation:   person, place, time, situation  Mood: within normal limits  Affect: calm  General appearance:  in no acute distress, up in chair wearing TLSO brace    Memory:   Grossly normal  Attention/Concentration: Grossly normal   language:  normal    ROM: Abnormal due to use of TLSO brace  Motor Exam:  Motor exam is symmetrical 5 out of 5 all extremities bilaterally    Tone:  normal  Muscle bulk: within normal limits; with noted mild atrophy of bilateral calves  Sensory:  Sensory intact  Coordination:   normal  Deep Tendon Reflexes:  Reflexes are intact and symmetrical bilaterally    Skin: warm and dry, no rash or erythema and abrasions over both knees with scabs and fading hematoma with abrasion over left parietal scalp  Peripheral vascular: Pulses: Normal upper and lower extremity pulses; Edema: no    Diagnostics:   Recent Results (from the past 24 hour(s))   Vitamin B12 & Folate    Collection Time: 20  6:03 AM   Result Value Ref Range    Vitamin B-12 627 211 - 911 pg/mL    Folate 7.4 4.8 - 24.2 ng/mL   Iron    Collection Time: 20  6:03 AM   Result Value Ref Range    Iron 71 65 - 195 ug/dL   CBC    Collection Time: 20  6:03 AM   Result Value Ref Range    WBC 5.7 4.8 - 10.8 thou/mm3    RBC 2.84 (L) 4.70 - 6.10 mill/mm3    Hemoglobin 10.1 (L) 14.0 - 18.0 gm/dl    Hematocrit 31.7 (L) 42.0 - 52.0 %    .6 (H) 80.0 - 94.0 fL    MCH 35.6 (H) 26.0 - 33.0 pg    MCHC 31.9 (L) 32.2 - 35.5 gm/dl    RDW-CV 13.6 11.5 - 14.5 %    RDW-SD 56.5 (H) 35.0 - 45.0 fL    Platelets 372 460 - 493 thou/mm3    MPV 9.9 9.4 - 12.4 fL   Basic Metabolic Panel    Collection Time: 20  6:03 AM Views)    Result Date: 9/7/2020  PROCEDURE: XR LUMBAR SPINE (2-3 VIEWS) CLINICAL INFORMATION: Interval image for prior T12 burst fracture., COMPARISON: Correlation is made with MRI dated 8/25/2020. TECHNIQUE: AP and lateral views of the lumbar spine. FINDINGS: A fracture of the T12 vertebral body is partially visualized. The lumbar vertebral body heights and alignment are maintained. Facet arthropathy seen lower lumbar spine. Sacroiliac joints are within normal limits. Fracture of the T12 vertebral body is partially visualized on the lateral film. **This report has been created using voice recognition software. It may contain minor errors which are inherent in voice recognition technology. ** Final report electronically signed by Dr Ish Huynh on 9/7/2020 11:45 AM    Impression:  1. Frequent falls. 2. Gait instability with a Tinetti score of 8/28. 3. Left scalp hematoma. 4. 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 5. Moderate cognitive impairment. (MOCA) version 8.2 completed. Patient scored 14/30. *Previous score on the [facility] was 15/25. 6. Acute alcohol intoxication with admission blood alcohol level of 0.08.  7. General cerebral atrophy and small vessel ischemic changes with prominent ventricles consistent with central atrophy. 8. Cervical and thoracic spine pain. 9. T12 burst fracture without instability initially noted on a lumbar spine x-ray on 7/24/2020. 10. Hyponatremia. 11. Hypomagnesemia  12. Acute kidney injury. 13. Alcohol abuse. 14. Vitamin B12 deficiency with chronic macrocytic anemia. .  15. Recent acute, comminuted, minimally displaced, T-shaped fracture of the head of the right proximal phalanx of the great toe on 8/14/2020. 16. Hypertension. 17. Hyperlipidemia. 18. Remote history of cocaine drug abuse.   19. Cervical spondylosis with C3-C4 moderate to severe right and severe left neuroforaminal stenosis, moderate to severe left and severe right neuroforaminal stenosis at C4-C5, moderate right and severe left neuroforaminal stenosis at C5-C6, and moderate right and mild left neuroforaminal stenosis at C6-C7.  20. Lumbar spondylosis with mild degenerative changes most pronounced at L3-L4 with moderate spinal canal stenosis and mild to moderate bilateral neuroforaminal stenosis. 21. Chronic macrocytic, hyperchromic anemia. 22. CKD 2.  23. History of depression. 24. Bipolar 1 disorder. 25. GERD. 26. Current everyday smoker with a 40-pack-year history. 27. Orthostatic hypotension. 28. Right medial hip adductor pain. 29. Chronic neuropathy of both feet. Plan:     Medical management: Per primary team and Dr. Jair Kang. Consultants: Trauma Surgery, Critical Care, Orthopedic Surgery, Family Medicine, Physical Medicine    Narcotic usage:  N/A  Last BM:  Stool Amount: Large (09/02/20 7641)    FUNCTIONAL OUTCOMES TOOLS:    DENNY -      Tinetti - Balance Score: 10  Gait Score: 9  Tinetti Total Score: 19    TUG -      Acute/Rehabilitation Problems:  1. Frequent falls/Gait instability with a Tinetti score of 8/28 . 1. PT/OT. 2. Tinetti 14/28. Score improved to 16/28 on 9/1. Improved to 19/28. Risk Indicators: Less than/equal to 18 = high risk; 19-23 Moderate risk; Greater than/equal to 24 = low risk. 3. TUG 63 seconds. 60-69 years:  8.1 seconds; 70-79 years: 9.2 seconds; 80-99 years: 11.3 seconds. 2. Left scalp hematoma. 3. 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 1. TBI protocol. 2. Tele-sitter. 4. Moderate cognitive impairment. (MOCA) version 8.2 completed. Patient scored 14/30. *Previous score on the [address] [place] [address] was 15/25. 1. SLP. 5. Acute alcohol intoxication with admission blood alcohol level of 0.08.  6. General cerebral atrophy and small vessel ischemic changes with prominent ventricles consistent with central atrophy.   7. Cervical and thoracic spine pain/T12 burst fracture without instability initially noted on a lumbar spine x-ray on 7/24/2020.  1. TLSO brace when out of bed. 2. Will likely need brace for 2 to 3 months. 3. No lifting over 20 pounds. 4. 2 to 3-week follow-up after discharge with serial x-rays. X-ray was ordered on 9/7 with no concerning changes noted in regards to the T12 burst fracture. 8. Acute kidney injury. 1. Cr/BUN/GFR improved to 1.2/13/62 from1.3/12/1957 on 8/25. Stable at 1.2/15/62 on 9/4. Decreased to 1.4/17/52 on 9/7.  2. Encourage fluids. 9. Alcohol abuse. 1. Family states they would like for him to go into a substance abuse program.  2. Multivitamin. 10. Vitamin B12 deficiency with chronic macrocytic anemia  1. .0 on 8/28. Improved to 109.1 on 8/31. MCV stable at 110.4. 2. Hemoglobin 9.7 on 9/4 which is grossly stable over prior 10.3 on 8/31. Stable at 82.3 on 9/7.  3. Folic acid. 4. Vitamin B12.  5. Vitamin B12/folate on 9/7 were 627/7.4. Normal.  6. Ferrous sulfate with vitamin C BID with meals started on 9/4.  7. Iron level was 71 on 9/7. Will discontinue ferrous sulfate at discharge. 11. Recent acute, comminuted, minimally displaced, T-shaped fracture of the head of the right proximal phalanx of the great toe on 8/14/2020. 12. Orthostatic hypotension. 1. Noted on 9/1. Patient encouraged to improve fluid intake with resolution later in the day. Will continue to monitor patient's fluid consumption. 2. Encourage fluids. 3. Atenolol has been decreased to 50 mg daily from 100 mg. Consider midodrine if no improvement noted. 13. Right hip adductor pain. 1. Suspect possible adductor complex strain. Physical Therapy ultrasound treatments have been ordered. Patient notes that these treatments are helping as of 9/3. 14. Chronic neuropathy of both feet. 1. Patient agreeable to trial 10% phenytoin cream in a neutral base to be applied twice daily. Ordered on 9/3 from the outpatient pharmacy and added to his inpatient medications. 3 refills have been provided.   2. Patient noted that this medication actually made his feet burn even more and he requested recommendations for follow-up with a specialist to address his neuropathy. 15. Nutrition:  Consultation to dietician for nutritional counseling and recommendations. 1. TotP 6.0, alb 3.5, Vitamin 25OH level of 43 on 8/28/2020. 16. Electrolytes. 1. Normal on 9/7 with exception of:  2. Hyponatremia. Sodium normal at 139 on 8/28 which is improved from 132 on 8/24. Sodium remains normal at 139 on 9/4  3. Hypomagnesemia. Magnesium was 1.5 on 8/25 with improvement to 2.2 on 8/26. Resolved. 17. Bladder: No issues, has Flomax on home medication list.  Medication was started 9/1 at patient's request  18. Bowel: Senna, colace, MOM  19. Rehabilitation nursing will be involved for bowel, bladder, skin, and pain management. Nursing will also provide education and training to patient and family. 20. Prophylaxis:  DVT: SCDs. GI: Protonix. 21.  and case management consultations for coordination of care and discharge planning. Chronic Problems:  1. Hypertension. 1. Amlodipine. Patient is actually on atenolol 100 mg daily at home; this medication was restarted on 9/2 and the amlodipine was discontinued. Dose decreased to 50 mg on 9/4 due to issues with hypotension. 2. Hyperlipidemia. 1. Has fenofibrate on home medication list.  3. Remote history of cocaine drug abuse. 4. Cervical spondylosis with C3-C4 moderate to severe right and severe left neuroforaminal stenosis, moderate to severe left and severe right neuroforaminal stenosis at C4-C5, moderate right and severe left neuroforaminal stenosis at C5-C6, and moderate right and mild left neuroforaminal stenosis at C6-C7.  5. Lumbar spondylosis with mild degenerative changes most pronounced at L3-L4 with moderate spinal canal stenosis and mild to moderate bilateral neuroforaminal stenosis. 6. Chronic macrocytic, hyperchromic anemia.   7. CKD 2.  8. History of depression/Bipolar 1 disorder. 1. Prozac. 2. Seroquel. 3. Tegretol. Carbamazepine level 5.4 on 9/4. Normal.  4. Has BuSpar on home medication list.  5. Trazodone for sleep. 9. GERD. 1. Protonix. 10. Current everyday smoker with a 40-pack-year history. 1. NicoDerm patch if patient is agreeable. Labs reviewed on:  1. 8/28.  2. 8/31.  3. 9/4.  4. 9/7. Infectious Disease:  1. N/A    Missed Therapy Time:  None     DME:    Discharge Plan:  Estimated Discharge Date: 9/9   Destination: home health and discharge home with supervision  Services at Discharge: [address] [place] [address] Rd, Occupational Therapy, Speech Therapy, Nursing and an aide 3x week  Is patient appropriate for an outpatient driving evaluation? no  Equipment at Discharge: RW, shower chair    Greater than 50% of 30 minutes was spent with the patient reviewing the findings of his x-ray today, the plan for care conference tomorrow, and reviewing his labs from today which were all stable and or normal aside from his increased creatinine.     Andi Kelley MD

## 2020-09-07 ENCOUNTER — APPOINTMENT (OUTPATIENT)
Dept: GENERAL RADIOLOGY | Age: 60
DRG: 949 | End: 2020-09-07
Attending: PHYSICAL MEDICINE & REHABILITATION
Payer: MEDICARE

## 2020-09-07 LAB
ANION GAP SERPL CALCULATED.3IONS-SCNC: 8 MEQ/L (ref 8–16)
BUN BLDV-MCNC: 17 MG/DL (ref 7–22)
CALCIUM SERPL-MCNC: 8.9 MG/DL (ref 8.5–10.5)
CHLORIDE BLD-SCNC: 106 MEQ/L (ref 98–111)
CO2: 27 MEQ/L (ref 23–33)
CREAT SERPL-MCNC: 1.4 MG/DL (ref 0.4–1.2)
ERYTHROCYTE [DISTWIDTH] IN BLOOD BY AUTOMATED COUNT: 13.6 % (ref 11.5–14.5)
ERYTHROCYTE [DISTWIDTH] IN BLOOD BY AUTOMATED COUNT: 56.5 FL (ref 35–45)
FOLATE: 7.4 NG/ML (ref 4.8–24.2)
GFR SERPL CREATININE-BSD FRML MDRD: 52 ML/MIN/1.73M2
GLUCOSE BLD-MCNC: 97 MG/DL (ref 70–108)
HCT VFR BLD CALC: 31.7 % (ref 42–52)
HEMOGLOBIN: 10.1 GM/DL (ref 14–18)
IRON: 71 UG/DL (ref 65–195)
MCH RBC QN AUTO: 35.6 PG (ref 26–33)
MCHC RBC AUTO-ENTMCNC: 31.9 GM/DL (ref 32.2–35.5)
MCV RBC AUTO: 111.6 FL (ref 80–94)
PLATELET # BLD: 267 THOU/MM3 (ref 130–400)
PMV BLD AUTO: 9.9 FL (ref 9.4–12.4)
POTASSIUM SERPL-SCNC: 4.5 MEQ/L (ref 3.5–5.2)
RBC # BLD: 2.84 MILL/MM3 (ref 4.7–6.1)
SODIUM BLD-SCNC: 141 MEQ/L (ref 135–145)
VITAMIN B-12: 627 PG/ML (ref 211–911)
WBC # BLD: 5.7 THOU/MM3 (ref 4.8–10.8)

## 2020-09-07 PROCEDURE — 85027 COMPLETE CBC AUTOMATED: CPT

## 2020-09-07 PROCEDURE — 6370000000 HC RX 637 (ALT 250 FOR IP): Performed by: SURGERY

## 2020-09-07 PROCEDURE — 83540 ASSAY OF IRON: CPT

## 2020-09-07 PROCEDURE — 80048 BASIC METABOLIC PNL TOTAL CA: CPT

## 2020-09-07 PROCEDURE — 6370000000 HC RX 637 (ALT 250 FOR IP): Performed by: PHYSICAL MEDICINE & REHABILITATION

## 2020-09-07 PROCEDURE — 94760 N-INVAS EAR/PLS OXIMETRY 1: CPT

## 2020-09-07 PROCEDURE — 82607 VITAMIN B-12: CPT

## 2020-09-07 PROCEDURE — 82746 ASSAY OF FOLIC ACID SERUM: CPT

## 2020-09-07 PROCEDURE — 6370000000 HC RX 637 (ALT 250 FOR IP): Performed by: FAMILY MEDICINE

## 2020-09-07 PROCEDURE — 1180000000 HC REHAB R&B

## 2020-09-07 PROCEDURE — 72100 X-RAY EXAM L-S SPINE 2/3 VWS: CPT

## 2020-09-07 PROCEDURE — 36415 COLL VENOUS BLD VENIPUNCTURE: CPT

## 2020-09-07 RX ADMIN — SENNOSIDES 8.6 MG: 8.6 TABLET, FILM COATED ORAL at 21:36

## 2020-09-07 RX ADMIN — PANTOPRAZOLE SODIUM 40 MG: 40 TABLET, DELAYED RELEASE ORAL at 06:04

## 2020-09-07 RX ADMIN — ACETAMINOPHEN 650 MG: 325 TABLET ORAL at 04:15

## 2020-09-07 RX ADMIN — DOCUSATE SODIUM 100 MG: 100 CAPSULE, LIQUID FILLED ORAL at 08:38

## 2020-09-07 RX ADMIN — QUETIAPINE FUMARATE 400 MG: 400 TABLET ORAL at 08:38

## 2020-09-07 RX ADMIN — Medication 100 MG: at 08:38

## 2020-09-07 RX ADMIN — ACETAMINOPHEN 650 MG: 325 TABLET ORAL at 08:37

## 2020-09-07 RX ADMIN — OXYCODONE HYDROCHLORIDE AND ACETAMINOPHEN 250 MG: 500 TABLET ORAL at 18:46

## 2020-09-07 RX ADMIN — TAMSULOSIN HYDROCHLORIDE 0.4 MG: 0.4 CAPSULE ORAL at 21:33

## 2020-09-07 RX ADMIN — QUETIAPINE FUMARATE 400 MG: 400 TABLET ORAL at 21:33

## 2020-09-07 RX ADMIN — FERROUS SULFATE TAB 325 MG (65 MG ELEMENTAL FE) 325 MG: 325 (65 FE) TAB at 18:46

## 2020-09-07 RX ADMIN — ACETAMINOPHEN 650 MG: 325 TABLET ORAL at 13:20

## 2020-09-07 RX ADMIN — ACETAMINOPHEN 500 MG: 500 TABLET ORAL at 21:33

## 2020-09-07 RX ADMIN — Medication 1000 MCG: at 08:38

## 2020-09-07 RX ADMIN — FOLIC ACID 1 MG: 1 TABLET ORAL at 08:37

## 2020-09-07 RX ADMIN — DIPHENHYDRAMINE HCL 25 MG: 25 TABLET ORAL at 13:20

## 2020-09-07 RX ADMIN — THERA TABS 1 TABLET: TAB at 08:37

## 2020-09-07 RX ADMIN — CARBAMAZEPINE 200 MG: 200 TABLET ORAL at 08:38

## 2020-09-07 RX ADMIN — ATENOLOL 50 MG: 50 TABLET ORAL at 08:38

## 2020-09-07 RX ADMIN — CARBAMAZEPINE 200 MG: 200 TABLET ORAL at 21:33

## 2020-09-07 RX ADMIN — DIPHENHYDRAMINE HCL 25 MG: 25 TABLET ORAL at 21:33

## 2020-09-07 RX ADMIN — PROMETHAZINE HYDROCHLORIDE 12.5 MG: 25 TABLET ORAL at 21:33

## 2020-09-07 RX ADMIN — OXYCODONE HYDROCHLORIDE AND ACETAMINOPHEN 250 MG: 500 TABLET ORAL at 08:38

## 2020-09-07 RX ADMIN — DIPHENHYDRAMINE HCL 25 MG: 25 TABLET ORAL at 04:18

## 2020-09-07 RX ADMIN — POLYETHYLENE GLYCOL 3350 17 G: 17 POWDER, FOR SOLUTION ORAL at 08:38

## 2020-09-07 RX ADMIN — FERROUS SULFATE TAB 325 MG (65 MG ELEMENTAL FE) 325 MG: 325 (65 FE) TAB at 08:38

## 2020-09-07 RX ADMIN — FLUOXETINE HYDROCHLORIDE 60 MG: 20 CAPSULE ORAL at 21:33

## 2020-09-07 ASSESSMENT — PAIN SCALES - GENERAL
PAINLEVEL_OUTOF10: 6
PAINLEVEL_OUTOF10: 5
PAINLEVEL_OUTOF10: 6
PAINLEVEL_OUTOF10: 6
PAINLEVEL_OUTOF10: 7

## 2020-09-07 ASSESSMENT — PAIN DESCRIPTION - ONSET: ONSET: ON-GOING

## 2020-09-07 ASSESSMENT — PAIN DESCRIPTION - FREQUENCY
FREQUENCY: CONTINUOUS
FREQUENCY: CONTINUOUS

## 2020-09-07 ASSESSMENT — PAIN DESCRIPTION - DIRECTION: RADIATING_TOWARDS: NONR

## 2020-09-07 ASSESSMENT — PAIN DESCRIPTION - DESCRIPTORS
DESCRIPTORS: ACHING
DESCRIPTORS: ACHING

## 2020-09-07 ASSESSMENT — ENCOUNTER SYMPTOMS
COUGH: 0
ALLERGIC/IMMUNOLOGIC NEGATIVE: 1
NAUSEA: 0
DIARRHEA: 0
TROUBLE SWALLOWING: 0
CONSTIPATION: 0
SHORTNESS OF BREATH: 0
EYES NEGATIVE: 1
VOICE CHANGE: 0

## 2020-09-07 ASSESSMENT — PAIN DESCRIPTION - PROGRESSION: CLINICAL_PROGRESSION: NOT CHANGED

## 2020-09-07 ASSESSMENT — PAIN DESCRIPTION - LOCATION
LOCATION: GENERALIZED
LOCATION: GENERALIZED

## 2020-09-07 ASSESSMENT — PAIN - FUNCTIONAL ASSESSMENT: PAIN_FUNCTIONAL_ASSESSMENT: PREVENTS OR INTERFERES SOME ACTIVE ACTIVITIES AND ADLS

## 2020-09-07 ASSESSMENT — PAIN DESCRIPTION - PAIN TYPE: TYPE: CHRONIC PAIN

## 2020-09-07 ASSESSMENT — PAIN DESCRIPTION - ORIENTATION: ORIENTATION: RIGHT;LEFT

## 2020-09-07 NOTE — PLAN OF CARE
Problem: Pain:  Goal: Pain level will decrease  Description: Pain level will decrease  Outcome: Ongoing     Problem: Anxiety:  Goal: Level of anxiety will decrease  Description: Level of anxiety will decrease  Outcome: Ongoing     Problem: Mood - Altered:  Goal: Mood stable  Description: Mood stable  Outcome: Ongoing     Problem: Skin Integrity:  Goal: Absence of new skin breakdown  Description: Absence of new skin breakdown  Outcome: Ongoing     Problem: Falls - Risk of:  Goal: Will remain free from falls  Description: Will remain free from falls  Outcome: Ongoing  Goal: Absence of physical injury  Description: Absence of physical injury  Outcome: Ongoing

## 2020-09-07 NOTE — FLOWSHEET NOTE
[facility]  PROGRESS [facility]      Patient: Kwesi Malone  Room #: 7E-70/070-A            YOB: 1960  Age: 60 y.o. Gender: male            Admit Date & Time: 8/27/2020  1:20 PM    Assessment:  Pt is a 59y. o. male, propped up in bed watching television, in 11E69. Pleasant demeanor, engaged conversation. Pt shared reason for him being on unit-a fall which broke some vertebrae in his back, due to drunkenness-which he shared that's what he seems to do, become inebriated and fall. Pt shared that he's been a steady drinker almost since the age of 14, and that it eventually led to harder drug use. This caused him his marriage, a great job, housing and almost his family. Pt currently lives with his mother and attends [facility] addiction therapy, meetings and other sober-support gatherings during the week, in and near [facility]. Pt acknowledged the damage his addictions have caused-losing almost everything he had and held dear. Pt also shared that he has some memory issues that make retention of things difficult at times, and that hasn't helped the situation. Interventions:   provided active listening, clarifying questions to ensure understanding of what he was being told, explored pt thoughts, concerns and feelings, offered encouragement, affirmation of his recovery activities and prayer. Outcomes:  Pt expressed gratitude for our visit, mentioned he appreciates the spiritual aspect of what we as chaplains do and mentioned that he really wants to reconnect with his former [place] [address]. Plan:  1.  Pt may be at [facility] for a while as yet; follow-up on perhaps what the roots of his addictions may be    Electronically signed by Lowanda Crigler Intern, on 9/6/2020 at 8:51 PM.  [address] [place] [address]  [phone]

## 2020-09-08 PROBLEM — E53.8 VITAMIN B12 DEFICIENCY: Status: RESOLVED | Noted: 2020-08-27 | Resolved: 2020-09-08

## 2020-09-08 PROCEDURE — 97535 SELF CARE MNGMENT TRAINING: CPT

## 2020-09-08 PROCEDURE — 97110 THERAPEUTIC EXERCISES: CPT

## 2020-09-08 PROCEDURE — 6370000000 HC RX 637 (ALT 250 FOR IP): Performed by: FAMILY MEDICINE

## 2020-09-08 PROCEDURE — 6370000000 HC RX 637 (ALT 250 FOR IP): Performed by: SURGERY

## 2020-09-08 PROCEDURE — 1180000000 HC REHAB R&B

## 2020-09-08 PROCEDURE — 97130 THER IVNTJ EA ADDL 15 MIN: CPT

## 2020-09-08 PROCEDURE — 97129 THER IVNTJ 1ST 15 MIN: CPT

## 2020-09-08 PROCEDURE — 6370000000 HC RX 637 (ALT 250 FOR IP): Performed by: PHYSICAL MEDICINE & REHABILITATION

## 2020-09-08 PROCEDURE — 97530 THERAPEUTIC ACTIVITIES: CPT

## 2020-09-08 PROCEDURE — 97116 GAIT TRAINING THERAPY: CPT

## 2020-09-08 RX ORDER — ATENOLOL 100 MG/1
100 TABLET ORAL DAILY
Status: DISCONTINUED | OUTPATIENT
Start: 2020-09-09 | End: 2020-09-09 | Stop reason: HOSPADM

## 2020-09-08 RX ORDER — ATENOLOL 50 MG/1
50 TABLET ORAL DAILY
Status: COMPLETED | OUTPATIENT
Start: 2020-09-08 | End: 2020-09-08

## 2020-09-08 RX ADMIN — THERA TABS 1 TABLET: TAB at 08:21

## 2020-09-08 RX ADMIN — POLYETHYLENE GLYCOL 3350 17 G: 17 POWDER, FOR SOLUTION ORAL at 08:21

## 2020-09-08 RX ADMIN — DOCUSATE SODIUM 100 MG: 100 CAPSULE, LIQUID FILLED ORAL at 08:21

## 2020-09-08 RX ADMIN — ACETAMINOPHEN 500 MG: 500 TABLET ORAL at 21:32

## 2020-09-08 RX ADMIN — ATENOLOL 50 MG: 50 TABLET ORAL at 12:47

## 2020-09-08 RX ADMIN — FERROUS SULFATE TAB 325 MG (65 MG ELEMENTAL FE) 325 MG: 325 (65 FE) TAB at 17:40

## 2020-09-08 RX ADMIN — ACETAMINOPHEN 650 MG: 325 TABLET ORAL at 12:49

## 2020-09-08 RX ADMIN — DIPHENHYDRAMINE HCL 25 MG: 25 TABLET ORAL at 21:32

## 2020-09-08 RX ADMIN — Medication 1000 MCG: at 08:21

## 2020-09-08 RX ADMIN — FOLIC ACID 1 MG: 1 TABLET ORAL at 08:21

## 2020-09-08 RX ADMIN — Medication 100 MG: at 08:21

## 2020-09-08 RX ADMIN — FERROUS SULFATE TAB 325 MG (65 MG ELEMENTAL FE) 325 MG: 325 (65 FE) TAB at 08:21

## 2020-09-08 RX ADMIN — ACETAMINOPHEN 650 MG: 325 TABLET ORAL at 05:12

## 2020-09-08 RX ADMIN — FLUOXETINE HYDROCHLORIDE 60 MG: 20 CAPSULE ORAL at 21:32

## 2020-09-08 RX ADMIN — CARBAMAZEPINE 200 MG: 200 TABLET ORAL at 21:33

## 2020-09-08 RX ADMIN — QUETIAPINE FUMARATE 400 MG: 400 TABLET ORAL at 21:32

## 2020-09-08 RX ADMIN — PANTOPRAZOLE SODIUM 40 MG: 40 TABLET, DELAYED RELEASE ORAL at 05:12

## 2020-09-08 RX ADMIN — TAMSULOSIN HYDROCHLORIDE 0.4 MG: 0.4 CAPSULE ORAL at 21:32

## 2020-09-08 RX ADMIN — OXYCODONE HYDROCHLORIDE AND ACETAMINOPHEN 250 MG: 500 TABLET ORAL at 08:21

## 2020-09-08 RX ADMIN — ATENOLOL 50 MG: 50 TABLET ORAL at 08:21

## 2020-09-08 RX ADMIN — CARBAMAZEPINE 200 MG: 200 TABLET ORAL at 08:21

## 2020-09-08 RX ADMIN — SENNOSIDES 8.6 MG: 8.6 TABLET, FILM COATED ORAL at 21:32

## 2020-09-08 RX ADMIN — DIPHENHYDRAMINE HCL 25 MG: 25 TABLET ORAL at 12:49

## 2020-09-08 RX ADMIN — OXYCODONE HYDROCHLORIDE AND ACETAMINOPHEN 250 MG: 500 TABLET ORAL at 17:41

## 2020-09-08 RX ADMIN — BISACODYL 5 MG: 5 TABLET, COATED ORAL at 17:40

## 2020-09-08 RX ADMIN — QUETIAPINE FUMARATE 400 MG: 400 TABLET ORAL at 08:21

## 2020-09-08 ASSESSMENT — PAIN - FUNCTIONAL ASSESSMENT: PAIN_FUNCTIONAL_ASSESSMENT: ACTIVITIES ARE NOT PREVENTED

## 2020-09-08 ASSESSMENT — PAIN DESCRIPTION - FREQUENCY: FREQUENCY: CONTINUOUS

## 2020-09-08 ASSESSMENT — PAIN SCALES - GENERAL
PAINLEVEL_OUTOF10: 6
PAINLEVEL_OUTOF10: 5
PAINLEVEL_OUTOF10: 7
PAINLEVEL_OUTOF10: 6

## 2020-09-08 ASSESSMENT — PAIN DESCRIPTION - ONSET: ONSET: ON-GOING

## 2020-09-08 ASSESSMENT — PAIN DESCRIPTION - DESCRIPTORS: DESCRIPTORS: ACHING

## 2020-09-08 ASSESSMENT — PAIN DESCRIPTION - PAIN TYPE: TYPE: CHRONIC PAIN

## 2020-09-08 ASSESSMENT — PAIN DESCRIPTION - PROGRESSION: CLINICAL_PROGRESSION: NOT CHANGED

## 2020-09-08 ASSESSMENT — PAIN DESCRIPTION - ORIENTATION: ORIENTATION: MID

## 2020-09-08 ASSESSMENT — PAIN DESCRIPTION - LOCATION: LOCATION: BACK;HEAD

## 2020-09-08 NOTE — PROGRESS NOTES
Physical Medicine & Rehabilitation  Progress Note    Chief Complaint:  frequent falls with mild traumatic brain injury without loss of consciousness and T12 burst fracture    Subjective:  Care conference held today with proposed discharge date on 9/9 to home with Home Health Physical Therapy, Occupational Therapy, Speech Therapy, Nursing and an aide. Family not present. He remains in agreement with plan and timing and is looking forward to going home tomorrow. It was discussed with the patient that as he request that he will be having a follow-up consultation for his neuropathy in his feet which will include an EMG/NCS testing to further define his neuropathy. He had no other concerns or questions at this time. Rehabilitation:   Physical Therapy:  OBJECTIVE:  Transfers:  Sit to Stand: Modified Independent  Stand to Sit:Modified Independent    Ambulation:  Modified Independent  Distance: Community distance, up/down 10' ramp  Surface: Level Tile and Ramp  Device:Rolling Walker  Gait Deviations:  Slow Dedra, Decreased Step Length Bilaterally, Decreased Weight Shift Bilaterally and Decreased Gait Speed    Exercise:  None     Functional Outcome Measures: Not completed    ASSESSMENT:  Assessment: Patient progressing toward established goals. Activity Tolerance:  Patient tolerance of  treatment: good. Equipment Recommendations:Equipment Needed: ([facility])  Discharge Recommendations:  Continue to assess pending progress, Patient would benefit from continued therapy after discharge     Occupational Therapy:  COGNITION: Decreased Insight and Decreased Safety Awareness     ADL:   No ADL's completed this session.      BALANCE:  Sitting Balance:  Independent. Standing Balance: Modified Independent.       BED MOBILITY:  Not Tested     TRANSFERS:  Sit to Stand:  Modified Independent.  recliner, w/c   Stand to Sit: Modified Independent.       FUNCTIONAL MOBILITY:  Assistive Device: Rolling Walker  Assist #2:  Goal 2: Pt will complete sustained, selective, and divided attention tasks with no more than 3 errors within a given task in order to improve participation in treatment and permit eventual return to driving. GOAL MET  REVISED GOAL: Pt will complete sustained, selective, and divided attention tasks for 5-7 minutes with no more than 2 errors within a given task in order to improve participation in treatment and permit eventual return to driving. INTERVENTIONS: *See Goal 4 for details on attention      SHORT TERM GOAL #3:  Goal 3: Pt will complete functional reasoning and thought organizational tasks with 90% accuracy given mod cues to improve overall executive functioning skills. GOAL MET  REVISED GOAL: Pt will complete functional reasoning and thought organizational tasks with 90% accuracy given min cues to improve overall executive functioning skills. INTERVENTIONS: *See Goal 4 for details of functional reasoning and thought organizational tasks  PREVIOUS SESSION: Verbal reasoning + thought organization-Stating STEFANY + x1 holiday within each month: 10/12 holidays independent, 2/12 min cues for appropriate sequencing secondary to poor attention to task. 9/12 holidays IDd independently, 3/12 with min cues for improved reasoning.      Lucas calculation via verbally presented coin groups:  -2 coin value groups: 3/3 independent  -3 coin value groups: 5/10 independent, 3/10 min cues, 1/10 mod cues, 1/10 MAX cues   *Pt stating, \"That shouldn't be hard but it is. \" ST educated pt re: cognitive deficits and impact on functional task completion re: safety and success; pt highly receptive to ST education.      SHORT TERM GOAL #4:  Goal 4: Pt will complete functional problem solving and sequencing tasks with 90% accuracy given mod cues to improve ability to make successful return to IADL's (medication management, finances, etc.).  GOAL MET  REVISED GOAL: Pt will complete functional problem solving and sequencing tasks with 90% accuracy given min cues to improve ability to make successful return to IADL's (medication management, finances, etc.). INTERVENTIONS: Pt participated in hospital navigation task that included 17 steps. Pt completed 10/17 steps ind, 7/17 steps min cues. Cues included redirections to task and suggestions that targeted cognitive flexibility. Pt exhibited functional problem solving, organization, and reasoning skills evidenced by asking staff members for directions, reading and interpreting signage appropriately, and manually checking off steps that have been completed on list of directions. Pt exhibited two moments of impaired memory recall when ordering a drink from [employer]; recall difficulties in both immediate and delayed recall. Pt identified a drink that he wanted and repeated it verbally to himself but could not remember name of drink when asked to order approx. 2 minutes later. Pt required min cues to recall drink order. When waiting for drink, Pt again not able to recall drink order; Pt required min cues to recall order and independently used compensatory strategy of writing down his order when reminded of drink name. When entering [address] [place] [address] [employer], [employer], and [place], Pt's communication became tangential and required redirections to task. Overall, Pt appropriately utilized functional problem solving, reasoning, cognitive flexibility, attention to task, and sequencing to navigate environment with min cues. Pt expressed content and satisfaction when asked how he thought he performed on the hospital navigation task. Therapist provided feedback regarding skills he demonstrated well as well as areas for continued remediation. Long-Term Goals:  Timeframe for Long-term Goals: 3 weeks     LONG TERM GOAL #1:  Goal 1: Patient will improve cognitive functioning to a min assist level to increase level of independence with ADL and iADL tasks in the home environment and reduce caregiver burden.   GOAL NOT MET        Comprehension: 5 - Patient understands basic needs (hungry/hot/pain)  Expression: 5 - Expresses basic ideas/needs only (hungry/hot/pain)  Social Interaction: 5 - Patient is appropriate with supervision/cues  Problem Solvin - Patient able to solve simple/routine tasks  Memory: 3 - Patient remembers 50%-74% of the time     ST FIM ASSESSMENT:       Admission score Current score Goal Status   COMMUNICATION 4 - Patient understands basic needs 75-90%+ of the time    5 - Patient understands basic needs (hungry/hot/pain)    Progressing   EXPRESSION 5 - Expresses basic ideas/needs only (hungry/hot/pain)       5 - Expresses basic ideas/needs only (hungry/hot/pain)    Stable performance   SOCIAL INTERACTION 4 - Patient appropriate 75-90%+ of the time    5 - Patient is appropriate with supervision/cues    Progressing   PROBLEM SOLVING 2 - Patient solves simple/routine tasks 25%-49%    5 - Patient able to solve simple/routine tasks    Progressing   MEMORY 2 - Patient remembers 25%-49% of the time    3 - Patient remembers 50%-74% of the time    Progressing     EDUCATION:  Learner: Patient  Education:  Reviewed recommendations for follow-up, Education Related to Potential Risks and Complications Due to Impairment/Illness/Injury and Home Safety Education  Evaluation of Education: Verbalizes understanding, Needs further instruction and Family not present     ASSESSMENT/PLAN:  SUMMARY  Patient met 4/4 STG and 0/1 LTG. Improvements evident in immediate and delayed memory recall with use of compensatory strategies, sustained and selective attention, functional reasoning, functional problem solving, sequencing tasks/activities, and overall thought organization skills. Despite overall improvements, Pt continues to have difficulty with independent mental retention, attending to details, higher level divided attention, and higher level thought organization.  Given concerns regarding attention skills, recommend Pt refrain from driving at this time. Pt has limited cognitive demands in the home environment, mother is very supportive and assists with managing finances, medications, and cooking tasks. Recommend continued speech therapy intervention to address cognitive functioning to increase level of independence in the home environment. Additionally recommend community support for alcohol/substance abuse. Patient planning for discharge home tomorrow with Dinah Morfin 48. Activity Tolerance:  Patient tolerance of treatment: good. Assessment/Plan: Patient progressing toward established goals. Continues to require skilled care of licensed speech pathologist to progress toward achievement of established goals and plan of care. Plan for Next Session: Computer navigation task, functional problem solving and safety awareness    Review of Systems:  Review of Systems   Constitutional: Positive for activity change. Negative for appetite change, fatigue and fever. HENT: Negative for trouble swallowing and voice change. Eyes: Negative. Respiratory: Negative for cough and shortness of breath. Cardiovascular: Negative for leg swelling. Gastrointestinal: Negative for constipation, diarrhea and nausea. Endocrine: Negative. Genitourinary: Negative for hematuria and urgency. Musculoskeletal: Positive for gait problem. Negative for joint swelling. Skin: Negative for pallor. Allergic/Immunologic: Negative. Neurological: Positive for weakness and numbness. Negative for dizziness and headaches. Hematological: Negative. Psychiatric/Behavioral: Positive for confusion, decreased concentration and hallucinations. Negative for dysphoric mood. The patient is not nervous/anxious. All other systems reviewed and are negative.      Objective:  BP (!) 158/87   Pulse 72   Temp 97.4 °F (36.3 °C) (Oral)   Resp 16   Ht 6' 4\" (1.93 m)   Wt 234 lb 9.6 oz (106.4 kg)   SpO2 98%   BMI 28.56 kg/m²   CURRENT VITALS:  height is 6' 4\" (1.93 m) and weight is 234 lb 9.6 oz (106.4 kg). His oral temperature is 97.4 °F (36.3 °C). His blood pressure is 158/87 (abnormal) and his pulse is 72. His respiration is 16 and oxygen saturation is 98%. Body mass index is 28.56 kg/m². Temperature Range (24h):Temp: 97.4 °F (36.3 °C) Temp  Av.5 °F (36.4 °C)  Min: 97.4 °F (36.3 °C)  Max: 97.6 °F (36.4 °C)  BP Range (45Z): Systolic (75AOU), ROSA:640 , Min:136 , IOO:602     Diastolic (95GRH), IWD:90, Min:87, Max:93    Pulse Range (24h): Pulse  Av  Min: 72  Max: 78  Respiration Range (24h): Resp  Av  Min: 16  Max: 16  Current Pulse Ox (24h):  SpO2: 98 %  Pulse Ox Range (24h):  SpO2  Av.5 %  Min: 98 %  Max: 99 %  Oxygen Amount and Delivery:      awake  Orientation:   person, place, time, situation  Mood: within normal limits  Affect: calm  General appearance:  in no acute distress, up in chair wearing TLSO brace    Memory:   Grossly normal  Attention/Concentration: Grossly normal   language:  normal    ROM: Abnormal due to use of TLSO brace  Motor Exam:  Motor exam is symmetrical 5 out of 5 all extremities bilaterally    Tone:  normal  Muscle bulk: within normal limits; with noted mild atrophy of bilateral calves  Sensory:  Sensory intact  Coordination:   normal  Deep Tendon Reflexes:  Reflexes are intact and symmetrical bilaterally    Skin: warm and dry, no rash or erythema and abrasions over both knees with scabs and fading hematoma with abrasion over left parietal scalp  Peripheral vascular: Pulses: Normal upper and lower extremity pulses; Edema: no    Diagnostics:   No results found for this or any previous visit (from the past 24 hour(s)).   Labs Renal Latest Ref Rng & Units 2020   BUN 7 - 22 mg/dL 17 15 12 13 13   Cr 0.4 - 1.2 mg/dL 1.4(H) 1.2 1.2 1.2 1.1   K 3.5 - 5.2 meq/L 4.5 4.4 4.3 3.9 4.5   Na 135 - 145 meq/L 141 139 140 139 138      Recent Labs     20  0603 20  1114 20  0842   WBC 5.7 4. 6* 5.5   HGB 10.1* 9.7* 10.3*   HCT 31.7* 30.7* 31.2*   .6* 110.4* 109.1*    251 276      Vl Dup Lower Extremity Venous Bilateral    Result Date: 9/4/2020  PROCEDURE: VL DUP LOWER EXTREMITY VENOUS BILATERAL CLINICAL INFORMATION: Pain COMPARISON: No prior study. TECHNIQUE: Multiple grayscale and color flow images of the major veins of both lower extremities were obtained from the level of the groin to the level of the ankle. Multiple compression and augmentation maneuvers were performed. FINDINGS: RIGHT LOWER EXTREMITY:All the deep veins of the right lower extremity are widely patent with normal flow and normal compressibility. LEFT LOWER EXTREMITY:All the  deep veins of the left lower extremity are widely patent with normal flow and normal compressibility. Normal venous ultrasound. No evidence for deep venous thrombosis. **This report has been created using voice recognition software. It may contain minor errors which are inherent in voice recognition technology. ** Final report electronically signed by Dr. Alek Wells on 9/4/2020 1:12 PM    Xr Lumbar Spine (2-3 Views)    Result Date: 9/7/2020  PROCEDURE: XR LUMBAR SPINE (2-3 VIEWS) CLINICAL INFORMATION: Interval image for prior T12 burst fracture., COMPARISON: Correlation is made with MRI dated 8/25/2020. TECHNIQUE: AP and lateral views of the lumbar spine. FINDINGS: A fracture of the T12 vertebral body is partially visualized. The lumbar vertebral body heights and alignment are maintained. Facet arthropathy seen lower lumbar spine. Sacroiliac joints are within normal limits. Fracture of the T12 vertebral body is partially visualized on the lateral film. **This report has been created using voice recognition software. It may contain minor errors which are inherent in voice recognition technology. ** Final report electronically signed by Dr Rsomery Joe on 9/7/2020 11:45 AM    Impression:  1. Frequent falls.   2. Gait instability with a Tinetti score of 8/28. 3. Left scalp hematoma. 4. 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 5. Moderate cognitive impairment. (MOCA) version 8.2 completed. Patient scored 14/30. *Previous score on the [address] [place] [address] was 15/25. 6. Acute alcohol intoxication with admission blood alcohol level of 0.08.  7. General cerebral atrophy and small vessel ischemic changes with prominent ventricles consistent with central atrophy. 8. Cervical and thoracic spine pain. 9. T12 burst fracture without instability initially noted on a lumbar spine x-ray on 7/24/2020. 10. Hyponatremia. 11. Hypomagnesemia  12. Acute kidney injury. 13. Alcohol abuse. 14. Vitamin B12 deficiency with chronic macrocytic anemia. .  15. Recent acute, comminuted, minimally displaced, T-shaped fracture of the head of the right proximal phalanx of the great toe on 8/14/2020. 16. Hypertension. 17. Hyperlipidemia. 18. Remote history of cocaine drug abuse. 19. Cervical spondylosis with C3-C4 moderate to severe right and severe left neuroforaminal stenosis, moderate to severe left and severe right neuroforaminal stenosis at C4-C5, moderate right and severe left neuroforaminal stenosis at C5-C6, and moderate right and mild left neuroforaminal stenosis at C6-C7.  20. Lumbar spondylosis with mild degenerative changes most pronounced at L3-L4 with moderate spinal canal stenosis and mild to moderate bilateral neuroforaminal stenosis. 21. Chronic macrocytic, hyperchromic anemia. 22. CKD 2.  23. History of depression. 24. Bipolar 1 disorder. 25. GERD. 26. Current everyday smoker with a 40-pack-year history. 27. Orthostatic hypotension. 28. Right medial hip adductor pain. 29. Chronic neuropathy of both feet. Plan:     Medical management: Per primary team and Dr. Addis Babb.     Consultants: Trauma Surgery, Critical Care, Orthopedic Surgery, Family Medicine, Physical Medicine    Narcotic usage:  N/A  Last BM:  Stool Amount: Large (09/02/20 acid.  4. Vitamin B12.  5. Vitamin B12/folate on 9/7 were 627/7.4. Normal.  6. Ferrous sulfate with vitamin C BID with meals started on 9/4.  7. Iron level was 71 on 9/7. Will discontinue ferrous sulfate at discharge. 11. Recent acute, comminuted, minimally displaced, T-shaped fracture of the head of the right proximal phalanx of the great toe on 8/14/2020. 12. Orthostatic hypotension. 1. Noted on 9/1. Patient encouraged to improve fluid intake with resolution later in the day. Will continue to monitor patient's fluid consumption. 2. Encourage fluids. 3. Atenolol has been decreased to 50 mg daily from 100 mg. Consider midodrine if no improvement noted. 13. Right hip adductor pain. 1. Suspect possible adductor complex strain. Physical Therapy ultrasound treatments have been ordered. Patient notes that these treatments are helping as of 9/3. 14. Chronic neuropathy of both feet. 1. Patient agreeable to trial 10% phenytoin cream in a neutral base to be applied twice daily. Ordered on 9/3 from the outpatient pharmacy and added to his inpatient medications. 3 refills have been provided. 2. Patient noted that this medication actually made his feet burn even more and he requested recommendations for follow-up with a specialist to address his neuropathy. 3. Patient will be referred to an EMG/NCS study for further work-up with Dr. Aditi Jo after discharge. 15. Nutrition:  Consultation to dietician for nutritional counseling and recommendations. 1. TotP 6.0, alb 3.5, Vitamin 25OH level of 43 on 8/28/2020. 16. Electrolytes. 1. Normal on 9/7 with exception of:  2. Hyponatremia. Sodium normal at 139 on 8/28 which is improved from 132 on 8/24. Sodium remains normal at 139 on 9/4  3. Hypomagnesemia. Magnesium was 1.5 on 8/25 with improvement to 2.2 on 8/26. Resolved. 17. Bladder: No issues, has Flomax on home medication list.  Medication was started 9/1 at patient's request  18.  Bowel: Senna, colace, MOM  19. Rehabilitation nursing will be involved for bowel, bladder, skin, and pain management. Nursing will also provide education and training to patient and family. 20. Prophylaxis:  DVT: SCDs. GI: Protonix. 21.  and case management consultations for coordination of care and discharge planning. Chronic Problems:  1. Hypertension. 1. Amlodipine. Patient is actually on atenolol 100 mg daily at home; this medication was restarted on 9/2 and the amlodipine was discontinued. Dose decreased to 50 mg on 9/4 due to issues with hypotension. Home dose of 100 mg to resume on 9/9.  2. Hyperlipidemia. 1. Has fenofibrate on home medication list.  3. Remote history of cocaine drug abuse. 4. Cervical spondylosis with C3-C4 moderate to severe right and severe left neuroforaminal stenosis, moderate to severe left and severe right neuroforaminal stenosis at C4-C5, moderate right and severe left neuroforaminal stenosis at C5-C6, and moderate right and mild left neuroforaminal stenosis at C6-C7.  5. Lumbar spondylosis with mild degenerative changes most pronounced at L3-L4 with moderate spinal canal stenosis and mild to moderate bilateral neuroforaminal stenosis. 6. Chronic macrocytic, hyperchromic anemia. 7. CKD 2.  8. History of depression/Bipolar 1 disorder. 1. Prozac. 2. Seroquel. 3. Tegretol. Carbamazepine level 5.4 on 9/4. Normal.  4. Has BuSpar on home medication list.  5. Trazodone for sleep. 9. GERD. 1. Protonix. 10. Current everyday smoker with a 40-pack-year history. 1. NicoDerm patch if patient is agreeable. Labs reviewed on:  1. 8/28.  2. 8/31.  3. 9/4.  4. 9/7. Infectious Disease:  1.  N/A    Missed Therapy Time:  None     DME:    Discharge Plan:  Estimated Discharge Date: 9/9   Destination: home health and discharge home with supervision  Services at Discharge: [address] [place] [address] Rd, Occupational Therapy, Speech Therapy, Nursing and an aide 3x week  Is patient appropriate for an outpatient driving evaluation? no  Equipment at Discharge: RW, shower chair, Reacher    Greater than 50% of 30 minutes was spent with the patient reviewing the findings of his x-ray today, the plan for care conference tomorrow, and reviewing his labs from today which were all stable and or normal aside from his increased creatinine.     Matteo Mesa MD

## 2020-09-08 NOTE — PLAN OF CARE
Problem: Falls - Risk of:  Goal: Will remain free from falls  Description: Will remain free from falls  9/7/2020 2343 by Olivia Safe  Outcome: Ongoing  Note: Patient verbalizes understanding of call light and fall precautions. Non skid footwear on when ambulating. Bed/chair alarms on. Gait belt on when ambulating.

## 2020-09-08 NOTE — PROGRESS NOTES
[address] [place] [address]  Recreational Therapy  Daily Note  [address]    Time Spent with Patient: 60 minutes    Date:  9/8/2020       Patient Name: Heriberto Blakely      MRN: [id number]      YOB: 1960 (56 y.o.)       Gender: male  Diagnosis: Burst fracture T 12 with routine healing, TBI  Referring Practitioner: Dr. Kostas Romero    RESTRICTIONS/PRECAUTIONS:  Restrictions/Precautions: General Precautions, Fall Risk  Vision: Impaired  Hearing: Within functional limits    PAIN: 0 -no c/o pain     SUBJECTIVE:  I will help you    OBJECTIVE:  Pt helped trace leaves for our fall decorations on the unit-RT cut them out-talked about his family and how he is ready to go home tomorrow -affect bright-social -states that activity was nice and calming -appreciative       Patient Education  New Education Provided: Importance of Leisure,     Electronically signed by: NOAH Thomson  Date: 9/8/2020

## 2020-09-08 NOTE — PLAN OF CARE
Problem: DISCHARGE BARRIERS  Goal: Patient's continuum of care needs are met  Note: Team conference held Tuesday, 09/08/2020. Recommendations of the team were explained to the patient by KIRSTIE Srivastava, and Dr. Yong Aldana. Team is recommending that patient continue on acute inpatient rehab for one more day, with expected discharge date of Wednesday, 09/09/2020. Following discharge, team is recommending home health care services for RN/PT/OT/ST/HHA. Home health care services arranged through [facility]. Care plan reviewed with patient. Patient verbalized understanding of the plan of care and contributed to goal setting. SW contacted patient's daughter, Sky Baez, to confirm discharge for Wednesday, 09/09/2020, at 11:30am. STACIA additionally contacted [facility] in attempt to make contact with Brielle KILLIAN. Left message requesting returned phone call. SW to follow and maintain involvement in discharge planning.

## 2020-09-08 NOTE — PROGRESS NOTES
[address] [address]  Inpatient Rehabilitation  Occupational Therapy  Progress Note  Time:  Time In: 0900  Time Out: 1000  Timed Code Treatment Minutes: 60 Minutes  Minutes: 60    Date: 2020  Patient Name: Rola Boyd,   Gender: male      Room: [facility]070[facility]A  MRN: [id number]  : 1960  (60 y.o.)  Referring Practitioner: Dr. Merritt Settler  Diagnosis: Burst fracture T 12 with routine healing, TBI  Additional Pertinent Hx: Per ED notes: Mr. Js Edge is a 59 Y/O male presenting at [facility] by trauma consult, brought by EMS following a fall today  with LOC; PMH includes drug and alcohol abuse, cancer, HTN, HLD, and GERD. Per patient report he has been falling with increasing frequency over past 2 months, believes a fall while exiting a parked car two days ago is what hurt his back, but he also fell today. Patient states he typically does not remember falls, just finds himself on the ground. Daughter present in exam room states, falls often occur after standing up, he will begin to shake and then fall to floor, appear to lose consciousness briefly. Patient reports history of alcohol abuse, drank a pint of liquor today between 1106-0648, states this is not unusual for him. Restrictions/Precautions:  Restrictions/Precautions: General Precautions, Fall Risk  Required Braces or Orthoses  Spinal: Thoracic Lumbar Sacral Orthotics(May don in sitting)  Position Activity Restriction  Spinal Precautions: No Bending, No Lifting, No Twisting  Other position/activity restrictions: TLSO brace ordered for T12 fracture. Will need to wear this when up. May have it off when in bed. SUBJECTIVE: Patient cooperative and agreeable to OT. Voices excitement to discharge tomorrow. Pleasantly declined shower, requesting sponge bath. PAIN: Denies pain     COGNITION: Decreased Recall, Decreased Problem Solving and Decreased Safety Awareness    ADL:     EATING:Independent. ,  CARE Score: 6.      ORAL dizziness. Pt does have cognitive concerns and is difficult to redirect at times. He will benefit from [facility] services at discharge to improve safety awareness within ADL and IADL at home to decrease risk of fall and future injury. Discharge Recommendations: Home with Home health OT, Home with nursing aide, 24 hour supervision or assist  Equipment Recommendations: Equipment Needed: No  Other: Pt has a shower chair. Does not need a BSC. Recommend a reacher. Discussed purchasing options. Plan: Times per week: 5xs week x 90 min, 1 x week x 30 min  Current Treatment Recommendations: Patient/Caregiver Education & Training, Balance Training, Functional Mobility Training, Endurance Training, Safety Education & Training, Self-Care / ADL, Home Management Training, Equipment Evaluation, Education, & procurement    Patient Education  Patient Education: ADL's, IADL's, Precautions, Equipment Education, Reviewed Prior Education and Assistive Device Safety    Goals  Short term goals  Time Frame for Short term goals: 1 week  Short term goal 1: Pt will complete BADL routine with setup and no > min cues for problem solving, attetntion to task , and back safety to increase independence in self care tasks  GOAL MET, REVISE   Short term goal 2: Pt to demo toileting tasks and transfers with S and no cues for safety GOAL MET, REVISE   Short term goal 3: Pt to demo good dyamamic standing balance > 5 min with S and 0-1 UE support while reaching outside base of support  in prep for retrieveing clothing items. GOAL MET, REVISE   Short term goal 4: PT will complete 2 step homemaking tasks withCGA  no > min cues for back precautions/ proper body mechanics to increase ability to retrieve a snack at home.  GOAL MET, DISCONTINUE   Long term goals  Time Frame for Long term goals : 2 weeks  Long term goal 1: PT will complete ADL routine including donning a TLSO with S andno > min cues for attention to tasks or cues for back  to increase independence in self care GOAL MET, DISCONTINUE   Long term goal 2: PT will compelte 2 step homemaking tasks with S and 0-1 cues for proper body mechanics to increase ability to complete laundry tasks. GOAL NOT MET, CONTINUE     Revised Short-Term Goals  Short term goals  Time Frame for Short term goals: 1 session  Short term goal 1: Pt will complete BADL routine with MI and no > min cues for problem solving, attetntion to task , and back safety to increase independence in self care tasks  Short term goal 2: Pt to demo toileting tasks and transfers with MI and no cues for safety  Short term goal 3: Pt to demo good dyamamic standing balance > 5 min with MI and 0-1 UE support while reaching outside base of support  in prep for retrieveing clothing items. Short term goal 4: DISCONTINUED GOAL  Long term goals  Time Frame for Long term goals : 1 day  Long term goal 1: DISCONTINUED GOAL  Long term goal 2: PT will complete 2 step homemaking tasks with S and 0-1 cues for proper body mechanics to increase ability to complete laundry tasks. Following session, patient left in safe position with all fall risk precautions in place.

## 2020-09-08 NOTE — PROGRESS NOTES
Declan [facility]  INPATIENT PHYSICAL THERAPY  DAILY NOTE  [facility] - 7E-70/070-A    Time In: 1130  Time Out: 1230  Timed Code Treatment Minutes: 60 Minutes  Minutes: 60          Date: 2020  Patient Name: Pippa Rodriguez,  Gender:  male        MRN: [id number]  : 1960  (60 y.o.)     Referring Practitioner: Dr. Elizabet Burger  Diagnosis: Burst fx of T12, TBI  Additional Pertinent Hx: T12 compression fracture s/p fall, chronic alcohol abuse, drug abuse, recurrent falls, neck pain, scalp hematoma     Prior Level of Function:  Lives With: Other (comment)(Mother)  Type of Home: House  Home Layout: One level  Home Access: Stairs to enter without rails  Entrance Stairs - Number of Steps: 1  Home Equipment: Standard walker   Bathroom Shower/Tub: Tub/Shower unit, Shower chair with back  Bathroom Toilet: Standard  Bathroom Equipment: Grab bars in shower(not in great shape per pt. plastic grab bars.)    ADL Assistance: Independent  Ambulation Assistance: Independent  Transfer Assistance: Independent  Active : Yes  Additional Comments: Pt stated he was \"experimenting\" with using the walker prior to admission. Pt reports mother does most of the inside chores, Pt is responsible for outside chores and yardwork. Restrictions/Precautions:  Restrictions/Precautions: General Precautions, Fall Risk  Required Braces or Orthoses  Spinal: Thoracic Lumbar Sacral Orthotics(May don in sitting)  Position Activity Restriction  Spinal Precautions: No Bending, No Lifting, No Twisting  Other position/activity restrictions: TLSO brace ordered for T12 fracture. Will need to wear this when up. May have it off when in bed. SUBJECTIVE: Patient in recliner upon arrival, agreed and cooperative during session. Patient reported feeling lightheaded at beginning of session. Several rest breaks required throughout session due to fatigue/lightheadedness.  Patient c/o back pain that decreased by end mechanics while performing exercise to increase patient safety. Exercises were completed for increased independence with functional mobility. Stairs:  Stairs:  6\" steps. X 4; 6\" x 12 using Bilateral Handrails and Contact Guard Assistance. - Patient educated on foot placement while ascending/descending to increase patient safety. Mod. vc's to widen IRON. Functional Outcome Measures: Not completed     ASSESSMENT:  Assessment: Patient progressing toward established goals. Patient is progressing well towards goals at this time. Several rest breaks needed throughout due to fatigue/lightheadedness. Patient would benefit from additional skilled PT to increase BLE strength/stability, which would improve patient's safe functional mobility. Further balance training would also benefit patient to increase safety while performing other tasks after discharge. Activity Tolerance:  Patient tolerance of  treatment: good. Equipment Recommendations:Equipment Needed: (RW ordered from [facility])  Discharge Recommendations: Patient would benefit from continued therapy after discharge    Plan: Times per week: 5x/wk 90 min, 1x/wk 30 min  Specific instructions for Next Treatment: US Rt adductors if pain noted.   Current Treatment Recommendations: Strengthening, Functional Mobility Training, Balance Training, Equipment Evaluation, Education, & procurement, Gait Training, Stair training, Endurance Training, Transfer Training    Patient Education  Patient Education: Plan of Care, Precautions/Restrictions (spinal precautions), [employer] Mobility, Gait, Stairs, Education Related to Prevention of Recurrence of Impairment/Illness/Injury, Health Promotion and Wellness Education, Home Safety Education,  - Patient Verbalized Understanding, - Patient Requires Continued Education    Goals:  Patient goals : get my dizziness better  Short term goals  Time Frame for Short term goals: 1 wk  Short term goal 1: bed mobility with Mod I, to get in/out of bed, use log roll technique  Short term goal 2: transfer with SBA to get in/out of chairs  Short term goal 3: amb >= 50''x1 with RW and SBA to progress to home and community mobility MET, see LTG  Short term goal 4: Pt to ascend/descend 6\" step with RW, CGA, for home entry. MET, see LTG  Short term goal 5: Pt to score >= 20/28 on Tinetti; perform TUG <= 25 sec, indicating improved balance. Long term goals  Time Frame for Long term goals : 3 wks  Long term goal 1: transfer with Mod I, to get in/out of chairs  Long term goal 2: amb >= 150''x1 with RW and Mod I, for home and community mobility  Long term goal 3: Pt to ascend/descend 6\" step with RW, Mod I, for home entry. Long term goal 4: Pt to score >= 24/28 on Tinetti; perform TUG ,<= 20 sec, indicating improved balance. Long term goal 5: Pt to ascend/descend 4+ steps junior rails, Mod I, for community mobility    Following session, patient left in safe position with all fall risk precautions in place.     Treatment session and note completed by Gwen Evans under supervision of signing therapist.

## 2020-09-08 NOTE — PROGRESS NOTES
[address] [address]  INPATIENT PHYSICAL THERAPY  DAILY NOTE  [address] [facility] - 7E-70/070-A    Time In: 1500  Time Out: 1515  Timed Code Treatment Minutes: 15 Minutes  Minutes: 15          Date: 2020  Patient Name: Steven Sánchez,  Gender:  male        MRN: [id number]  : 1960  (60 y.o.)     Referring Practitioner: Dr. Idalia Peace  Diagnosis: Burst fx of T12, TBI  Additional Pertinent Hx: T12 compression fracture s/p fall, chronic alcohol abuse, drug abuse, recurrent falls, neck pain, scalp hematoma     Prior Level of Function:  Lives With: Other (comment)(Mother)  Type of Home: House  Home Layout: One level  Home Access: Stairs to enter without rails  Entrance Stairs - Number of Steps: 1  Home Equipment: Standard walker   Bathroom Shower/Tub: Tub/Shower unit, Shower chair with back  Bathroom Toilet: Standard  Bathroom Equipment: Grab bars in shower(not in great shape per pt. plastic grab bars.)    ADL Assistance: Independent  Ambulation Assistance: Independent  Transfer Assistance: Independent  Active : Yes  Additional Comments: Pt stated he was \"experimenting\" with using the walker prior to admission. Pt reports mother does most of the inside chores, Pt is responsible for outside chores and yardwork. Restrictions/Precautions:  Restrictions/Precautions: General Precautions, Fall Risk  Required Braces or Orthoses  Spinal: Thoracic Lumbar Sacral Orthotics(May don in sitting)  Position Activity Restriction  Spinal Precautions: No Bending, No Lifting, No Twisting  Other position/activity restrictions: TLSO brace ordered for T12 fracture. Will need to wear this when up. May have it off when in bed. SUBJECTIVE: Pt. Seated in WC and pleasantly agrees to therapy session to make up missed time.      PAIN: Not rated: Back    OBJECTIVE:  Transfers:  Sit to Stand: Modified Independent  Stand to Sit:Modified Independent      Ambulation:  Modified Independent  Distance: Community distance, up/down 10' ramp  Surface: Level Tile and Ramp  Device:Rolling Walker  Gait Deviations:  Slow Dedra, Decreased Step Length Bilaterally, Decreased Weight Shift Bilaterally and Decreased Gait Speed      Exercise:  None    Functional Outcome Measures: Not completed       ASSESSMENT:  Assessment: Patient progressing toward established goals. Activity Tolerance:  Patient tolerance of  treatment: good. Equipment Recommendations:Equipment Needed: (RW ordered from [facility])  Discharge Recommendations:  Continue to assess pending progress, Patient would benefit from continued therapy after discharge    Plan: Times per week: 5x/wk 90 min, 1x/wk 30 min  Specific instructions for Next Treatment: US Rt adductors if pain noted. Current Treatment Recommendations: Strengthening, Functional Mobility Training, Balance Training, Equipment Evaluation, Education, & procurement, Gait Training, Stair training, Endurance Training, Transfer Training    Patient Education  Patient Education: Plan of Care, Precautions/Restrictions, Reviewed Prior Education, Home Safety Education    Goals:  Patient goals : get my dizziness better  Short term goals  Time Frame for Short term goals: 1 wk  Short term goal 1: bed mobility with Mod I, to get in/out of bed, use log roll technique  Short term goal 2: transfer with SBA to get in/out of chairs  Short term goal 3: amb >= 50''x1 with RW and SBA to progress to home and community mobility MET, see LTG  Short term goal 4: Pt to ascend/descend 6\" step with RW, CGA, for home entry. MET, see LTG  Short term goal 5: Pt to score >= 20/28 on Tinetti; perform TUG <= 25 sec, indicating improved balance. Long term goals  Time Frame for Long term goals : 3 wks  Long term goal 1: transfer with Mod I, to get in/out of chairs  Long term goal 2: amb >= 150''x1 with RW and Mod I, for home and community mobility  Long term goal 3: Pt to ascend/descend 6\" step with RW, Mod I, for home entry.   Long term goal 4: Pt to score >= 24/28 on Tinetti; perform TUG ,<= 20 sec, indicating improved balance. Long term goal 5: Pt to ascend/descend 4+ steps junior rails, Mod I, for community mobility    Following session, patient left in safe position with all fall risk precautions in place.

## 2020-09-08 NOTE — PROGRESS NOTES
Patient: Jose [facility]  Unit/Bed: 7E-70/070-A  YOB: 1960  MRN: [id number] Acct: [de-identified]   Admitting Diagnosis: Burst fracture of T12 vertebra with routine healing [S22.081D]  Admit Date:  8/27/2020  Hospital Day: 12    Assessment:     Active Problems:    Chronic alcohol abuse    Benign essential hypertension    Fall    Closed head injury    Hypomagnesemia    Burst fracture of T12 vertebra with routine healing    Vitamin B12 deficiency  Resolved Problems:    * No resolved hospital problems. *      Plan:     Increasing the atenolol back to 100 mg as the BP has elevated some        Subjective:     Patient has no complaint of CP, SOB, GI upset of voiding troubles. .   Medication side effects: none    Scheduled Meds:   [START ON 9/9/2020] atenolol  100 mg Oral Daily    atenolol  50 mg Oral Daily    polyethylene glycol  17 g Oral Daily    NONFORMULARY   Topical 4x Daily    ferrous sulfate  325 mg Oral BID WC    And    vitamin C  250 mg Oral BID WC    tamsulosin  0.4 mg Oral Nightly    acetaminophen  500 mg Oral Nightly    And    diphenhydrAMINE  25 mg Oral Nightly    thiamine  100 mg Oral Daily    carBAMazepine  200 mg Oral BID    docusate sodium  100 mg Oral Daily    FLUoxetine  60 mg Oral Daily    folic acid  1 mg Oral Daily    [START ON 8/25/2021] magnesium replacement protocol   Other RX Placeholder    multivitamin  1 tablet Oral Daily    pantoprazole  40 mg Oral QAM AC    QUEtiapine  400 mg Oral BID    senna  1 tablet Oral Nightly    vitamin B-12  1,000 mcg Oral Daily     Continuous Infusions:  PRN Meds:diphenhydrAMINE, acetaminophen, promethazine **OR** ondansetron, bisacodyl, traZODone, magnesium hydroxide    Review of Systems  Pertinent items are noted in HPI. Objective:     Patient Vitals for the past 8 hrs:   BP Temp Temp src Pulse Resp SpO2   09/08/20 0819 (!) 136/93 97.6 °F (36.4 °C) Oral 78 16 99 %     I/O last 3 completed shifts:   In: 500 [P.O.:500]  Out: 1150 [Urine:1150]  No intake/output data recorded.     BP (!) 136/93   Pulse 78   Temp 97.6 °F (36.4 °C) (Oral)   Resp 16   Ht 6' 4\" (1.93 m)   Wt 234 lb 9.6 oz (106.4 kg)   SpO2 99%   BMI 28.56 kg/m²     BP (!) 136/93   Pulse 78   Temp 97.6 °F (36.4 °C) (Oral)   Resp 16   Ht 6' 4\" (1.93 m)   Wt 234 lb 9.6 oz (106.4 kg)   SpO2 99%   BMI 28.56 kg/m²   General appearance: alert, appears stated age and cooperative  Head: Normocephalic, without obvious abnormality, atraumatic  Lungs: clear to auscultation bilaterally  Heart: could not evaluate with him in the brace  Abdomen: soft, non-tender; bowel sounds normal; no masses,  no organomegaly  Extremities: extremities normal, atraumatic, no cyanosis or edema  Skin: Skin color, texture, turgor normal. No rashes or lesions  Neurologic: Grossly normal      Electronically signed by Lia Groves MD on 9/8/2020 at 11:12 AM

## 2020-09-08 NOTE — PROGRESS NOTES
[address] [facility] [address]  [address] [place] [address]  Occupational Therapy  Daily Note  Time:   Time In: 1430  Time Out: 1500  Timed Code Treatment Minutes: 30 Minutes  Minutes: 30    Date: 2020  Patient Name: Andrew Ardon,   Gender: male      Room: [facility]70/070-A  MRN: [id number]  : 1960  (60 y.o.)  Referring Practitioner: Dr. Sheryl Halsted  Diagnosis: Burst fracture T 12 with routine healing, TBI  Additional Pertinent Hx: Per ED notes: Mr. Teri Overton is a 61 Y/O male presenting at [facility] by trauma consult, brought by EMS following a fall today  with LOC; PMH includes drug and alcohol abuse, cancer, HTN, HLD, and GERD. Per patient report he has been falling with increasing frequency over past 2 months, believes a fall while exiting a parked car two days ago is what hurt his back, but he also fell today. Patient states he typically does not remember falls, just finds himself on the ground. Daughter present in exam room states, falls often occur after standing up, he will begin to shake and then fall to floor, appear to lose consciousness briefly. Patient reports history of alcohol abuse, drank a pint of liquor today between 2949-0495, states this is not unusual for him. Restrictions/Precautions:  Restrictions/Precautions: General Precautions, Fall Risk  Required Braces or Orthoses  Spinal: Thoracic Lumbar Sacral Orthotics(May don in sitting)  Position Activity Restriction  Spinal Precautions: No Bending, No Lifting, No Twisting  Other position/activity restrictions: TLSO brace ordered for T12 fracture. Will need to wear this when up. May have it off when in bed. SUBJECTIVE: Pleasant and cooperative, agreeable to OT. Voices fatigue. PAIN: \"37\"/10: Pt initially stated.  When given explanation of pain scale and to provide a more realistic number as he was sitting in chair without grimaces or any s/s of pain, pt stated, \"6/10\"      COGNITION: Decreased Insight and Decreased Safety Awareness    ADL:   No ADL's completed this session. BALANCE:  Sitting Balance:  Independent. Standing Balance: Modified Independent. BED MOBILITY:  Not Tested    TRANSFERS:  Sit to Stand:  Modified Independent. recliner, w/c   Stand to Sit: Modified Independent. FUNCTIONAL MOBILITY:  Assistive Device: Rolling Walker  Assist Level:  Modified Independent. Distance: To and from therapy gym     ADDITIONAL ACTIVITIES:  - Patient has an HEP for home; HEP practiced with handout but withou VCing from [facility] to challenge pt's ability to complete HEP at home independently; 0 cues with handout; x 15 reps x 1 set with med resistive band. All completed to improve UE strength and tolerance for showering routine. ASSESSMENT:  Assessment: Polina De La Fuente has made steady progress on IP Rehab. He has progressed to modified independent level with his ADL routine and is able to complete with adhering to precautions. Pt still requires supervision for mobility due to intermittent c/o dizziness. Pt does have cognitive concerns and is difficult to redirect at times. He will benefit from [facility] services at discharge to improve safety awareness within ADL and IADL at home to decrease risk of fall and future injury. Activity Tolerance:  Patient tolerance of  treatment: good. Discharge Recommendations: Home with Home health OT, Home with nursing aide, 24 hour supervision or assist   Equipment Recommendations: Equipment Needed: No  Other: Pt has a shower chair. Does not need a BSC. Recommend a reacher. Discussed purchasing options.   Plan: Times per week: 5xs week x 90 min, 1 x week x 30 min  Current Treatment Recommendations: Patient/Caregiver Education & Training, Balance Training, Functional Mobility Training, Endurance Training, Safety Education & Training, Self-Care / ADL, Home Management Training, Equipment Evaluation, Education, & procurement    Patient Education  Patient Education: Home Exercise Program    Goals  Short term goals  Time Frame for Short term goals: 1 session  Short term goal 1: Pt will complete BADL routine with MI and no > min cues for problem solving, attetntion to task , and back safety to increase independence in self care tasks  Short term goal 2: Pt to demo toileting tasks and transfers with MI and no cues for safety  Short term goal 3: Pt to demo good dyamamic standing balance > 5 min with MI and 0-1 UE support while reaching outside base of support  in prep for retrieveing clothing items. Short term goal 4: DISCONTINUED GOAL  Long term goals  Time Frame for Long term goals : 1 day  Long term goal 1: DISCONTINUED GOAL  Long term goal 2: PT will complete 2 step homemaking tasks with S and 0-1 cues for proper body mechanics to increase ability to complete laundry tasks. Following session, patient left in safe position with all fall risk precautions in place.

## 2020-09-08 NOTE — PROGRESS NOTES
[address] [address]  INPATIENT PHYSICAL THERAPY  DAILY NOTE  [facility] - 7E-70/070-A    Time In: 0845  Time Out: 0900  Timed Code Treatment Minutes: 15 Minutes  Minutes: 15          Date: 2020  Patient Name: Meliton Oliveira,  Gender:  male        MRN: [id number]  : 1960  (60 y.o.)     Referring Practitioner: Dr. Belle Christianson  Diagnosis: Burst fx of T12, TBI  Additional Pertinent Hx: T12 compression fracture s/p fall, chronic alcohol abuse, drug abuse, recurrent falls, neck pain, scalp hematoma     Prior Level of Function:  Lives With: Other (comment)(Mother)  Type of Home: House  Home Layout: One level  Home Access: Stairs to enter without rails  Entrance Stairs - Number of Steps: 1  Home Equipment: Standard walker   Bathroom Shower/Tub: Tub/Shower unit, Shower chair with back  Bathroom Toilet: Standard  Bathroom Equipment: Grab bars in shower(not in great shape per pt. plastic grab bars.)    ADL Assistance: Independent  Ambulation Assistance: Independent  Transfer Assistance: Independent  Active : Yes  Additional Comments: Pt stated he was \"experimenting\" with using the walker prior to admission. Pt reports mother does most of the inside chores, Pt is responsible for outside chores and yardwork. Restrictions/Precautions:  Restrictions/Precautions: General Precautions, Fall Risk  Required Braces or Orthoses  Spinal: Thoracic Lumbar Sacral Orthotics(May don in sitting)  Position Activity Restriction  Spinal Precautions: No Bending, No Lifting, No Twisting  Other position/activity restrictions: TLSO brace ordered for T12 fracture. Will need to wear this when up. May have it off when in bed. SUBJECTIVE: Pt. Seated on EOB and pleasantly agrees to therapy session.  Treatment session delayed due to late breakfast.     PAIN: Not rated: Back    OBJECTIVE:  Transfers:  Sit to Stand: Modified Independent  Stand to Sit:Modified Independent  Car:Modified Independent    Ambulation:  Modified Independent  Distance: Community distance  Surface: Level Tile  Device:Rolling Walker  Gait Deviations:  Slow Dedra, Decreased Step Length Bilaterally, Decreased Weight Shift Bilaterally and Decreased Gait Speed    Stairs:  Platform:  6\" platform X 1 using Rolling Walker and Modified Independent. Exercise:  None    Functional Outcome Measures: Not completed       ASSESSMENT:  Assessment: Patient progressing toward established goals. Activity Tolerance:  Patient tolerance of  treatment: good. Equipment Recommendations:Equipment Needed: (RW ordered from E)  Discharge Recommendations:  Continue to assess pending progress, Patient would benefit from continued therapy after discharge    Plan: Times per week: 5x/wk 90 min, 1x/wk 30 min  Specific instructions for Next Treatment: US Rt adductors if pain noted. Current Treatment Recommendations: Strengthening, Functional Mobility Training, Balance Training, Equipment Evaluation, Education, & procurement, Gait Training, Stair training, Endurance Training, Transfer Training    Patient Education  Patient Education: Plan of Care, Precautions/Restrictions, Reviewed Prior Education, Home Safety Education    Goals:  Patient goals : get my dizziness better  Short term goals  Time Frame for Short term goals: 1 wk  Short term goal 1: bed mobility with Mod I, to get in/out of bed, use log roll technique  Short term goal 2: transfer with SBA to get in/out of chairs  Short term goal 3: amb >= 50''x1 with RW and SBA to progress to home and community mobility MET, see LTG  Short term goal 4: Pt to ascend/descend 6\" step with RW, CGA, for home entry. MET, see LTG  Short term goal 5: Pt to score >= 20/28 on Tinetti; perform TUG <= 25 sec, indicating improved balance.   Long term goals  Time Frame for Long term goals : 3 wks  Long term goal 1: transfer with Mod I, to get in/out of chairs  Long term goal 2: amb >= 150''x1 with RW and Mod I,

## 2020-09-08 NOTE — PROGRESS NOTES
CLINICAL PHARMACY NOTE: MEDS TO [address] Select Patient?: No  Total # of Prescriptions Filled: 1   The following medications were delivered to the patient:  Phenytoin 10% cream  Total # of Interventions Completed: 2  Time Spent (min): 30    Additional Documentation:

## 2020-09-08 NOTE — PROGRESS NOTES
[address] THERAPY  [facility]  PROGRESS NOTE    TIME   SLP Individual Minutes  Time In: 7553  Time Out: 1110  Minutes: 55  Timed Code Treatment Minutes: 55 Minutes       Date: 2020  Patient Name: Randolph Rosales      CSN: [id number]   : 1960  (60 y.o.)  Gender: male   Referring Physician:  Dr. Makeda Arnett   Diagnosis: Burst fracture of T12 vertebra with routine healing; TBI   Secondary Diagnosis: Cognitive impairments  Precautions: Fall Risk; Impulsivity; Seizure   Current Diet: General diet with thin liquids   Swallowing Strategies: Standard Universal Swallow Precautions  Date of Last MBS: Not Applicable    Pain: None reported    Subjective:  Pt upright in recliner, alert and present. No family present. Pt stood with contact guard assist and sat in wheelchair. Pt was guided in wheelchair by clinician when completing hospital navigation task. Pt was sociable with hospital staff encountered during navigation task. Occasional inappropriate comments were used, though these comments could be attributed to personality and baseline behavior. Short-Term Goals:  SHORT TERM GOAL #1:  Goal 1: Pt will complete immediate/delayed recall and working memory tasks with 90% accuracy given mod cues to improve retention of new and functional information. GOAL MET  REVISED GOAL: Pt will complete immediate/delayed recall and working memory tasks with 90% accuracy given min cues to improve retention of new and functional information.   INTERVENTIONS: *See Goal 4 for details in memory and recall   PREVIOUS SESSION:Recall breakfast items: 2/3 independent with use of memory log, 1/3 min verbal prompts for reference to memory log for recall  Delayed recall ST name and dept:  -15 minute delay: 1/2 min cues, 1/2 FO2  -25 minute delay: 2/2 independent     SHORT TERM GOAL #2:  Goal 2: Pt will complete sustained, selective, and divided attention tasks with no more than 3 errors within a given task in order to improve participation in treatment and permit eventual return to driving. GOAL MET  REVISED GOAL: Pt will complete sustained, selective, and divided attention tasks for 5-7 minutes with no more than 2 errors within a given task in order to improve participation in treatment and permit eventual return to driving. INTERVENTIONS: *See Goal 4 for details on attention     SHORT TERM GOAL #3:  Goal 3: Pt will complete functional reasoning and thought organizational tasks with 90% accuracy given mod cues to improve overall executive functioning skills. GOAL MET  REVISED GOAL: Pt will complete functional reasoning and thought organizational tasks with 90% accuracy given min cues to improve overall executive functioning skills. INTERVENTIONS: *See Goal 4 for details of functional reasoning and thought organizational tasks  PREVIOUS SESSION: Verbal reasoning + thought organization-Stating STEFANY + x1 holiday within each month: 10/12 holidays independent, 2/12 min cues for appropriate sequencing secondary to poor attention to task. 9/12 holidays IDd independently, 3/12 with min cues for improved reasoning. [place] calculation via verbally presented coin groups:  -2 coin value groups: 3/3 independent  -3 coin value groups: 5/10 independent, 3/10 min cues, 1/10 mod cues, 1/10 MAX cues   *Pt stating, \"That shouldn't be hard but it is. \" ST educated pt re: cognitive deficits and impact on functional task completion re: safety and success; pt highly receptive to [address] [place] [address] education. SHORT TERM GOAL #4:  Goal 4: Pt will complete functional problem solving and sequencing tasks with 90% accuracy given mod cues to improve ability to make successful return to IADL's (medication management, finances, etc.).  GOAL MET  REVISED GOAL: Pt will complete functional problem solving and sequencing tasks with 90% accuracy given min cues to improve ability to make successful return to IADL's (medication with managing finances, medications, and cooking tasks. Recommend continued speech therapy intervention to address cognitive functioning to increase level of independence in the home environment. Additionally recommend community support for alcohol/substance abuse. Patient planning for discharge home tomorrow with Dinah Medley. Activity Tolerance:  Patient tolerance of treatment: good. Assessment/Plan: Patient progressing toward established goals. Continues to require skilled care of licensed speech pathologist to progress toward achievement of established goals and plan of care. Plan for Next Session: Computer navigation task, functional problem solving and safety awareness     [employer] [facility] Speech Intern  Epimenio Clipper.  [address] [facility] [address]

## 2020-09-09 VITALS
HEIGHT: 76 IN | RESPIRATION RATE: 18 BRPM | SYSTOLIC BLOOD PRESSURE: 107 MMHG | WEIGHT: 236.13 LBS | BODY MASS INDEX: 28.76 KG/M2 | DIASTOLIC BLOOD PRESSURE: 71 MMHG | HEART RATE: 80 BPM | OXYGEN SATURATION: 99 % | TEMPERATURE: 98.1 F

## 2020-09-09 LAB
ANION GAP SERPL CALCULATED.3IONS-SCNC: 9 MEQ/L (ref 8–16)
BUN BLDV-MCNC: 19 MG/DL (ref 7–22)
CALCIUM SERPL-MCNC: 9.8 MG/DL (ref 8.5–10.5)
CHLORIDE BLD-SCNC: 107 MEQ/L (ref 98–111)
CO2: 25 MEQ/L (ref 23–33)
CREAT SERPL-MCNC: 1.1 MG/DL (ref 0.4–1.2)
GFR SERPL CREATININE-BSD FRML MDRD: 68 ML/MIN/1.73M2
GLUCOSE BLD-MCNC: 98 MG/DL (ref 70–108)
POTASSIUM SERPL-SCNC: 4.7 MEQ/L (ref 3.5–5.2)
SODIUM BLD-SCNC: 141 MEQ/L (ref 135–145)

## 2020-09-09 PROCEDURE — 97130 THER IVNTJ EA ADDL 15 MIN: CPT | Performed by: SPEECH-LANGUAGE PATHOLOGIST

## 2020-09-09 PROCEDURE — 97129 THER IVNTJ 1ST 15 MIN: CPT | Performed by: SPEECH-LANGUAGE PATHOLOGIST

## 2020-09-09 PROCEDURE — 97530 THERAPEUTIC ACTIVITIES: CPT

## 2020-09-09 PROCEDURE — 36415 COLL VENOUS BLD VENIPUNCTURE: CPT

## 2020-09-09 PROCEDURE — 94760 N-INVAS EAR/PLS OXIMETRY 1: CPT

## 2020-09-09 PROCEDURE — 97116 GAIT TRAINING THERAPY: CPT

## 2020-09-09 PROCEDURE — 80048 BASIC METABOLIC PNL TOTAL CA: CPT

## 2020-09-09 PROCEDURE — 6370000000 HC RX 637 (ALT 250 FOR IP): Performed by: FAMILY MEDICINE

## 2020-09-09 PROCEDURE — 6370000000 HC RX 637 (ALT 250 FOR IP): Performed by: SURGERY

## 2020-09-09 PROCEDURE — 97110 THERAPEUTIC EXERCISES: CPT

## 2020-09-09 RX ADMIN — FOLIC ACID 1 MG: 1 TABLET ORAL at 09:36

## 2020-09-09 RX ADMIN — DOCUSATE SODIUM 100 MG: 100 CAPSULE, LIQUID FILLED ORAL at 09:37

## 2020-09-09 RX ADMIN — THERA TABS 1 TABLET: TAB at 09:37

## 2020-09-09 RX ADMIN — CARBAMAZEPINE 200 MG: 200 TABLET ORAL at 09:37

## 2020-09-09 RX ADMIN — ACETAMINOPHEN 650 MG: 325 TABLET ORAL at 09:37

## 2020-09-09 RX ADMIN — ATENOLOL 100 MG: 100 TABLET ORAL at 09:36

## 2020-09-09 RX ADMIN — QUETIAPINE FUMARATE 400 MG: 400 TABLET ORAL at 09:37

## 2020-09-09 RX ADMIN — Medication 100 MG: at 09:37

## 2020-09-09 RX ADMIN — Medication 1000 MCG: at 09:37

## 2020-09-09 RX ADMIN — FERROUS SULFATE TAB 325 MG (65 MG ELEMENTAL FE) 325 MG: 325 (65 FE) TAB at 09:36

## 2020-09-09 RX ADMIN — OXYCODONE HYDROCHLORIDE AND ACETAMINOPHEN 250 MG: 500 TABLET ORAL at 09:39

## 2020-09-09 RX ADMIN — POLYETHYLENE GLYCOL 3350 17 G: 17 POWDER, FOR SOLUTION ORAL at 09:37

## 2020-09-09 RX ADMIN — PANTOPRAZOLE SODIUM 40 MG: 40 TABLET, DELAYED RELEASE ORAL at 05:51

## 2020-09-09 ASSESSMENT — PAIN DESCRIPTION - PAIN TYPE: TYPE: ACUTE PAIN;CHRONIC PAIN

## 2020-09-09 ASSESSMENT — PAIN SCALES - GENERAL
PAINLEVEL_OUTOF10: 6
PAINLEVEL_OUTOF10: 6

## 2020-09-09 NOTE — PROGRESS NOTES
[facility]  Recreational Therapy  Discharge Note  Inpatient Rehabilitation Unit           Date:  9/9/2020       Patient Name: Pansy Baumgarten      MRN: [id number]       YOB: 1960 (56 y.o.)       Gender: male  Diagnosis: Burst fracture T 12 with routine healing, TBI  Referring Practitioner: Dr. Agnes Troy    Patient discharged from Recreational Therapy at this time. See recreational therapy notes for details.     Electronically signed by: NOAH Coffey  Date: 9/9/2020

## 2020-09-09 NOTE — PLAN OF CARE
Problem: Skin Integrity:  Goal: Absence of new skin breakdown  Description: Absence of new skin breakdown  9/9/2020 0417 by Pati Russo LPN  Outcome: Ongoing  Pt shows no signs of new skin breakdown. Bilateral heels slightly pink but are blanchable. Problem: Falls - Risk of:  Goal: Will remain free from falls  Description: Will remain free from falls  9/9/2020 0417 by Pati Russo LPN  Outcome: Ongoing   Pt will remain free from falls. Pt uses call light appropriately to ask for assistance, demonstrates understanding of use of assistive devices.

## 2020-09-09 NOTE — PLAN OF CARE
Problem: Pain:  Goal: Pain level will decrease  Description: Pain level will decrease  Outcome: Ongoing     Problem: Anxiety:  Goal: Level of anxiety will decrease  Description: Level of anxiety will decrease  Outcome: Ongoing     Problem: Mood - Altered:  Goal: Mood stable  Description: Mood stable  Outcome: Ongoing     Problem: Skin Integrity:  Goal: Absence of new skin breakdown  Description: Absence of new skin breakdown  Outcome: Ongoing     Problem: Falls - Risk of:  Goal: Will remain free from falls  Description: Will remain free from falls  Outcome: Ongoing     Problem: IP MOBILITY  Goal: LTG - patient will demonstrate safe mobility requirements  Outcome: Ongoing

## 2020-09-09 NOTE — PROGRESS NOTES
Plan remains for discharge home today, 09/09/2020. STACIA contacted [facility] with discharge notification of today, 09/09/2020. Information provided to Karmen Olivares. Physician progress note, therapy notes, face to face encounter, and discharge instructions faxed to agency to continuity of care. SW was able to get in contact with patient's CMBrielle, at [facility] to provide update on patient's hospitalization. [facility] offers assistance with transportation for services and CMBrielle, to reach out to patient upon discharge to further discuss assistance available.

## 2020-09-09 NOTE — PROGRESS NOTES
[address] [address]  Inpatient Rehabilitation  Occupational Therapy  Discharge Note  Time:  Time In: 0830  Time Out: 0900  Timed Code Treatment Minutes: 30 Minutes  Minutes: 30          Date: 2020  Patient Name: Hollie Troy,   Gender: male      Room: -70/070-A  MRN: [id number]  : 1960  (60 y.o.)  Referring Practitioner: Dr. Altagracia Huitron  Diagnosis: Burst fracture T 12 with routine healing, TBI  Additional Pertinent Hx: Per ED notes: Mr. Sudha Cavazos is a 59 Y/O male presenting at [facility] by trauma consult, brought by EMS following a fall today  with LOC; PMH includes drug and alcohol abuse, cancer, HTN, HLD, and GERD. Per patient report he has been falling with increasing frequency over past 2 months, believes a fall while exiting a parked car two days ago is what hurt his back, but he also fell today. Patient states he typically does not remember falls, just finds himself on the ground. Daughter present in exam room states, falls often occur after standing up, he will begin to shake and then fall to floor, appear to lose consciousness briefly. Patient reports history of alcohol abuse, drank a pint of liquor today between [id number], states this is not unusual for him. Restrictions/Precautions:  Restrictions/Precautions: General Precautions, Fall Risk  Required Braces or Orthoses  Spinal: Thoracic Lumbar Sacral Orthotics(May don in sitting)  Position Activity Restriction  Spinal Precautions: No Bending, No Lifting, No Twisting  Other position/activity restrictions: TLSO brace ordered for T12 fracture. Will need to wear this when up. May have it off when in bed. SUBJECTIVE: Pt asked to review UB HEP for home. New handouts provided as patient states he misplaced his previous handouts.      PAIN:  None at rest /10:      COGNITION: Slow Processing, Decreased Recall, Decreased Insight, Decreased Safety Awareness and Impaired Attention    ADDITIONAL ACTIVITIES:  Patient identified a personal goal to increase UB strength and improve overall endurance so they can complete their toilet & shower transfers; skilled edu on UE strengthening and patient completed BUE strengthening exercises x15  reps x1 set this date with a medium resistance band in all joints and all planes. Patient tolerated well , requiring no rest breaks. Pt required min cues to attend to and problem solve exs from handout with visual and verbal language to assist with comprehension of exs at home. ASSESSMENT:  Activity Tolerance:  Patient tolerance of  treatment: good. Assessment: King Arauz has made steady progress on IP Rehab during his 12 days. He has progressed to modified independent level with his ADL routine and is able to complete while adhering to spinal  precautions. Pt still requires supervision for mobility and home IADL tasks due to intermittent c/o dizziness. Pt does have cognitive concerns and is difficult to redirect at times. He will benefit from [facility] services at discharge to improve safety awareness within ADL and IADL at home to decrease risk of fall and future injury. Discharge Recommendations: Home with Home health OT, Home with nursing aide, 24 hour supervision or assist  Equipment Recommendations: Equipment Needed: No  Other: Pt has a shower chair. Does not need a BSC. Recommend a reacher. Discussed purchasing options. Plan: Discharge home with home health OT and support from his mother.    Current Treatment Recommendations: Patient/Caregiver Education & Training, Balance Training, Functional Mobility Training, Endurance Training, Safety Education & Training, Self-Care / ADL, Home Management Training, Equipment Evaluation, Education, & procurement    Patient Education  Patient Education: Role of OT, Plan of Care, ADL's and Home Exercise Program    Goals  Short term goals  Time Frame for Short term goals: 1 session  Short term goal 1: Pt will complete BADL routine with MI and no > min cues for problem solving, attetntion to task , and back safety to increase independence in self care tasks  MET  Short term goal 2: Pt to demo toileting tasks and transfers with MI and no cues for safety MET  Short term goal 3: Pt to demo good dyamamic standing balance > 5 min with MI and 0-1 UE support while reaching outside base of support  in prep for retrieveing clothing items. MET  Short term goal 4: DISCONTINUED GOAL  Long term goals  Time Frame for Long term goals : 1 day  Long term goal 1: DISCONTINUED GOAL  Long term goal 2: PT will complete 2 step homemaking tasks with S and 0-1 cues for proper body mechanics to increase ability to complete laundry tasks. NOT MET     Following session, patient left in safe position with all fall risk precautions in place.

## 2020-09-09 NOTE — PROGRESS NOTES
[address] THERAPY  254 [facility]  DISCHARGE NOTE    TIME   SLP Individual Minutes  Time In: 0900  Time Out: 30  Minutes: 30  Timed Code Treatment Minutes: 30 Minutes       Date: 2020  Patient Name: Samina Billy      CSN: [id number]   : 1960  (60 y.o.)  Gender: male   Referring Physician:  Dr. Marielena Wing   Diagnosis: Burst fracture of T12 vertebra with routine healing; TBI   Secondary Diagnosis: Cognitive impairments  Precautions: Fall Risk; Impulsivity; Seizure   Current Diet: General diet with thin liquids   Swallowing Strategies: Standard Universal Swallow Precautions  Date of Last MBS: Not Applicable    Pain: None reported    Subjective:  Pt upright in recliner with TLSO in place. Pleasant and cooperative, but with continued inappropriate comments at times. *Spoke with daughter Vane Daniel in the phone yesterday. Discussed POC and progress patient has made over the course of his rehab stay. Reviewed recommendations for continued ST services via [facility], patient refraining from driving and need to continue engagement in healthy community activities to promote socialization. Daughter agreed and supportive of recommendations. Short-Term Goals:  SHORT TERM GOAL #1:  Goal 1: Pt will complete immediate/delayed recall and working memory tasks with 90% accuracy given min cues to improve retention of new and functional information. GOAL MET  INTERVENTIONS: Reviewed goals with patient and progress made over the course of his rehab stay. Discussed the different types of memory providing education on his specific deficits and possible reasons for the deficits (ie: history of alcohol and drug abuse, multiple falls with possible concussions).  Focused majority of the education on the importance of use of compensatory strategies, which patient has demonstrated SIGNIFICANT success with use of memory log, written \"to do\" lists, and written reminders regarding events. Reviewed current contributions to log, which were clear with appropriate organization (ie: use of headings, changes in pen color to indicate completion or importance) . Discussed ways to implement these strategies into home environment (ie: use of monthly calendar for documenting appointments/events, use of a 5 subject notebook to improve organization with a section for his memory log, section for \"to do\" items, and a section for his thoughts. Patient receptive to education. SHORT TERM GOAL #2:  Goal 2:  Pt will complete sustained, selective, and divided attention tasks for 5-7 minutes with no more than 2 errors within a given task in order to improve participation in treatment and permit eventual return to driving. GOAL NOT MET  INTERVENTIONS: Provided skilled education on the different types of attention and specific breakdowns in attention that have been evident in speech sessions, as well as information he has shared about events occurring prior to hospitalization (ie: paying for gas, but losing attention to task and not actually getting the gas.) Discussed ways to improve mental focus with suggestion for patient to write down these 3 steps and recite them when he feels his focus on wavering, 1) stop what you are doing, 2) remind yourself of what you are supposed to be doing, 3) Resume what you are supposed to be doing. Sustained, alternating and divided attention remain a continued area of concern as the directly impact all other areas of cognition. SHORT TERM GOAL #3:  Goal 3: Pt will complete functional reasoning and thought organizational tasks with 90% accuracy given min cues to improve overall executive functioning skills. GOAL NOT MET  INTERVENTIONS: Reviewed reasoning, and judgement with patient writing on his list \"determining the consequences of actions\" and \"prioritizing completion of tasks\" to assist with organizing thoughts and guiding reasoning skills.        SHORT TERM GOAL Pt's communication became tangential and required redirections to task. Overall, Pt appropriately utilized functional problem solving, reasoning, cognitive flexibility, attention to task, and sequencing to navigate environment with min cues. Pt expressed content and satisfaction when asked how he thought he performed on the hospital navigation task. Therapist provided feedback regarding skills he demonstrated well as well as areas for continued remediation. Long-Term Goals:  Timeframe for Long-term Goals: 3 weeks    LONG TERM GOAL #1:  Goal 1: Patient will improve cognitive functioning to a min assist level to increase level of independence with ADL and iADL tasks in the home environment and reduce caregiver burden. GOAL MET      Comprehension: 6 - Complex ideas 90% or device (hearing aid or glasses- if patient is primarily a visual learner)  Expression: 6 - Device used to express complex ideas/needs  Social Interaction: 5 - Patient is appropriate with supervision/cues  Problem Solvin - Patient able to solve simple/routine tasks  Memory: 5 - Patient requires prompting with stress/unfamiliar situations      EDUCATION:  Learner: Patient  Education:  Reviewed recommendations for follow-up, Education Related to Prevention of Recurrence of Impairment/Illness/Injury, Education Related to [employer] and Wellness, Home Safety Education and ST HEP  Evaluation of Education: [employer] understanding, Patient writing information discussed in his memory log. ASSESSMENT/PLAN:  SUMMARY  Patient met 2/4 STG (goals adjusted during yesterday's progress note and have not been addressed thus far) and  LTG. Improvements evident in immediate and delayed memory recall with use of compensatory strategies, sustained and selective attention, functional reasoning, functional problem solving, sequencing tasks/activities, and overall thought organization skills.  Despite overall improvements, Pt continues to present with mild (borderline moderate) cognitive impairments with retention of information (without use of compensatory strategies) , attending to details, higher level divided attention, and higher level thought organization. Given concerns regarding attention skills, recommend Pt refrain from driving at this time. Per daughter report, patient's cognition appears close to baseline, however he has not had ST services in the past and would benefit from further [facility] ST to assist with carryover of compensatory strategies, organization, and reasoning skills to optimize his level of independence in the home environment. Activity Tolerance:  Patient tolerance of treatment: good. Appropriate participation with POC. Assessment/Plan: Patient discharged from Speech Therapy at this time due to completion of inpatient rehab stay. Discharge Disposition: home with family support. Continued Speech Therapy Services recommended: Yes       Griselda Betts.  [address], [address]

## 2020-09-09 NOTE — PROGRESS NOTES
[facility]  INPATIENT PHYSICAL THERAPY  DISCHARGE NOTE  [address] [facility] - [id number]/070-A    Time In: 0700  Time Out: 0740  Timed Code Treatment Minutes: 40 Minutes  Minutes: 40          Date: 2020  Patient Name: Teri Lefort,  Gender:  male        MRN: [id number]  : 1960  (60 y.o.)     Referring Practitioner: Dr. Tretha Canavan  Diagnosis: Burst fx of T12, TBI  Additional Pertinent Hx: T12 compression fracture s/p fall, chronic alcohol abuse, drug abuse, recurrent falls, neck pain, scalp hematoma     Prior Level of Function:  Lives With: Other (comment)(Mother)  Type of Home: House  Home Layout: One level  Home Access: Stairs to enter without rails  Entrance Stairs - Number of Steps: 1  Home Equipment: Standard walker   Bathroom Shower/Tub: Tub/Shower unit, Shower chair with back  Bathroom Toilet: Standard  Bathroom Equipment: Grab bars in shower(not in great shape per pt. plastic grab bars.)    ADL Assistance: Independent  Ambulation Assistance: Independent  Transfer Assistance: Independent  Active : Yes  Additional Comments: Pt stated he was \"experimenting\" with using the walker prior to admission. Pt reports mother does most of the inside chores, Pt is responsible for outside chores and yardwork. Restrictions/Precautions:  Restrictions/Precautions: General Precautions, Fall Risk  Required Braces or Orthoses  Spinal: Thoracic Lumbar Sacral Orthotics(May don in sitting)  Position Activity Restriction  Spinal Precautions: No Bending, No Lifting, No Twisting  Other position/activity restrictions: TLSO brace ordered for T12 fracture. Will need to wear this when up. May have it off when in bed. SUBJECTIVE: Pt. Laying in bed and pleasantly agrees to therapy session. Pt. Mariaelena Gonzalez on discharge later today.      PAIN: None indicated    OBJECTIVE:  Bed Mobility:  Rolling to Right: Modified Independent   Supine to Sit: Modified Independent  Sit to Supine: Modified Independent     Transfers:  Sit to Stand: Modified Independent  Stand to Sit:Modified Independent    Ambulation:  Modified Independent  Distance: 150' x 2, 20' x 1, 40' x 2  Surface: Level Tile  Device:Rolling Walker, No AD for one 40' bout  Gait Deviations:  Decreased Gait Speed. Pt. Easily distracted. Pt. With mild posterior lean with ambulation at beginning of session    Stairs:  Stairs:  6\" steps. X 4 using Bilateral Handrails and Stand By Assistance, with increased time for completion. Pt. Unsteady during descent displaying increased trunk sway with posterior lean at times. Balance:  Dynamic Sitting Balance: Modified Independent  Dynamic Standing Balance: Modified Independent  While completing clothing management. Pt. Following back precautions well. Exercise:  None    Functional Outcome Measures: Completed  Balance Score: 13  Gait Score: 11  Tinetti Total Score: 24    Risk Indicators:  Less than/equal to 18 = high risk  19-23 Moderate risk  Greater than/equal to 24 = low risk      Timed Up and Go: 20 seconds  Normative Reference Values  60-69 years:  8.1 seconds  70-79 years: 9.2 seconds  80-99 years: 11.3 seconds    < 10 seconds is normal, a score of > 14 seconds indicates high fall risk        ASSESSMENT:  Assessment: Patient progressing toward established goals. Vicky Prince has met 5/5 STGs and 4/5 LTGs during his stay. He has made noted improvement in regards to all mobility with use of a rolling walker. Current score of 24 on Tinetti balance test places him at low risk for falls. Time required to complete the TUG has drastically improved to 20 seconds at this time. He is mod I for all bed mobility, transfers, and ambulation with use of rolling walker and requires SBA for step negotiation. He will be discharged to home with continued HHPT services to improve mobility and independence and to ensure safe return to home    Activity Tolerance:  Patient tolerance of  treatment: good. Equipment Recommendations:Equipment Needed: (RW ordered from HME)  Discharge Recommendations:  Continue to assess pending progress, Home with Home health PT    Plan: Pt. To be discharged home with mother and home health PT. Patient Education  Patient Education: Plan of Care, Precautions/Restrictions, Reviewed Prior Education, Health Promotion and Wellness Education, Home Safety Education    Goals:  Patient goals : get my dizziness better  Short term goals  Time Frame for Short term goals: 1 wk  Short term goal 1: bed mobility with Mod I, to get in/out of bed, use log roll technique  GOAL MET  Short term goal 2: transfer with SBA to get in/out of chairs  GOAL MET, SEE LTG  Short term goal 3: amb >= 50''x1 with RW and SBA to progress to home and community mobility MET, see LTG  Short term goal 4: Pt to ascend/descend 6\" step with RW, CGA, for home entry. MET, see LTG  Short term goal 5: Pt to score >= 20/28 on Tinetti; perform TUG <= 25 sec, indicating improved balance. GOAL MET, SEE LTG  Long term goals  Time Frame for Long term goals : 3 wks  Long term goal 1: transfer with Mod I, to get in/out of chairs  GOAL MET  Long term goal 2: amb >= 150''x1 with RW and Mod I, for home and community mobility  GOAL MET  Long term goal 3: Pt to ascend/descend 6\" step with RW, Mod I, for home entry. GOAL MET  Long term goal 4: Pt to score >= 24/28 on Tinetti; perform TUG ,<= 20 sec, indicating improved balance. GOAL MET  Long term goal 5: Pt to ascend/descend 4+ steps junior rails, Mod I, for community mobility  GOAL NOT MET    Following session, patient left in safe position with all fall risk precautions in place. Treatment session and note completed by Flip Weiner PTA.   Assessment and goal revision of Discharge Note completed by Milady Palacios, PT.

## 2020-09-29 ENCOUNTER — HOSPITAL ENCOUNTER (OUTPATIENT)
Age: 60
Discharge: HOME | End: 2020-09-29
Payer: MEDICARE

## 2020-09-29 LAB
ANION GAP SERPL CALCULATED.3IONS-SCNC: 14 MEQ/L (ref 8–16)
BACTERIA: ABNORMAL /HPF
BILIRUBIN URINE: NEGATIVE
BLOOD, URINE: NEGATIVE
BUN BLDV-MCNC: 14 MG/DL (ref 7–22)
CALCIUM SERPL-MCNC: 10 MG/DL (ref 8.5–10.5)
CASTS 2: ABNORMAL /LPF
CASTS UA: ABNORMAL /LPF
CHARACTER, URINE: CLEAR
CHLORIDE BLD-SCNC: 100 MEQ/L (ref 98–111)
CO2: 22 MEQ/L (ref 23–33)
COLOR: YELLOW
CREAT SERPL-MCNC: 1.4 MG/DL (ref 0.4–1.2)
CREATININE URINE: 113.5 MG/DL
CRYSTALS, UA: ABNORMAL
EPITHELIAL CELLS, UA: ABNORMAL /HPF
ERYTHROCYTE [DISTWIDTH] IN BLOOD BY AUTOMATED COUNT: 12.7 % (ref 11.5–14.5)
ERYTHROCYTE [DISTWIDTH] IN BLOOD BY AUTOMATED COUNT: 49.3 FL (ref 35–45)
FERRITIN: 442 NG/ML (ref 22–322)
FOLATE: 12.2 NG/ML (ref 4.8–24.2)
GFR SERPL CREATININE-BSD FRML MDRD: 52 ML/MIN/1.73M2
GLUCOSE BLD-MCNC: 104 MG/DL (ref 70–108)
GLUCOSE URINE: NEGATIVE MG/DL
HCT VFR BLD CALC: 40.3 % (ref 42–52)
HEMOGLOBIN: 12.7 GM/DL (ref 14–18)
IRON: 86 UG/DL (ref 65–195)
KETONES, URINE: NEGATIVE
LEUKOCYTE ESTERASE, URINE: ABNORMAL
MAGNESIUM: 2.1 MG/DL (ref 1.6–2.4)
MCH RBC QN AUTO: 33.5 PG (ref 26–33)
MCHC RBC AUTO-ENTMCNC: 31.5 GM/DL (ref 32.2–35.5)
MCV RBC AUTO: 106.3 FL (ref 80–94)
MISCELLANEOUS 2: ABNORMAL
NITRITE, URINE: NEGATIVE
PH UA: 7 (ref 5–9)
PLATELET # BLD: 238 THOU/MM3 (ref 130–400)
PMV BLD AUTO: 11.9 FL (ref 9.4–12.4)
POTASSIUM SERPL-SCNC: 4.9 MEQ/L (ref 3.5–5.2)
PREALBUMIN: 33.5 MG/DL (ref 20–40)
PROT/CREAT RATIO, UR: 0.07
PROTEIN UA: NEGATIVE
PROTEIN, URINE: 8.2 MG/DL
RBC # BLD: 3.79 MILL/MM3 (ref 4.7–6.1)
RBC URINE: ABNORMAL /HPF
RENAL EPITHELIAL, UA: ABNORMAL
SEDIMENTATION RATE, ERYTHROCYTE: 11 MM/HR (ref 0–10)
SODIUM BLD-SCNC: 136 MEQ/L (ref 135–145)
SPECIFIC GRAVITY, URINE: 1.02 (ref 1–1.03)
TOTAL IRON BINDING CAPACITY: 299 UG/DL (ref 171–450)
UROBILINOGEN, URINE: 1 EU/DL (ref 0–1)
VITAMIN B-12: 522 PG/ML (ref 211–911)
WBC # BLD: 6.2 THOU/MM3 (ref 4.8–10.8)
WBC UA: ABNORMAL /HPF
YEAST: ABNORMAL

## 2020-09-29 PROCEDURE — 84446 ASSAY OF VITAMIN E: CPT

## 2020-09-29 PROCEDURE — 82570 ASSAY OF URINE CREATININE: CPT

## 2020-09-29 PROCEDURE — 84156 ASSAY OF PROTEIN URINE: CPT

## 2020-09-29 PROCEDURE — 82746 ASSAY OF FOLIC ACID SERUM: CPT

## 2020-09-29 PROCEDURE — 80048 BASIC METABOLIC PNL TOTAL CA: CPT

## 2020-09-29 PROCEDURE — 82525 ASSAY OF COPPER: CPT

## 2020-09-29 PROCEDURE — 84165 PROTEIN E-PHORESIS SERUM: CPT

## 2020-09-29 PROCEDURE — 83090 ASSAY OF HOMOCYSTEINE: CPT

## 2020-09-29 PROCEDURE — 84207 ASSAY OF VITAMIN B-6: CPT

## 2020-09-29 PROCEDURE — 81001 URINALYSIS AUTO W/SCOPE: CPT

## 2020-09-29 PROCEDURE — 85651 RBC SED RATE NONAUTOMATED: CPT

## 2020-09-29 PROCEDURE — 83921 ORGANIC ACID SINGLE QUANT: CPT

## 2020-09-29 PROCEDURE — 83540 ASSAY OF IRON: CPT

## 2020-09-29 PROCEDURE — 86592 SYPHILIS TEST NON-TREP QUAL: CPT

## 2020-09-29 PROCEDURE — 82180 ASSAY OF ASCORBIC ACID: CPT

## 2020-09-29 PROCEDURE — 82728 ASSAY OF FERRITIN: CPT

## 2020-09-29 PROCEDURE — 84155 ASSAY OF PROTEIN SERUM: CPT

## 2020-09-29 PROCEDURE — 36415 COLL VENOUS BLD VENIPUNCTURE: CPT

## 2020-09-29 PROCEDURE — 84134 ASSAY OF PREALBUMIN: CPT

## 2020-09-29 PROCEDURE — 82607 VITAMIN B-12: CPT

## 2020-09-29 PROCEDURE — 83735 ASSAY OF MAGNESIUM: CPT

## 2020-09-29 PROCEDURE — 85027 COMPLETE CBC AUTOMATED: CPT

## 2020-09-29 PROCEDURE — 84630 ASSAY OF ZINC: CPT

## 2020-09-29 PROCEDURE — 84597 ASSAY OF VITAMIN K: CPT

## 2020-09-29 PROCEDURE — 84590 ASSAY OF VITAMIN A: CPT

## 2020-09-29 PROCEDURE — 84425 ASSAY OF VITAMIN B-1: CPT

## 2020-09-29 PROCEDURE — 84255 ASSAY OF SELENIUM: CPT

## 2020-09-29 PROCEDURE — 84466 ASSAY OF TRANSFERRIN: CPT

## 2020-09-30 LAB
RPR: NONREACTIVE
TRANSFERRIN: 250 MG/DL (ref 200–360)

## 2020-10-01 ENCOUNTER — HOSPITAL ENCOUNTER (OUTPATIENT)
Age: 60
Discharge: HOME | End: 2020-10-01
Payer: MEDICARE

## 2020-10-02 LAB — HOMOCYSTEINE: 26.6 UMOL/L

## 2020-10-03 LAB
COPPER: 106.4 UG/DL (ref 70–140)
METHYLMALONIC ACID: NORMAL
RETINOL (VITAMIN A): NORMAL
SELENIUM: 107.8 UG/L (ref 23–190)
VITAMIN E LEVEL: NORMAL
VITAMIN K1: NORMAL
ZINC: 96.9 UG/DL (ref 60–120)

## 2020-10-04 LAB
PROTEIN ELECTROPHORESIS, SERUM: NORMAL
VITAMIN B1 WHOLE BLOOD: 199 NMOL/L (ref 70–180)
VITAMIN B6: 16.8 NMOL/L (ref 20–125)

## 2020-10-05 LAB — VITAMIN C: NORMAL

## 2020-11-25 ENCOUNTER — HOSPITAL ENCOUNTER (OUTPATIENT)
Dept: PHYSICAL THERAPY | Age: 60
Setting detail: THERAPIES SERIES
Discharge: HOME | End: 2020-11-25
Payer: MEDICARE

## 2020-11-25 PROCEDURE — 97161 PT EVAL LOW COMPLEX 20 MIN: CPT

## 2020-11-25 PROCEDURE — 97110 THERAPEUTIC EXERCISES: CPT
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
## 2020-11-30 ENCOUNTER — HOSPITAL ENCOUNTER (OUTPATIENT)
Dept: PHYSICAL THERAPY | Age: 60
Setting detail: THERAPIES SERIES
Discharge: HOME | End: 2020-11-30
Payer: MEDICARE

## 2020-11-30 PROCEDURE — 97110 THERAPEUTIC EXERCISES: CPT

## 2020-11-30 NOTE — PROGRESS NOTES
[address] [facility]  PHYSICAL THERAPY  [] DAILY NOTE (LAND) [] DAILY NOTE (AQUATIC ) [] PROGRESS NOTE [] DISCHARGE NOTE    [] [address] [facility]   [x] Sally [address]    [] [facility]   [] Lit Jaramillo    Date: 2020  Patient Name:  Sarina Diaz  : 1960  MRN: [id number]  CSN: [id number]    Referring Practitioner Luly العلي MD   Diagnosis Other fracture of t11-T12 vertebra, subsequent encounter for fracture with routine healing [S22.088D]    Treatment Diagnosis Back pain, difficulty walking, decreased balance   Date of Evaluation 20   Additional Pertinent History See below      Functional Outcome Measure Used Tinetti balance   Functional Outcome Score eval  (20)       Insurance: Primary: Payor: [employer] /  /  / ,   Secondary:    Authorization Information: Medicare pays at 80%, modalities covered except ionto/MHP/CP not covered. Visit # 2, 2/10 for progress note   Visits Allowed: Unlimited    Recertification Date: 4578   Physician Follow-Up: 2021 f/u with Dr. Shikha Liao. Does also see nephrologist, urologist, cardiologist, psychologist at [facility] in [facility]. Physician Orders:    History of Present Illness: h     SUBJECTIVE: Patient reporting pain level 2/10 today and reporting no other complains at this time. Patient leaving walker over by chairs at the door and walking back to mat table without rolling walker. Educated patient to keeping walker with him at all times for now.        TREATMENT   Precautions: Avoid bending , lifting, twisting spine   Pain: 2/10 lower thoracic spine    X in shaded column indicates activity completed today   Modalities Parameters/  Location  Notes                     Manual Therapy Time/Technique  Notes                     Exercise/Intervention   Notes   Abdominal bracing  10 5 x    Abdominal bracing with UE flexion alternating arms 10  x    Abdominal bracing with quad set R then L 10 5 x    Abdominal bracing with SAQ 10 5 x Denies increase in pain with performance today   Abdominal bracing with hip adduction 10 5 x ball   Abdominal bracing with heel slides  10  x    Abdominal bracing with hip abduction  10 5 x Bilateral and Unilateral (green)   Abdominal bracing with SLR  10  x    Abdominal bracing with LAQ  10 5 x    Seated heel/toe raises  15  x Cues for sitting posture                  Heel/toe raises, marching, mini squats  10  x    Hip abduction  10  x             Specific Interventions Next Treatment: NuStep, DLSP, gentle stretching, strengthening, avoid increased pain, progress to standing, step, total gym    Activity/Treatment Tolerance:  [x]  Patient tolerated treatment well  []  Patient limited by fatigue  []  Patient limited by pain   []  Patient limited by medical complications  []  Other:     Assessment: Progressed with exercises as noted with patient tolerating well. Patient reporting right leg feeling a little weaker during some exercises. Patient reporting feeling a little dizzy at end of session so had patient sit in chair for a few minutes before walking out. Did walk with patient out to car. GOALS:  Patient Goal: to help with help with balance    Short Term Goals:  Time Frame: 4 weeks  1. Increase AROM lumbar extension to 50%, B hip flexion to 50 degrees to allow to report able to stand x 10 minutes to assist with clean up in kitchen with decreased back pain to 2/10  2  Increase R hip strength to 3+/5 to allow patient to report able to walk x 10 minutes per day  3. I with HEP to allow patient to walk without device mod I with no LOB    Long Term Goals:  Time Frame: 6 weeks  1. Increase AROM lumbar spine to 75%, thoracic rotation to 40 degrees, B hip flexion to 60 degrees to allow patient to return to getting groceries x 15 minutes using cart  2.   Increased abdominal strength to fair, LE to 4-/5 to allow patient to report able to return to taking

## 2020-12-02 ENCOUNTER — HOSPITAL ENCOUNTER (OUTPATIENT)
Dept: PHYSICAL THERAPY | Age: 60
Setting detail: THERAPIES SERIES
Discharge: HOME | End: 2020-12-02
Payer: MEDICARE

## 2020-12-02 PROCEDURE — 97110 THERAPEUTIC EXERCISES: CPT

## 2020-12-02 NOTE — PROGRESS NOTES
SAQ 10 5 x Denies increase in pain with performance today   Abdominal bracing with hip adduction 15 5 x ball   Abdominal bracing with heel slides  10  x    Abdominal bracing with hip abduction  15 5 x Bilateral and Unilateral (green)   Abdominal bracing with SLR  10      Abdominal bracing with LAQ  10 5 x    Seated heel/toe raises  15  x Cues for sitting posture                  Heel/toe raises, marching, mini squats  10      Hip abduction  10               Specific Interventions Next Treatment: NuStep, DLSP, gentle stretching, strengthening, avoid increased pain, progress to standing, step, total gym    Activity/Treatment Tolerance:  [x]  Patient tolerated treatment well  []  Patient limited by fatigue  []  Patient limited by pain   []  Patient limited by medical complications  []  Other:     Assessment: Progressed with reps as noted and held standing exercises today due to pt getting a little dizzy with during last session. Did again walk with patient out to his car due to patient no having rolling walker with him. Path deviations noted and wide base of support noted while walking with patient out to his car. GOALS:  Patient Goal: to help with help with balance    Short Term Goals:  Time Frame: 4 weeks  1. Increase AROM lumbar extension to 50%, B hip flexion to 50 degrees to allow to report able to stand x 10 minutes to assist with clean up in kitchen with decreased back pain to 2/10  2  Increase R hip strength to 3+/5 to allow patient to report able to walk x 10 minutes per day  3. I with HEP to allow patient to walk without device mod I with no LOB    Long Term Goals:  Time Frame: 6 weeks  1. Increase AROM lumbar spine to 75%, thoracic rotation to 40 degrees, B hip flexion to 60 degrees to allow patient to return to getting groceries x 15 minutes using cart  2. Increased abdominal strength to fair, LE to 4-/5 to allow patient to report able to return to taking trash out   3.   I with HEP as

## 2020-12-09 ENCOUNTER — HOSPITAL ENCOUNTER (OUTPATIENT)
Dept: PHYSICAL THERAPY | Age: 60
Setting detail: THERAPIES SERIES
Discharge: HOME | End: 2020-12-09
Payer: MEDICARE

## 2020-12-09 PROCEDURE — 97110 THERAPEUTIC EXERCISES: CPT

## 2020-12-09 NOTE — PROGRESS NOTES
[address] [facility]  PHYSICAL THERAPY  [] DAILY NOTE (LAND) [] DAILY NOTE (AQUATIC ) [] PROGRESS NOTE [] DISCHARGE NOTE    [] [address] [facility]   [x] [address]    [] [facility]   [] Merlinda Box    Date: 2020  Patient Name:  Giancarlo Hastings  : 1960  MRN: [id number]  CSN: [id number]    Referring Practitioner Estella Noyola MD   Diagnosis Other fracture of t11-T12 vertebra, subsequent encounter for fracture with routine healing [S22.088D]    Treatment Diagnosis Back pain, difficulty walking, decreased balance   Date of Evaluation 20   Additional Pertinent History See below      Functional Outcome Measure Used Tinetti balance   Functional Outcome Score eval  (20)       Insurance: Primary: Payor: [employer] /  /  / ,   Secondary:    Authorization Information: Medicare pays at 80%, modalities covered except ionto/MHP/CP not covered. Visit # 3, 3/10 for progress note   Visits Allowed: Unlimited    Recertification Date:    Physician Follow-Up: 2021 f/u with Dr. Keyla Dorado. Does also see nephrologist, urologist, cardiologist, psychologist at [facility] in [facility]. Physician Orders:    History of Present Illness: h     SUBJECTIVE: Patient ambulating with rolling walker into session. Patient reporting his low back has also been bothering him a little more and reporting he has been using the hot pack on his back. Patient stating he still is getting dizzy about 2-3 times a week and MD aware of this. Patient reporting the his BP sometimes runs low or he did get put on a new medication recently and that could be what is making him dizzy. TREATMENT   Precautions: Avoid bending , lifting, twisting spine   Pain: LBP 2/10.     X in shaded column indicates activity completed today   Modalities Parameters/  Location  Notes                     Manual Therapy Time/Technique  Notes Exercise/Intervention   Notes   Abdominal bracing  15 5 x    Abdominal bracing with UE flexion alternating arms 15  x    Abdominal bracing with quad set R then L 15 5 x    Abdominal bracing with SAQ 15 5 x Denies increase in pain with performance today   Abdominal bracing with hip adduction 15 5 x ball   Abdominal bracing with heel slides  10  x    Abdominal bracing with hip abduction  15 5 x Bilateral and Unilateral (green)   Abdominal bracing with SLR  15      Abdominal bracing with LAQ  10 5 x    Side lying hip abduction  10 5 x    HS stretch with strap  5 10 sec x    HS curl with green band  10  x    Heel/toe raises, marching, mini squats  10      Hip abduction  10               Specific Interventions Next Treatment: NuStep, DLSP, gentle stretching, strengthening, avoid increased pain, progress to standing, step, total gym    Activity/Treatment Tolerance:  [x]  Patient tolerated treatment well  []  Patient limited by fatigue  []  Patient limited by pain   []  Patient limited by medical complications  []  Other:     Assessment: Added reps as noted and added HS stretch, side lying hip abduction and HS curl with green band. Patient denies having any increase in pain at end of session and reporting no other complains. GOALS:  Patient Goal: to help with help with balance    Short Term Goals:  Time Frame: 4 weeks  1. Increase AROM lumbar extension to 50%, B hip flexion to 50 degrees to allow to report able to stand x 10 minutes to assist with clean up in kitchen with decreased back pain to 2/10  2  Increase R hip strength to 3+/5 to allow patient to report able to walk x 10 minutes per day  3. I with HEP to allow patient to walk without device mod I with no LOB    Long Term Goals:  Time Frame: 6 weeks  1. Increase AROM lumbar spine to 75%, thoracic rotation to 40 degrees, B hip flexion to 60 degrees to allow patient to return to getting groceries x 15 minutes using cart  2.   Increased abdominal strength to fair, LE to 4-/5 to allow patient to report able to return to taking trash out   3. I with HEP as prescribed to improve Tinetti mirlande 22/28 with no device to allow patient to walk outside x 15 minutes with no LOB or pain    Patient Education:    [x]  HEP/Education Completed: monitor response to progressions made. []  No new Education completed  []  Reviewed Prior HEP      []  Patient verbalized and/or demonstrated understanding of education provided. []  Patient unable to verbalize and/or demonstrate understanding of education provided. Will continue education. [x]  Barriers to learning: memory issue, bipolar, hx of alcoholism    PLAN:  Treatment Recommendations: Strengthening, Balance Training, Gait Training, Stair Training, Home Exercise Program and Patient Education      [x]  Continue with current plan of care. 2 times per week for 6 weeks.   []  Modify plan of care as follows:    []  Hold pending physician visit  []  Discharge    Time In 1006   Time Out 1039   Timed Code Minutes: 33 min   Total Treatment Time: 33 min       Electronically Signed by: Daryl Talbot

## 2020-12-11 ENCOUNTER — HOSPITAL ENCOUNTER (OUTPATIENT)
Dept: PHYSICAL THERAPY | Age: 60
Setting detail: THERAPIES SERIES
Discharge: HOME | End: 2020-12-11
Payer: MEDICARE

## 2020-12-11 PROCEDURE — 97110 THERAPEUTIC EXERCISES: CPT

## 2020-12-11 NOTE — PROGRESS NOTES
[address] [facility]  PHYSICAL THERAPY  [x] DAILY NOTE (LAND) [] DAILY NOTE (AQUATIC ) [] PROGRESS NOTE [] DISCHARGE NOTE    [] [facility]   [x] Sally 90    [] [facility]   [] Andrea Michelle    Date: 2020  Patient Name:  Micaela Cuellar  : 1960  MRN: [id number]  CSN: [id number]    Referring Practitioner Akash Salinas MD   Diagnosis Other fracture of t11-T12 vertebra, subsequent encounter for fracture with routine healing [S22.088D]    Treatment Diagnosis Back pain, difficulty walking, decreased balance   Date of Evaluation 20   Additional Pertinent History See below      Functional Outcome Measure Used Tinetti balance   Functional Outcome Score eval  (20)       Insurance: Primary: Payor: Oliver Miranda /  /  / ,   Secondary:    Authorization Information: Medicare pays at 80%, modalities covered except ionto/MHP/CP not covered. Visit # 5, 5/10 for progress note   Visits Allowed: Unlimited    Recertification Date: 2848   Physician Follow-Up: 2021 f/u with Dr. Elio Sloan. Does also see nephrologist, urologist, cardiologist, psychologist at [facility] in [facility]. Physician Orders:    History of Present Illness:      SUBJECTIVE: reports achiness yesterday in back to 3/10. Pain today 0/10, using walker at home for longer walks and in/out of house. No falls at all      TREATMENT   Precautions: Avoid bending , lifting, twisting spine   Pain: LBP 0/10.     X in shaded column indicates activity completed today   Modalities Parameters/  Location  Notes                     Manual Therapy Time/Technique  Notes                     Exercise/Intervention   Notes   NuStep seat 1 hole showing, UE 12, level 1 5 minutes  x    Abdominal bracing  15 5 x    Abdominal bracing with UE flexion alternating arms 15  x    Abdominal bracing with quad set R then L 15 5 x    Abdominal bracing with SAQ 15 5 x Denies increase in pain with performance today   Abdominal bracing with hip adduction 15 5 x ball   Abdominal bracing with heel slides  15  x    Abdominal bracing with hip abduction  15 5 x Bilateral and Unilateral (green)   Abdominal bracing with SLR  10 5     Abdominal bracing with LAQ  15 5 x    Side lying hip abduction  15 5 x    HS stretch with strap  5 10 sec x    HS curl with green band  10      Heel/toe raises, marching, mini squats  10      Hip abduction  15  x             Specific Interventions Next Treatment: NuStep, DLSP, gentle stretching, strengthening, avoid increased pain, progress to standing, step, total gym    Activity/Treatment Tolerance:  [x]  Patient tolerated treatment well  []  Patient limited by fatigue  []  Patient limited by pain   []  Patient limited by medical complications  []  Other:     Assessment: added NuStep and progressed with reps. Encouraged to breathe with exercises as tends to hold breath. Try more standing exercises and total gym next week        GOALS:  Patient Goal: to help with help with balance    Short Term Goals:  Time Frame: 4 weeks  1. Increase AROM lumbar extension to 50%, B hip flexion to 50 degrees to allow to report able to stand x 10 minutes to assist with clean up in kitchen with decreased back pain to 2/10  2  Increase R hip strength to 3+/5 to allow patient to report able to walk x 10 minutes per day  3. I with HEP to allow patient to walk without device mod I with no LOB    Long Term Goals:  Time Frame: 6 weeks  1. Increase AROM lumbar spine to 75%, thoracic rotation to 40 degrees, B hip flexion to 60 degrees to allow patient to return to getting groceries x 15 minutes using cart  2. Increased abdominal strength to fair, LE to 4-/5 to allow patient to report able to return to taking trash out   3.   I with HEP as prescribed to improve Tinetti mirlande 22/28 with no device to allow patient to walk outside x 15 minutes with no LOB or pain    Patient Education:    [x]

## 2020-12-16 ENCOUNTER — HOSPITAL ENCOUNTER (OUTPATIENT)
Dept: PHYSICAL THERAPY | Age: 60
Setting detail: THERAPIES SERIES
Discharge: HOME | End: 2020-12-16
Payer: MEDICARE

## 2020-12-16 PROCEDURE — 97110 THERAPEUTIC EXERCISES: CPT

## 2020-12-16 NOTE — PROGRESS NOTES
[address] [facility]  PHYSICAL THERAPY  [x] DAILY NOTE (LAND) [] DAILY NOTE (AQUATIC ) [] PROGRESS NOTE [] DISCHARGE NOTE    [] [facility]   [x] Sally 90    [] [facility]   [] Pinky Lefort    Date: 2020  Patient Name:  Bridget Heck  : 1960  MRN: [id number]  CSN: [id number]    Referring Practitioner Micky Baker MD   Diagnosis Other fracture of t11-T12 vertebra, subsequent encounter for fracture with routine healing [S22.088D]    Treatment Diagnosis Back pain, difficulty walking, decreased balance   Date of Evaluation 20   Additional Pertinent History See below      Functional Outcome Measure Used Tinetti balance   Functional Outcome Score eval  (20)       Insurance: Primary: Payor: Marimar Allen /  /  / ,   Secondary:    Authorization Information: Medicare pays at 80%, modalities covered except ionto/MHP/CP not covered. Visit # 6, 6/10 for progress note   Visits Allowed: Unlimited    Recertification Date: 04/3/0411   Physician Follow-Up: 2021 f/u with Dr. Shiv Noel. Does also see nephrologist, urologist, cardiologist, psychologist at [facility] in [facility]. Physician Orders:    History of Present Illness:      SUBJECTIVE: Patient reporting all over achiness 2/10 today and reporting no other complains at this time.       TREATMENT   Precautions: Avoid bending , lifting, twisting spine   Pain:  2/10 general achiness    X in shaded column indicates activity completed today   Modalities Parameters/  Location  Notes         Manual Therapy Time/Technique  Notes         Exercise/Intervention   Notes   NuStep seat 1 hole showing, UE 12, level 1 5 minutes  x    Abdominal bracing  15 5 x    Abdominal bracing with UE flexion alternating arms 20  x    Abdominal bracing with quad set R then L 20 5 x    Abdominal bracing with SAQ 20 5 x Denies increase in pain with performance today   Abdominal bracing with hip adduction 15 5 x ball   Abdominal bracing with heel slides  15  x    Abdominal bracing with hip abduction  20 5 x Bilateral and Unilateral (green)   Abdominal bracing with SLR  15 5 x    Abdominal bracing with LAQ  15 5 x    Side lying hip abduction  15 5     HS stretch with strap  5 10 sec     Heel/toe raises 10  x    Hip abduction  15  x             Specific Interventions Next Treatment: NuStep, DLSP, gentle stretching, strengthening, avoid increased pain, progress to standing, step, total gym    Activity/Treatment Tolerance:  [x]  Patient tolerated treatment well  []  Patient limited by fatigue  []  Patient limited by pain   []  Patient limited by medical complications  []  Other:     Assessment: Progressed with reps as noted with patient tolerating well but having fatigue. Patient reporting no complains at end of session. GOALS:  Patient Goal: to help with help with balance    Short Term Goals:  Time Frame: 4 weeks  1. Increase AROM lumbar extension to 50%, B hip flexion to 50 degrees to allow to report able to stand x 10 minutes to assist with clean up in kitchen with decreased back pain to 2/10  2  Increase R hip strength to 3+/5 to allow patient to report able to walk x 10 minutes per day  3. I with HEP to allow patient to walk without device mod I with no LOB    Long Term Goals:  Time Frame: 6 weeks  1. Increase AROM lumbar spine to 75%, thoracic rotation to 40 degrees, B hip flexion to 60 degrees to allow patient to return to getting groceries x 15 minutes using cart  2. Increased abdominal strength to fair, LE to 4-/5 to allow patient to report able to return to taking trash out   3. I with HEP as prescribed to improve Tinetti mirlande 22/28 with no device to allow patient to walk outside x 15 minutes with no LOB or pain    Patient Education:    [x]  HEP/Education Completed: monitor response to progressions made.   []  No new Education completed  [x]  Reviewed Prior HEP      []  Patient verbalized and/or demonstrated understanding of education provided. []  Patient unable to verbalize and/or demonstrate understanding of education provided. Will continue education. [x]  Barriers to learning: memory issue, bipolar, hx of alcoholism    PLAN:  Treatment Recommendations: Strengthening, Balance Training, Gait Training, Stair Training, Home Exercise Program and Patient Education      [x]  Continue with current plan of care. 2 times per week for 6 weeks.   []  Modify plan of care as follows:    []  Hold pending physician visit  []  Discharge    Time In 1142   Time Out 1218   Timed Code Minutes: 36 min   Total Treatment Time: 36 min       Electronically Signed by: Ruben Willams

## 2020-12-18 ENCOUNTER — HOSPITAL ENCOUNTER (OUTPATIENT)
Dept: PHYSICAL THERAPY | Age: 60
Setting detail: THERAPIES SERIES
Discharge: HOME | End: 2020-12-18
Payer: MEDICARE

## 2020-12-18 PROCEDURE — 97110 THERAPEUTIC EXERCISES: CPT

## 2020-12-18 NOTE — PROGRESS NOTES
[address] [facility]  PHYSICAL THERAPY  [x] DAILY NOTE (LAND) [] PROGRESS NOTE [] DISCHARGE NOTE     [x] [facility]        Date: 2020  Patient Name:  Germaine York  : 1960  MRN: [id number]  CSN: [id number]    Referring Practitioner Tierra Jama MD   Diagnosis Other fracture of t11-T12 vertebra, subsequent encounter for fracture with routine healing [S22.365D]    Treatment Diagnosis Back pain, difficulty walking, decreased balance   Date of Evaluation 20   Additional Pertinent History See below      Functional Outcome Measure Used Tinetti balance   Functional Outcome Score eval  (20)       Insurance: Primary: Payor: Efren Woodson /  /  / ,   Secondary:    Authorization Information: Medicare pays at 80%, modalities covered except ionto/MHP/CP not covered. Visit # 7, 7/10 for progress note   Visits Allowed: Unlimited    Recertification Date:    Physician Follow-Up: 2021 f/u with Dr. Carrol Mcdonald. Does also see nephrologist, urologist, cardiologist, psychologist at [facility] in [facility].    Physician Orders:    History of Present Illness:      SUBJECTIVE: Patient reporting feeling a little more sore following last session and currently reporting pain level/soreness 3/10      TREATMENT   Precautions: Avoid bending , lifting, twisting spine   Pain:  3/10 general achiness    X in shaded column indicates activity completed today   Modalities Parameters/  Location  Notes         Manual Therapy Time/Technique  Notes         Exercise/Intervention   Notes   NuStep seat 1 hole showing, UE 12, level 1 5 minutes      Abdominal bracing  15 5     Abdominal bracing with UE flexion alternating arms 20  x    Abdominal bracing with quad set R then L 20 5 x    Abdominal bracing with SAQ 20 5 x Denies increase in pain with performance today   Abdominal bracing with hip adduction 15 5  ball   Abdominal bracing with heel slides  20  x    Abdominal bracing with hip abduction  20 5  Bilateral and Unilateral (green)   Abdominal bracing with SLR  15 5 x    Abdominal bracing with LAQ  20 5 x    Side lying hip abduction  15 5     HS stretch with strap  5 10 sec     Heel/toe raises 10  x    Hip abduction  15  x             Specific Interventions Next Treatment: NuStep, DLSP, gentle stretching, strengthening, avoid increased pain, progress to standing, step, total gym    Activity/Treatment Tolerance:  [x]  Patient tolerated treatment well  []  Patient limited by fatigue  []  Patient limited by pain   []  Patient limited by medical complications  []  Other:     Assessment:  Able to make progressions with reps as note but did not get through with all exercises today. Patient reporting he is going to be sore again tomorrow. GOALS:  Patient Goal: to help with help with balance    Short Term Goals:  Time Frame: 4 weeks  1. Increase AROM lumbar extension to 50%, B hip flexion to 50 degrees to allow to report able to stand x 10 minutes to assist with clean up in kitchen with decreased back pain to 2/10  2  Increase R hip strength to 3+/5 to allow patient to report able to walk x 10 minutes per day  3. I with HEP to allow patient to walk without device mod I with no LOB    Long Term Goals:  Time Frame: 6 weeks  1. Increase AROM lumbar spine to 75%, thoracic rotation to 40 degrees, B hip flexion to 60 degrees to allow patient to return to getting groceries x 15 minutes using cart  2. Increased abdominal strength to fair, LE to 4-/5 to allow patient to report able to return to taking trash out   3. I with HEP as prescribed to improve Tinetti mirlande 22/28 with no device to allow patient to walk outside x 15 minutes with no LOB or pain    Patient Education:    [x]  HEP/Education Completed: encouraged patient to perform HEP consistently. []  No new Education completed  [x]  Reviewed Prior HEP      []  Patient verbalized and/or demonstrated understanding of education provided.   [] Patient unable to verbalize and/or demonstrate understanding of education provided. Will continue education. [x]  Barriers to learning: memory issue, bipolar, hx of alcoholism    PLAN:  Treatment Recommendations: Strengthening, Balance Training, Gait Training, Stair Training, Home Exercise Program and Patient Education      [x]  Continue with current plan of care. 2 times per week for 6 weeks.   []  Modify plan of care as follows:    []  Hold pending physician visit  []  Discharge    Time In 1011   Time Out 1033   Timed Code Minutes: 22 min   Total Treatment Time: 22 min       Electronically Signed by: Sahra Taylor

## 2020-12-21 ENCOUNTER — HOSPITAL ENCOUNTER (OUTPATIENT)
Dept: PHYSICAL THERAPY | Age: 60
Setting detail: THERAPIES SERIES
Discharge: HOME | End: 2020-12-21
Payer: MEDICARE

## 2020-12-21 PROCEDURE — 97110 THERAPEUTIC EXERCISES: CPT

## 2020-12-21 NOTE — PROGRESS NOTES
[address] [facility]  PHYSICAL THERAPY  [x] DAILY NOTE (LAND) [] PROGRESS NOTE [] DISCHARGE NOTE     [x] [facility]        Date: 2020  Patient Name:  Jearld Bloch  : 1960  MRN: [id number]  CSN: [id number]    Referring Practitioner Brandon Hobson MD   Diagnosis Other fracture of t11-T12 vertebra, subsequent encounter for fracture with routine healing [S22.198D]    Treatment Diagnosis Back pain, difficulty walking, decreased balance   Date of Evaluation 20   Additional Pertinent History See below      Functional Outcome Measure Used Tinetti balance   Functional Outcome Score eval  (20)       Insurance: Primary: Payor: Watson Gaytan /  /  / ,   Secondary:    Authorization Information: Medicare pays at 80%, modalities covered except ionto/MHP/CP not covered. Visit # 8, 8/10 for progress note   Visits Allowed: Unlimited    Recertification Date: 99/3/7105   Physician Follow-Up: 2021 f/u with Dr. Severa Peace. Does also see nephrologist, urologist, cardiologist, psychologist at [facility] in [facility]. Physician Orders:    History of Present Illness:      SUBJECTIVE: patient reports feeling that overall achiness 1-2/10 today. No falls and using RW. No falls since therapy started.       TREATMENT   Precautions: Avoid bending , lifting, twisting spine   Pain:  3/10 general achiness    X in shaded column indicates activity completed today   Modalities Parameters/  Location  Notes         Manual Therapy Time/Technique  Notes         Exercise/Intervention   Notes   NuStep seat 1 hole showing, UE 12, level 1 5 minutes      Abdominal bracing  15 5     Abdominal bracing with UE flexion alternating arms 20  x    Abdominal bracing with quad set R then L 20 5 x    Abdominal bracing with SAQ 20 5 x Denies increase in pain with performance today   Abdominal bracing with hip adduction 15 5  ball   Abdominal bracing with heel slides  20  x    Abdominal bracing with hip abduction  20 5  Bilateral and Unilateral (green)   Abdominal bracing with SLR  15 5 x    Abdominal bracing with LAQ  20 5 x    Side lying hip abduction  15 5     HS stretch with strap  5 10 sec     Heel/toe raises standing 15  x    Hip abduction standing, 15  x    Standing small knee bend 15  x      Specific Interventions Next Treatment: NuStep, DLSP, gentle stretching, strengthening, avoid increased pain, progress to standing, step, total gym    Activity/Treatment Tolerance:  [x]  Patient tolerated treatment well  []  Patient limited by fatigue  []  Patient limited by pain   []  Patient limited by medical complications  []  Other:     Assessment:  Alternated easy/hard exercise to avoid soreness in abdominal patient had last time. also added standing exercises to HEP with handout give, progress to more balance exercises    GOALS:  Patient Goal: to help with help with balance    Short Term Goals:  Time Frame: 4 weeks  1. Increase AROM lumbar extension to 50%, B hip flexion to 50 degrees to allow to report able to stand x 10 minutes to assist with clean up in kitchen with decreased back pain to 2/10  2  Increase R hip strength to 3+/5 to allow patient to report able to walk x 10 minutes per day  3. I with HEP to allow patient to walk without device mod I with no LOB    Long Term Goals:  Time Frame: 6 weeks  1. Increase AROM lumbar spine to 75%, thoracic rotation to 40 degrees, B hip flexion to 60 degrees to allow patient to return to getting groceries x 15 minutes using cart  2. Increased abdominal strength to fair, LE to 4-/5 to allow patient to report able to return to taking trash out   3.   I with HEP as prescribed to improve Tinetti mirlande 22/28 with no device to allow patient to walk outside x 15 minutes with no LOB or pain    Patient Education:    [x]  HEP/Education Completed: handout for standing heel raises, small knee bend, hip abduction given to HEP  []  No new Education completed  [x]  Reviewed Prior HEP      []  Patient verbalized and/or demonstrated understanding of education provided. []  Patient unable to verbalize and/or demonstrate understanding of education provided. Will continue education. [x]  Barriers to learning: memory issue, bipolar, hx of alcoholism    PLAN:  Treatment Recommendations: Strengthening, Balance Training, Gait Training, Stair Training, Home Exercise Program and Patient Education      [x]  Continue with current plan of care. 2 times per week for 6 weeks.   []  Modify plan of care as follows:    []  Hold pending physician visit  []  Discharge    Time In 1330   Time Out 1400   Timed Code Minutes: 30 min   Total Treatment Time: 30 min       Electronically Signed by: Teodoro Ba

## 2020-12-23 ENCOUNTER — HOSPITAL ENCOUNTER (OUTPATIENT)
Dept: PHYSICAL THERAPY | Age: 60
Setting detail: THERAPIES SERIES
Discharge: HOME | End: 2020-12-23
Payer: MEDICARE

## 2020-12-23 PROCEDURE — 97110 THERAPEUTIC EXERCISES: CPT

## 2020-12-23 NOTE — PROGRESS NOTES
[address] [facility]  PHYSICAL THERAPY  [x] DAILY NOTE (LAND) [] PROGRESS NOTE [] DISCHARGE NOTE      [x] [facility]        Date: 2020  Patient Name:  Oswaldo Brunson  : 1960  MRN: [id number]  CSN: [id number]    Referring Practitioner Monalisa Chambers MD   Diagnosis Other fracture of t11-T12 vertebra, subsequent encounter for fracture with routine healing [S22.603D]    Treatment Diagnosis Back pain, difficulty walking, decreased balance   Date of Evaluation 20   Additional Pertinent History See below      Functional Outcome Measure Used Tinetti balance   Functional Outcome Score eval  (20)       Insurance: Primary: Payor: [employer] /  /  / ,   Secondary:    Authorization Information: Medicare pays at 80%, modalities covered except ionto/MHP/CP not covered. Visit # 9, 910 for progress note   Visits Allowed: Unlimited    Recertification Date: 5516   Physician Follow-Up: 2021 f/u with Dr. Harl Holstein. Does also see nephrologist, urologist, cardiologist, psychologist at [facility] in [facility]. Physician Orders:    History of Present Illness:      SUBJECTIVE: Patient reporting neuropathy in hands bothering him more today and keeping gloves on. No other complains reported at this time.       TREATMENT   Precautions: Avoid bending , lifting, twisting spine   Pain:      X in shaded column indicates activity completed today   Modalities Parameters/  Location  Notes         Manual Therapy Time/Technique  Notes         Exercise/Intervention   Notes   NuStep seat 1 hole showing, UE 12, level 1 5 minutes      Abdominal bracing  15 5     Abdominal bracing with UE flexion alternating arms 20  x    Abdominal bracing with quad set R then L 20 5 x    Abdominal bracing with SAQ 20 5 x Denies increase in pain with performance today   Abdominal bracing with hip adduction 15 5  ball   Abdominal bracing with heel slides  20  x    Abdominal bracing with hip abduction  20 5  Bilateral and Unilateral (green)   Abdominal bracing with SLR  2 x 10 5 x    Abdominal bracing with LAQ  20 5 x    Side lying hip abduction  15 5     HS stretch with strap  5 10 sec     Heel/toe raises standing 15  x    Hip abduction standing, 15  x    Standing small knee bend 15        Specific Interventions Next Treatment: NuStep, DLSP, gentle stretching, strengthening, avoid increased pain, progress to standing, step, total gym    Activity/Treatment Tolerance:  [x]  Patient tolerated treatment well  []  Patient limited by fatigue  []  Patient limited by pain   []  Patient limited by medical complications  []  Other:     Assessment:  Progressed reps of SLR with patient having fatigue. Patient also reporting of having a little fatigue at end of session but having no other complains. GOALS:  Patient Goal: to help with help with balance    Short Term Goals:  Time Frame: 4 weeks  1. Increase AROM lumbar extension to 50%, B hip flexion to 50 degrees to allow to report able to stand x 10 minutes to assist with clean up in kitchen with decreased back pain to 2/10  2  Increase R hip strength to 3+/5 to allow patient to report able to walk x 10 minutes per day  3. I with HEP to allow patient to walk without device mod I with no LOB    Long Term Goals:  Time Frame: 6 weeks  1. Increase AROM lumbar spine to 75%, thoracic rotation to 40 degrees, B hip flexion to 60 degrees to allow patient to return to getting groceries x 15 minutes using cart  2. Increased abdominal strength to fair, LE to 4-/5 to allow patient to report able to return to taking trash out   3. I with HEP as prescribed to improve Tinetti mirlande 22/28 with no device to allow patient to walk outside x 15 minutes with no LOB or pain    Patient Education:    [x]  HEP/Education Completed:   [x]  No new Education completed  []  Reviewed Prior HEP      []  Patient verbalized and/or demonstrated understanding of education provided.   []  Patient unable to verbalize and/or demonstrate understanding of education provided. Will continue education. [x]  Barriers to learning: memory issue, bipolar, hx of alcoholism    PLAN:  Treatment Recommendations: Strengthening, Balance Training, Gait Training, Stair Training, Home Exercise Program and Patient Education      [x]  Continue with current plan of care. 2 times per week for 6 weeks.   []  Modify plan of care as follows:    []  Hold pending physician visit  []  Discharge    Time In 1106   Time Out 1139   Timed Code Minutes: 33 min   Total Treatment Time: 33 min       Electronically Signed by: Maria Fernanda Florence

## 2020-12-28 ENCOUNTER — HOSPITAL ENCOUNTER (OUTPATIENT)
Dept: PHYSICAL THERAPY | Age: 60
Setting detail: THERAPIES SERIES
End: 2020-12-28
Payer: MEDICARE

## 2020-12-30 ENCOUNTER — HOSPITAL ENCOUNTER (OUTPATIENT)
Dept: PHYSICAL THERAPY | Age: 60
Setting detail: THERAPIES SERIES
Discharge: HOME | End: 2020-12-30
Payer: MEDICARE

## 2020-12-30 PROCEDURE — 97110 THERAPEUTIC EXERCISES: CPT

## 2020-12-30 NOTE — PROGRESS NOTES
standing, 15  x    Step ups 4' forward and lateral 10  x bilateral   Rocker board f/b and s/s  15  x      Specific Interventions Next Treatment: NuStep, DLSP, gentle stretching, strengthening, avoid increased pain, progress to standing, step, total gym    Activity/Treatment Tolerance:  [x]  Patient tolerated treatment well  []  Patient limited by fatigue  []  Patient limited by pain   []  Patient limited by medical complications  []  Other:     Assessment:  Added more standing exercises with patient overall tolerating well but reporting legs would be a little sore tomorrow with exercises added. Currently no soreness or pain at end of session. GOALS:  Patient Goal: to help with help with balance    Short Term Goals:  Time Frame: 4 weeks  1. Increase AROM lumbar extension to 50%, B hip flexion to 50 degrees to allow to report able to stand x 10 minutes to assist with clean up in kitchen with decreased back pain to 2/10  2  Increase R hip strength to 3+/5 to allow patient to report able to walk x 10 minutes per day  3. I with HEP to allow patient to walk without device mod I with no LOB    Long Term Goals:  Time Frame: 6 weeks  1. Increase AROM lumbar spine to 75%, thoracic rotation to 40 degrees, B hip flexion to 60 degrees to allow patient to return to getting groceries x 15 minutes using cart  2. Increased abdominal strength to fair, LE to 4-/5 to allow patient to report able to return to taking trash out   3. I with HEP as prescribed to improve Tinetti mirlande 22/28 with no device to allow patient to walk outside x 15 minutes with no LOB or pain    Patient Education:   [x]  HEP/Education Completed: monitor response to progressions made. []  No new Education completed  []  Reviewed Prior HEP      []  Patient verbalized and/or demonstrated understanding of education provided. []  Patient unable to verbalize and/or demonstrate understanding of education provided. Will continue education.   [x]  Barriers to learning: memory issue, bipolar, hx of alcoholism    PLAN:  Treatment Recommendations: Strengthening, Balance Training, Gait Training, Stair Training, Home Exercise Program and Patient Education      [x]  Continue with current plan of care. 2 times per week for 6 weeks.   []  Modify plan of care as follows:    []  Hold pending physician visit  []  Discharge    Time In 1106   Time Out 1209   Timed Code Minutes: 32 min   Total Treatment Time: 32 min       Electronically Signed by: Jossy Bey

## 2021-01-07 ENCOUNTER — HOSPITAL ENCOUNTER (OUTPATIENT)
Dept: PHYSICAL THERAPY | Age: 61
Setting detail: THERAPIES SERIES
Discharge: HOME | End: 2021-01-07
Payer: MEDICARE

## 2021-01-07 PROCEDURE — 97110 THERAPEUTIC EXERCISES: CPT

## 2021-01-07 NOTE — PROGRESS NOTES
[facility] [facility]  PHYSICAL THERAPY  [] DAILY NOTE (LAND) [x] PROGRESS NOTE [] DISCHARGE NOTE      [x] [facility]        Date: 2021  Patient Name:  Valeria Cunha  : 1960  MRN: [id number]  CSN: [id number]    Referring Practitioner Leonardo Gatica MD   Diagnosis Other fracture of t11-T12 vertebra, subsequent encounter for fracture with routine healing [S22.088D]    Treatment Diagnosis Back pain, difficulty walking, decreased balance   Date of Evaluation 20   Additional Pertinent History See below      Functional Outcome Measure Used Tinetti balance   Functional Outcome Score eval  (20), PN        Insurance: Primary: Payor: Rashi Malagon /  /  / ,   Secondary:    Authorization Information: Medicare pays at 80%, modalities covered except ionto/MHP/CP not covered. Visit # 10, 10/10 for progress note   Visits Allowed: Unlimited    Recertification Date: [id number]   Physician Follow-Up: Alexandria Buckley of date of  f/u with Dr. Latisha Redman. Does also see nephrologist, urologist, cardiologist, psychologist at [facility] in [facility].    Physician Orders:    History of Present Illness:      SUBJECTIVE: reports no pain in past week,  Does present with new rx for 4-6 more week of PT      TREATMENT   Precautions: Avoid bending , lifting, twisting spine   Pain:      X in shaded column indicates activity completed today   Modalities Parameters/  Location  Notes         Manual Therapy Time/Technique  Notes         Exercise/Intervention   Notes   NuStep seat 1 hole showing, UE 12, level 3 5 minutes  X    Abdominal bracing with UE flexion alt arms 20      Abdominal bracing with SAQ 20 5  Denies increase in pain with performance today   Abdominal bracing with heel slides  20  x    Abdominal bracing side lying clamshell 10 5  Bilateral and Unilateral (green)   Abdominal bracing with SLR  15 5 x    Abdominal bracing with bridge 10 5 x    Abdominal bracing with LAQ  20 5 x 15 minutes with no LOB or pain. NOT MET ONGOING. CONTINUE TOWARD ABOVE LTG'S    Patient Education:   [x]  HEP/Education Completed: monitor response to progressions made. []  No new Education completed  []  Reviewed Prior HEP      []  Patient verbalized and/or demonstrated understanding of education provided. []  Patient unable to verbalize and/or demonstrate understanding of education provided. Will continue education. [x]  Barriers to learning: memory issue, bipolar, hx of alcoholism    PLAN:  Treatment Recommendations: Strengthening, Balance Training, Gait Training, Stair Training, Home Exercise Program and Patient Education      [x]  Continue with current plan of care. 2 times per week for 6 weeks.   []  Modify plan of care as follows:    []  Hold pending physician visit  []  Discharge    Time In 900   Time Out 930   Timed Code Minutes: 30 min   Total Treatment Time: 30 min       Electronically Signed by: Meg Roman

## 2021-01-12 ENCOUNTER — HOSPITAL ENCOUNTER (OUTPATIENT)
Dept: PHYSICAL THERAPY | Age: 61
Setting detail: THERAPIES SERIES
Discharge: HOME | End: 2021-01-12
Payer: MEDICARE

## 2021-01-12 PROCEDURE — 97110 THERAPEUTIC EXERCISES: CPT

## 2021-01-12 NOTE — DISCHARGE SUMMARY
[address] [facility]  PHYSICAL THERAPY  [] DAILY NOTE (LAND) [] PROGRESS NOTE [x] DISCHARGE NOTE      [x] [facility]        Date: 2021  Patient Name:  Airam Colvin  : 1960  MRN: [id number]  [facility]: [id number]    Referring Practitioner Magnolia Brice MD   Diagnosis Other fracture of t11-T12 vertebra, subsequent encounter for fracture with routine healing [S22.088D]    Treatment Diagnosis Back pain, difficulty walking, decreased balance   Date of Evaluation 20   Additional Pertinent History See below      Functional Outcome Measure Used Tinetti balance   Functional Outcome Score eval  (20), PN  , discharge       Insurance: Primary: Payor: Radha Baker /  /  / ,   Secondary:    Authorization Information: Medicare pays at 80%, modalities covered except ionto/MHP/CP not covered. Visit # 6, 11/10 for progress note   Visits Allowed: Unlimited    Recertification Date:    Physician Follow-Up: Gisel Blanton of date of  f/u with Dr. Lisa Giordano. Does also see nephrologist, urologist, cardiologist, psychologist at [facility] in [facility]. Physician Orders:    History of Present Illness:      SUBJECTIVE: patient presents with \"having a problem\". Patient reports MD started gabapentin last week and increased dizziness, slurred speech, difficulty getting in/out of car, walking. Is on visteril and wonders if this might have been interacting with new med, patient did call MD and decreased med from 3 pills to 2 pills per day for last 2 days \"but I still feel really off\". Patient requesting that he hold off on therapy right now and would like to be discharged until feeling better. PT asked if patient would like to be put on hold for a week or 2 but patient requesting discharge.   PT instructed that will need new rx and eval to start back up either from Dr. Lisa Giordano or family MD.  PT encouraged patient to call MD if side effects remain with decreased dosage . TREATMENT   Precautions: Avoid bending , lifting, twisting spine   Pain:  none currently    X in shaded column indicates activity completed today   Modalities Parameters/  Location  Notes         Manual Therapy Time/Technique  Notes         Exercise/Intervention   Notes   NuStep seat 1 hole showing, UE 12, level 3 5 minutes      Abdominal bracing with UE flexion alt arms 20      Abdominal bracing with SAQ 20 5  Denies increase in pain with performance today   Abdominal bracing with heel slides  20      Abdominal bracing side lying clamshell 10 5  Bilateral and Unilateral (green)   Abdominal bracing with SLR  15 5     Abdominal bracing with bridge 10 5     Abdominal bracing with LAQ  20 5     Heel/toe raises standing 15      Hip abduction standing, 15      Step ups 4' forward and lateral 10   bilateral   PT walking patient out to car with RW with min assist, difficulty with balance and assist needed to recover balance, PT putting RW in car for patient 1 x 300 feet min assist with RW  x    Total gym, level 8 squat 15      Rocker board f/b and s/s  15            Activity/Treatment Tolerance:  [x]  Patient tolerated treatment well  []  Patient limited by fatigue  []  Patient limited by pain   []  Patient limited by medical complications  []  Other:     Assessment:  Patient requests discharge at this time due to dizziness, slurred speech, decreased balance, feeling off with new medications. PT discussed holding but patient firm on desire to discharge. PT did advise to contact MD if s/s continue, use of RW at all times for safety and continue to contact MD with worsening s/s. New rx and eval needed to restart therapy    GOALS:  Patient Goal: to help with help with balance  1/12/2021  All goals with same measurements from last visit when PN was done. PATIENT REQUEST DISCHARGE DUE TO PROBLEMS WITH SIDE EFFECTS OF NEW MEDICATIONS SO WILL DISCHARGE  Short Term Goals:  Time Frame: 4 weeks  1.  Increase AROM lumbar extension to 50%, B hip flexion to 50 degrees to allow to report able to stand x 10 minutes to assist with clean up in kitchen with decreased back pain to 2/10. MET EXTENSION 50%, B HIP FLEXION 55 DEGREES, 10 MINUTES STANDING WITH NO PAIN  2  Increase R hip strength to 3+/5 to allow patient to report able to walk x 10 minutes per day. MET STRENGTH 3+/5 WITH WALKING X 10 MINUTES  3. I with HEP to allow patient to walk without device mod I with no LOB. MET FOR HEP, BALANCE IMPROVED FROM 14 TO 20/28 TINETTI BUT STILL REQUIRES ASSISTIVE DEVICE , CONTINUE WITH PT    Long Term Goals:  Time Frame: 6 weeks  1. Increase AROM lumbar spine to 75%, thoracic rotation to 40 degrees, B hip flexion to 60 degrees to allow patient to return to getting groceries x 15 minutes using cart. NOT MET , ONGOING  2. Increased abdominal strength to fair, LE to 4-/5 to allow patient to report able to return to taking trash out , NOT MET ONGOING  3. I with HEP as prescribed to improve Tinetti mirlande 22/28 with no device to allow patient to walk outside x 15 minutes with no LOB or pain. NOT MET ONGOING. CONTINUE TOWARD ABOVE LTG'S    Patient Education:   [x]  HEP/Education Completed: PT ADVISED PATIENT TO CONTINUE TO CONTACT MD WITH SIDE EFFECTS OF MEDICATIONS, ADVERSE REACTIONS START. DISCUSSED TO CONTINUE WITH HEP AS TOLERATED, NEW RX AND EVAL WILL BE NEEDED WHEN PATIENT WANTS TO RESTART THERAPY. []  No new Education completed  []  Reviewed Prior HEP      []  Patient verbalized and/or demonstrated understanding of education provided. []  Patient unable to verbalize and/or demonstrate understanding of education provided. Will continue education.   [x]  Barriers to learning: memory issue, bipolar, hx of alcoholism    PLAN:    [x]  Discharge    Time In 1130   Time Out 1145   Timed Code Minutes: 15 min   Total Treatment Time: 15 min       Electronically Signed by: Fabrziio [place]

## 2021-01-14 ENCOUNTER — APPOINTMENT (OUTPATIENT)
Dept: PHYSICAL THERAPY | Age: 61
End: 2021-01-14
Payer: MEDICARE

## 2021-01-19 ENCOUNTER — APPOINTMENT (OUTPATIENT)
Dept: PHYSICAL THERAPY | Age: 61
End: 2021-01-19
Payer: MEDICARE

## 2021-01-21 ENCOUNTER — APPOINTMENT (OUTPATIENT)
Dept: PHYSICAL THERAPY | Age: 61
End: 2021-01-21
Payer: MEDICARE

## 2021-01-26 ENCOUNTER — APPOINTMENT (OUTPATIENT)
Dept: PHYSICAL THERAPY | Age: 61
End: 2021-01-26
Payer: MEDICARE

## 2021-01-28 ENCOUNTER — APPOINTMENT (OUTPATIENT)
Dept: PHYSICAL THERAPY | Age: 61
End: 2021-01-28
Payer: MEDICARE

## 2021-06-15 ENCOUNTER — APPOINTMENT (OUTPATIENT)
Dept: GENERAL RADIOLOGY | Age: 61
End: 2021-06-15
Payer: MEDICARE

## 2021-06-15 ENCOUNTER — HOSPITAL ENCOUNTER (EMERGENCY)
Age: 61
Discharge: HOME | End: 2021-06-15
Payer: MEDICARE

## 2021-06-15 VITALS
RESPIRATION RATE: 18 BRPM | BODY MASS INDEX: 24.59 KG/M2 | DIASTOLIC BLOOD PRESSURE: 88 MMHG | HEART RATE: 70 BPM | TEMPERATURE: 98 F | OXYGEN SATURATION: 100 % | WEIGHT: 202 LBS | SYSTOLIC BLOOD PRESSURE: 134 MMHG

## 2021-06-15 DIAGNOSIS — T40.715A ADVERSE EFFECT OF CANNABIS, INITIAL ENCOUNTER: ICD-10-CM

## 2021-06-15 DIAGNOSIS — R63.4 WEIGHT LOSS: Primary | ICD-10-CM

## 2021-06-15 LAB
ALBUMIN SERPL-MCNC: 4.3 G/DL (ref 3.5–5.1)
ALP BLD-CCNC: 46 U/L (ref 38–126)
ALT SERPL-CCNC: 21 U/L (ref 11–66)
ANION GAP SERPL CALCULATED.3IONS-SCNC: 19 MEQ/L (ref 8–16)
AST SERPL-CCNC: 27 U/L (ref 5–40)
BASOPHILS # BLD: 0.9 %
BASOPHILS ABSOLUTE: 0.1 THOU/MM3 (ref 0–0.1)
BILIRUB SERPL-MCNC: 0.4 MG/DL (ref 0.3–1.2)
BILIRUBIN URINE: NEGATIVE
BLOOD, URINE: NEGATIVE
BUN BLDV-MCNC: 20 MG/DL (ref 7–22)
CALCIUM SERPL-MCNC: 9.9 MG/DL (ref 8.5–10.5)
CARBAMAZEPINE, TOTAL: 4 MCG/ML (ref 2–10)
CHARACTER, URINE: CLEAR
CHLORIDE BLD-SCNC: 96 MEQ/L (ref 98–111)
CO2: 21 MEQ/L (ref 23–33)
COLOR: YELLOW
CREAT SERPL-MCNC: 1.5 MG/DL (ref 0.4–1.2)
EOSINOPHIL # BLD: 1.7 %
EOSINOPHILS ABSOLUTE: 0.1 THOU/MM3 (ref 0–0.4)
ERYTHROCYTE [DISTWIDTH] IN BLOOD BY AUTOMATED COUNT: 12.3 % (ref 11.5–14.5)
ERYTHROCYTE [DISTWIDTH] IN BLOOD BY AUTOMATED COUNT: 43.3 FL (ref 35–45)
FOLATE: 10 NG/ML (ref 4.8–24.2)
GFR SERPL CREATININE-BSD FRML MDRD: 48 ML/MIN/1.73M2
GLUCOSE BLD-MCNC: 97 MG/DL (ref 70–108)
GLUCOSE URINE: NEGATIVE MG/DL
HCT VFR BLD CALC: 41 % (ref 42–52)
HEMOGLOBIN: 13.5 GM/DL (ref 14–18)
IMMATURE GRANS (ABS): 0.01 THOU/MM3 (ref 0–0.07)
IMMATURE GRANULOCYTES: 0.2 %
KETONES, URINE: 15
LACTIC ACID, SEPSIS: 1.2 MMOL/L (ref 0.5–1.9)
LEUKOCYTE ESTERASE, URINE: NEGATIVE
LIPASE: 17.1 U/L (ref 5.6–51.3)
LYMPHOCYTES # BLD: 35.1 %
LYMPHOCYTES ABSOLUTE: 2 THOU/MM3 (ref 1–4.8)
MAGNESIUM: 1.8 MG/DL (ref 1.6–2.4)
MCH RBC QN AUTO: 31.4 PG (ref 26–33)
MCHC RBC AUTO-ENTMCNC: 32.9 GM/DL (ref 32.2–35.5)
MCV RBC AUTO: 95.3 FL (ref 80–94)
MONOCYTES # BLD: 6.9 %
MONOCYTES ABSOLUTE: 0.4 THOU/MM3 (ref 0.4–1.3)
NITRITE, URINE: NEGATIVE
NUCLEATED RED BLOOD CELLS: 0 /100 WBC
OSMOLALITY CALCULATION: 274.5 MOSMOL/KG (ref 275–300)
PH UA: 5.5 (ref 5–9)
PLATELET # BLD: 207 THOU/MM3 (ref 130–400)
PMV BLD AUTO: 10.3 FL (ref 9.4–12.4)
POTASSIUM SERPL-SCNC: 4.4 MEQ/L (ref 3.5–5.2)
PROCALCITONIN: 0.08 NG/ML (ref 0.01–0.09)
PROTEIN UA: NEGATIVE
RBC # BLD: 4.3 MILL/MM3 (ref 4.7–6.1)
SALICYLATE, SERUM: < 0.3 MG/DL (ref 2–10)
SEG NEUTROPHILS: 55.2 %
SEGMENTED NEUTROPHILS ABSOLUTE COUNT: 3.2 THOU/MM3 (ref 1.8–7.7)
SODIUM BLD-SCNC: 136 MEQ/L (ref 135–145)
SPECIFIC GRAVITY, URINE: > 1.03 (ref 1–1.03)
TOTAL PROTEIN: 7.1 G/DL (ref 6.1–8)
TROPONIN T: < 0.01 NG/ML
UROBILINOGEN, URINE: 1 EU/DL (ref 0–1)
VITAMIN B-12: 882 PG/ML (ref 211–911)
WBC # BLD: 5.8 THOU/MM3 (ref 4.8–10.8)

## 2021-06-15 PROCEDURE — 82607 VITAMIN B-12: CPT

## 2021-06-15 PROCEDURE — 83690 ASSAY OF LIPASE: CPT

## 2021-06-15 PROCEDURE — 85025 COMPLETE CBC W/AUTO DIFF WBC: CPT

## 2021-06-15 PROCEDURE — 80179 DRUG ASSAY SALICYLATE: CPT

## 2021-06-15 PROCEDURE — 93005 ELECTROCARDIOGRAM TRACING: CPT | Performed by: PHYSICIAN ASSISTANT

## 2021-06-15 PROCEDURE — 71046 X-RAY EXAM CHEST 2 VIEWS: CPT

## 2021-06-15 PROCEDURE — 81003 URINALYSIS AUTO W/O SCOPE: CPT

## 2021-06-15 PROCEDURE — 83605 ASSAY OF LACTIC ACID: CPT

## 2021-06-15 PROCEDURE — 84484 ASSAY OF TROPONIN QUANT: CPT

## 2021-06-15 PROCEDURE — 80053 COMPREHEN METABOLIC PANEL: CPT

## 2021-06-15 PROCEDURE — 80156 ASSAY CARBAMAZEPINE TOTAL: CPT

## 2021-06-15 PROCEDURE — 96374 THER/PROPH/DIAG INJ IV PUSH: CPT

## 2021-06-15 PROCEDURE — 82746 ASSAY OF FOLIC ACID SERUM: CPT

## 2021-06-15 PROCEDURE — 2580000003 HC RX 258: Performed by: PHYSICIAN ASSISTANT

## 2021-06-15 PROCEDURE — 99283 EMERGENCY DEPT VISIT LOW MDM: CPT

## 2021-06-15 PROCEDURE — 84145 PROCALCITONIN (PCT): CPT

## 2021-06-15 PROCEDURE — 83735 ASSAY OF MAGNESIUM: CPT

## 2021-06-15 PROCEDURE — 36415 COLL VENOUS BLD VENIPUNCTURE: CPT

## 2021-06-15 PROCEDURE — 6360000002 HC RX W HCPCS: Performed by: PHYSICIAN ASSISTANT

## 2021-06-15 RX ORDER — ONDANSETRON 2 MG/ML
4 INJECTION INTRAMUSCULAR; INTRAVENOUS ONCE
Status: COMPLETED | OUTPATIENT
Start: 2021-06-15 | End: 2021-06-15

## 2021-06-15 RX ORDER — AMLODIPINE BESYLATE 2.5 MG/1
2.5 TABLET ORAL DAILY
COMMUNITY
End: 2022-04-26

## 2021-06-15 RX ORDER — 0.9 % SODIUM CHLORIDE 0.9 %
1000 INTRAVENOUS SOLUTION INTRAVENOUS ONCE
Status: COMPLETED | OUTPATIENT
Start: 2021-06-15 | End: 2021-06-15

## 2021-06-15 RX ORDER — ONDANSETRON 4 MG/1
4 TABLET, ORALLY DISINTEGRATING ORAL EVERY 8 HOURS PRN
Qty: 10 TABLET | Refills: 0 | Status: SHIPPED | OUTPATIENT
Start: 2021-06-15 | End: 2021-06-15 | Stop reason: SDUPTHER

## 2021-06-15 RX ORDER — ONDANSETRON 4 MG/1
4 TABLET, ORALLY DISINTEGRATING ORAL EVERY 8 HOURS PRN
Qty: 10 TABLET | Refills: 0 | Status: SHIPPED | OUTPATIENT
Start: 2021-06-15 | End: 2021-09-27 | Stop reason: ALTCHOICE

## 2021-06-15 RX ORDER — METOPROLOL SUCCINATE 50 MG/1
25 TABLET, EXTENDED RELEASE ORAL 2 TIMES DAILY
COMMUNITY
End: 2022-04-26 | Stop reason: ALTCHOICE

## 2021-06-15 RX ADMIN — SODIUM CHLORIDE 1000 ML: 9 INJECTION, SOLUTION INTRAVENOUS at 13:13

## 2021-06-15 RX ADMIN — ONDANSETRON 4 MG: 2 INJECTION INTRAMUSCULAR; INTRAVENOUS at 13:47

## 2021-06-15 ASSESSMENT — PAIN SCALES - GENERAL
PAINLEVEL_OUTOF10: 4
PAINLEVEL_OUTOF10: 3

## 2021-06-15 ASSESSMENT — PAIN DESCRIPTION - LOCATION: LOCATION: ABDOMEN

## 2021-06-15 ASSESSMENT — PAIN DESCRIPTION - ORIENTATION: ORIENTATION: RIGHT

## 2021-06-15 ASSESSMENT — PAIN DESCRIPTION - PAIN TYPE: TYPE: ACUTE PAIN

## 2021-06-15 NOTE — ED NOTES
Patient resting in bed. Respirations easy and unlabored. No distress noted. Call light within reach.        Yahaira Castillo RN  06/15/21 2540

## 2021-06-15 NOTE — ED NOTES
Patient resting in bed. Respirations easy and unlabored. No distress noted. Call light within reach.        Kiki Elizabeth RN  06/15/21 4969

## 2021-06-15 NOTE — ED TRIAGE NOTES
Pt presents to the ED for a weight loss of 25lbs in 7 days. Pt states that food does not taste correct and that he has a dry mouth. Pt states he went to [facility]. Pt states he had COVID over a month ago. Pt states he is also having anxiety attacks.

## 2021-06-16 LAB
EKG ATRIAL RATE: 77 BPM
EKG P AXIS: 63 DEGREES
EKG P-R INTERVAL: 176 MS
EKG Q-T INTERVAL: 378 MS
EKG QRS DURATION: 96 MS
EKG QTC CALCULATION (BAZETT): 427 MS
EKG R AXIS: 0 DEGREES
EKG T AXIS: 36 DEGREES
EKG VENTRICULAR RATE: 77 BPM

## 2021-06-16 PROCEDURE — 93010 ELECTROCARDIOGRAM REPORT: CPT | Performed by: INTERNAL MEDICINE

## 2021-06-19 ASSESSMENT — ENCOUNTER SYMPTOMS
VOMITING: 1
SORE THROAT: 0
DIARRHEA: 0
COUGH: 0
RHINORRHEA: 0
NAUSEA: 1
PHOTOPHOBIA: 0
CONSTIPATION: 1
SHORTNESS OF BREATH: 0
ABDOMINAL PAIN: 0

## 2021-06-19 NOTE — ED PROVIDER NOTES
PAST MEDICAL HISTORY    has a past medical history of Alcohol abuse, CKD (chronic kidney disease) stage 2, GFR 60-89 ml/min, Current every day smoker, Depression, Drug abuse ([address] GERD (gastroesophageal reflux disease), Hyperlipidemia, Hypertension, Neuropathy of both feet, and Skin cancer. SURGICAL HISTORY      has a past surgical history that includes Tonsillectomy and Tibia fracture surgery (Left, 12/28/2012). CURRENT MEDICATIONS       Discharge Medication List as of 6/15/2021  3:27 PM      CONTINUE these medications which have NOT CHANGED    Details   amLODIPine (NORVASC) 2.5 MG tablet Take 2.5 mg by mouth dailyHistorical Med      metoprolol succinate (TOPROL XL) 50 MG extended release tablet Take 25 mg by mouth 2 times dailyHistorical Med      vitamin B-12 1000 MCG tablet Take 1 tablet by mouth daily, Disp-30 tablet,R-3Normal      aspirin 325 MG tablet Take 325 mg by mouth dailyHistorical Med      fenofibrate (TRICOR) 54 MG tablet Take 54 mg by mouth daily s [facility] pharmacy: Please dispense generic fenofibrate unless prescriber denoteHistorical Med      tamsulosin (FLOMAX) 0.4 MG capsule Take 0.4 mg by mouth dailyHistorical Med      acetaminophen (APAP EXTRA STRENGTH) 500 MG tablet Take 1 tablet by mouth every 6 hours as needed for Pain, Disp-120 tablet, R-0Print      busPIRone HCl (BUSPAR PO) Take by mouth dailyHistorical Med      ATORVASTATIN CALCIUM PO Take 80 mg by mouth Historical Med      folic acid (FOLVITE) 1 MG tablet Take 1 tablet by mouth daily. , Disp-30 tablet, R-3      vitamin B-1 100 MG tablet Take 1 tablet by mouth daily. , Disp-30 tablet, R-3      carBAMazepine (TEGRETOL) 200 MG tablet Take 200 mg by mouth 2 times daily. QUEtiapine (SEROQUEL) 400 MG tablet Take 400 mg by mouth 2 times daily.       Multiple Vitamins-Minerals (THERAPEUTIC MULTIVITAMIN-MINERALS) tablet Take 1 tablet by mouth every evening Historical Med      FLUoxetine (PROZAC) 40 MG capsule Take 1 capsule by mouth Palpations: Abdomen is soft. Abdomen is not rigid. There is no pulsatile mass. Tenderness: There is no abdominal tenderness. There is no right CVA tenderness, left CVA tenderness, guarding or rebound. Negative signs include Hennessy's sign and McBurney's sign. Hernia: No hernia is present. Musculoskeletal:         General: Normal range of motion. Cervical back: No rigidity. No muscular tenderness. Comments: Movement normal as observed   Lymphadenopathy:      Cervical: No cervical adenopathy. Skin:     General: Skin is warm and dry. Coloration: Skin is not pale. Findings: No rash. Neurological:      General: No focal deficit present. Mental Status: He is alert and oriented to person, place, and time. Sensory: No sensory deficit. Gait: Gait normal.   Psychiatric:         Attention and Perception: Attention normal.         Mood and Affect: Mood is anxious. Speech: Speech normal.         Behavior: Behavior normal. Behavior is cooperative. Thought Content: Thought content normal.         Cognition and Memory: Cognition normal.         DIFFERENTIAL DIAGNOSIS:   Including but not limited to:  Adverse drug reaction to medical marijuana, STEPHANY, metabolic derangement, hyperthyroidism, anxiety with panic    DIAGNOSTIC RESULTS     EKG: All EKG's are interpreted by theEmergency Department Physician who either signs or Co-signs this chart in the absence of a cardiologist.  Ventricular Rate BPM 77    Atrial Rate BPM 77    P-R Interval ms 176    QRS Duration ms 96    Q-T Interval ms 378    QTc Calculation (Bazett) ms 427    P Axis degrees 63    R Axis degrees 0    T Axis degrees 36    Narrative & Impression    Normal sinus rhythm  Incomplete right bundle branch block  T wave abnormality, consider anterior ischemia  Abnormal ECG  When compared with ECG of 24-AUG-2020 17:45,  Criteria for Septal infarct are no longer Present         RADIOLOGY: non-plain film images(s) such as CT,Ultrasound and MRI are read by the radiologist.  Plain radiographic images are visualized and preliminarily interpreted by the emergency physician unless otherwise stated below. XR CHEST (2 VW)   Final Result   1. Normal heart size. Loop recorder projects over left side of the chest.   2. No acute findings. No infiltrates or effusions are seen. **This report has been created using voice recognition software. It may contain minor errors which are inherent in voice recognition technology. **      Final report electronically signed by Dr. Leo Schwab on 6/15/2021 1:19 PM          LABS:   Labs Reviewed   CBC WITH AUTO DIFFERENTIAL - Abnormal; Notable for the following components:       Result Value    RBC 4.30 (*)     Hemoglobin 13.5 (*)     Hematocrit 41.0 (*)     MCV 95.3 (*)     All other components within normal limits   COMPREHENSIVE METABOLIC PANEL - Abnormal; Notable for the following components:    CREATININE 1.5 (*)     Chloride 96 (*)     CO2 21 (*)     All other components within normal limits   SALICYLATE LEVEL - Abnormal; Notable for the following components:    Salicylate, Serum < 0.3 (*)     All other components within normal limits   URINE RT REFLEX TO CULTURE - Abnormal; Notable for the following components:    Ketones, Urine 15 (*)     Specific Gravity, Urine > 1.030 (*)     All other components within normal limits   ANION GAP - Abnormal; Notable for the following components:    Anion Gap 19.0 (*)     All other components within normal limits   GLOMERULAR FILTRATION RATE, ESTIMATED - Abnormal; Notable for the following components:    Est, Glom Filt Rate 48 (*)     All other components within normal limits   OSMOLALITY - Abnormal; Notable for the following components:    Osmolality Calc 274.5 (*)     All other components within normal limits   TROPONIN   LACTATE, SEPSIS   PROCALCITONIN   MAGNESIUM   VITAMIN B12 & FOLATE   CARBAMAZEPINE LEVEL, TOTAL   LIPASE       EMERGENCY DEPARTMENT COURSE:   Vitals:    Vitals:    06/15/21 1209 06/15/21 1348 06/15/21 1349 06/15/21 1447   BP: 109/82  (!) 123/90 134/88   Pulse: 86 69  70   Resp: 18 16  18   Temp: 98 °F (36.7 °C)      TempSrc: Oral      SpO2: 94% 100%  100%   Weight: 202 lb (91.6 kg)          MDM:  The patient was seen and evaluated within the ED today for weight loss among other complaints since starting medical marijuana. On exam, I appreciated no acute findings. The patient was neurologically intact with a soft and nontender abdomen. Old records were reviewed. Within the department, I observed the patient's vital signs to be within acceptable range. Radiological studies within the department revealed no acute intra cardiopulmonary process. Laboratory work was reassuring. Within the department the patient was treated with saline and Zofran. I observed the patient's condition to modestly improve during the duration of their stay. On re-exam abdomen remained soft and nontender. Patient reported he felt improved. Vital signs remained stable. The patient remained non-toxic appearing with no distress evident in the ER. There was no sinister etiology uncovered for patient's symptoms. He was deemed appropriate for discharge. The patient was comfortable with the plan of discharge home and to follow up with Xiomara Herrera NP. Anticipatory guidance was given. The patient was instructed to return to the emergency department for any worsening of their symptoms. Patient was discharged from the emergency department in good condition with all questions answered. See disposition below. I have given the patient strict written and verbal instructions about care at home, follow-up, and signs and symptoms of worsening of condition and they did verbalize understanding.      Medications   0.9 % sodium chloride bolus (0 mLs Intravenous Stopped 6/15/21 1413)   ondansetron (ZOFRAN) injection 4 mg (4 mg Intravenous Given 6/15/21 1347)       CRITICAL CARE:

## 2021-09-27 ENCOUNTER — HOSPITAL ENCOUNTER (EMERGENCY)
Age: 61
Discharge: HOME | End: 2021-09-27
Payer: MEDICARE

## 2021-09-27 VITALS
OXYGEN SATURATION: 95 % | HEIGHT: 76 IN | SYSTOLIC BLOOD PRESSURE: 98 MMHG | BODY MASS INDEX: 26.18 KG/M2 | HEART RATE: 84 BPM | DIASTOLIC BLOOD PRESSURE: 64 MMHG | RESPIRATION RATE: 18 BRPM | TEMPERATURE: 98 F | WEIGHT: 215 LBS

## 2021-09-27 DIAGNOSIS — S91.209A WOUND OF TOENAIL, INITIAL ENCOUNTER: Primary | ICD-10-CM

## 2021-09-27 PROCEDURE — 99213 OFFICE O/P EST LOW 20 MIN: CPT

## 2021-09-27 PROCEDURE — 99212 OFFICE O/P EST SF 10 MIN: CPT | Performed by: NURSE PRACTITIONER

## 2021-09-27 RX ORDER — PREGABALIN 25 MG/1
25 CAPSULE ORAL 2 TIMES DAILY
COMMUNITY
End: 2022-04-26

## 2021-09-27 ASSESSMENT — ENCOUNTER SYMPTOMS
VOMITING: 0
NAUSEA: 0

## 2021-09-27 NOTE — ED TRIAGE NOTES
Pt to urgent care due to a possible wound infection in his left great toe. Pt states roughly 2 months ago he 'Preformed home surgery\" on his toe and was afraid it was infected.

## 2021-09-27 NOTE — ED NOTES
Discharge instructions  reviewed with pt. Pt verbalized understanding. Pt ambulated out in stable condition. Assessment unchanged upon discharge.      Mar Dick RN  09/27/21 [id number]

## 2021-09-27 NOTE — ED PROVIDER NOTES
[address]  Urgent Care Encounter       CHIEF COMPLAINT       Chief Complaint   Patient presents with    Wound Infection     Left great toe       Nurses Notes reviewed and I agree except as noted in the HPI. HISTORY OF PRESENT ILLNESS   Destinee Markham is a 63 y.o. male who presents for evaluation of the toenail wound. Patient has had problems with a toenail been thickened over the last several months. He attempted to cut some of the toenail on his own and in the process cut into the nailbed. He denies pain, redness, swelling and fever. He did schedule an appointment with a podiatrist but cannot get in until sometime in November. The history is provided by the patient. REVIEW OF SYSTEMS     Review of Systems   Constitutional: Negative for chills and fever. Gastrointestinal: Negative for nausea and vomiting. Skin: Positive for wound. Neurological: Positive for numbness (Chronic in feet due to neuropathy). PAST MEDICAL HISTORY         Diagnosis Date    Alcohol abuse     CKD (chronic kidney disease) stage 2, GFR 60-89 ml/min     Current every day smoker     Depression     Drug abuse ([facility] [address]     2008    GERD (gastroesophageal reflux disease)     Hyperlipidemia     Hypertension     Neuropathy of both feet     Skin cancer        SURGICALHISTORY     Patient  has a past surgical history that includes Tonsillectomy and Tibia fracture surgery (Left, 12/28/2012). CURRENT MEDICATIONS       Discharge Medication List as of 9/27/2021  2:31 PM      CONTINUE these medications which have NOT CHANGED    Details   pregabalin (LYRICA) 25 MG capsule Take 25 mg by mouth 2 times daily. Historical Med      amLODIPine (NORVASC) 2.5 MG tablet Take 2.5 mg by mouth dailyHistorical Med      metoprolol succinate (TOPROL XL) 50 MG extended release tablet Take 25 mg by mouth 2 times dailyHistorical Med      vitamin B-12 1000 MCG tablet Take 1 tablet by mouth daily, Disp-30 tablet,R-3Normal      aspirin 325 MG tablet Take 81 mg by mouth daily Historical Med      fenofibrate (TRICOR) 54 MG tablet Take 54 mg by mouth daily s [facility] pharmacy: Please dispense generic fenofibrate unless prescriber denoteHistorical Med      tamsulosin (FLOMAX) 0.4 MG capsule Take 0.4 mg by mouth dailyHistorical Med      busPIRone HCl (BUSPAR PO) Take by mouth dailyHistorical Med      ATORVASTATIN CALCIUM PO Take 80 mg by mouth Historical Med      folic acid (FOLVITE) 1 MG tablet Take 1 tablet by mouth daily. , Disp-30 tablet, R-3      vitamin B-1 100 MG tablet Take 1 tablet by mouth daily. , Disp-30 tablet, R-3      carBAMazepine (TEGRETOL) 200 MG tablet Take 200 mg by mouth 2 times daily. QUEtiapine (SEROQUEL) 400 MG tablet Take 400 mg by mouth 2 times daily. Multiple Vitamins-Minerals (THERAPEUTIC MULTIVITAMIN-MINERALS) tablet Take 1 tablet by mouth every evening Historical Med      FLUoxetine (PROZAC) 40 MG capsule Take 1 capsule by mouth daily. , Disp-30 capsule, R-0      traZODone (DESYREL) 100 MG tablet Take 1 tablet by mouth nightly as needed for Sleep., Disp-30 tablet, R-0      acetaminophen (APAP EXTRA STRENGTH) 500 MG tablet Take 1 tablet by mouth every 6 hours as needed for Pain, Disp-120 tablet, R-0Print             ALLERGIES     Patient is has No Known Allergies. Patients   Immunization History   Administered Date(s) Administered    PPD Test 01/02/2013, 01/09/2013    Pneumococcal Polysaccharide ([id number]) 12/22/2014    Tdap (Boostrix, Adacel) 08/16/2019       FAMILY HISTORY     Patient's family history includes COPD in his father; Heart Disease in his father and mother. SOCIAL HISTORY     Patient  reports that he has been smoking cigarettes. He has a 40.00 pack-year smoking history. He has never used smokeless tobacco. He reports current alcohol use. He reports previous drug use. Drug: Cocaine.     PHYSICAL EXAM     ED TRIAGE VITALS  BP: 98/64, Temp: 98 °F (36.7 °C), Pulse: 84, Resp: 18, SpO2: 95 %,Estimated body mass index is 26.17 kg/m² as calculated from the following:    Height as of this encounter: 6' 4\" (1.93 m). Weight as of this encounter: 215 lb (97.5 kg). ,No LMP for male patient. Physical Exam  Vitals and nursing note reviewed. Constitutional:       General: He is not in acute distress. Appearance: He is well-developed. HENT:      Head: Normocephalic and atraumatic. Pulmonary:      Effort: Pulmonary effort is normal. No respiratory distress. Musculoskeletal:        Feet:       Comments: Left great toenail has been partially removed distally. Toenail is thickened. Dark scabbing noted under the nail and the nail bed. Nailbed as well as hypertrophied. Skin:     General: Skin is warm and dry. Neurological:      General: No focal deficit present. Mental Status: He is alert and oriented to person, place, and time. Psychiatric:         Mood and Affect: Mood normal.         Speech: Speech normal.         Behavior: Behavior normal. Behavior is cooperative. DIAGNOSTIC RESULTS     Labs:No results found for this visit on 09/27/21. IMAGING:    No orders to display         EKG:      URGENT CARE COURSE:     Vitals:    09/27/21 1421   BP: 98/64   Pulse: 84   Resp: 18   Temp: 98 °F (36.7 °C)   TempSrc: Temporal   SpO2: 95%   Weight: 215 lb (97.5 kg)   Height: 6' 4\" (1.93 m)       Medications - No data to display         PROCEDURES:  None    FINAL IMPRESSION      1. Wound of toenail, initial encounter          DISPOSITION/ PLAN     Patient presents with a wound to the toenail of the left great toe. The wound does not appear infected. Recommend patient follow-up with podiatry for evaluation. Follow-up as needed prior to podiatry if any signs of infection which were discussed. Further instructions were outlined verbally and in the patient's discharge instructions. All the patient's questions were answered.  The patient/parent agreed with the plan and was discharged from the  center in good condition.       PATIENT REFERRED TO:  DEBRA Knapp CNP  [address] / [place] Sultana [address]:  Discharge Medication List as of 9/27/2021  2:31 PM          Discharge Medication List as of 9/27/2021  2:31 PM          Discharge Medication List as of 9/27/2021  2:31 PM          DEBRA Escobedo CNP    (Please note that portions of this note were completed with a voice recognition program. Efforts were made to edit the dictations but occasionally words are mis-transcribed.)         DEBRA Escobedo CNP  09/27/21 6331

## 2021-11-09 ENCOUNTER — APPOINTMENT (OUTPATIENT)
Dept: INTERVENTIONAL RADIOLOGY/VASCULAR | Age: 61
End: 2021-11-09
Payer: MEDICARE

## 2021-11-09 ENCOUNTER — HOSPITAL ENCOUNTER (EMERGENCY)
Age: 61
Discharge: HOME | End: 2021-11-09
Payer: MEDICARE

## 2021-11-09 VITALS
HEIGHT: 76 IN | WEIGHT: 228 LBS | SYSTOLIC BLOOD PRESSURE: 136 MMHG | DIASTOLIC BLOOD PRESSURE: 76 MMHG | OXYGEN SATURATION: 100 % | BODY MASS INDEX: 27.76 KG/M2 | TEMPERATURE: 98.6 F | RESPIRATION RATE: 20 BRPM | HEART RATE: 95 BPM

## 2021-11-09 DIAGNOSIS — M79.89 SWELLING OF LEFT LOWER EXTREMITY: Primary | ICD-10-CM

## 2021-11-09 LAB
BASOPHILS # BLD: 0.9 %
BASOPHILS ABSOLUTE: 0.1 THOU/MM3 (ref 0–0.1)
EOSINOPHIL # BLD: 2.1 %
EOSINOPHILS ABSOLUTE: 0.1 THOU/MM3 (ref 0–0.4)
ERYTHROCYTE [DISTWIDTH] IN BLOOD BY AUTOMATED COUNT: 13.1 % (ref 11.5–14.5)
ERYTHROCYTE [DISTWIDTH] IN BLOOD BY AUTOMATED COUNT: 47 FL (ref 35–45)
HCT VFR BLD CALC: 37.8 % (ref 42–52)
HEMOGLOBIN: 11.8 GM/DL (ref 14–18)
IMMATURE GRANS (ABS): 0.01 THOU/MM3 (ref 0–0.07)
IMMATURE GRANULOCYTES: 0.2 %
LYMPHOCYTES # BLD: 32.2 %
LYMPHOCYTES ABSOLUTE: 2.1 THOU/MM3 (ref 1–4.8)
MCH RBC QN AUTO: 30.7 PG (ref 26–33)
MCHC RBC AUTO-ENTMCNC: 31.2 GM/DL (ref 32.2–35.5)
MCV RBC AUTO: 98.4 FL (ref 80–94)
MONOCYTES # BLD: 6.7 %
MONOCYTES ABSOLUTE: 0.4 THOU/MM3 (ref 0.4–1.3)
NUCLEATED RED BLOOD CELLS: 0 /100 WBC
PLATELET # BLD: 181 THOU/MM3 (ref 130–400)
PMV BLD AUTO: 11.2 FL (ref 9.4–12.4)
RBC # BLD: 3.84 MILL/MM3 (ref 4.7–6.1)
SEG NEUTROPHILS: 57.9 %
SEGMENTED NEUTROPHILS ABSOLUTE COUNT: 3.8 THOU/MM3 (ref 1.8–7.7)
WBC # BLD: 6.5 THOU/MM3 (ref 4.8–10.8)

## 2021-11-09 PROCEDURE — 85025 COMPLETE CBC W/AUTO DIFF WBC: CPT

## 2021-11-09 PROCEDURE — 93971 EXTREMITY STUDY: CPT

## 2021-11-09 PROCEDURE — 99284 EMERGENCY DEPT VISIT MOD MDM: CPT

## 2021-11-09 PROCEDURE — 36415 COLL VENOUS BLD VENIPUNCTURE: CPT

## 2021-11-09 ASSESSMENT — ENCOUNTER SYMPTOMS: SHORTNESS OF BREATH: 0

## 2021-11-09 ASSESSMENT — PAIN SCALES - GENERAL: PAINLEVEL_OUTOF10: 3

## 2021-11-09 NOTE — ED PROVIDER NOTES
[facility]  eMERGENCY dEPARTMENT eNCOUnter          [address] [facility]       Chief Complaint   Patient presents with    Leg Pain     left       Nurses Notes reviewed and I agree except as noted inthe HPI. HISTORY OF PRESENT ILLNESS    Anny White is a 63 y.o. male who presents to the Emergency Department for the evaluation of DVT rule out. Patient states that he had a procedure couple weeks ago with podiatry to remove a nail that had a blister underneath it. He states that since that time he has had swelling to the foot and ankle. He states is been stable, unrelieved with the course of Keflex that was prescribed immediately after the surgery. He denies any associated pain but states that he has peripheral neuropathy and really can't feel the foot. He denies any history of DVT or PEs, anticoagulant use, recent travel, chest pain or shortness of breath. States he saw the podiatry service today, had  a foot wound debrided and redressed and was sent to the ED for exclusion of DVT given the persistent swelling of his leg. The HPI was provided by the patient. REVIEW OF SYSTEMS     Review of Systems   Constitutional: Negative for chills and fever. Respiratory: Negative for shortness of breath. Cardiovascular: Positive for leg swelling. Negative for chest pain. Skin: Positive for wound. Neurological: Positive for numbness. All other systems reviewed and are negative. PAST MEDICAL HISTORY    has a past medical history of Alcohol abuse, CKD (chronic kidney disease) stage 2, GFR 60-89 ml/min, Current every day smoker, Depression, Drug abuse ([address] GERD (gastroesophageal reflux disease), Hyperlipidemia, Hypertension, Neuropathy of both feet, and Skin cancer. SURGICAL HISTORY      has a past surgical history that includes Tonsillectomy and Tibia fracture surgery (Left, 12/28/2012).     CURRENT MEDICATIONS       Discharge Medication List as of 11/9/2021  5:42 PM CONTINUE these medications which have NOT CHANGED    Details   pregabalin (LYRICA) 25 MG capsule Take 25 mg by mouth 2 times daily. Historical Med      amLODIPine (NORVASC) 2.5 MG tablet Take 2.5 mg by mouth dailyHistorical Med      metoprolol succinate (TOPROL XL) 50 MG extended release tablet Take 25 mg by mouth 2 times dailyHistorical Med      vitamin B-12 1000 MCG tablet Take 1 tablet by mouth daily, Disp-30 tablet,R-3Normal      aspirin 325 MG tablet Take 81 mg by mouth daily Historical Med      fenofibrate (TRICOR) 54 MG tablet Take 54 mg by mouth daily s [facility] pharmacy: Please dispense generic fenofibrate unless prescriber denoteHistorical Med      tamsulosin (FLOMAX) 0.4 MG capsule Take 0.4 mg by mouth dailyHistorical Med      acetaminophen (APAP EXTRA STRENGTH) 500 MG tablet Take 1 tablet by mouth every 6 hours as needed for Pain, Disp-120 tablet, R-0Print      busPIRone HCl (BUSPAR PO) Take by mouth dailyHistorical Med      ATORVASTATIN CALCIUM PO Take 80 mg by mouth Historical Med      folic acid (FOLVITE) 1 MG tablet Take 1 tablet by mouth daily. , Disp-30 tablet, R-3      vitamin B-1 100 MG tablet Take 1 tablet by mouth daily. , Disp-30 tablet, R-3      carBAMazepine (TEGRETOL) 200 MG tablet Take 200 mg by mouth 2 times daily. QUEtiapine (SEROQUEL) 400 MG tablet Take 400 mg by mouth 2 times daily. Multiple Vitamins-Minerals (THERAPEUTIC MULTIVITAMIN-MINERALS) tablet Take 1 tablet by mouth every evening Historical Med      FLUoxetine (PROZAC) 40 MG capsule Take 1 capsule by mouth daily. , Disp-30 capsule, R-0      traZODone (DESYREL) 100 MG tablet Take 1 tablet by mouth nightly as needed for Sleep., Disp-30 tablet, R-0             ALLERGIES     has No Known Allergies. FAMILY HISTORY     He indicated that his mother is alive. He indicated that his father is . family history includes COPD in his father; Heart Disease in his father and mother.     SOCIAL HISTORY      reports that he has been smoking cigarettes. He has a 40.00 pack-year smoking history. He has never used smokeless tobacco. He reports current alcohol use. He reports previous drug use. Drug: Cocaine. PHYSICAL EXAM     INITIAL VITALS:  height is 6' 4\" (1.93 m) and weight is 228 lb (103.4 kg). His oral temperature is 98.6 °F (37 °C). His blood pressure is 136/76 and his pulse is 95. His respiration is 20 and oxygen saturation is 100%. Physical Exam  Vitals and nursing note reviewed. Constitutional:       Appearance: Normal appearance. HENT:      Head: Normocephalic and atraumatic. Eyes:      Conjunctiva/sclera: Conjunctivae normal.   Cardiovascular:      Rate and Rhythm: Normal rate. Pulmonary:      Effort: Pulmonary effort is normal. No respiratory distress. Musculoskeletal:         General: No tenderness. Cervical back: Normal range of motion. Right lower leg: No edema. Left lower leg: Edema present. Comments: 2+ edema noted to left lower extremity starting at the distal lower leg and extending into the foot. There is blanching erythema with mild warmth. First nail absent with healing wound and no purulent drainage or tenderness. Intact dressing to the midfoot. Skin:     General: Skin is warm and dry. Neurological:      General: No focal deficit present. Mental Status: He is alert and oriented to person, place, and time. Psychiatric:         Mood and Affect: Mood normal.         DIFFERENTIAL DIAGNOSIS:   Differential diagnoses are discussed    DIAGNOSTIC RESULTS     EKG: All EKG's are interpreted by the Emergency Department Physician who either signs or Co-signsthis chart in the absence of a cardiologist.        RADIOLOGY: non-plain film images(s) such as CT, Ultrasound and MRI are read by the radiologist.    VL DUP LOWER EXTREMITY VENOUS LEFT   Final Result   No evidence of deep venous thrombosis of the left lower extremity.                **This report has been created using voice recognition software. It may contain minor errors which are inherent in voice recognition technology. **      Final report electronically signed by Dr. Florence Espinoza MD on 11/9/2021 4:08 PM          LABS:      Labs Reviewed   CBC WITH AUTO DIFFERENTIAL - Abnormal; Notable for the following components:       Result Value    RBC 3.84 (*)     Hemoglobin 11.8 (*)     Hematocrit 37.8 (*)     MCV 98.4 (*)     MCHC 31.2 (*)     RDW-SD 47.0 (*)     All other components within normal limits       EMERGENCY DEPARTMENT COURSE:   Vitals:    Vitals:    11/09/21 1428   BP: 136/76   Pulse: 95   Resp: 20   Temp: 98.6 °F (37 °C)   TempSrc: Oral   SpO2: 100%   Weight: 228 lb (103.4 kg)   Height: 6' 4\" (1.93 m)      3:16 PM EST: The patient was seen and evaluated. From podiatry clinic for DVT rule out for some postprocedural edema to the left lower extremity does been ongoing for the past 2 weeks. He denies any associated pain. No new numbness, tingling, weakness. He does report some subjective warmth in the lower leg which has 2+ pitting edema on exam.  DVT study performed and negative. Discussed with Dr. Kimberli Marc, podiatry. He recommended obtaining CBC at this point to ensure there is no elevation of the white blood cell count. This was performed and within normal limits. He feels comfortable with discharge home and will follow with the podiatry office. He was provided with Ace wrap for compression prior to discharge. CRITICAL CARE:   None     CONSULTS:  Podiatry    PROCEDURES:  None    FINAL IMPRESSION      1. Swelling of left lower extremity          DISPOSITION/PLAN   Discharge    PATIENT REFERRED TO:  [address]  [address]  [address] [address] [phone]    Call in 1 day      [address] [facility] [address] [place] [address],[address]    If symptoms worsen      DISCHARGEMEDICATIONS:  Discharge Medication List as of 11/9/2021  5:42 PM          (Please note that portions of this note were completedwith a voice recognition program.  Efforts were made to edit the dictations but occasionally words are mis-transcribed.)        Alexandria Ly PA-C  11/12/21 8944

## 2021-11-09 NOTE — ED TRIAGE NOTES
Pt arrives to ED from Dr. Alison Pabon office with c/o left leg pain, he states dr wanted him to be checked out because he thought possible blood clot. Pt states the leg pain and swelling started about 15 days ago. Pt has left foot in walking boot and wrapped in gauze, pt states dr removed a blister.    Pt is AOx4, VSS

## 2022-04-26 RX ORDER — ATORVASTATIN CALCIUM 80 MG/1
80 TABLET, FILM COATED ORAL NIGHTLY
COMMUNITY

## 2022-04-26 RX ORDER — BUSPIRONE HYDROCHLORIDE 30 MG/1
30 TABLET ORAL DAILY
COMMUNITY
End: 2022-04-26

## 2022-04-26 RX ORDER — LANOLIN ALCOHOL/MO/W.PET/CERES
CREAM (GRAM) TOPICAL NIGHTLY
COMMUNITY

## 2022-04-26 RX ORDER — PREGABALIN 150 MG/1
150 CAPSULE ORAL DAILY
COMMUNITY

## 2022-04-26 RX ORDER — BUPROPION HYDROCHLORIDE 300 MG/1
300 TABLET ORAL DAILY
COMMUNITY

## 2022-04-26 RX ORDER — CHLORAL HYDRATE 500 MG
3000 CAPSULE ORAL 2 TIMES DAILY
COMMUNITY

## 2022-04-26 RX ORDER — ASPIRIN 81 MG/1
81 TABLET ORAL DAILY
COMMUNITY

## 2022-04-26 RX ORDER — ATENOLOL 25 MG/1
25 TABLET ORAL NIGHTLY
COMMUNITY

## 2022-04-26 NOTE — PROGRESS NOTES
PAT call attempted, patient unavailable, left message to please call us back at your earliest convenience; [phone]

## 2022-04-26 NOTE — PROGRESS NOTES
NPO after midnight  [employer] and drivers license  Wear comfortable clean clothing  Do not bring jewelry  Shower night before and morning of surgery with a liquid antibacterial soap  Bring list of medications with dosage and how often taken  Follow all instructions given by your physician   needed at discharge  No visitors allowed in with patient at this time  Please bring and wear mask  Call [phone] for any questions

## 2022-04-26 NOTE — PROGRESS NOTES
In preparation for their surgical procedure above patient was screened for Obstructive Sleep Apnea (GAUTAM) using the STOP-Bang Questionnaire by the Pre-Admission Testing department. This is a pre-surgical screening tool for patient safety and serves as a recommendation, this WILL NOT cause cancellation of surgery. STOP-Bang Questionnaire  * Do you currently see a pulmonologist?  No     If yes STOP, do not complete. Patient follows with Dr.     1.  Do you snore loudly (able to be heard in the next room)? No    2. Do you often feel tired or sleepy during the daytime? No       3. Has anyone ever told you that you stop breathing during your sleep? No    4. Do you have or are you being treated for high blood pressure? Yes      5. BMI more than 35? BMI (Calculated): 31        No    6. Age over 48 years? 57 y.o. Yes    7. Neck Circumference greater than 17 inches for male or 16 inches for female? Measured           (visits only)            Not Applicable    8. Gender Male? Yes      TOTAL SCORE: 3    GAUTAM - Low Risk : Yes to 0 - 2 questions  GAUTAM - Intermediate Risk : Yes to 3 - 4 questions  GAUTAM - High Risk : Yes to 5 - 8 questions    Adapted from:   STOP Questionnaire: A Tool to Screen Patients for Obstructive Sleep Apnea   MERARY Craig[employer]P.C., SUBHASH Lorenzo.B.B.S., Luiz Strauss M.D., Carlos Avila. Shaggy Massey, Ph.D., SUBHASH Licea.B.B.S., Omar Graham, M.Sc., Coretta Iverson M.D., [facility]. MER[facility] Sanchez[employer]P.C[facility]    Anesthesiology 2008; 163:285-78 Copyright 2008, the [address] of Anesthesiologists, [facility].   ----------------------------------------------------------------------------------------------------------------

## 2022-05-03 ENCOUNTER — HOSPITAL ENCOUNTER (OUTPATIENT)
Age: 62
Setting detail: OUTPATIENT SURGERY
Discharge: HOME | End: 2022-05-03
Attending: STUDENT IN AN ORGANIZED HEALTH CARE EDUCATION/TRAINING PROGRAM | Admitting: STUDENT IN AN ORGANIZED HEALTH CARE EDUCATION/TRAINING PROGRAM
Payer: MEDICARE

## 2022-05-03 ENCOUNTER — ANESTHESIA (OUTPATIENT)
Dept: OPERATING ROOM | Age: 62
End: 2022-05-03
Payer: MEDICARE

## 2022-05-03 ENCOUNTER — ANESTHESIA EVENT (OUTPATIENT)
Dept: OPERATING ROOM | Age: 62
End: 2022-05-03
Payer: MEDICARE

## 2022-05-03 VITALS
DIASTOLIC BLOOD PRESSURE: 58 MMHG | OXYGEN SATURATION: 96 % | SYSTOLIC BLOOD PRESSURE: 104 MMHG | RESPIRATION RATE: 11 BRPM

## 2022-05-03 VITALS
SYSTOLIC BLOOD PRESSURE: 101 MMHG | WEIGHT: 253.4 LBS | TEMPERATURE: 96.9 F | BODY MASS INDEX: 30.86 KG/M2 | RESPIRATION RATE: 16 BRPM | HEART RATE: 64 BPM | OXYGEN SATURATION: 95 % | HEIGHT: 76 IN | DIASTOLIC BLOOD PRESSURE: 56 MMHG

## 2022-05-03 PROCEDURE — 6360000002 HC RX W HCPCS: Performed by: NURSE ANESTHETIST, CERTIFIED REGISTERED

## 2022-05-03 PROCEDURE — 7100000011 HC PHASE II RECOVERY - ADDTL 15 MIN: Performed by: STUDENT IN AN ORGANIZED HEALTH CARE EDUCATION/TRAINING PROGRAM

## 2022-05-03 PROCEDURE — 87070 CULTURE OTHR SPECIMN AEROBIC: CPT

## 2022-05-03 PROCEDURE — 3600000012 HC SURGERY LEVEL 2 ADDTL 15MIN: Performed by: STUDENT IN AN ORGANIZED HEALTH CARE EDUCATION/TRAINING PROGRAM

## 2022-05-03 PROCEDURE — 2709999900 HC NON-CHARGEABLE SUPPLY: Performed by: STUDENT IN AN ORGANIZED HEALTH CARE EDUCATION/TRAINING PROGRAM

## 2022-05-03 PROCEDURE — 87205 SMEAR GRAM STAIN: CPT

## 2022-05-03 PROCEDURE — 7100000010 HC PHASE II RECOVERY - FIRST 15 MIN: Performed by: STUDENT IN AN ORGANIZED HEALTH CARE EDUCATION/TRAINING PROGRAM

## 2022-05-03 PROCEDURE — 87147 CULTURE TYPE IMMUNOLOGIC: CPT

## 2022-05-03 PROCEDURE — 87075 CULTR BACTERIA EXCEPT BLOOD: CPT

## 2022-05-03 PROCEDURE — 3700000001 HC ADD 15 MINUTES (ANESTHESIA): Performed by: STUDENT IN AN ORGANIZED HEALTH CARE EDUCATION/TRAINING PROGRAM

## 2022-05-03 PROCEDURE — 3600000002 HC SURGERY LEVEL 2 BASE: Performed by: STUDENT IN AN ORGANIZED HEALTH CARE EDUCATION/TRAINING PROGRAM

## 2022-05-03 PROCEDURE — 2580000003 HC RX 258: Performed by: NURSE ANESTHETIST, CERTIFIED REGISTERED

## 2022-05-03 PROCEDURE — 88311 DECALCIFY TISSUE: CPT

## 2022-05-03 PROCEDURE — 6360000002 HC RX W HCPCS: Performed by: STUDENT IN AN ORGANIZED HEALTH CARE EDUCATION/TRAINING PROGRAM

## 2022-05-03 PROCEDURE — 3700000000 HC ANESTHESIA ATTENDED CARE: Performed by: STUDENT IN AN ORGANIZED HEALTH CARE EDUCATION/TRAINING PROGRAM

## 2022-05-03 PROCEDURE — 88304 TISSUE EXAM BY PATHOLOGIST: CPT

## 2022-05-03 PROCEDURE — 2500000003 HC RX 250 WO HCPCS: Performed by: STUDENT IN AN ORGANIZED HEALTH CARE EDUCATION/TRAINING PROGRAM

## 2022-05-03 RX ORDER — SODIUM CHLORIDE 0.9 % (FLUSH) 0.9 %
5-40 SYRINGE (ML) INJECTION PRN
Status: CANCELLED | OUTPATIENT
Start: 2022-05-03

## 2022-05-03 RX ORDER — MORPHINE SULFATE 2 MG/ML
2 INJECTION, SOLUTION INTRAMUSCULAR; INTRAVENOUS
Status: CANCELLED | OUTPATIENT
Start: 2022-05-03

## 2022-05-03 RX ORDER — ONDANSETRON 4 MG/1
4 TABLET, ORALLY DISINTEGRATING ORAL EVERY 8 HOURS PRN
Status: CANCELLED | OUTPATIENT
Start: 2022-05-03

## 2022-05-03 RX ORDER — SODIUM CHLORIDE 9 MG/ML
INJECTION, SOLUTION INTRAVENOUS CONTINUOUS PRN
Status: DISCONTINUED | OUTPATIENT
Start: 2022-05-03 | End: 2022-05-03 | Stop reason: SDUPTHER

## 2022-05-03 RX ORDER — SODIUM CHLORIDE 0.9 % (FLUSH) 0.9 %
5-40 SYRINGE (ML) INJECTION EVERY 12 HOURS SCHEDULED
Status: DISCONTINUED | OUTPATIENT
Start: 2022-05-03 | End: 2022-05-03 | Stop reason: HOSPADM

## 2022-05-03 RX ORDER — MIDAZOLAM HYDROCHLORIDE 1 MG/ML
INJECTION INTRAMUSCULAR; INTRAVENOUS PRN
Status: DISCONTINUED | OUTPATIENT
Start: 2022-05-03 | End: 2022-05-03 | Stop reason: SDUPTHER

## 2022-05-03 RX ORDER — SODIUM CHLORIDE 0.9 % (FLUSH) 0.9 %
5-40 SYRINGE (ML) INJECTION PRN
Status: DISCONTINUED | OUTPATIENT
Start: 2022-05-03 | End: 2022-05-03 | Stop reason: HOSPADM

## 2022-05-03 RX ORDER — BUPIVACAINE HYDROCHLORIDE 5 MG/ML
INJECTION, SOLUTION EPIDURAL; INTRACAUDAL PRN
Status: DISCONTINUED | OUTPATIENT
Start: 2022-05-03 | End: 2022-05-03 | Stop reason: ALTCHOICE

## 2022-05-03 RX ORDER — PROPOFOL 10 MG/ML
INJECTION, EMULSION INTRAVENOUS PRN
Status: DISCONTINUED | OUTPATIENT
Start: 2022-05-03 | End: 2022-05-03

## 2022-05-03 RX ORDER — SODIUM CHLORIDE 9 MG/ML
INJECTION, SOLUTION INTRAVENOUS PRN
Status: CANCELLED | OUTPATIENT
Start: 2022-05-03

## 2022-05-03 RX ORDER — ACETAMINOPHEN 325 MG/1
650 TABLET ORAL EVERY 4 HOURS PRN
Status: CANCELLED | OUTPATIENT
Start: 2022-05-03

## 2022-05-03 RX ORDER — SODIUM CHLORIDE 9 MG/ML
INJECTION, SOLUTION INTRAVENOUS PRN
Status: DISCONTINUED | OUTPATIENT
Start: 2022-05-03 | End: 2022-05-03 | Stop reason: HOSPADM

## 2022-05-03 RX ORDER — MORPHINE SULFATE 2 MG/ML
4 INJECTION, SOLUTION INTRAMUSCULAR; INTRAVENOUS
Status: CANCELLED | OUTPATIENT
Start: 2022-05-03

## 2022-05-03 RX ORDER — FENTANYL CITRATE 50 UG/ML
INJECTION, SOLUTION INTRAMUSCULAR; INTRAVENOUS PRN
Status: DISCONTINUED | OUTPATIENT
Start: 2022-05-03 | End: 2022-05-03 | Stop reason: SDUPTHER

## 2022-05-03 RX ORDER — HYDROCODONE BITARTRATE AND ACETAMINOPHEN 5; 325 MG/1; MG/1
2 TABLET ORAL EVERY 4 HOURS PRN
Status: CANCELLED | OUTPATIENT
Start: 2022-05-03

## 2022-05-03 RX ORDER — HYDROCODONE BITARTRATE AND ACETAMINOPHEN 5; 325 MG/1; MG/1
1 TABLET ORAL EVERY 4 HOURS PRN
Status: CANCELLED | OUTPATIENT
Start: 2022-05-03

## 2022-05-03 RX ORDER — PROPOFOL 10 MG/ML
INJECTION, EMULSION INTRAVENOUS CONTINUOUS PRN
Status: DISCONTINUED | OUTPATIENT
Start: 2022-05-03 | End: 2022-05-03 | Stop reason: SDUPTHER

## 2022-05-03 RX ORDER — KETOROLAC TROMETHAMINE 10 MG/1
10 TABLET, FILM COATED ORAL EVERY 6 HOURS PRN
Qty: 20 TABLET | Refills: 0 | Status: SHIPPED | OUTPATIENT
Start: 2022-05-03 | End: 2022-05-08

## 2022-05-03 RX ORDER — ONDANSETRON 2 MG/ML
4 INJECTION INTRAMUSCULAR; INTRAVENOUS EVERY 6 HOURS PRN
Status: CANCELLED | OUTPATIENT
Start: 2022-05-03

## 2022-05-03 RX ORDER — SODIUM CHLORIDE 0.9 % (FLUSH) 0.9 %
5-40 SYRINGE (ML) INJECTION EVERY 12 HOURS SCHEDULED
Status: CANCELLED | OUTPATIENT
Start: 2022-05-03

## 2022-05-03 RX ADMIN — MIDAZOLAM 2 MG: 1 INJECTION INTRAMUSCULAR; INTRAVENOUS at 07:30

## 2022-05-03 RX ADMIN — PROPOFOL 50 MCG/KG/MIN: 10 INJECTION, EMULSION INTRAVENOUS at 07:36

## 2022-05-03 RX ADMIN — SODIUM CHLORIDE: 9 INJECTION, SOLUTION INTRAVENOUS at 07:30

## 2022-05-03 RX ADMIN — Medication 2000 MG: at 07:44

## 2022-05-03 RX ADMIN — FENTANYL CITRATE 100 MCG: 50 INJECTION, SOLUTION INTRAMUSCULAR; INTRAVENOUS at 07:32

## 2022-05-03 ASSESSMENT — PULMONARY FUNCTION TESTS
PIF_VALUE: 0
PIF_VALUE: 1
PIF_VALUE: 0

## 2022-05-03 ASSESSMENT — PAIN - FUNCTIONAL ASSESSMENT: PAIN_FUNCTIONAL_ASSESSMENT: NONE - DENIES PAIN

## 2022-05-03 NOTE — H&P
[address] [address]  History and Physical Update    Pt Name: Frandy Murray  MRN: [id number]  YOB: 1960  Date of evaluation: 5/3/2022    I have examined the patient and reviewed the H&P/Consult and there are no changes to the patient or plans.       Shira Gonzales DPM,  Electronically signed 5/3/2022 at 7:15 AM

## 2022-05-03 NOTE — OP NOTE
Operative Note      Patient: Angela Ibarra  YOB: 1960  MRN: [id number]    Date of Procedure: 5/3/2022    Pre-Op Diagnosis: Left foot ulcer Left foot bunionette    Post-Op Diagnosis: Same       Procedure(s):  Left 5th Metatarsal Head Resection Excision of Wound 1.4cm     Surgeon(s):  Javed Gipson DPM    Assistant:   Resident: Casi Giron DPM    Anesthesia: Monitor Anesthesia Care    Estimated Blood Loss (mL): Minimal    Complications: None    Specimens:   ID Type Source Tests Collected by Time Destination   1 :  Tissue Foot CULTURE, ANAEROBIC AND AEROBIC Javed Gipson DPM 5/3/2022 7026    A :  Tissue Foot SURGICAL PATHOLOGY Javed Gipson DPM 5/3/2022 0753        Implants:  * No implants in log *      Drains: * No LDAs found *    Findings: consistent with diagnosis     Detailed Description of Procedure: Indications: Patient is a 57 y.o. male with a chief complaint of left foot ulcer being treated for left foot ulcer. At this time all conservative options have been exhausted and surgical intervention is necessary. The procedure has been explained to the patient and they understand the risks, benefits and possible complications including bleeding, infection, recurrence, dehiscence, CRPS, nerve damage, DVT, PE, loss of limb, loss of life. No promises have been made as to surgical outcome. Procedure: The patient was transported from the pre-op holding to the operating room and placed in a supine position. A pneumatic ankle tourniquet was applied to the left ankle. A pre-operative injection of 20cc of 0.5% marcaine plain was injected into the ldft foot in a field block. The left foot was then prepped and draped in the normal aspetic manner. The left foot was then exsanguinated with an esmark and the tourniquet was inflated to 250 mmHg. Attention was directed to the left foot ulcer.  A 15 blade was used to resect the wound in entirety measuring 1.4cm down to the level of the bone. The tissue was noted to be inflamed. The metatarsal head was identified and the plantar aspect was noted to be soft and slightly dusky. A TPS sagittal saw was then used to resect the 5th metatarsal head. The ulcer was sent for A/A and the metatarsal head was then sent for surgical pathology. The surgical site was inspected and no necrotic, nonviable tissue was noted. The incision was carried distal and proximal to create an ellipse to allow for easier closure. The surgical site was then flushed with copious amounts of sterile saline. The incision was flushed with copious amounts of sterile saline. The skin was closed using 2-0 prolene. The incision was dressed with adaptic, 4x4's, kerlix, etc.  The pneumatic ankle tourniquet was then deflated and an immediate hyperemic response was noted to all digits of the left foot. A surgical shoe was then applied. The patient was transported to the PACU with VSS and NVS intact to all digits of the left foot. No complications were noted throughout the procedure. The patient is to be discharged per PACU protocol.      Dr. Kelly Groves DPM      Electronically signed by Radha Anthony DPM on 5/3/2022 at 8:08 AM

## 2022-05-03 NOTE — ANESTHESIA PRE PROCEDURE
Department of Anesthesiology  Preprocedure Note       Name:  Chris Carlos   Age:  57 y.o.  :  1960                                          MRN:  [id number]         Date:  5/3/2022      Surgeon: Shelby Hatfield):  Brian Nuno DPM    Procedure: Procedure(s):  Left 5th Metatarsal Head Resection Excision of Wound    Medications prior to admission:   Prior to Admission medications    Medication Sig Start Date End Date Taking? Authorizing Provider   ketorolac (TORADOL) 10 MG tablet Take 1 tablet by mouth every 6 hours as needed for Pain 5/3/22 5/8/22 Yes Brian Nuno DPM   pregabalin (LYRICA) 150 MG capsule Take 150 mg by mouth daily.  3 tab   Yes Historical Provider, MD   atorvastatin (LIPITOR) 80 MG tablet Take 80 mg by mouth at bedtime   Yes Historical Provider, MD   atenolol (TENORMIN) 25 MG tablet Take 25 mg by mouth nightly   Yes Historical Provider, MD   aspirin 81 MG EC tablet Take 81 mg by mouth daily   Yes Historical Provider, MD   folic acid (FOLVITE) 445 MCG tablet Take by mouth nightly 2 tab   Yes Historical Provider, MD   Pyridoxine HCl (VITAMIN B-6 PO) Take by mouth nightly   Yes Historical Provider, MD   Omega-3 Fatty Acids (FISH OIL) 1000 MG CAPS Take 3,000 mg by mouth 2 times daily   Yes Historical Provider, MD   vitamin D (CHOLECALCIFEROL) 25 MCG (1000 UT) TABS tablet Take 1,000 Units by mouth nightly   Yes Historical Provider, MD   buPROPion (WELLBUTRIN XL) 300 MG extended release tablet Take 300 mg by mouth daily    Historical Provider, MD   vitamin B-12 1000 MCG tablet Take 1 tablet by mouth daily  Patient taking differently: Take 1,000 mcg by mouth nightly  20   Edwar Castro MD   fenofibrate (TRICOR) 54 MG tablet Take 54 mg by mouth at bedtime s [facility] pharmacy: Please dispense generic fenofibrate unless prescriber denote     Historical Provider, MD   tamsulosin (FLOMAX) 0.4 MG capsule Take 0.4 mg by mouth 2 times daily     Historical Provider, MD   acetaminophen (APAP EXTRA STRENGTH) 500 MG tablet Take 1 tablet by mouth every 6 hours as needed for Pain 8/17/19   Della Jennings PA-C   vitamin B-1 100 MG tablet Take 1 tablet by mouth daily. Patient taking differently: Take 100 mg by mouth at bedtime  12/28/14   Rashid Wiggins MD   carBAMazepine (TEGRETOL) 200 MG tablet Take 200 mg by mouth 2 times daily. Historical Provider, MD   QUEtiapine (SEROQUEL) 400 MG tablet Take 400 mg by mouth 2 times daily. Historical Provider, MD   Multiple Vitamins-Minerals (THERAPEUTIC MULTIVITAMIN-MINERALS) tablet Take 1 tablet by mouth every evening     Historical Provider, MD   FLUoxetine (PROZAC) 40 MG capsule Take 1 capsule by mouth daily. Patient taking differently: Take 40 mg by mouth nightly  1/10/13   Laura Ray MD   traZODone (DESYREL) 100 MG tablet Take 1 tablet by mouth nightly as needed for Sleep.  1/10/13   Laura Ray MD       Current medications:    Current Facility-Administered Medications   Medication Dose Route Frequency Provider Last Rate Last Admin    sodium chloride flush 0.9 % injection 5-40 mL  5-40 mL IntraVENous 2 times per day Maurita Grout, DPM        sodium chloride flush 0.9 % injection 5-40 mL  5-40 mL IntraVENous PRN Maurita Grout, DPM        0.9 % sodium chloride infusion   IntraVENous PRN Maurita Grout, DPM        ceFAZolin (ANCEF) 2000 mg in sterile water 20 mL IV syringe  2,000 mg IntraVENous On Call to [address] [place] [address], DPM           Allergies:  No Known Allergies    Problem List:    Patient Active Problem List   Diagnosis Code    Chronic alcohol abuse F10.10    STEPHANY (acute kidney injury) ([facility] [address] N17.9    Benign essential hypertension I10    Tobacco abuse Z72.0    Syncope and collapse R55    Closed fracture of thoracic vertebral body (HCC) S22.009A    Closed head injury S09.90XA    Burst fracture of T12 vertebra with routine healing S22.081D       Past Medical History:        Diagnosis Date    Alcohol abuse     CKD (chronic kidney disease) stage 2, GFR 60-89 ml/min     Current every day smoker     Depression     Drug abuse ([facility] [address] 2008    prescription abuse, cocaine    GERD (gastroesophageal reflux disease)     Hyperlipidemia     Hypertension     Neuropathy of both feet     Seizure ([facility]     Skin cancer     skin       Past Surgical History:        Procedure Laterality Date    TIBIA FRACTURE SURGERY Left 12/28/2012    ORIF LEFT TIBIA    TONSILLECTOMY         Social History:    Social History     Tobacco Use    Smoking status: Current Every Day Smoker     Packs/day: 1.00     Years: 40.00     Pack years: 40.00     Types: Cigarettes    Smokeless tobacco: Never Used   Substance Use Topics    Alcohol use: Yes     Comment: none for 1 year quit in 2021                                Ready to quit: Not Answered  Counseling given: Not Answered      Vital Signs (Current):   Vitals:    04/26/22 1522 05/03/22 0644   BP:  100/64   Pulse:  63   Resp:  16   Temp:  97.4 °F (36.3 °C)   TempSrc:  Temporal   SpO2:  96%   Weight: 254 lb (115.2 kg) 253 lb 6.4 oz (114.9 kg)   Height: 6' 4\" (1.93 m) 6' 4\" (1.93 m)                                              BP Readings from Last 3 Encounters:   05/03/22 100/64   05/03/22 115/78   11/09/21 136/76       NPO Status: Time of last liquid consumption: 1800                        Time of last solid consumption: 1800                        Date of last liquid consumption: 05/02/22                        Date of last solid food consumption: 05/02/22    BMI:   Wt Readings from Last 3 Encounters:   05/03/22 253 lb 6.4 oz (114.9 kg)   11/09/21 228 lb (103.4 kg)   09/27/21 215 lb (97.5 kg)     Body mass index is 30.84 kg/m².     CBC:   Lab Results   Component Value Date    WBC 6.5 11/09/2021    RBC 3.84 11/09/2021    HGB 11.8 11/09/2021    HCT 37.8 11/09/2021    MCV 98.4 11/09/2021    RDW 14.9 10/23/2017     11/09/2021       CMP:   Lab Results   Component Value Date     06/15/2021    K 4.4 06/15/2021    K 4.2 08/25/2020    CL 96 06/15/2021    CO2 21 06/15/2021    BUN 20 06/15/2021    CREATININE 1.5 06/15/2021    LABGLOM 48 06/15/2021    GLUCOSE 97 06/15/2021    PROT 7.1 06/15/2021    CALCIUM 9.9 06/15/2021    BILITOT 0.4 06/15/2021    ALKPHOS 46 06/15/2021    AST 27 06/15/2021    ALT 21 06/15/2021       POC Tests: No results for input(s): POCGLU, POCNA, POCK, POCCL, POCBUN, POCHEMO, POCHCT in the last 72 hours. Coags:   Lab Results   Component Value Date    INR 1.04 08/24/2020    APTT 27.2 08/24/2020       HCG (If Applicable): No results found for: PREGTESTUR, PREGSERUM, HCG, HCGQUANT     ABGs: No results found for: PHART, PO2ART, EHS9VLC, YHD3YUP, BEART, W7VUQJHW     Type & Screen (If Applicable):  No results found for: LABABO, LABRH    Drug/Infectious Status (If Applicable):  Lab Results   Component Value Date    HEPCAB Negative 12/22/2014       COVID-19 Screening (If Applicable): No results found for: COVID19        Anesthesia Evaluation    Airway: Mallampati: II        Dental:          Pulmonary:                              Cardiovascular:    (+) hypertension:,                   Neuro/Psych:               GI/Hepatic/Renal:   (+) GERD:,           Endo/Other:                     Abdominal:             Vascular: Other Findings:             Anesthesia Plan      MAC     ASA 2             Anesthetic plan and risks discussed with patient.                       Kristina Villeda MD   5/3/2022

## 2022-05-03 NOTE — BRIEF OP NOTE
Brief Postoperative Note      Patient: Cassy Gallego  YOB: 1960  MRN: [id number]    Date of Procedure: 5/3/2022    Pre-Op Diagnosis: Left foot ulcer Left foot bunionette    Post-Op Diagnosis: Same       Procedure(s): All on Left  Excision of Wound Sub 5th Metatarsal Head Measuring 1.4 x 1.4 cm, Down to Bone  5th Metatarsal Head Resection     Surgeon(s):  Martha Eduardo DPM    Assistant:  Resident: Elaina Sandoval DPM    Anesthesia: Monitor Anesthesia Care    Estimated Blood Loss (mL): Minimal    Hemostasis: Left Ankle Tourniquet @ 250 mmHg    Injectables: 20 cc of 1/2% Marcaine Plain    Sutures: 3-0 Prolene    Complications: None    Specimens:   ID Type Source Tests Collected by Time Destination   1 :  Tissue Foot CULTURE, ANAEROBIC AND AEROBIC Martha Eduardo DPM 5/3/2022 7450    A :  Tissue Foot SURGICAL PATHOLOGY Martha Eduardo DPM 5/3/2022 3547        Implants: None      Drains: None    Findings: Same as Pre-Op Diagnosis    Electronically signed by Elaina Sandoval DPM on 5/3/2022 at 8:11 AM

## 2022-05-03 NOTE — ANESTHESIA POSTPROCEDURE EVALUATION
Department of Anesthesiology  Postprocedure Note    Patient: Ronny Cheung  MRN: [id number]  YOB: 1960  Date of evaluation: 5/3/2022  Time:  9:39 AM     Procedure Summary     Date: 05/03/22 Room / Location: [address] [facility]    Anesthesia Start: 0730 Anesthesia Stop: 4675    Procedure: Left 5th Metatarsal Head Resection Excision of Wound (Left ) Diagnosis: (Left foot ulcer Left foot bunionette)    Surgeons: Nataly Salas DPM Responsible Provider: Jessee Chappell MD    Anesthesia Type: MAC ASA Status: 2          Anesthesia Type: MAC    Jovanny Phase I:      Jovanny Phase II: Jovanny Score: 10    Last vitals: Reviewed and per EMR flowsheets.        Anesthesia Post Evaluation    Complications: no

## 2022-05-03 NOTE — PROGRESS NOTES
5989 To recovery via chair. Spont resp. VSS. Report received from surgical rn. Surgical boot in place to left foot. IV infusing KVO. Denies pain or nausea. Mother to room. Call bell in reach. Snack and drink given  0945 IV discontinued.  Discharge instructions given to pt and mother with each voicing understanding  8298 Discharge to home per wheelchair in stable condition with mother

## 2022-05-06 LAB
AEROBIC CULTURE: NORMAL
ANAEROBIC CULTURE: NORMAL
GRAM STAIN RESULT: NORMAL

## 2022-06-14 ENCOUNTER — HOSPITAL ENCOUNTER (OUTPATIENT)
Age: 62
Discharge: HOME | End: 2022-06-14
Payer: MEDICARE

## 2022-06-14 LAB
ALBUMIN SERPL-MCNC: 4.8 G/DL (ref 3.5–5.1)
ALP BLD-CCNC: 53 U/L (ref 38–126)
ALT SERPL-CCNC: 16 U/L (ref 11–66)
ANION GAP SERPL CALCULATED.3IONS-SCNC: 12 MEQ/L (ref 8–16)
AST SERPL-CCNC: 26 U/L (ref 5–40)
BILIRUB SERPL-MCNC: 0.4 MG/DL (ref 0.3–1.2)
BUN BLDV-MCNC: 22 MG/DL (ref 7–22)
CALCIUM SERPL-MCNC: 9.5 MG/DL (ref 8.5–10.5)
CHLORIDE BLD-SCNC: 105 MEQ/L (ref 98–111)
CO2: 25 MEQ/L (ref 23–33)
CREAT SERPL-MCNC: 2.4 MG/DL (ref 0.4–1.2)
GFR SERPL CREATININE-BSD FRML MDRD: 28 ML/MIN/1.73M2
GLUCOSE BLD-MCNC: 93 MG/DL (ref 70–108)
POTASSIUM SERPL-SCNC: 4.6 MEQ/L (ref 3.5–5.2)
SODIUM BLD-SCNC: 142 MEQ/L (ref 135–145)
TOTAL PROTEIN: 6.9 G/DL (ref 6.1–8)

## 2022-06-14 PROCEDURE — 36415 COLL VENOUS BLD VENIPUNCTURE: CPT

## 2022-06-14 PROCEDURE — 80053 COMPREHEN METABOLIC PANEL: CPT

## 2023-12-12 ENCOUNTER — NURSE ONLY (OUTPATIENT)
Dept: LAB | Age: 63
End: 2023-12-12

## 2023-12-12 ENCOUNTER — OFFICE VISIT (OUTPATIENT)
Dept: PULMONOLOGY | Age: 63
End: 2023-12-12
Payer: MEDICARE

## 2023-12-12 ENCOUNTER — HOSPITAL ENCOUNTER (OUTPATIENT)
Dept: GENERAL RADIOLOGY | Age: 63
Discharge: HOME | End: 2023-12-12
Attending: RADIOLOGY

## 2023-12-12 ENCOUNTER — HOSPITAL ENCOUNTER (OUTPATIENT)
Dept: CT IMAGING | Age: 63
Discharge: HOME | End: 2023-12-12
Attending: RADIOLOGY

## 2023-12-12 VITALS
OXYGEN SATURATION: 98 % | DIASTOLIC BLOOD PRESSURE: 54 MMHG | WEIGHT: 239.8 LBS | HEART RATE: 74 BPM | BODY MASS INDEX: 29.2 KG/M2 | HEIGHT: 76 IN | TEMPERATURE: 97.7 F | SYSTOLIC BLOOD PRESSURE: 106 MMHG

## 2023-12-12 DIAGNOSIS — Z87.891 PERSONAL HISTORY OF TOBACCO USE, PRESENTING HAZARDS TO HEALTH: ICD-10-CM

## 2023-12-12 DIAGNOSIS — G47.10 HYPERSOMNIA: ICD-10-CM

## 2023-12-12 DIAGNOSIS — R06.00 DYSPNEA, UNSPECIFIED TYPE: Primary | ICD-10-CM

## 2023-12-12 DIAGNOSIS — I10 ESSENTIAL HYPERTENSION: ICD-10-CM

## 2023-12-12 DIAGNOSIS — Z00.6 ENCOUNTER FOR EXAMINATION FOR NORMAL COMPARISON AND CONTROL IN CLINICAL RESEARCH PROGRAM: ICD-10-CM

## 2023-12-12 DIAGNOSIS — R06.00 DYSPNEA, UNSPECIFIED TYPE: ICD-10-CM

## 2023-12-12 LAB — D DIMER PPP IA.FEU-MCNC: < 215 NG/ML FEU (ref 0–500)

## 2023-12-12 PROCEDURE — 3017F COLORECTAL CA SCREEN DOC REV: CPT | Performed by: INTERNAL MEDICINE

## 2023-12-12 PROCEDURE — G8427 DOCREV CUR MEDS BY ELIG CLIN: HCPCS | Performed by: INTERNAL MEDICINE

## 2023-12-12 PROCEDURE — 3078F DIAST BP <80 MM HG: CPT | Performed by: INTERNAL MEDICINE

## 2023-12-12 PROCEDURE — G8419 CALC BMI OUT NRM PARAM NOF/U: HCPCS | Performed by: INTERNAL MEDICINE

## 2023-12-12 PROCEDURE — 3074F SYST BP LT 130 MM HG: CPT | Performed by: INTERNAL MEDICINE

## 2023-12-12 PROCEDURE — 99204 OFFICE O/P NEW MOD 45 MIN: CPT | Performed by: INTERNAL MEDICINE

## 2023-12-12 PROCEDURE — 4004F PT TOBACCO SCREEN RCVD TLK: CPT | Performed by: INTERNAL MEDICINE

## 2023-12-12 PROCEDURE — G8484 FLU IMMUNIZE NO ADMIN: HCPCS | Performed by: INTERNAL MEDICINE

## 2023-12-12 RX ORDER — ALBUTEROL SULFATE 2.5 MG/3ML
2.5 SOLUTION RESPIRATORY (INHALATION) EVERY 6 HOURS PRN
COMMUNITY

## 2023-12-12 NOTE — PROGRESS NOTES
Neck Circumference -   18  Mallampati - 3    Lung Nodule Screening     [] Qualifies    [] Does not qualify   [] Declined    [] Completed
control-his blood pressure is on the low side. He denies any symptoms of dizziness, headache or chest pain. -Bipolar disorder.  -?  Seizure on treatment with Tegretol.  -Depression on treatment medications    Recommendations/Plan:  -Will send serum D- dimer today to evaluate for etiology of exertional dyspnea. He was advised and instructed to call my office in [facility] to go over the above test results and management. He verbalizes understanding.  -Schedule patient for full pulmonary function tests before clinic visit.  -He was advised to continue to practice good sleep hygiene practices.  -He was advised to loose weight by controlling diet and doing exercise.  -He was instructed to not to drive any motor vehicles or operate heavy equipment if he feels sleepy. - Schedule patient for nocturnal polysomnogram (Sleep study) at [facility] sleep lab. Patient educated to not to drive any motor vehicles or operate heavy equipment until sleep symptoms are under control. Patient verbalizes understanding. Patient to follow with my clinic at [address] [place] [address] 1week after sleep study.  -Schedule patient for Methacholine challenge test before clinic visit  to further evaluate for hyperreactive air way disease before clinic visit.  -Please obtain the latest progress note and echocardiogram report from Dr. Peter Velázquez, MDs office in [place] for review before next clinic visit. -Will follow his right lower lobe opacity VS scarring with a repeat CT scan of chest without IV contrast in 3 months. - Patient educated to quit smoking as soon as possible. Patient verbalizes understanding of adverse consequences of tobacco smoking.  -Schedule patient for follow up with my clinic in [facility] with recommended tests I.e full PFTs, methacholine challenge test and CT scan of chest without IV contrast. Patient advised to make early appointment if needed.   -Patient educated about my impression and plan.  Patient verbalizes

## 2024-01-23 NOTE — PROGRESS NOTES
[place] [facility]  Pulmonary medicine clinic follow up note.      Patient: Moshe Garcia  : 1960      Chief complaint/[place]:  Moshe Garcia is a 63 y.o. old male came for follow-up regarding his dyspnea after having recommended testing including serum D-dimer level, PFTs, methacholine challenge test and a CT scan of chest without IV contrast requested at the last visit.    He was initially referred from Cely Dickinson, DEBRA - CNP    He is having shortness of breath:Yes  Onset: Gradual  Duration: Last 3 months  Diurnal variation:  None.    Current functional capacity on level ground: Distance he can walk on level ground: He can walk on treadmill for 2 miles.  He also does weights in the gym.  He can climb steps: No.  He is not climbing any steps.  He gives a history of orthopnea:Yes.  He uses 2 pillows to sleep  He gives a history of paroxysmal nocturnal dyspnea:Yes     He is currently using following inhalers:  Albuterol 2.5 mg by nebulization every 6 hourly as needed for wheezing    He is having cough: No    He is having chest pain: Yes-on and off  Duration:Days: For the last 3 months  Onset: For the last 3 months  Location:predominantly on substernal  Nature/Quality: Squeezing pain  Assessment:mild.  Radiation: No  Scale:3/10  Relation ship to breathing: unchanged with inspiration.  He follows with Dr. Young Cohen MD-cardiologist at his office in [place].    He is currently using any oxygen supplementation at rest, exercise or during sleep/at night time: No    He was ever diagnosed with following pulmonary diseases:  Bronchial asthma: NO  COPD:NO  Pulmonary fibrosis:NO  Sarcoidosis:NO  Pulmonary embolism: NO  History of DVT in the past: NO     Pulmonary hypertension:NO  Pleural effusion/s in the past:NO    He ever diagnosed with connective tissue diseases including Systemic lupus Erythematosus, Rheumatoid arthritis or Sarcoidosis etc:NO    He

## 2024-01-26 ENCOUNTER — HOSPITAL ENCOUNTER (OUTPATIENT)
Dept: SLEEP CENTER | Age: 64
End: 2024-01-26
Payer: MEDICARE

## 2024-01-26 DIAGNOSIS — Z87.891 PERSONAL HISTORY OF TOBACCO USE, PRESENTING HAZARDS TO HEALTH: ICD-10-CM

## 2024-01-26 DIAGNOSIS — R06.00 DYSPNEA, UNSPECIFIED TYPE: ICD-10-CM

## 2024-01-26 DIAGNOSIS — I10 ESSENTIAL HYPERTENSION: ICD-10-CM

## 2024-01-26 DIAGNOSIS — G47.10 HYPERSOMNIA: ICD-10-CM

## 2024-01-26 PROCEDURE — 95810 POLYSOM 6/> YRS 4/> PARAM: CPT

## 2024-01-26 PROCEDURE — 95806 SLEEP STUDY UNATT&RESP EFFT: CPT

## 2024-01-28 NOTE — PROGRESS NOTES
[place] [facility]  Sleep Medicine Clinic Follow up note      Moshe Garcia                                            Chief Complaint and [place]: Moshe Garcia is a 63 y.o.oldmale came for follow up regarding his sleep study results. He underwent sleep study on 26 January 2024. He still complaining of excessive day time sleepiness with no improvement compared to last visit.    He is currently unemployed.  He is disabled due to bipolar disorder.  He used to work as a  in the past        Review of Systems:   General/Constitutional: he lost 4lbs of weight from the last visit with normal appetite. No fever or chills.  HENT: Negative.   Eyes: Negative.  Upper respiratory tract: No nasal stuffiness or post nasal drip.  Lower respiratory tract/ lungs: No cough or sputum production. No hemoptysis.  Cardiovascular: No palpitations or chest pain.  Gastrointestinal: No nausea or vomiting.  Neurological: No focal neurologiacal weakness.  Extremities: No edema.  Musculoskeletal: No complaints.  Genitourinary: No complaints.  Hematological: Negative.   Psychiatric/Behavioral: Negative.   Skin: No itching.        Past Medical History:   Diagnosis Date    Alcohol abuse     CKD (chronic kidney disease) stage 2, GFR 60-89 ml/min     Current every day smoker     Depression     Drug abuse (HCC) 2008    prescription abuse, cocaine    GERD (gastroesophageal reflux disease)     Hyperlipidemia     Hypertension     Neuropathy of both feet     Seizure ([facility])     Skin cancer     skin       Past Surgical History:   Procedure Laterality Date    OSTEOTOMY Left 5/3/2022    Left 5th Metatarsal Head Resection Excision of Wound performed by Prince Rosales DPM at [facility]    TIBIA FRACTURE SURGERY Left 12/28/2012    ORIF LEFT TIBIA    TONSILLECTOMY         Social History     Tobacco Use    Smoking status: Every Day     Current packs/day: 1.00     Average packs/day: 1

## 2024-01-30 NOTE — PROGRESS NOTES
Moshe presents today for a HST instruction and demonstration on unit # 5433. Questions were asked and answers given.  He was able to return demonstration and verbalized understanding.  The sleep center control room phone number was provided in case questions arise during the study. Informed patient to call 911 in case of an emergency.   He states he will return the unit tomorrow before 1000.                       Title:  Home Sleep Apnea Testing (HSAT)     Approved by:  Edenilson Moyer MD        Approval Date: December, 2021  Next Review: December, 2023       Responsible Party:  Carine Adame  Institution/Entities Applies to:    [facility]    Policy Number:  None      Document Type:  Such as Guideline, Policy,   Policy & Procedure, or Procedure, Instructions   Manual:  Policy and Procedures     Section: IV  Policy Start Date: June, 2011       HOME SLEEP APNEA TESTING (HSAT)      PURPOSE: To ensure home sleep apnea testing (HSAT) conducted by the sleep facility adheres to the current AASM practice parameters, clinical practice guidelines, best practice and clinical guidelines in regard to the diagnosis of GAUTAM in adults.       POLICY:      HSAT is a method of recording certain parameters which will target and measure, minimally, heart rate, oxygen saturation, respiratory airflow, respiratory effort and snoring for the purpose of evaluating a patient for GAUTAM.       HSAT will be performed in conjunction with a comprehensive sleep evaluation by an appropriately licensed sleep facility medical staff member. All portable monitoring equipment will be FDA-approved and appropriately maintained to ensure patient safety and efficiency of the test.       PROCEDURE:      An order from a licensed sleep physician along with appropriate medical history documenting the indication for HSAT that complies with the AASM practice parameters.    All tests will be performed, and records will be 
started at 08:44 p.m. and was  terminated at 04:50 a.m. with a total recording time of 486.3 minutes,  total sleep time was 477 minutes, and overall sleep efficiency was 98.1%  of total sleep time.  The sleep onset latency was immediate, and wake  after sleep onset was 9.5 minutes.    SLEEP STAGING AND DISTRIBUTION SUMMARY:  Revealed the patient spent 1  minute in stage I consisting of 0.2%, 447.5 minutes in stage II  consisting of 93.8%, 28.5 minutes in stage III consisting of 6%.  The  patient did not achieve REM sleep.    The patient did not have any periodic limb movements.    RESPIRATORY EVENT ANALYSIS:  Revealed the patient had a total of 0  apneas.  The patient had a total of 69 hypopneas.  All hypopneas were  scored by using AASM 1B criteria (4% desaturation criteria).  The total  number of apneas and hypopneas recorded during the study was 69 with an  apnea-hypopnea index of 8.7.  Patient's REM sleep apnea-hypopnea index  was not available due to absent REM sleep.    OXYGEN SATURATION MONITORING:  Revealed the patient had a maximum oxygen  desaturation to 80% which appeared during non-REM sleep.  Patient's  average oxygen saturation remained around 90.1%.  The patient spent a  total of 132.2 minutes below oxygen saturation less than 88%.    EKG MONITORING:  Revealed normal sinus rhythm.  The patient was found to  have moderate snoring during the sleep study.    The patient took 100 mg of trazodone prior to the sleep study.    IMPRESSION:  1.  Mild obstructive sleep apnea.  2.  Nocturnal hypoxia.  The patient spent a total of 132.2 minutes below  oxygen saturation less than 88%.  3.  Hypersomnia, most likely due to sleep apnea.  4.  Chronic kidney disease.  5.  Essential hypertension.  6.  Depression.    RECOMMENDATIONS:  The patient should be scheduled for followup with my  clinic as soon as possible to discuss about the sleep study findings for  further management.    Thanks to Cely Kennedy CNP, for giving

## 2024-02-06 ENCOUNTER — OFFICE VISIT (OUTPATIENT)
Dept: PULMONOLOGY | Age: 64
End: 2024-02-06
Payer: MEDICARE

## 2024-02-06 VITALS
TEMPERATURE: 97.9 F | OXYGEN SATURATION: 98 % | HEART RATE: 75 BPM | BODY MASS INDEX: 28.62 KG/M2 | HEIGHT: 76 IN | SYSTOLIC BLOOD PRESSURE: 118 MMHG | DIASTOLIC BLOOD PRESSURE: 80 MMHG | WEIGHT: 235 LBS

## 2024-02-06 DIAGNOSIS — G47.33 OSA (OBSTRUCTIVE SLEEP APNEA): Primary | ICD-10-CM

## 2024-02-06 DIAGNOSIS — G47.10 HYPERSOMNIA: ICD-10-CM

## 2024-02-06 DIAGNOSIS — I10 ESSENTIAL HYPERTENSION: ICD-10-CM

## 2024-02-06 PROCEDURE — 3079F DIAST BP 80-89 MM HG: CPT | Performed by: INTERNAL MEDICINE

## 2024-02-06 PROCEDURE — G8427 DOCREV CUR MEDS BY ELIG CLIN: HCPCS | Performed by: INTERNAL MEDICINE

## 2024-02-06 PROCEDURE — 3074F SYST BP LT 130 MM HG: CPT | Performed by: INTERNAL MEDICINE

## 2024-02-06 PROCEDURE — G8484 FLU IMMUNIZE NO ADMIN: HCPCS | Performed by: INTERNAL MEDICINE

## 2024-02-06 PROCEDURE — G8419 CALC BMI OUT NRM PARAM NOF/U: HCPCS | Performed by: INTERNAL MEDICINE

## 2024-02-06 PROCEDURE — 4004F PT TOBACCO SCREEN RCVD TLK: CPT | Performed by: INTERNAL MEDICINE

## 2024-02-06 PROCEDURE — 99213 OFFICE O/P EST LOW 20 MIN: CPT | Performed by: INTERNAL MEDICINE

## 2024-02-06 PROCEDURE — 3017F COLORECTAL CA SCREEN DOC REV: CPT | Performed by: INTERNAL MEDICINE

## 2024-02-06 NOTE — PROGRESS NOTES
Chief Complaint: Patient is here for PSG follow up     Mallampati airway Class: 3  Neck Circumference: 18 Inches    [place] sleepiness score 2/6/24: 5

## 2024-02-06 NOTE — PATIENT INSTRUCTIONS
Recommendations/Plan:  -I had a discussion with patient regarding avialable treatment options for his sleep disorder breathing including but not limited to CPAP titration in the sleep lab Vs.Dental appliance placement with referral to a local dentist Vs other available surgical options including Uvulopalatopharyngoplasty, maxillomandibular ostomy, Inspire device placement and tracheostomy as last option. At the end of discussion, he decided to go for the CPAP titration.  -Schedule patient for CPAP titration at [facility] sleep lab as soon as possible.  -He was advised to keep good compliance with recommended positive airway pressure therapy to get optimal results and clinical improvement.  -He was advised to call [place] regarding supplies if needed.  -He was advised to call my office for earlier appointment if needed for worsening of sleep symptoms.  -Moshe Garcia was advised to keep his [facility] for further evaluation and management of exertional dyspnea.  -He was advised to continue to practice good sleep hygiene practices.  -He was advised to loose weight by controlling diet and doing exercise.  -He was instructed to not to drive any motor vehicles or operate heavy equipment if he feels sleepy.   -Patient to follow with my clinic at [facility] sleep clinic in 6 to 8 weeks with CPAP download for further evaluation.  -Moshe Garcia was educated about my impression and plan. He verbalizes understanding.

## 2024-02-08 ENCOUNTER — HOSPITAL ENCOUNTER (OUTPATIENT)
Dept: SLEEP CENTER | Age: 64
Discharge: HOME | End: 2024-02-10
Payer: MEDICARE

## 2024-02-08 DIAGNOSIS — G47.10 HYPERSOMNIA: ICD-10-CM

## 2024-02-08 DIAGNOSIS — G47.33 OSA (OBSTRUCTIVE SLEEP APNEA): ICD-10-CM

## 2024-02-08 DIAGNOSIS — I10 ESSENTIAL HYPERTENSION: ICD-10-CM

## 2024-02-08 PROCEDURE — 95811 POLYSOM 6/>YRS CPAP 4/> PARM: CPT

## 2024-02-09 DIAGNOSIS — G47.33 OSA ON CPAP: Primary | ICD-10-CM

## 2024-02-09 DIAGNOSIS — G47.10 HYPERSOMNIA: ICD-10-CM

## 2024-02-09 NOTE — PROGRESS NOTES
[facility] for PAP supplies.    Simplus size medium.         Title:  CPAP/BiLevel Titration's    Approved by:  Edenilson Moyer MD      Approval Date:  December, 2021 Next Review:  December, 2023     Responsible Party: Carine Adame [facility] Institution/Entities Applies to:   [facility]   Policy Number:  None    Document Type:  Such as Guideline, Policy, Policy & Procedure, or Procedure, Instructions  Manual:  Policy and Procedures    Section: IV Policy Start Date: October, 2000         POLICY: Positive airway pressure device is used to treat patients with sleep related breathing disorders characterized by full or partial occlusion of the upper airway during sleep. A patient must have undergone polysomnography and diagnosed with obstructive sleep apnea.    All individuals who record sleep studies must follow best practices for CPAP/bilevel/ASV titrations in order to attain the ideal pressure setting for their patients. Too low of pressure may cause patients to either be sub-optimally treated or to wake up in a panic. Too much pressure may cause the patient to experience bloating or mask leakage. Determining the appropriate pressure setting for each patient will lead to improved adherence and outcome. Titrations are not an exact science, and it is understood that technologists may need to make minor changes for individual patients. The procedures outlined below are meant to be a guideline and follow the spirit of the AASM clinical guidelines.      PROCEDURE:    CPAP:    Review the patients pertinent medical al history, previous sleep study or studies to ass the severity of sleep disordered breathing. Review of pertinent information will help to attain a better titration.   Applications of electrodes, montages, filters, sensitivities and scoring will be performed according to the current version of the AASM Scoring Manual.   Prior to initiating study collect all

## 2024-02-13 ENCOUNTER — TELEPHONE (OUTPATIENT)
Dept: PULMONOLOGY | Age: 64
End: 2024-02-13

## 2024-02-13 NOTE — TELEPHONE ENCOUNTER
Patient called and wanted his pap order sent to [facility]. Did not wish to go [facility] anymore. Order faxed.

## 2024-02-14 ENCOUNTER — HOSPITAL ENCOUNTER (OUTPATIENT)
Dept: PULMONOLOGY | Age: 64
Discharge: HOME | End: 2024-02-14
Attending: INTERNAL MEDICINE
Payer: MEDICARE

## 2024-02-14 ENCOUNTER — HOSPITAL ENCOUNTER (OUTPATIENT)
Dept: CT IMAGING | Age: 64
Discharge: HOME | End: 2024-02-14
Attending: INTERNAL MEDICINE
Payer: MEDICARE

## 2024-02-14 DIAGNOSIS — I10 ESSENTIAL HYPERTENSION: ICD-10-CM

## 2024-02-14 DIAGNOSIS — Z87.891 PERSONAL HISTORY OF TOBACCO USE, PRESENTING HAZARDS TO HEALTH: ICD-10-CM

## 2024-02-14 DIAGNOSIS — G47.10 HYPERSOMNIA: ICD-10-CM

## 2024-02-14 DIAGNOSIS — R06.00 DYSPNEA, UNSPECIFIED TYPE: ICD-10-CM

## 2024-02-14 PROCEDURE — 94726 PLETHYSMOGRAPHY LUNG VOLUMES: CPT

## 2024-02-14 PROCEDURE — 6360000002 HC RX W HCPCS

## 2024-02-14 PROCEDURE — 94010 BREATHING CAPACITY TEST: CPT

## 2024-02-14 PROCEDURE — 94729 DIFFUSING CAPACITY: CPT

## 2024-02-14 PROCEDURE — 71250 CT THORAX DX C-: CPT

## 2024-02-14 PROCEDURE — 94070 EVALUATION OF WHEEZING: CPT

## 2024-02-19 ENCOUNTER — OFFICE VISIT (OUTPATIENT)
Dept: PULMONOLOGY | Age: 64
End: 2024-02-19
Payer: MEDICARE

## 2024-02-19 VITALS
SYSTOLIC BLOOD PRESSURE: 128 MMHG | BODY MASS INDEX: 28.74 KG/M2 | DIASTOLIC BLOOD PRESSURE: 70 MMHG | OXYGEN SATURATION: 98 % | TEMPERATURE: 97.5 F | WEIGHT: 236 LBS | HEART RATE: 63 BPM | HEIGHT: 76 IN

## 2024-02-19 DIAGNOSIS — G47.33 OSA (OBSTRUCTIVE SLEEP APNEA): ICD-10-CM

## 2024-02-19 DIAGNOSIS — Z87.891 PERSONAL HISTORY OF TOBACCO USE, PRESENTING HAZARDS TO HEALTH: ICD-10-CM

## 2024-02-19 DIAGNOSIS — R93.89 ABNORMAL CT OF THE CHEST: ICD-10-CM

## 2024-02-19 DIAGNOSIS — J45.40 MODERATE PERSISTENT ASTHMA WITHOUT COMPLICATION: Primary | ICD-10-CM

## 2024-02-19 PROCEDURE — 3017F COLORECTAL CA SCREEN DOC REV: CPT | Performed by: INTERNAL MEDICINE

## 2024-02-19 PROCEDURE — G8427 DOCREV CUR MEDS BY ELIG CLIN: HCPCS | Performed by: INTERNAL MEDICINE

## 2024-02-19 PROCEDURE — G8419 CALC BMI OUT NRM PARAM NOF/U: HCPCS | Performed by: INTERNAL MEDICINE

## 2024-02-19 PROCEDURE — 99214 OFFICE O/P EST MOD 30 MIN: CPT | Performed by: INTERNAL MEDICINE

## 2024-02-19 PROCEDURE — G8484 FLU IMMUNIZE NO ADMIN: HCPCS | Performed by: INTERNAL MEDICINE

## 2024-02-19 PROCEDURE — 4004F PT TOBACCO SCREEN RCVD TLK: CPT | Performed by: INTERNAL MEDICINE

## 2024-02-19 PROCEDURE — 3078F DIAST BP <80 MM HG: CPT | Performed by: INTERNAL MEDICINE

## 2024-02-19 PROCEDURE — 3074F SYST BP LT 130 MM HG: CPT | Performed by: INTERNAL MEDICINE

## 2024-02-19 RX ORDER — ALBUTEROL SULFATE 90 UG/1
2 AEROSOL, METERED RESPIRATORY (INHALATION) EVERY 6 HOURS PRN
Qty: 18 G | Refills: 9 | Status: SHIPPED | OUTPATIENT
Start: 2024-02-19

## 2024-02-19 RX ORDER — FLUTICASONE FUROATE AND VILANTEROL 100; 25 UG/1; UG/1
1 POWDER RESPIRATORY (INHALATION) DAILY
Qty: 60 EACH | Refills: 1 | Status: SHIPPED | OUTPATIENT
Start: 2024-02-19

## 2024-02-19 NOTE — PROGRESS NOTES
Neck Circumference -   18  Mallampati - 3    Lung Nodule Screening     [] Qualifies    [] Does not qualify   [] Declined    [] Completed

## 2024-02-19 NOTE — PROGRESS NOTES
[facility]  Pulmonary medicine clinic follow up note.      Patient: Moshe Garcia  : 1960      Chief complaint/[place]:  Moshe Garcia is a 63 y.o. old male came for follow-up regarding his dyspnea after having recommended testing including serum D-dimer level, PFTs, methacholine challenge test and a CT scan of chest without IV contrast requested at the last visit.    Reports he has been doing better since last visit. Reports decreased episodes of dyspnea, however is having about 3-4 episodes each day. Denies PND. Has not received his CPAP machine yet. Most recent sleep study with titration 24, AHI 8.7. Was started on CPAP 14 cmH20. Reported he had much better sleep quality with the CPAP machine during his sleep study.     Reports improvement in his coughing, will have intermittent cough throughout the day with clear sputum production.     Reports ongoing SOB during the daytime. Unable to identify a pattern to the SOB. He believes the SOB is worst when he bends over to tie his shoes in the morning and when he is exerting himself. Reports wheezing when having SOB. States he has not been on inhalers in the past. Has albuterol nebulizer at home, has not noticed much help with it. Smokes about 5-6 cigarettes per day, trying to cut down. Denies chest pain, worsening lower extremity edema.     He was initially referred from Cely Dickinson, DEBRA - CNP    He is having shortness of breath:Yes  Onset: Gradual  Duration: Last 3 months  Diurnal variation:  None.    Current functional capacity on level ground: Distance he can walk on level ground: He can walk on treadmill for 2 miles.  He also does weights in the gym.  He can climb steps: No.  He is not climbing any steps.  He gives a history of orthopnea:Yes.  He uses 2 pillows to sleep  He gives a history of paroxysmal nocturnal dyspnea:No     He is currently using following inhalers:  Albuterol 2.5 mg

## 2024-03-22 ENCOUNTER — TELEPHONE (OUTPATIENT)
Dept: PULMONOLOGY | Age: 64
End: 2024-03-22

## 2024-03-22 NOTE — TELEPHONE ENCOUNTER
Patient called in and stated that Breo is not affordable. He asked if he could just use the albuterol inhaler. I informed him of the difference between a rescue and maintenance inhaler. He verbalized his understanding. I advised him to call his insurance and ask what more affordable covered alternatives there are and give us a call back to know what options they provide him with and we will send the alternatives to Dr. Moyer so he can determine what would be a good maintenance inhaler. He verbalized his understanding .

## 2024-04-22 DIAGNOSIS — J45.40 MODERATE PERSISTENT ASTHMA WITHOUT COMPLICATION: ICD-10-CM

## 2024-04-22 RX ORDER — FLUTICASONE FUROATE AND VILANTEROL TRIFENATATE 100; 25 UG/1; UG/1
1 POWDER RESPIRATORY (INHALATION) DAILY
Qty: 1 EACH | Refills: 11 | Status: SHIPPED | OUTPATIENT
Start: 2024-04-22

## 2024-05-13 ENCOUNTER — OFFICE VISIT (OUTPATIENT)
Dept: PULMONOLOGY | Age: 64
End: 2024-05-13
Payer: MEDICARE

## 2024-05-13 VITALS
HEIGHT: 76 IN | TEMPERATURE: 97.6 F | BODY MASS INDEX: 29.57 KG/M2 | HEART RATE: 60 BPM | WEIGHT: 242.8 LBS | OXYGEN SATURATION: 96 % | SYSTOLIC BLOOD PRESSURE: 118 MMHG | DIASTOLIC BLOOD PRESSURE: 70 MMHG

## 2024-05-13 DIAGNOSIS — G47.33 OSA (OBSTRUCTIVE SLEEP APNEA): Primary | ICD-10-CM

## 2024-05-13 PROCEDURE — 3078F DIAST BP <80 MM HG: CPT

## 2024-05-13 PROCEDURE — 99214 OFFICE O/P EST MOD 30 MIN: CPT

## 2024-05-13 PROCEDURE — 3074F SYST BP LT 130 MM HG: CPT

## 2024-05-13 ASSESSMENT — ENCOUNTER SYMPTOMS
COUGH: 0
SHORTNESS OF BREATH: 0
SORE THROAT: 0
WHEEZING: 0

## 2024-05-13 NOTE — PROGRESS NOTES
[facility]      Moshe Garcia         [id number]  5/13/2024   Chief Complaint   Patient presents with    Follow-up     3mo GAUTAM f/u w/[facility] download.  Was set up w/PAP therapy 2/26/24.  Here to discuss progress.  Says he feels better.        Patient of Dr. Moyer     Assessment/Plan   1. GAUTAM (obstructive sleep apnea)         -Yearly supply order placed? No  -Download was personally reviewed and discussed with patient at length.  -Decrease pressure to 12 cmH2O and order mask refit  -The symptoms of GAUTAM have improved. The patient is benefiting from therapy and should continue use of PAP device.  -Patient's symptoms and AHI are somewhat controlled with current settings of 14 cmH2O. Change current pressure settings to 12 cmH2o and recheck DL in 2 weeks.  -Advised to continue current positive airway pressure therapy with above described pressure.   -Advised to keep good compliance with current recommended pressure to get optimal results and clinical improvement  -Recommend 7-9 hours of sleep with PAP  -Instructed to call DME company regarding supplies if needed.   -Patient to call my office for earlier appointment if needed for worsening of sleep symptoms.   -Patient is not to drive if feeling sleepy    - DME Order for CPAP as OP  - Misc. Devices (CPAP MACHINE) MISC; by Does not apply route Please change his current CPAP/BiPAP pressure to a CPAP 12 cm H2O    Please pull download in 2 weeks     Educated about my impression and plan. Patient verbalizes understanding.      Return in about 3 months (around 8/13/2024) for GAUTAM after pressure change/mask refit. with download.      Subjective   Presentation:   Moshe presents for sleep medicine follow up for obstructive sleep apnea.  Since the last visit, Moshe has been doing well with his CPAP. He reports great benefit from the machine and feels like he is much more restored when he is waking up. He is sleeping well. He is

## 2024-05-29 ENCOUNTER — TELEPHONE (OUTPATIENT)
Dept: PULMONOLOGY | Age: 64
End: 2024-05-29

## 2024-05-29 NOTE — TELEPHONE ENCOUNTER
Called and informed patient and patient states that pressure is fine but he was concerned that he didn't use any of the water last night. I advised him to try adjusting his humidification, patient voiced understanding and states that he got a voicemail about the \"heating plate\" and I transferred him to [facility] as we did not leave that voicemail.

## 2024-08-05 ENCOUNTER — HOSPITAL ENCOUNTER (OUTPATIENT)
Dept: CT IMAGING | Age: 64
Discharge: HOME | End: 2024-08-05
Payer: MEDICARE

## 2024-08-05 DIAGNOSIS — R93.89 ABNORMAL CT OF THE CHEST: ICD-10-CM

## 2024-08-05 PROCEDURE — 71250 CT THORAX DX C-: CPT

## 2024-08-13 NOTE — PROGRESS NOTES
05/13/24 110.1 kg (242 lb 12.8 oz)   02/19/24 107 kg (236 lb)       Physical Exam  Constitutional:       General: He is not in acute distress.     Comments: BMI 29   HENT:      Head: Normocephalic and atraumatic.      Right Ear: External ear normal.      Left Ear: External ear normal.      Mouth/Throat:      Mouth: Mucous membranes are moist.      Pharynx: No oropharyngeal exudate or posterior oropharyngeal erythema.   Eyes:      General:         Right eye: No discharge.         Left eye: No discharge.   Cardiovascular:      Rate and Rhythm: Normal rate and regular rhythm.   Pulmonary:      Effort: Pulmonary effort is normal. No respiratory distress.      Breath sounds: Wheezing (faint expiratory right upper lobe) present. No rhonchi or rales.   Chest:      Chest wall: No tenderness.   Musculoskeletal:      Cervical back: Neck supple.      Right lower leg: No edema.      Left lower leg: No edema.   Skin:     General: Skin is warm and dry.   Neurological:      General: No focal deficit present.      Mental Status: He is alert.   Psychiatric:         Mood and Affect: Mood normal.         Behavior: Behavior normal.         Thought Content: Thought content normal.            Electronically signed by DEBRA Pina CNP on 8/14/2024 at 11:45 AM     (Please note that portions of this note may have been completed with a voice recognition program. Efforts were made to edit the dictation but occasionally words are mis-transcribed)

## 2024-08-14 ENCOUNTER — OFFICE VISIT (OUTPATIENT)
Dept: PULMONOLOGY | Age: 64
End: 2024-08-14
Payer: MEDICARE

## 2024-08-14 VITALS
HEART RATE: 67 BPM | SYSTOLIC BLOOD PRESSURE: 120 MMHG | BODY MASS INDEX: 29.35 KG/M2 | DIASTOLIC BLOOD PRESSURE: 72 MMHG | HEIGHT: 76 IN | OXYGEN SATURATION: 94 % | WEIGHT: 241 LBS | TEMPERATURE: 97.7 F

## 2024-08-14 DIAGNOSIS — F17.210 CIGARETTE NICOTINE DEPENDENCE WITHOUT COMPLICATION: ICD-10-CM

## 2024-08-14 DIAGNOSIS — J45.40 MODERATE PERSISTENT ASTHMA WITHOUT COMPLICATION: Primary | ICD-10-CM

## 2024-08-14 PROCEDURE — 3078F DIAST BP <80 MM HG: CPT

## 2024-08-14 PROCEDURE — 3074F SYST BP LT 130 MM HG: CPT

## 2024-08-14 PROCEDURE — 99214 OFFICE O/P EST MOD 30 MIN: CPT

## 2024-08-14 PROCEDURE — G8419 CALC BMI OUT NRM PARAM NOF/U: HCPCS

## 2024-08-14 PROCEDURE — G8427 DOCREV CUR MEDS BY ELIG CLIN: HCPCS

## 2024-08-14 PROCEDURE — 4004F PT TOBACCO SCREEN RCVD TLK: CPT

## 2024-08-14 PROCEDURE — 3017F COLORECTAL CA SCREEN DOC REV: CPT

## 2024-08-14 RX ORDER — FLUTICASONE PROPIONATE AND SALMETEROL 100; 50 UG/1; UG/1
1 POWDER RESPIRATORY (INHALATION) 2 TIMES DAILY
Qty: 60 EACH | Refills: 11 | Status: SHIPPED | OUTPATIENT
Start: 2024-08-14

## 2024-08-14 ASSESSMENT — ENCOUNTER SYMPTOMS
SINUS PRESSURE: 0
SHORTNESS OF BREATH: 0
CHEST TIGHTNESS: 0
COUGH: 0
SINUS PAIN: 0
RHINORRHEA: 1
WHEEZING: 0

## 2024-08-14 NOTE — PATIENT INSTRUCTIONS
half. And your risk for many other types of cancer is lower too.  How would quitting help others in your life?  When you quit smoking, you improve the health of everyone who now breathes in your smoke.  Their heart, lung, and cancer risks drop, much like yours.  They are sick less. For babies and small children, living smoke-free means they're less likely to have ear infections, pneumonia, and bronchitis.  If you're a woman who is or will be pregnant someday, quitting smoking means a healthier .  Children who are close to you are less likely to become adult smokers.  Where can you learn more?  Go to [email or web address] and sign in to your [employer] account. Enter O319 in the Search Health Information box to learn more about \"Learning About Benefits From Quitting Smoking.\"     If you do not have an account, please click on the \"Sign Up Now\" link.  Current as of: 2021               Content Version: 12.9   [employer].   Care instructions adapted under license by [employer]. If you have questions about a medical condition or this instruction, always ask your healthcare professional. [employer] disclaims any warranty or liability for your use of this information.

## 2024-08-20 NOTE — PROGRESS NOTES
External ear normal.      Mouth/Throat:      Mouth: Mucous membranes are moist.      Pharynx: No oropharyngeal exudate or posterior oropharyngeal erythema.   Eyes:      General:         Right eye: No discharge.         Left eye: No discharge.   Cardiovascular:      Rate and Rhythm: Normal rate.   Pulmonary:      Effort: Pulmonary effort is normal. No respiratory distress.      Breath sounds: No wheezing, rhonchi or rales.   Musculoskeletal:         General: Signs of injury present.      Cervical back: Neck supple.      Right lower leg: No edema.      Left lower leg: No edema.      Comments: Ortho shoe to right foot   Skin:     General: Skin is warm and dry.   Neurological:      General: No focal deficit present.      Mental Status: He is alert.   Psychiatric:         Mood and Affect: Mood normal.         Behavior: Behavior normal.         Thought Content: Thought content normal.            Information added by my medical assistant/LPN was reviewed today.    Electronically signed by DEBRA Olivas CNP on 8/21/2024 at 10:04 AM

## 2024-08-21 ENCOUNTER — OFFICE VISIT (OUTPATIENT)
Dept: PULMONOLOGY | Age: 64
End: 2024-08-21
Payer: MEDICARE

## 2024-08-21 ENCOUNTER — TELEPHONE (OUTPATIENT)
Dept: SLEEP CENTER | Age: 64
End: 2024-08-21

## 2024-08-21 VITALS
WEIGHT: 245.4 LBS | TEMPERATURE: 97.9 F | HEART RATE: 62 BPM | BODY MASS INDEX: 29.88 KG/M2 | SYSTOLIC BLOOD PRESSURE: 116 MMHG | HEIGHT: 76 IN | OXYGEN SATURATION: 96 % | DIASTOLIC BLOOD PRESSURE: 70 MMHG

## 2024-08-21 DIAGNOSIS — E66.3 OVERWEIGHT (BMI 25.0-29.9): ICD-10-CM

## 2024-08-21 DIAGNOSIS — G47.33 OSA (OBSTRUCTIVE SLEEP APNEA): Primary | ICD-10-CM

## 2024-08-21 PROCEDURE — 3074F SYST BP LT 130 MM HG: CPT

## 2024-08-21 PROCEDURE — 99213 OFFICE O/P EST LOW 20 MIN: CPT

## 2024-08-21 PROCEDURE — 3078F DIAST BP <80 MM HG: CPT

## 2024-08-21 ASSESSMENT — ENCOUNTER SYMPTOMS
SHORTNESS OF BREATH: 0
WHEEZING: 0
SORE THROAT: 0
RHINORRHEA: 0
COUGH: 0

## 2024-08-21 NOTE — TELEPHONE ENCOUNTER
Moshe left a vm here at the sleep lab confirming his appt with Дмитрий for 08/21/24.        Thank you,  Griselda

## 2025-03-25 NOTE — PROGRESS NOTES
[facility]    Patient: MOSHE CASTRO, 64 y.o.   : 1960  3/26/2025    Patient of Dr. Moyer    Assessment/Plan   1. Moderate persistent asthma without complication - stable  -Patient did have hospitalization at [facility] for exacerbation due to parainfluenza infection. He was discharged on O2  -6 Minute Walk Test - Did not qualify for supplemental O2    -Continue Wixela. Patient advised to take 1 puff twice daily.  Rinse mouth with water, gargle, and spit after use.   -Continue albuterol 2 puffs every 6 hours as needed for shortness of breath or wheezing   -Reviewed preventative vaccinations    2. Cigarette nicotine dependence without complication  -Tobacco Cessation Counseling: Patient advised about behavior change, including information about personal health harms, usage of appropriate cessation measures and benefits of cessation.  Time spent (minutes): 3  -[place] TOBACCO USE CESSATION INTERMEDIATE 3-10 MINUTES [96425]  -Patient had CT Chest in Dec 2024 at [facility]. Images not available for review. Per report no acute lung findings. Will repeat lung screening in Dec 2025      Advised patient to call office with any changes, questions, or concerns regarding respiratory status or issues with prescribed medications    Return in about 9 months (around 2025) for Asthma with LDCT prior.        Subjective     Chief Complaint   Patient presents with    Follow-up     Asthma f/u 7 mo         HPI  Complaints: Shortness of Breath  Onset Duration: Over a year  Exacerbating factors: Exertion  Alleviating factors: Rest  Pertinent negatives: Cough, Sputum Production, Hemoptysis, Wheezing, and Chest Tightness  Risk factors for lung disease: no known risk factors      Moshe is here for follow up for asthma. Overall patient reports respiratory symptoms have been stable since last appointment. Patient reports good compliance with inhaled medications (wixela). He admits he

## 2025-03-26 ENCOUNTER — OFFICE VISIT (OUTPATIENT)
Dept: PULMONOLOGY | Age: 65
End: 2025-03-26

## 2025-03-26 VITALS
SYSTOLIC BLOOD PRESSURE: 112 MMHG | HEIGHT: 76 IN | BODY MASS INDEX: 31.15 KG/M2 | WEIGHT: 255.8 LBS | HEART RATE: 66 BPM | TEMPERATURE: 97.7 F | OXYGEN SATURATION: 98 % | DIASTOLIC BLOOD PRESSURE: 76 MMHG

## 2025-03-26 DIAGNOSIS — J45.40 MODERATE PERSISTENT ASTHMA WITHOUT COMPLICATION: Primary | ICD-10-CM

## 2025-03-26 DIAGNOSIS — F17.210 CIGARETTE NICOTINE DEPENDENCE WITHOUT COMPLICATION: ICD-10-CM

## 2025-03-26 RX ORDER — HYDROXYZINE PAMOATE 50 MG/1
50 CAPSULE ORAL 4 TIMES DAILY
COMMUNITY

## 2025-03-26 ASSESSMENT — ENCOUNTER SYMPTOMS
RHINORRHEA: 0
WHEEZING: 0
SHORTNESS OF BREATH: 1
COUGH: 0
SINUS PRESSURE: 0
SINUS PAIN: 0
CHEST TIGHTNESS: 0

## 2025-03-26 NOTE — PATIENT INSTRUCTIONS
Make sure to discuss the RSV vaccine with your pharmacist.      Quitting Tobacco: Care Instructions  Quitting tobacco is much harder than simply changing a habit. Nicotine cravings make it hard to quit, but you can do it. Your doctor will help you set up the plan that best meets your needs.    You will miss the nicotine at first. You may feel short-tempered and grumpy. You may have trouble sleeping or thinking clearly. The urge to use tobacco may continue for a time.   Combining tools can raise your chances of success. You can use medicine along with counseling. And you can join a quit-tobacco program, such as the American Lung Association's Freedom from Smoking program.     Get support.  Reach out to family and friends, and find a counselor to help you quit. Join a support group, such as Nicotine Anonymous. Go to www.smokefree.gov to sign up for text messaging support.     Talk to your doctor or pharmacist about medicines that can help you quit.  Medicines can help with cravings and withdrawal symptoms. There are several over-the-counter choices, such as nicotine patches, gum, and lozenges.     After you quit, do not use tobacco again, not even once.  Get rid of all tobacco products and anything that reminds you of tobacco, such as ashtrays.     Avoid things that make you reach for tobacco.  Change your daily routine. Take a different route to work, or eat a meal in a different place.     Try to cut down on stress.  Find ways to calm yourself, such as taking a hot bath or doing deep breathing exercises.     Eat a healthy diet, and get regular exercise.  Having healthy habits may help you quit using tobacco.     Don't give up on quitting if you use tobacco again.  Most people quit and restart a few times before they quit for good.   Follow-up care is a key part of your treatment and safety. Be sure to make and go to all appointments, and call your doctor if you are having problems. It's also a good idea to know your

## 2025-06-07 DIAGNOSIS — J45.40 MODERATE PERSISTENT ASTHMA WITHOUT COMPLICATION: ICD-10-CM

## 2025-06-09 RX ORDER — ALBUTEROL SULFATE 90 UG/1
INHALANT RESPIRATORY (INHALATION)
Qty: 18 EACH | Refills: 9 | Status: SHIPPED | OUTPATIENT
Start: 2025-06-09

## 2025-08-20 ENCOUNTER — OFFICE VISIT (OUTPATIENT)
Age: 65
End: 2025-08-20
Payer: MEDICARE

## 2025-08-20 VITALS
HEART RATE: 63 BPM | DIASTOLIC BLOOD PRESSURE: 70 MMHG | WEIGHT: 249.8 LBS | BODY MASS INDEX: 30.42 KG/M2 | HEIGHT: 76 IN | SYSTOLIC BLOOD PRESSURE: 118 MMHG | OXYGEN SATURATION: 90 % | TEMPERATURE: 98.3 F

## 2025-08-20 DIAGNOSIS — E66.9 OBESITY (BMI 30-39.9): ICD-10-CM

## 2025-08-20 DIAGNOSIS — G47.33 OSA ON CPAP: Primary | ICD-10-CM

## 2025-08-20 DIAGNOSIS — Z87.891 PERSONAL HISTORY OF TOBACCO USE, PRESENTING HAZARDS TO HEALTH: ICD-10-CM

## 2025-08-20 PROCEDURE — G8417 CALC BMI ABV UP PARAM F/U: HCPCS

## 2025-08-20 PROCEDURE — 3017F COLORECTAL CA SCREEN DOC REV: CPT

## 2025-08-20 PROCEDURE — 4004F PT TOBACCO SCREEN RCVD TLK: CPT

## 2025-08-20 PROCEDURE — 3074F SYST BP LT 130 MM HG: CPT

## 2025-08-20 PROCEDURE — G8427 DOCREV CUR MEDS BY ELIG CLIN: HCPCS

## 2025-08-20 PROCEDURE — 3078F DIAST BP <80 MM HG: CPT

## 2025-08-20 PROCEDURE — 1123F ACP DISCUSS/DSCN MKR DOCD: CPT

## 2025-08-20 PROCEDURE — 99214 OFFICE O/P EST MOD 30 MIN: CPT

## 2025-08-20 ASSESSMENT — ENCOUNTER SYMPTOMS
RHINORRHEA: 1
BACK PAIN: 1
COUGH: 0
WHEEZING: 0
SORE THROAT: 0
SHORTNESS OF BREATH: 0

## 2025-08-28 ENCOUNTER — TELEPHONE (OUTPATIENT)
Dept: PULMONOLOGY | Age: 65
End: 2025-08-28

## 2025-08-28 DIAGNOSIS — J45.40 MODERATE PERSISTENT ASTHMA WITHOUT COMPLICATION: ICD-10-CM

## 2025-08-28 RX ORDER — FLUTICASONE PROPIONATE AND SALMETEROL 100; 50 UG/1; UG/1
1 POWDER RESPIRATORY (INHALATION) 2 TIMES DAILY
Qty: 60 EACH | Refills: 11 | Status: SHIPPED | OUTPATIENT
Start: 2025-08-28

## (undated) DEVICE — GLOVE SURG SZ 8 L12IN FNGR THK94MIL TRNSLUC YEL LTX HYDRGEL

## (undated) DEVICE — SOLUTION IV IRRIG POUR BRL 0.9% SODIUM CHL 2F7124

## (undated) DEVICE — SUTURE NONABSORBABLE MONOFILAMENT 4-0 PS-2 18 IN BLU PROLENE 8682H

## (undated) DEVICE — GLOVE ORANGE PI 7 1/2   MSG9075

## (undated) DEVICE — SOLUTION IRRIG 1500ML STRL H2O USP POUR PLAS BTL

## (undated) DEVICE — PACK PROCEDURE SURG POD SC SRHP LF

## (undated) DEVICE — Device

## (undated) DEVICE — THIN OFFSET (9.0 X 0.38 X 25.0MM)

## (undated) DEVICE — NEEDLE HYPO 25GA L1IN PIVOTING SHLD FOR LUERLOCK SYR ECLIPSE

## (undated) DEVICE — PENCIL SMK EVAC ALL IN 1 DSGN ENH VISIBILITY IMPROVED AIR

## (undated) DEVICE — IMPREGNATED GAUZE DRESSING: Brand: CUTICERIN 7.5X7.5CM CTN 50